# Patient Record
Sex: FEMALE | Race: WHITE | NOT HISPANIC OR LATINO | Employment: OTHER | URBAN - METROPOLITAN AREA
[De-identification: names, ages, dates, MRNs, and addresses within clinical notes are randomized per-mention and may not be internally consistent; named-entity substitution may affect disease eponyms.]

---

## 2017-01-04 DIAGNOSIS — M79.7 FIBROMYALGIA: ICD-10-CM

## 2017-01-04 DIAGNOSIS — F33.0 MAJOR DEPRESSIVE DISORDER, RECURRENT EPISODE, MILD (H): ICD-10-CM

## 2017-01-04 DIAGNOSIS — G25.81 RESTLESS LEG SYNDROME: Primary | ICD-10-CM

## 2017-01-04 DIAGNOSIS — F41.9 ANXIETY: ICD-10-CM

## 2017-01-04 RX ORDER — DULOXETIN HYDROCHLORIDE 60 MG/1
60 CAPSULE, DELAYED RELEASE ORAL 2 TIMES DAILY
Qty: 180 CAPSULE | Refills: 3 | Status: SHIPPED | OUTPATIENT
Start: 2017-01-04 | End: 2018-01-04

## 2017-01-04 NOTE — TELEPHONE ENCOUNTER
Pt states she is taking 3 mg of Requip.   Provider approval needed, this amount is not on her directions.     Also resent her Cymbalta to [facility]. She states they won't release the 12/8/16 prescription.

## 2017-01-05 RX ORDER — ROPINIROLE 1 MG/1
3 TABLET, FILM COATED ORAL AT BEDTIME
Qty: 90 TABLET | Refills: 5 | Status: SHIPPED | OUTPATIENT
Start: 2017-01-05 | End: 2017-05-25

## 2017-01-05 NOTE — TELEPHONE ENCOUNTER
It was ordered as 2 mg at night; advise her she should never increase a medication above what I have recommended on her own. Requip can take some time to work so that is why I had her stop at 2 mg to start with. I will do the 3 now but in the future she needs to call before changing doses.

## 2017-01-08 PROBLEM — F33.0 MILD EPISODE OF RECURRENT MAJOR DEPRESSIVE DISORDER (H): Status: ACTIVE | Noted: 2017-01-08

## 2017-01-18 ENCOUNTER — TELEPHONE (OUTPATIENT)
Dept: INTERNAL MEDICINE | Facility: CLINIC | Age: 72
End: 2017-01-18

## 2017-01-18 DIAGNOSIS — M51.369 DDD (DEGENERATIVE DISC DISEASE), LUMBAR: ICD-10-CM

## 2017-01-18 DIAGNOSIS — M79.7 FIBROMYALGIA: Primary | ICD-10-CM

## 2017-01-18 NOTE — TELEPHONE ENCOUNTER
Norco      Last Written Prescription Date:  12-23-16  Last Fill Quantity: 180,   # refills: 0  Last Office Visit with [facility] prescribing provider: 12-8-16  Future Office visit:       Routing refill request to provider for review/approval because:  Drug not on the [facility] or [facility] refill protocol or controlled substance    Signed CSA form in chart.    [facility]    Please advise, thanks.

## 2017-01-18 NOTE — TELEPHONE ENCOUNTER
Reason for Call:  Medication or medication refill:    Do you use a [facility]?  Name of the pharmacy and phone number for the current request:  [facility] [address] [place] - [phone]    Name of the medication requested: HAI,    Other request: NONE      Can we leave a detailed message on this number? YES    Phone number patient can be reached at: Cell number on file:    Telephone Information:   Mobile [phone]       Best Time: ASAP    Call taken on 1/18/2017 at 11:07 AM by Jazz Franks

## 2017-01-19 RX ORDER — HYDROCODONE BITARTRATE AND ACETAMINOPHEN 5; 325 MG/1; MG/1
1-2 TABLET ORAL EVERY 6 HOURS PRN
Qty: 180 TABLET | Refills: 0 | Status: SHIPPED | OUTPATIENT
Start: 2017-01-19 | End: 2017-02-17

## 2017-02-02 ENCOUNTER — TELEPHONE (OUTPATIENT)
Dept: INTERNAL MEDICINE | Facility: CLINIC | Age: 72
End: 2017-02-02

## 2017-02-02 NOTE — TELEPHONE ENCOUNTER
Patient calling in requesting a z-deana.  Reports having a cold and coughing up phlegm, greenish in color, thick once expectorated. Hard to sleep due to coughing. Cough x 3-4 days. Denies fever. Denies SOB or wheezing.    Provider please review and advise.

## 2017-02-07 ENCOUNTER — TELEPHONE (OUTPATIENT)
Dept: INTERNAL MEDICINE | Facility: CLINIC | Age: 72
End: 2017-02-07

## 2017-02-07 DIAGNOSIS — R05.9 COUGH: Primary | ICD-10-CM

## 2017-02-07 NOTE — TELEPHONE ENCOUNTER
Is she still having a cough? Or is this a new illness? If it is chronic she may need to see the lung specialist.

## 2017-02-07 NOTE — TELEPHONE ENCOUNTER
Cheratussin AC Oral Syrup      Last Written Prescription Date:  01/08/16  Last Fill Quantity: 8 oz,   # refills: 0  Last Office Visit with [facility] or  [facility] prescribing provider: 12/08/16  Future Office visit:       Routing refill request to provider for review/approval because:  Drug not on the [facility] or  [facility] refill protocol or controlled substance  Drug not active on patient's medication list

## 2017-02-08 NOTE — TELEPHONE ENCOUNTER
Spoke with pt and she went to [facility] last [facility], they gave her Zpak. She would like the cough syrup still, routed to PCP

## 2017-02-09 RX ORDER — CODEINE PHOSPHATE AND GUAIFENESIN 10; 100 MG/5ML; MG/5ML
2 SOLUTION ORAL AT BEDTIME
Qty: 8 OZ | Refills: 0 | Status: SHIPPED | OUTPATIENT
Start: 2017-02-09 | End: 2018-01-23

## 2017-02-17 DIAGNOSIS — M79.7 FIBROMYALGIA: ICD-10-CM

## 2017-02-17 DIAGNOSIS — M51.369 DDD (DEGENERATIVE DISC DISEASE), LUMBAR: ICD-10-CM

## 2017-02-17 NOTE — TELEPHONE ENCOUNTER
Pt requesting refill of Vicodin  Last written 1/19/17 #180 with 0 refills  Last OV 12/8/16    Please have someone take it to [facility].

## 2017-02-17 NOTE — TELEPHONE ENCOUNTER
Reason for Call:  Medication or medication refill:    [facility]?  Name of the pharmacy and phone number for the current request:  [facility] [address] ([place]) - [phone]    Name of the medication requested: vicodin    Other request: Pt has 5 left    Can we leave a detailed message on this number? YES    Phone number patient can be reached at: Home number on file [phone] (home)    Best Time: any    Call taken on 2/17/2017 at 1:10 PM by Giovanna Yang

## 2017-02-19 RX ORDER — HYDROCODONE BITARTRATE AND ACETAMINOPHEN 5; 325 MG/1; MG/1
1-2 TABLET ORAL EVERY 6 HOURS PRN
Qty: 180 TABLET | Refills: 0 | Status: SHIPPED | OUTPATIENT
Start: 2017-02-19 | End: 2017-03-16

## 2017-02-20 DIAGNOSIS — R05.9 COUGH: ICD-10-CM

## 2017-02-20 RX ORDER — CODEINE PHOSPHATE AND GUAIFENESIN 10; 100 MG/5ML; MG/5ML
2 SOLUTION ORAL AT BEDTIME
Qty: 8 OZ | Refills: 0 | Status: CANCELLED | OUTPATIENT
Start: 2017-02-20

## 2017-02-20 NOTE — TELEPHONE ENCOUNTER
Patient called- not feeling any better sx persist.     Deep coughing, phlegm is white and clear. Mucinex not effective. Finished Zpak few weeks ago.    [facility] [place].

## 2017-02-21 NOTE — TELEPHONE ENCOUNTER
Robitussin AC      Last Written Prescription Date:  02/09/17  Last Fill Quantity: 8 ounces,   # refills: 0  Last Office Visit with [facility] prescribing provider: 12/08/16  Future Office visit:       Routing refill request to provider for review/approval because:  Drug not on the [facility], [facility] or [facility] refill protocol or controlled substance    CSA in epic.    Please advise, thanks.

## 2017-03-16 DIAGNOSIS — M51.369 DDD (DEGENERATIVE DISC DISEASE), LUMBAR: ICD-10-CM

## 2017-03-16 DIAGNOSIS — M79.7 FIBROMYALGIA: ICD-10-CM

## 2017-03-16 NOTE — TELEPHONE ENCOUNTER
Reason for Call:  Medication or medication refill:    Do you use a [facility]?  Name of the pharmacy and phone number for the current request:  [facility] [address] ([place]) - [phone]    Name of the medication requested: vicodin    Other request:     Can we leave a detailed message on this number? YES-pt requests to leave a full message     Phone number patient can be reached at: Home number on file [phone] (home)    Best Time:     Call taken on 3/16/2017 at 12:53 PM by Chika Matute

## 2017-03-16 NOTE — TELEPHONE ENCOUNTER
Norco       Last Written Prescription Date:  2/19/17  Last Fill Quantity: 180,   # refills: 0    Last Office Visit with [facility] prescribing provider: 12/8/16  Future Office visit:       Routing refill request to provider for review/approval because:  Drug not on the [facility] or [facility] refill protocol or controlled substance

## 2017-03-21 RX ORDER — HYDROCODONE BITARTRATE AND ACETAMINOPHEN 5; 325 MG/1; MG/1
1-2 TABLET ORAL EVERY 6 HOURS PRN
Qty: 180 TABLET | Refills: 0 | Status: SHIPPED | OUTPATIENT
Start: 2017-03-21 | End: 2017-04-17

## 2017-03-22 ENCOUNTER — TRANSFERRED RECORDS (OUTPATIENT)
Dept: HEALTH INFORMATION MANAGEMENT | Facility: CLINIC | Age: 72
End: 2017-03-22

## 2017-04-17 DIAGNOSIS — I10 BENIGN ESSENTIAL HYPERTENSION: ICD-10-CM

## 2017-04-17 DIAGNOSIS — M51.369 DDD (DEGENERATIVE DISC DISEASE), LUMBAR: ICD-10-CM

## 2017-04-17 DIAGNOSIS — M79.7 FIBROMYALGIA: ICD-10-CM

## 2017-04-17 RX ORDER — LOSARTAN POTASSIUM 50 MG/1
TABLET ORAL
Qty: 90 TABLET | Refills: 0 | Status: SHIPPED | OUTPATIENT
Start: 2017-04-17 | End: 2017-05-25

## 2017-04-17 RX ORDER — AMLODIPINE BESYLATE 5 MG/1
TABLET ORAL
Qty: 90 TABLET | Refills: 0 | Status: SHIPPED | OUTPATIENT
Start: 2017-04-17 | End: 2017-05-25

## 2017-04-17 NOTE — TELEPHONE ENCOUNTER
Reason for Call:  Medication or medication refill:    Do you use a [facility]?  Name of the pharmacy and phone number for the current request: [facility]    Name of the medication requested: vicodin    Other request: none   Can we leave a detailed message on this number? YES    Phone number patient can be reached at: Home number on file [phone] (home)    Best Time: asap     Call taken on 4/17/2017 at 10:50 AM by Denia Orozco

## 2017-04-17 NOTE — TELEPHONE ENCOUNTER
Norco      Last Written Prescription Date:  3-21-17  Last Fill Quantity: 180,   # refills: 0  Last Office Visit with [facility] prescribing provider: 12-8-16  Future Office visit:    Next 5 appointments (look out 90 days)     Apr 28, 2017 10:00 AM CDT   [facility]   [facility]    [address]  [facility] [id number] [phone]                   Routing refill request to provider for review/approval because:  Drug not on the [facility], [facility] or [facility] refill protocol or controlled substance    Signed CSA form in chart.    [facility]    Please advise, thanks.

## 2017-04-17 NOTE — TELEPHONE ENCOUNTER
Amlodipine      Cozaar      Last Written Prescription Date: 7/20/16  Last Fill Quantity: 90, # refills: 3  Last Office Visit with [facility] or [facility] prescribing provider: 12/8/16  Next 5 appointments (look out 90 days)     Apr 28, 2017 10:00 AM CDT   Office Visit with Dolores Blanton MD   [facility]    [address]  [facility] [id number] [phone]                   Potassium   Date Value Ref Range Status   05/05/2016 4.2 3.4 - 5.3 mmol/L Final     Creatinine   Date Value Ref Range Status   05/05/2016 0.87 0.52 - 1.04 mg/dL Final     BP Readings from Last 3 Encounters:   12/08/16 138/70   05/05/16 138/89   08/06/15 143/89     Medication is being filled for 1 time refill only due to:  Patient needs labs non-fasting labs due May 2017. Appointment scheduled 4/28/17.

## 2017-04-18 ENCOUNTER — TELEPHONE (OUTPATIENT)
Dept: INTERNAL MEDICINE | Facility: CLINIC | Age: 72
End: 2017-04-18

## 2017-04-18 DIAGNOSIS — R53.83 FATIGUE, UNSPECIFIED TYPE: ICD-10-CM

## 2017-04-18 DIAGNOSIS — I10 ESSENTIAL HYPERTENSION: Primary | ICD-10-CM

## 2017-04-18 RX ORDER — HYDROCODONE BITARTRATE AND ACETAMINOPHEN 5; 325 MG/1; MG/1
1-2 TABLET ORAL EVERY 6 HOURS PRN
Qty: 180 TABLET | Refills: 0 | Status: SHIPPED | OUTPATIENT
Start: 2017-04-18 | End: 2017-05-18

## 2017-04-18 NOTE — TELEPHONE ENCOUNTER
Reason for Call: Request for an order or referral:    Order or referral being requested: labs for Vitamin D     Date needed: before or at my next appointment 4/28    Has the patient been seen by the PCP for this problem? NO    Additional comments: pt's chiropractor would like labs ordered for vitamin D levels in regards to patients fatigue    Phone number Patient can be reached at:  Cell number on file:    Telephone Information:   Mobile [phone]       Best Time:      Can we leave a detailed message on this number?  YES    Call taken on 4/18/2017 at 11:53 AM by Chika Matute

## 2017-04-18 NOTE — TELEPHONE ENCOUNTER
Advise patient medicare will not likely pay for this and she will have to sign ABN form. Document how much Vitamin D she is taking and advise she also needs her yearly urine protein and blood kidney tests.

## 2017-04-19 NOTE — TELEPHONE ENCOUNTER
Spoke to patient who states she will look into cost if not covered and decide if they want to proceed and let us know.

## 2017-05-02 ENCOUNTER — HOSPITAL ENCOUNTER (OUTPATIENT)
Dept: MAMMOGRAPHY | Facility: CLINIC | Age: 72
Discharge: HOME | End: 2017-05-02
Attending: INTERNAL MEDICINE | Admitting: INTERNAL MEDICINE
Payer: MEDICARE

## 2017-05-02 DIAGNOSIS — Z12.31 VISIT FOR SCREENING MAMMOGRAM: ICD-10-CM

## 2017-05-02 PROCEDURE — 77063 BREAST TOMOSYNTHESIS BI: CPT

## 2017-05-05 ENCOUNTER — TRANSFERRED RECORDS (OUTPATIENT)
Dept: HEALTH INFORMATION MANAGEMENT | Facility: CLINIC | Age: 72
End: 2017-05-05

## 2017-05-18 DIAGNOSIS — M79.7 FIBROMYALGIA: ICD-10-CM

## 2017-05-18 DIAGNOSIS — M51.369 DDD (DEGENERATIVE DISC DISEASE), LUMBAR: ICD-10-CM

## 2017-05-18 RX ORDER — HYDROCODONE BITARTRATE AND ACETAMINOPHEN 5; 325 MG/1; MG/1
1-2 TABLET ORAL EVERY 6 HOURS PRN
Qty: 180 TABLET | Refills: 0 | Status: SHIPPED | OUTPATIENT
Start: 2017-05-18 | End: 2017-06-23

## 2017-05-18 NOTE — TELEPHONE ENCOUNTER
Norco      Last Written Prescription Date:  4/18/17  Last Fill Quantity: 180,   # refills: 0  Last Office Visit with [facility] prescribing provider: 12/8/16  Future Office visit:    Next 5 appointments (look out 90 days)     May 25, 2017  8:00 AM CDT   [facility] with [facility]   [facility]    [address]  [facility] [id number] [phone]                   Routing refill request to provider for review/approval because:  Drug not on the [facility], [facility] or [facility] refill protocol or controlled substance    Controlled substance agreement on file

## 2017-05-18 NOTE — TELEPHONE ENCOUNTER
Reason for Call:  Medication or medication refill:    Do you use a [facility]?  Name of the pharmacy and phone number for the current request:  [facility] [address] [place] - [phone]    Name of the medication requested: vicodin    Other request: pt states she would like to pick this RX up on 5/18/17,told pt no kristee    Can we leave a detailed message on this number? YES    Phone number patient can be reached at: Home number on file [phone] (home)    Best Time: anytime    Call taken on 5/18/2017 at 12:45 PM by Basilia Martell

## 2017-05-25 ENCOUNTER — OFFICE VISIT (OUTPATIENT)
Dept: INTERNAL MEDICINE | Facility: CLINIC | Age: 72
End: 2017-05-25
Payer: MEDICARE

## 2017-05-25 VITALS
TEMPERATURE: 97.9 F | WEIGHT: 286 LBS | HEIGHT: 64 IN | SYSTOLIC BLOOD PRESSURE: 138 MMHG | HEART RATE: 104 BPM | OXYGEN SATURATION: 96 % | BODY MASS INDEX: 48.83 KG/M2 | DIASTOLIC BLOOD PRESSURE: 84 MMHG

## 2017-05-25 DIAGNOSIS — Z00.00 ENCOUNTER FOR ROUTINE ADULT HEALTH EXAMINATION WITHOUT ABNORMAL FINDINGS: Primary | ICD-10-CM

## 2017-05-25 DIAGNOSIS — R73.09 ABNORMAL BLOOD SUGAR: ICD-10-CM

## 2017-05-25 DIAGNOSIS — G89.4 CHRONIC PAIN SYNDROME: ICD-10-CM

## 2017-05-25 DIAGNOSIS — G25.81 RESTLESS LEG SYNDROME: ICD-10-CM

## 2017-05-25 DIAGNOSIS — J45.20 MILD INTERMITTENT ASTHMA WITHOUT COMPLICATION: ICD-10-CM

## 2017-05-25 DIAGNOSIS — I10 BENIGN ESSENTIAL HYPERTENSION: ICD-10-CM

## 2017-05-25 DIAGNOSIS — R53.83 FATIGUE, UNSPECIFIED TYPE: ICD-10-CM

## 2017-05-25 DIAGNOSIS — F33.0 MILD EPISODE OF RECURRENT MAJOR DEPRESSIVE DISORDER (H): ICD-10-CM

## 2017-05-25 DIAGNOSIS — I10 ESSENTIAL HYPERTENSION: ICD-10-CM

## 2017-05-25 DIAGNOSIS — E66.01 MORBID OBESITY, UNSPECIFIED OBESITY TYPE (H): ICD-10-CM

## 2017-05-25 LAB
ANION GAP SERPL CALCULATED.3IONS-SCNC: 9 MMOL/L (ref 3–14)
BUN SERPL-MCNC: 18 MG/DL (ref 7–30)
CALCIUM SERPL-MCNC: 9.4 MG/DL (ref 8.5–10.1)
CHLORIDE SERPL-SCNC: 108 MMOL/L (ref 94–109)
CO2 SERPL-SCNC: 24 MMOL/L (ref 20–32)
CREAT SERPL-MCNC: 0.82 MG/DL (ref 0.52–1.04)
CREAT UR-MCNC: 199 MG/DL
GFR SERPL CREATININE-BSD FRML MDRD: 69 ML/MIN/1.7M2
GLUCOSE SERPL-MCNC: 116 MG/DL (ref 70–99)
MICROALBUMIN UR-MCNC: 12 MG/L
MICROALBUMIN/CREAT UR: 5.83 MG/G CR (ref 0–25)
POTASSIUM SERPL-SCNC: 4.2 MMOL/L (ref 3.4–5.3)
SODIUM SERPL-SCNC: 141 MMOL/L (ref 133–144)

## 2017-05-25 PROCEDURE — G0472 HEP C SCREEN HIGH RISK/OTHER: HCPCS | Performed by: INTERNAL MEDICINE

## 2017-05-25 PROCEDURE — 86803 HEPATITIS C AB TEST: CPT | Performed by: INTERNAL MEDICINE

## 2017-05-25 PROCEDURE — 82306 VITAMIN D 25 HYDROXY: CPT | Performed by: INTERNAL MEDICINE

## 2017-05-25 PROCEDURE — 82043 UR ALBUMIN QUANTITATIVE: CPT | Performed by: INTERNAL MEDICINE

## 2017-05-25 PROCEDURE — 80307 DRUG TEST PRSMV CHEM ANLYZR: CPT | Mod: 90 | Performed by: INTERNAL MEDICINE

## 2017-05-25 PROCEDURE — 80048 BASIC METABOLIC PNL TOTAL CA: CPT | Performed by: INTERNAL MEDICINE

## 2017-05-25 PROCEDURE — 99000 SPECIMEN HANDLING OFFICE-LAB: CPT | Performed by: INTERNAL MEDICINE

## 2017-05-25 PROCEDURE — G0439 PPPS, SUBSEQ VISIT: HCPCS | Performed by: INTERNAL MEDICINE

## 2017-05-25 PROCEDURE — 36415 COLL VENOUS BLD VENIPUNCTURE: CPT | Performed by: INTERNAL MEDICINE

## 2017-05-25 RX ORDER — ROPINIROLE 1 MG/1
3 TABLET, FILM COATED ORAL AT BEDTIME
Qty: 90 TABLET | Refills: 11 | Status: SHIPPED | OUTPATIENT
Start: 2017-05-25 | End: 2018-01-23

## 2017-05-25 RX ORDER — LOSARTAN POTASSIUM 50 MG/1
TABLET ORAL
Qty: 90 TABLET | Refills: 3 | Status: SHIPPED | OUTPATIENT
Start: 2017-05-25 | End: 2017-11-07

## 2017-05-25 RX ORDER — AMLODIPINE BESYLATE 5 MG/1
TABLET ORAL
Qty: 90 TABLET | Refills: 3 | Status: SHIPPED | OUTPATIENT
Start: 2017-05-25 | End: 2017-11-07

## 2017-05-25 RX ORDER — ALBUTEROL SULFATE 90 UG/1
2 AEROSOL, METERED RESPIRATORY (INHALATION) EVERY 6 HOURS PRN
Qty: 1 INHALER | Refills: 11 | Status: SHIPPED | OUTPATIENT
Start: 2017-05-25 | End: 2023-11-21

## 2017-05-25 NOTE — MR AVS SNAPSHOT
After Visit Summary   5/25/2017    Svetlana Serrano    MRN: [id number]           Patient Information     Date Of Birth          1945        Visit Information        Provider Department      5/25/2017 8:00 AM Dolores Blanton MD [facility]        Today's Diagnoses     Encounter for routine adult health examination without abnormal findings    -  1    Fatigue, unspecified type        Essential hypertension          Care Instructions    Look for information on Low Glycemic Load Diet.       PREVENTIVE HEALTH RECOMMENDATIONS:     Vaccines: Get a flu shot each year. Get a tetanus shot every 10 years.     Exercise for at least 150 minutes a week (an average of 30 minutes a day, 5 days of the week). This will help you control your weight and prevent disease.    Limit alcohol to one drink per day.    No smoking.     Wear sunscreen to prevent skin cancer.     See your dentist twice a year for an exam and cleaning.    Try to get Calcium 1200 mg total per day. It is best to not take it all at once.     BMI or Body Mass Index is a way of indicating weight and health risk for cardiovascular diseases, high blood pressure, diabetes.   Definitions:    Underweight is less than 18.5 and will be associated with health risk.   Normal BMI is 18.5 to 25   Overweight is 25-29   Obesity is 30 or greater   Morbid Obesity is 40 or greater or 35 or greater with diabetes or high blood pressure  Obesity and Morbid Obesity are associated with higher health risks. Lowering calories, exercising more may lower your BMI and even small decreases can have positive impact on lowering health risks.   Your Body mass index is 49.87 kg/(m^2)..               Follow-ups after your visit        Who to contact     If you have questions or need follow up information about today's clinic visit or your schedule please contact [facility] directly at [phone].  Normal or non-critical lab and imaging results will  "be communicated to you by MyChart, letter or phone within 4 business days after the clinic has received the results. If you do not hear from us within 7 days, please contact the clinic through [employer] or phone. If you have a critical or abnormal lab result, we will notify you by phone as soon as possible.  Submit refill requests through [employer] or call your pharmacy and they will forward the refill request to us. Please allow 3 business days for your refill to be completed.          Additional Information About Your Visit        [employer] Information     [employer] lets you send messages to your doctor, view your test results, renew your prescriptions, schedule appointments and more. To sign up, go to [email or web address] . Click on \"Log in\" on the left side of the screen, which will take you to the Welcome page. Then click on \"Sign up Now\" on the right side of the page.     You will be asked to enter the access code listed below, as well as some personal information. Please follow the directions to create your username and password.     Your access code is: [email or web address]  Expires: 2017  8:40 AM     Your access code will  in 90 days. If you need help or a new code, please call your [place] clinic or [phone].        Care EveryWhere ID     This is your Care EveryWhere ID. This could be used by other organizations to access your [place] medical records  [id number]        Your Vitals Were     Pulse Temperature Height Pulse Oximetry BMI (Body Mass Index)       104 97.9  F (36.6  C) (Oral) 5' 3.5\" (1.613 m) 96% 49.87 kg/m2        Blood Pressure from Last 3 Encounters:   17 144/84   16 138/70   16 138/89    Weight from Last 3 Encounters:   17 286 lb (129.7 kg)   16 287 lb 12.8 oz (130.5 kg)   16 282 lb (127.9 kg)              We Performed the Following     Albumin Random Urine Quantitative     Basic metabolic panel     Hepatitis C Screen Reflex to HCV RNA Quant and " Genotype     Vitamin D Deficiency        Primary Care Provider Office Phone # Fax #    Dolores Blanton [phone]       [facility] [address] [facility] [address]  [facility] [address]        Thank you!     Thank you for choosing [facility]  for your care. Our goal is always to provide you with excellent care. Hearing back from our patients is one way we can continue to improve our services. Please take a few minutes to complete the written survey that you may receive in the mail after your visit with us. Thank you!             Your Updated Medication List - Protect others around you: Learn how to safely use, store and throw away your medicines at www.disposemymeds.org.          This list is accurate as of: 5/25/17  8:40 AM.  Always use your most recent med list.                   Brand Name Dispense Instructions for use    albuterol 108 (90 BASE) MCG/ACT Inhaler    PROAIR HFA/PROVENTIL HFA/VENTOLIN HFA    1 Inhaler    Inhale 2 puffs into the lungs every 6 hours as needed for shortness of breath / dyspnea       aluminum chloride 20 % external solution    DRYSOL    60 mL    Apply to scalp qhs x3, cover area of application with plastic and wash area in morning. As sweating improves, decrease use to 1-2 times weekly.       amLODIPine 5 MG tablet    NORVASC    90 tablet    Take 1 tablet by mouth  daily       diclofenac 1 % Gel topical gel    VOLTAREN    300 g    Apply to affected area tid as directed.       DULoxetine 60 MG EC capsule    CYMBALTA    180 capsule    Take 1 capsule (60 mg) by mouth 2 times daily       guaiFENesin-codeine 100-10 MG/5ML Soln solution    ROBITUSSIN AC    8 oz    Take 10 mLs by mouth At Bedtime       HYDROcodone-acetaminophen 5-325 MG per tablet    NORCO    180 tablet    Take 1-2 tablets by mouth every 6 hours as needed       LORazepam 0.5 MG tablet    ATIVAN    9 tablet    1-2 po prior to flying. Do not drive within 6 hours of taking.       losartan 50 MG  tablet    COZAAR    90 tablet    Take 1 tablet by mouth  daily       * rOPINIRole 0.5 MG tablet    REQUIP    60 tablet    Take 1.5 tablets nightly for one week. Then increase to 2 tablets nightly.       * rOPINIRole 1 MG tablet    REQUIP    90 tablet    Take 3 tablets (3 mg) by mouth At Bedtime       * Notice:  This list has 2 medication(s) that are the same as other medications prescribed for you. Read the directions carefully, and ask your doctor or other care provider to review them with you.

## 2017-05-25 NOTE — PATIENT INSTRUCTIONS
Look for information on Low Glycemic Load Diet.       PREVENTIVE HEALTH RECOMMENDATIONS:     Vaccines: Get a flu shot each year. Get a tetanus shot every 10 years.     Exercise for at least 150 minutes a week (an average of 30 minutes a day, 5 days of the week). This will help you control your weight and prevent disease.    Limit alcohol to one drink per day.    No smoking.     Wear sunscreen to prevent skin cancer.     See your dentist twice a year for an exam and cleaning.    Try to get Calcium 1200 mg total per day. It is best to not take it all at once.     BMI or Body Mass Index is a way of indicating weight and health risk for cardiovascular diseases, high blood pressure, diabetes.   Definitions:    Underweight is less than 18.5 and will be associated with health risk.   Normal BMI is 18.5 to 25   Overweight is 25-29   Obesity is 30 or greater   Morbid Obesity is 40 or greater or 35 or greater with diabetes or high blood pressure  Obesity and Morbid Obesity are associated with higher health risks. Lowering calories, exercising more may lower your BMI and even small decreases can have positive impact on lowering health risks.   Your Body mass index is 49.87 kg/(m^2)..

## 2017-05-25 NOTE — PROGRESS NOTES
SUBJECTIVE:                                                            Svetlana Serrano is a 71 year old female who presents for Preventive Visit.      Are you in the first 12 months of your Medicare Part B coverage?  No    Healthy Habits:    Do you get at least three servings of calcium containing foods daily (dairy, green leafy vegetables, etc.)? yes    Amount of exercise or daily activities, outside of work: no     Problems taking medications regularly No    Medication side effects: No    Have you had an eye exam in the past two years? yes    Do you see a dentist twice per year? no    Do you have sleep apnea, excessive snoring or daytime drowsiness?yes    COGNITIVE SCREEN  1) Repeat 3 items (Banana, Sunrise, Chair)    2) Clock draw: NORMAL  3) 3 item recall: Recalls 3 objects  Results: 3 items recalled: COGNITIVE IMPAIRMENT LESS LIKELY    Mini-CogTM Copyright S Kailee. Licensed by the author for use in [facility]; reprinted with permission (lonny@[facility]). All rights reserved.        Current concerns:   Weight questions, not sure what diet, questions about meds.   BP at home always better than here.       Reviewed and updated as needed this visit by clinical staff  Tobacco  Allergies  Med Hx  Surg Hx  Fam Hx  Soc Hx        Reviewed and updated as needed this visit by Provider        Social History   Substance Use Topics     Smoking status: Former Smoker     Quit date: 1/1/1969     Smokeless tobacco: Never Used     Alcohol use 0.0 oz/week     0 Standard drinks or equivalent per week      Comment: casual       The patient does not drink >3 drinks per day nor >7 drinks per week.    Today's PHQ-2 Score:   PHQ-2 ( 1999 Pfizer) 5/5/2016 7/10/2015   Q1: Little interest or pleasure in doing things 0 0   Q2: Feeling down, depressed or hopeless 0 0   PHQ-2 Score 0 0       Do you feel safe in your environment - Yes    Do you have a Health Care Directive?: No: Advance care planning was reviewed with  patient; patient declined at this time.    Current providers sharing in care for this patient include:   Patient Care Team:  Dolores Blanton MD as PCP - General (Internal Medicine)      Hearing impairment: No    Ability to successfully perform activities of daily living: Yes, no assistance needed     Fall risk:  Fallen 2 or more times in the past year?: No  Any fall with injury in the past year?: No    Home safety:  lack of grab bars in the bathroom- working on     The following health maintenance items are reviewed in Epic and correct as of today:  Health Maintenance   Topic Date Due     HEPATITIS C SCREENING  06/07/1963     FALL RISK ASSESSMENT  05/05/2017     ASTHMA ACTION PLAN Q1 YR  05/07/2017     ASTHMA CONTROL TEST Q6 MOS  06/08/2017     INFLUENZA VACCINE (SYSTEM ASSIGNED)  09/01/2017     GHADA QUESTIONNAIRE 1 YEAR  12/08/2017     PHQ-9 Q1YR  12/08/2017     MAMMO Q1 YR  05/02/2018     TETANUS IMMUNIZATION (SYSTEM ASSIGNED)  06/19/2018     ADVANCE DIRECTIVE PLANNING Q5 YRS  07/02/2018     LIPID SCREEN Q5 YR FEMALE (SYSTEM ASSIGNED)  05/21/2020     COLONOSCOPY Q5 YR  08/06/2020     DEXA SCAN SCREENING (SYSTEM ASSIGNED)  Completed     PNEUMOCOCCAL  Completed       Patient Active Problem List   Diagnosis     Mild intermittent asthma     HTN (hypertension)     Restless leg syndrome     CARDIOVASCULAR SCREENING; LDL GOAL LESS THAN 160     Fibromyalgia     Advanced directives, counseling/discussion     DDD (degenerative disc disease), lumbar     Spinal stenosis     Controlled substance agreement signed     Morbid obesity (H)     Anxiety     Mild episode of recurrent major depressive disorder (H)     Current Outpatient Prescriptions   Medication Sig Dispense Refill     HYDROcodone-acetaminophen (NORCO) 5-325 MG per tablet Take 1-2 tablets by mouth every 6 hours as needed 180 tablet 0     losartan (COZAAR) 50 MG tablet Take 1 tablet by mouth  daily 90 tablet 0     amLODIPine (NORVASC) 5 MG tablet Take 1 tablet by mouth   "daily 90 tablet 0     rOPINIRole (REQUIP) 1 MG tablet Take 3 tablets (3 mg) by mouth At Bedtime 90 tablet 5     DULoxetine (CYMBALTA) 60 MG EC capsule Take 1 capsule (60 mg) by mouth 2 times daily 180 capsule 3     LORazepam (ATIVAN) 0.5 MG tablet 1-2 po prior to flying. Do not drive within 6 hours of taking. 9 tablet 1     diclofenac (VOLTAREN) 1 % GEL Apply to affected area tid as directed. 300 g 3     albuterol (PROAIR HFA, PROVENTIL HFA, VENTOLIN HFA) 108 (90 BASE) MCG/ACT inhaler Inhale 2 puffs into the lungs every 6 hours as needed for shortness of breath / dyspnea 1 Inhaler 2     guaiFENesin-codeine (ROBITUSSIN AC) 100-10 MG/5ML SOLN solution Take 10 mLs by mouth At Bedtime 8 oz 0     rOPINIRole (REQUIP) 0.5 MG tablet Take 1.5 tablets nightly for one week. Then increase to 2 tablets nightly. 60 tablet 11     aluminum chloride (DRYSOL) 20 % external solution Apply to scalp qhs x3, cover area of application with plastic and wash area in morning. As sweating improves, decrease use to 1-2 times weekly. 60 mL 11      Review Of Systems  Skin: negative  Eyes: has appt coming up  Ears/Nose/Throat: negative  Respiratory: No dyspnea on exertion and No cough  Cardiovascular: negative  Gastrointestinal: negative  Genitourinary: negative  Musculoskeletal: stable  Neurologic: negative  Psychiatric: negative  Hematologic/Lymphatic/Immunologic: negative  Endocrine: negative   Gynecologic ros reveals:no breast pain or new or enlarging lumps on self exam    Objective:  Patient alert, in no acute distress  /84  Pulse 104  Temp 97.9  F (36.6  C) (Oral)  Ht 5' 3.5\" (1.613 m)  Wt 286 lb (129.7 kg)  SpO2 96%  BMI 49.87 kg/m2    HEENT: extraocular movements are intact, pupils equal and reactive to light and accommodation, TMs clear, oropharynx clear  NECK: Neck supple. No adenopathy. Thyroid symmetric, normal size,, Carotids without bruits.  PULMONARY: clear to auscultation  CARDIAC: regular rate and rhythm and no " "murmurs, clicks, or gallops  PULSES: 2/2 throughout  BACK: no spinal or CVAT  ABDOMINAL: Soft, nontender.  Normal bowel sounds.  No hepatosplenomegaly or abnormal masses  BREAST: No breast masses or tenderness, No axillary masses or tenderness and No galactorrhea  PELVIC: not needed  REFLEXES: 2+ throughout except knees 0/2  SKIN: seb ks on back       ASSESSMENT / PLAN:                                                              (Z00.00) Encounter for routine adult health examination without abnormal findings  (primary encounter diagnosis)  Comment:   Plan: Hepatitis C Screen Reflex to HCV RNA Quant and         Genotype            (E66.01) Morbid obesity, unspecified obesity type (H)  Comment: discussed diet  Plan: recheck 6-12 months    (F33.0) Mild episode of recurrent major depressive disorder (H)  Comment: stable  Plan: continue med    (I10) Essential hypertension  Comment: controlled  Plan: continue med    (G89.4) Chronic pain syndrome  Comment: stable, has CSA  Plan: Drug  Screen Comprehensive , Urine with         Reported Meds (MedTox) (Pain Care Package)              End of Life Planning:  Patient currently has an advanced directive: No.  I have verified the patient's ablity to prepare an advanced directive/make health care decisions.  Literature was provided to assist patient in preparing an advanced directive.    COUNSELING:  Reviewed preventive health counseling, as reflected in patient instructions       Hepatitis C screening        Estimated body mass index is 48.64 kg/(m^2) as calculated from the following:    Height as of 12/8/16: 5' 4.5\" (1.638 m).    Weight as of 12/8/16: 287 lb 12.8 oz (130.5 kg).  Weight management plan: Discussed healthy diet and exercise guidelines and patient will follow up in 12 months in clinic to re-evaluate.   reports that she quit smoking about 48 years ago. She has never used smokeless tobacco.      Appropriate preventive services were discussed with this patient, " including applicable screening as appropriate for cardiovascular disease, diabetes, osteopenia/osteoporosis, and glaucoma.  As appropriate for age/gender, discussed screening for colorectal cancer, prostate cancer, breast cancer, and cervical cancer. Checklist reviewing preventive services available has been given to the patient.    Reviewed patients plan of care and provided an AVS. The Basic Care Plan (routine screening as documented in Health Maintenance) for Svetlana meets the Care Plan requirement. This Care Plan has been established and reviewed with the Patient.    Counseling Resources:  ATP IV Guidelines  Pooled Cohorts Equation Calculator  Breast Cancer Risk Calculator  FRAX Risk Assessment  ICSI Preventive Guidelines  Dietary Guidelines for Americans, 2010  USDA's MyPlate  ASA Prophylaxis  Lung CA Screening    Dolores Blanton MD  [facility]

## 2017-05-25 NOTE — NURSING NOTE
"Chief Complaint   Patient presents with     Wellness Visit       Initial /84  Pulse 104  Temp 97.9  F (36.6  C) (Oral)  Ht 5' 3.5\" (1.613 m)  Wt 286 lb (129.7 kg)  SpO2 96%  BMI 49.87 kg/m2 Estimated body mass index is 49.87 kg/(m^2) as calculated from the following:    Height as of this encounter: 5' 3.5\" (1.613 m).    Weight as of this encounter: 286 lb (129.7 kg).  Medication Reconciliation: complete    "

## 2017-05-26 LAB
DEPRECATED CALCIDIOL+CALCIFEROL SERPL-MC: 37 UG/L (ref 20–75)
HCV AB SERPL QL IA: NORMAL

## 2017-05-26 ASSESSMENT — PATIENT HEALTH QUESTIONNAIRE - PHQ9: SUM OF ALL RESPONSES TO PHQ QUESTIONS 1-9: 1

## 2017-05-26 ASSESSMENT — ASTHMA QUESTIONNAIRES: ACT_TOTALSCORE: 23

## 2017-06-01 LAB — PAIN DRUG SCR UR W RPTD MEDS: NORMAL

## 2017-06-12 ENCOUNTER — TRANSFERRED RECORDS (OUTPATIENT)
Dept: HEALTH INFORMATION MANAGEMENT | Facility: CLINIC | Age: 72
End: 2017-06-12

## 2017-06-23 ENCOUNTER — TELEPHONE (OUTPATIENT)
Dept: INTERNAL MEDICINE | Facility: CLINIC | Age: 72
End: 2017-06-23

## 2017-06-23 DIAGNOSIS — M51.369 DDD (DEGENERATIVE DISC DISEASE), LUMBAR: ICD-10-CM

## 2017-06-23 DIAGNOSIS — M79.7 FIBROMYALGIA: ICD-10-CM

## 2017-06-23 NOTE — TELEPHONE ENCOUNTER
Norco       Last Written Prescription Date:  5/18/17  Last Fill Quantity: 180,   # refills: 0    Last Office Visit with [facility] or [facility] prescribing provider: 5/25/17    Future Office visit:       Routing refill request to provider for review/approval because:  Drug not on the [facility], [facility] or [facility] refill protocol or controlled substance

## 2017-06-23 NOTE — TELEPHONE ENCOUNTER
Reason for Call:  Medication or medication refill:    Do you use a [facility]?  Name of the pharmacy and phone number for the current request:  [facility] [address] ([place]) - [phone]    Name of the medication requested: vicodin    Other request: none    Can we leave a detailed message on this number? YES-pls leave a complete msg    Phone number patient can be reached at: Home number on file [phone] (home)    Best Time: any    Call taken on 6/23/2017 at 2:17 PM by Giovanna Yang

## 2017-06-26 RX ORDER — HYDROCODONE BITARTRATE AND ACETAMINOPHEN 5; 325 MG/1; MG/1
1-2 TABLET ORAL EVERY 6 HOURS PRN
Qty: 180 TABLET | Refills: 0 | Status: SHIPPED | OUTPATIENT
Start: 2017-06-26 | End: 2017-07-17

## 2017-06-26 NOTE — TELEPHONE ENCOUNTER
Called and spoke with Harley (Consent to Communicate in EPIC). Rx hand carried to [facility].

## 2017-07-11 ENCOUNTER — TRANSFERRED RECORDS (OUTPATIENT)
Dept: HEALTH INFORMATION MANAGEMENT | Facility: CLINIC | Age: 72
End: 2017-07-11

## 2017-07-17 DIAGNOSIS — M51.369 DDD (DEGENERATIVE DISC DISEASE), LUMBAR: ICD-10-CM

## 2017-07-17 DIAGNOSIS — M79.7 FIBROMYALGIA: ICD-10-CM

## 2017-07-17 NOTE — TELEPHONE ENCOUNTER
Norco. Pt states they are going to [place] on 7/20 and planning to stay there for 3-4 weeks. They are asking to have this filled early, otherwise will need to take paper script with them and have filled in [place].       Last Written Prescription Date:  6/26/17  Last Fill Quantity: 180,   # refills: 0    Last Office Visit with [facility] or [facility] prescribing provider: 5/25/17    Future Office visit:       Routing refill request to provider for review/approval because:  Drug not on the [facility] or [facility] refill protocol or controlled substance

## 2017-07-19 NOTE — TELEPHONE ENCOUNTER
Pt calls back. The insurance states OK as long as its a new Rx and signed by Dr Blanton.     OK to take to pharmacy in AM>

## 2017-07-20 RX ORDER — HYDROCODONE BITARTRATE AND ACETAMINOPHEN 5; 325 MG/1; MG/1
1-2 TABLET ORAL EVERY 6 HOURS PRN
Qty: 180 TABLET | Refills: 0 | Status: SHIPPED | OUTPATIENT
Start: 2017-07-20 | End: 2017-08-23

## 2017-07-20 NOTE — TELEPHONE ENCOUNTER
Rx brought down to  pharmacy- pt advised to call pharmacy to make sure it has been process and ready for  before coming in.

## 2017-08-23 ENCOUNTER — TELEPHONE (OUTPATIENT)
Dept: INTERNAL MEDICINE | Facility: CLINIC | Age: 72
End: 2017-08-23

## 2017-08-23 DIAGNOSIS — M51.369 DDD (DEGENERATIVE DISC DISEASE), LUMBAR: ICD-10-CM

## 2017-08-23 DIAGNOSIS — M79.7 FIBROMYALGIA: ICD-10-CM

## 2017-08-23 RX ORDER — HYDROCODONE BITARTRATE AND ACETAMINOPHEN 5; 325 MG/1; MG/1
1-2 TABLET ORAL EVERY 6 HOURS PRN
Qty: 180 TABLET | Refills: 0 | Status: SHIPPED | OUTPATIENT
Start: 2017-08-23 | End: 2017-09-25

## 2017-08-23 NOTE — TELEPHONE ENCOUNTER
Reason for call:  Medication   If this is a refill request, has the caller requested the refill from the pharmacy already? No  Will the patient be using a [facility]? Yes  Name of the pharmacy and phone number for the current request: [facility]    Name of the medication requested: Vicodin    Other request: none      Phone number to reach patient:  Cell number on file:    Telephone Information:   Mobile [phone]       Best Time:  anytime    Can we leave a detailed message on this number?  YES

## 2017-08-23 NOTE — TELEPHONE ENCOUNTER
Norco      Last Written Prescription Date:  7/20/17  Last Fill Quantity: 180,   # refills: 0  Last Office Visit with [facility] or [facility] prescribing provider: 5/25/17  Future Office visit:       Routing refill request to provider for review/approval because:  Drug not on the [facility], [facility] or [facility] refill protocol or controlled substance.  Signed CSA form in chart.  [facility].

## 2017-08-30 ENCOUNTER — TELEPHONE (OUTPATIENT)
Dept: INTERNAL MEDICINE | Facility: CLINIC | Age: 72
End: 2017-08-30

## 2017-08-30 DIAGNOSIS — R61 GENERALIZED HYPERHIDROSIS: Primary | ICD-10-CM

## 2017-08-30 NOTE — TELEPHONE ENCOUNTER
(Reason for Call:  Medication or medication refill:    Do you use a [facility]?  Name of the pharmacy and phone number for the current request:  [facility]    Name of the medication requested: NEEDS REPLACEMENT FOR PILL THAT STOPS SWEATING.  INS. NO LONGER PAYS FOR THIS    Other request: NO      Can we leave a detailed message on this number? YES    Phone number patient can be reached at: Cell number on file:    Telephone Information:   Mobile [phone]       Best Time: ANY    Call taken on 8/30/2017 at 3:13 PM by Dolores Amaro

## 2017-08-31 NOTE — TELEPHONE ENCOUNTER
"Pt calls. She doesn't know what the pills are called.   She will call back if she remembers.     She is asking if Dr Blanton has any recommendations for her sweating. It just \"pours out of her\".     I advised of hormone pills, but she states this isn't the problem. She denied that its side effect from Norco.       "

## 2017-08-31 NOTE — TELEPHONE ENCOUNTER
Oral medications may reduce sweating a little but have significant side effects such as lightheadedness, dizziness, dry mouth and some are linked to dementia. I recommend she see a dermatologist; there are some treatments with botox and laser that may help certain areas. If she does not have a dermatologist, I pended a referral with several names.

## 2017-08-31 NOTE — TELEPHONE ENCOUNTER
Drysol is not a pill. Need to call and ask what pill she is talking about, I don't know of pills that stop sweating. She may want to check if insurance allows a PA.

## 2017-09-01 NOTE — TELEPHONE ENCOUNTER
Writer called and advised per MDs note. Patient agreed to plan of care. Patient agreed to Derm Referral. Writer provided referral information.

## 2017-09-13 ENCOUNTER — TRANSFERRED RECORDS (OUTPATIENT)
Dept: HEALTH INFORMATION MANAGEMENT | Facility: CLINIC | Age: 72
End: 2017-09-13

## 2017-09-25 DIAGNOSIS — M51.369 DDD (DEGENERATIVE DISC DISEASE), LUMBAR: ICD-10-CM

## 2017-09-25 DIAGNOSIS — M79.7 FIBROMYALGIA: ICD-10-CM

## 2017-09-25 RX ORDER — HYDROCODONE BITARTRATE AND ACETAMINOPHEN 5; 325 MG/1; MG/1
1-2 TABLET ORAL EVERY 6 HOURS PRN
Qty: 180 TABLET | Refills: 0 | Status: SHIPPED | OUTPATIENT
Start: 2017-09-25 | End: 2017-10-20

## 2017-09-25 NOTE — TELEPHONE ENCOUNTER
Take Rx down to [facility].  Leaving later this week for a trip out of state.     Vicodin      Last Written Prescription Date:  8/23/17  Last Fill Quantity: 180,   # refills: 0  Last Office Visit with [facility] prescribing provider: 5/25/17  Future Office visit:       Routing refill request to provider for review/approval because:  Drug not on the [facility] or [facility] refill protocol or controlled substance

## 2017-09-28 ENCOUNTER — TRANSFERRED RECORDS (OUTPATIENT)
Dept: HEALTH INFORMATION MANAGEMENT | Facility: CLINIC | Age: 72
End: 2017-09-28

## 2017-10-20 ENCOUNTER — TELEPHONE (OUTPATIENT)
Dept: INTERNAL MEDICINE | Facility: CLINIC | Age: 72
End: 2017-10-20

## 2017-10-20 DIAGNOSIS — M51.369 DDD (DEGENERATIVE DISC DISEASE), LUMBAR: ICD-10-CM

## 2017-10-20 DIAGNOSIS — M79.7 FIBROMYALGIA: ICD-10-CM

## 2017-10-20 RX ORDER — HYDROCODONE BITARTRATE AND ACETAMINOPHEN 5; 325 MG/1; MG/1
1-2 TABLET ORAL EVERY 6 HOURS PRN
Qty: 180 TABLET | Refills: 0 | Status: SHIPPED | OUTPATIENT
Start: 2017-10-20 | End: 2017-11-28

## 2017-10-21 PROBLEM — F11.20 NARCOTIC DEPENDENCE (H): Status: ACTIVE | Noted: 2017-10-21

## 2017-11-07 DIAGNOSIS — I10 BENIGN ESSENTIAL HYPERTENSION: ICD-10-CM

## 2017-11-07 RX ORDER — AMLODIPINE BESYLATE 5 MG/1
TABLET ORAL
Qty: 90 TABLET | Refills: 1 | Status: SHIPPED | OUTPATIENT
Start: 2017-11-07 | End: 2018-06-19

## 2017-11-07 RX ORDER — LOSARTAN POTASSIUM 50 MG/1
TABLET ORAL
Qty: 90 TABLET | Refills: 1 | Status: SHIPPED | OUTPATIENT
Start: 2017-11-07 | End: 2018-05-17

## 2017-11-15 ENCOUNTER — TRANSFERRED RECORDS (OUTPATIENT)
Dept: HEALTH INFORMATION MANAGEMENT | Facility: CLINIC | Age: 72
End: 2017-11-15

## 2017-11-21 ENCOUNTER — TRANSFERRED RECORDS (OUTPATIENT)
Dept: HEALTH INFORMATION MANAGEMENT | Facility: CLINIC | Age: 72
End: 2017-11-21

## 2017-11-28 DIAGNOSIS — M79.7 FIBROMYALGIA: ICD-10-CM

## 2017-11-28 DIAGNOSIS — M51.369 DDD (DEGENERATIVE DISC DISEASE), LUMBAR: ICD-10-CM

## 2017-11-28 RX ORDER — HYDROCODONE BITARTRATE AND ACETAMINOPHEN 5; 325 MG/1; MG/1
1-2 TABLET ORAL EVERY 6 HOURS PRN
Qty: 180 TABLET | Refills: 0 | Status: SHIPPED | OUTPATIENT
Start: 2017-11-28 | End: 2018-01-02

## 2017-11-28 NOTE — TELEPHONE ENCOUNTER
Norco    TAKE to  pharmacy     Last Written Prescription Date:  Fill on 10/25/17  Last Fill Quantity: 180,   # refills: 0    Last Office Visit: 5/25/17    Future Office visit:       Routing refill request to provider for review/approval because:  Drug not on the [facility], [facility] or [facility] refill protocol or controlled substance

## 2018-01-02 DIAGNOSIS — M51.369 DDD (DEGENERATIVE DISC DISEASE), LUMBAR: ICD-10-CM

## 2018-01-02 DIAGNOSIS — M79.7 FIBROMYALGIA: ICD-10-CM

## 2018-01-02 RX ORDER — HYDROCODONE BITARTRATE AND ACETAMINOPHEN 5; 325 MG/1; MG/1
1-2 TABLET ORAL EVERY 6 HOURS PRN
Qty: 180 TABLET | Refills: 0 | Status: SHIPPED | OUTPATIENT
Start: 2018-01-02 | End: 2018-02-02

## 2018-01-02 NOTE — TELEPHONE ENCOUNTER
Advise she should see me soon; with narcotics we are now requiring visit every 6 months per protocol.

## 2018-01-02 NOTE — TELEPHONE ENCOUNTER
[place]ven     TAKE to [facility] pharmacy/     Last Written Prescription Date:  11/28/17  Last Fill Quantity: 180,   # refills: 0    Last Office Visit: 5/25/17    Future Office visit:    Next 5 appointments (look out 90 days)     Jan 16, 2018  1:20 PM CST   [facility]    [address] [facility] [place] [id number] [phone]                   Routing refill request to provider for review/approval because:  Drug not on the [facility], [facility] or  Health refill protocol or controlled substance

## 2018-01-04 DIAGNOSIS — M79.7 FIBROMYALGIA: ICD-10-CM

## 2018-01-04 DIAGNOSIS — F33.0 MAJOR DEPRESSIVE DISORDER, RECURRENT EPISODE, MILD (H): ICD-10-CM

## 2018-01-04 DIAGNOSIS — F41.9 ANXIETY: ICD-10-CM

## 2018-01-04 NOTE — TELEPHONE ENCOUNTER
Requested Prescriptions   Pending Prescriptions Disp Refills     DULoxetine (CYMBALTA) 60 MG EC capsule [Pharmacy Med Name: DULOXETINE  60MG  CAP] 180 capsule      Sig: TAKE 1 CAPSULE BY MOUTH TWO TIMES DAILY    Serotonin-Norepinephrine Reuptake Inhibitors  Failed    1/4/2018  3:08 AM       Failed - PHQ-9 score of less than 5 in past 6 months    The PHQ-9 criteria is meant to fail. It requires a PHQ-9 score review         Passed - Blood pressure under 140/90    BP Readings from Last 3 Encounters:   05/25/17 138/84   12/08/16 138/70   05/05/16 138/89                Passed - Patient is age 18 or older       Passed - No active pregnancy on record       Passed - No positive pregnancy test in past 12 months       Passed - Recent (6 mo) or future visit with authorizing provider's specialty    Patient had office visit in the last 6 months or has a visit in the next 30 days with authorizing provider.  See chart review.             Provider's authorization needed.

## 2018-01-05 RX ORDER — DULOXETIN HYDROCHLORIDE 60 MG/1
CAPSULE, DELAYED RELEASE ORAL
Qty: 180 CAPSULE | Refills: 1 | Status: SHIPPED | OUTPATIENT
Start: 2018-01-05 | End: 2018-07-20

## 2018-01-23 ENCOUNTER — OFFICE VISIT (OUTPATIENT)
Dept: INTERNAL MEDICINE | Facility: CLINIC | Age: 73
End: 2018-01-23
Payer: MEDICARE

## 2018-01-23 VITALS
TEMPERATURE: 98.7 F | BODY MASS INDEX: 51.23 KG/M2 | DIASTOLIC BLOOD PRESSURE: 82 MMHG | RESPIRATION RATE: 24 BRPM | SYSTOLIC BLOOD PRESSURE: 118 MMHG | WEIGHT: 293 LBS | OXYGEN SATURATION: 95 % | HEART RATE: 80 BPM

## 2018-01-23 DIAGNOSIS — I10 ESSENTIAL HYPERTENSION: ICD-10-CM

## 2018-01-23 DIAGNOSIS — J45.20 MILD INTERMITTENT ASTHMA WITHOUT COMPLICATION: ICD-10-CM

## 2018-01-23 DIAGNOSIS — E66.01 MORBID OBESITY (H): ICD-10-CM

## 2018-01-23 DIAGNOSIS — F43.0 ACUTE REACTION TO STRESS: ICD-10-CM

## 2018-01-23 DIAGNOSIS — F11.20 NARCOTIC DEPENDENCE (H): ICD-10-CM

## 2018-01-23 DIAGNOSIS — G89.4 CHRONIC PAIN SYNDROME: Primary | ICD-10-CM

## 2018-01-23 PROCEDURE — 99214 OFFICE O/P EST MOD 30 MIN: CPT | Performed by: INTERNAL MEDICINE

## 2018-01-23 RX ORDER — LORAZEPAM 0.5 MG/1
TABLET ORAL
Qty: 10 TABLET | Refills: 1 | Status: SHIPPED | OUTPATIENT
Start: 2018-01-23 | End: 2018-07-23

## 2018-01-23 ASSESSMENT — PATIENT HEALTH QUESTIONNAIRE - PHQ9
5. POOR APPETITE OR OVEREATING: SEVERAL DAYS
SUM OF ALL RESPONSES TO PHQ QUESTIONS 1-9: 2

## 2018-01-23 ASSESSMENT — ANXIETY QUESTIONNAIRES
GAD7 TOTAL SCORE: 2
1. FEELING NERVOUS, ANXIOUS, OR ON EDGE: NOT AT ALL
7. FEELING AFRAID AS IF SOMETHING AWFUL MIGHT HAPPEN: NOT AT ALL
5. BEING SO RESTLESS THAT IT IS HARD TO SIT STILL: SEVERAL DAYS
2. NOT BEING ABLE TO STOP OR CONTROL WORRYING: NOT AT ALL
IF YOU CHECKED OFF ANY PROBLEMS ON THIS QUESTIONNAIRE, HOW DIFFICULT HAVE THESE PROBLEMS MADE IT FOR YOU TO DO YOUR WORK, TAKE CARE OF THINGS AT HOME, OR GET ALONG WITH OTHER PEOPLE: NOT DIFFICULT AT ALL
6. BECOMING EASILY ANNOYED OR IRRITABLE: NOT AT ALL
3. WORRYING TOO MUCH ABOUT DIFFERENT THINGS: NOT AT ALL

## 2018-01-23 NOTE — NURSING NOTE
"Chief Complaint   Patient presents with     Recheck Medication     LOV 05/25/2017: Asthma, HTN, Anxiety, PHQ       Initial /82  Pulse 80  Temp 98.7  F (37.1  C) (Oral)  Resp 24  Wt 293 lb 12.8 oz (133.3 kg)  SpO2 95%  BMI 51.23 kg/m2 Estimated body mass index is 51.23 kg/(m^2) as calculated from the following:    Height as of 5/25/17: 5' 3.5\" (1.613 m).    Weight as of this encounter: 293 lb 12.8 oz (133.3 kg).  Medication Reconciliation: complete      Kumar Celaya CMA      "

## 2018-01-23 NOTE — MR AVS SNAPSHOT
"              After Visit Summary   1/23/2018    Svetlana Serrano    MRN: [id number]           Patient Information     Date Of Birth          1945        Visit Information        Provider Department      1/23/2018 2:40 PM Dolores Blanton MD [facility]        Today's Diagnoses     Acute reaction to stress          Care Instructions    Diabetes website: [email or web address]          Follow-ups after your visit        Your next 10 appointments already scheduled     Jul 16, 2018   Procedure with Olman Rocha MD   [facility] (--)    [address] [facility] [id number] [phone]              Who to contact     If you have questions or need follow up information about today's clinic visit or your schedule please contact [facility] directly at [phone].  Normal or non-critical lab and imaging results will be communicated to you by MyChart, letter or phone within 4 business days after the clinic has received the results. If you do not hear from us within 7 days, please contact the clinic through MyChart or phone. If you have a critical or abnormal lab result, we will notify you by phone as soon as possible.  Submit refill requests through [employer] or call your pharmacy and they will forward the refill request to us. Please allow 3 business days for your refill to be completed.          Additional Information About Your Visit        MyChart Information     [employer] lets you send messages to your doctor, view your test results, renew your prescriptions, schedule appointments and more. To sign up, go to [email or web address] . Click on \"Log in\" on the left side of the screen, which will take you to the Welcome page. Then click on \"Sign up Now\" on the right side of the page.     You will be asked to enter the access code listed below, as well as some personal information. Please follow the directions to create your username and password.     Your access " code is: [email or web address]  Expires: 2018  3:22 PM     Your access code will  in 90 days. If you need help or a new code, please call your [facility] clinic or [phone].        Care EveryWhere ID     This is your Care EveryWhere ID. This could be used by other organizations to access your [facility] medical records  [id number]        Your Vitals Were     Pulse Temperature Respirations Pulse Oximetry BMI (Body Mass Index)       80 98.7  F (37.1  C) (Oral) 24 95% 51.23 kg/m2        Blood Pressure from Last 3 Encounters:   18 118/82   17 138/84   16 138/70    Weight from Last 3 Encounters:   18 293 lb 12.8 oz (133.3 kg)   17 286 lb (129.7 kg)   16 287 lb 12.8 oz (130.5 kg)              Today, you had the following     No orders found for display         Today's Medication Changes          These changes are accurate as of: 18  3:22 PM.  If you have any questions, ask your nurse or doctor.               Stop taking these medicines if you haven't already. Please contact your care team if you have questions.     guaiFENesin-codeine 100-10 MG/5ML Soln solution   Commonly known as:  ROBITUSSIN AC   Stopped by:  Dolores Blanton MD           rOPINIRole 0.5 MG tablet   Commonly known as:  REQUIP   Stopped by:  Dolores Blanton MD           rOPINIRole 1 MG tablet   Commonly known as:  REQUIP   Stopped by:  Dolores Blanton MD                Where to get your medicines      Some of these will need a paper prescription and others can be bought over the counter.  Ask your nurse if you have questions.     Bring a paper prescription for each of these medications     LORazepam 0.5 MG tablet                Primary Care Provider Office Phone # Fax #    Dolores Blanton [phone]       [address] [place] [id number]        Equal Access to Services     ANNIKA BERG AH: Adonay Siddiqui, geoffrey topete, mariah kangoc mayes, bj lewis  hillapollosukhjinder franklin ah. So [facility] [phone].    ATENCIÓN: Si sterling charles, tiene a will disposición servicios gratuitos de asistencia lingüística. Nicky viera [phone].    We comply with applicable federal civil rights laws and [place] laws. We do not discriminate on the basis of race, color, national origin, age, disability, sex, sexual orientation, or gender identity.            Thank you!     Thank you for choosing [facility]  for your care. Our goal is always to provide you with excellent care. Hearing back from our patients is one way we can continue to improve our services. Please take a few minutes to complete the written survey that you may receive in the mail after your visit with us. Thank you!             Your Updated Medication List - Protect others around you: Learn how to safely use, store and throw away your medicines at www.disposemymeds.org.          This list is accurate as of: 1/23/18  3:22 PM.  Always use your most recent med list.                   Brand Name Dispense Instructions for use Diagnosis    albuterol 108 (90 BASE) MCG/ACT Inhaler    PROAIR HFA/PROVENTIL HFA/VENTOLIN HFA    1 Inhaler    Inhale 2 puffs into the lungs every 6 hours as needed for shortness of breath / dyspnea    Mild intermittent asthma without complication       aluminum chloride 20 % external solution    DRYSOL    60 mL    Apply to scalp qhs x3, cover area of application with plastic and wash area in morning. As sweating improves, decrease use to 1-2 times weekly.    Generalized hyperhidrosis       amLODIPine 5 MG tablet    NORVASC    90 tablet    TAKE 1 TABLET BY MOUTH  DAILY    Benign essential hypertension       diclofenac 1 % Gel topical gel    VOLTAREN    300 g    Apply to affected area tid as directed.    Fibromyalgia       DULoxetine 60 MG EC capsule    CYMBALTA    180 capsule    TAKE 1 CAPSULE BY MOUTH TWO TIMES DAILY    Fibromyalgia, Major depressive disorder, recurrent episode, mild (H), Anxiety        HYDROcodone-acetaminophen 5-325 MG per tablet    NORCO    180 tablet    Take 1-2 tablets by mouth every 6 hours as needed    Fibromyalgia, DDD (degenerative disc disease), lumbar       LORazepam 0.5 MG tablet    ATIVAN    10 tablet    1-2 po prior to flying. Do not drive within 6 hours of taking.    Acute reaction to stress       losartan 50 MG tablet    COZAAR    90 tablet    TAKE 1 TABLET BY MOUTH  DAILY    Benign essential hypertension

## 2018-01-23 NOTE — PROGRESS NOTES
SUBJECTIVE:   Svetlana Serrano is a 72 year old female who presents to clinic today for the following health issues:    Follow up chronic pain, narcotic dependence: She remains stable on her current narcotics.  She feels the Cymbalta is helping with pain also.      Hypertension Follow-up      Outpatient blood pressures are not being checked.    Low Salt Diet: not monitoring salt    Depression and Anxiety Follow-Up    Status since last visit: No change    Other associated symptoms:None    Complicating factors:     Significant life event: No     Current substance abuse: None    PHQ-9 Score and MyChart F/U Questions 5/7/2016 12/12/2016 5/25/2017   Total Score 6 10 1   Q9: Suicide Ideation Not at all Not at all Not at all     GHADA-7 SCORE 12/12/2016   Total Score 3       PHQ-9  English  PHQ-9   Any Language  GAD7  Suicide Assessment Five-step Evaluation and Treatment (SAFE-T)  Asthma Follow-Up    Was ACT completed today?    Yes    ACT Total Scores 1/28/2018   ACT TOTAL SCORE -   ASTHMA ER VISITS -   ASTHMA HOSPITALIZATIONS -   ACT TOTAL SCORE (Goal Greater than or Equal to 20) 22   In the past 12 months, how many times did you visit the emergency room for your asthma without being admitted to the hospital? 0   In the past 12 months, how many times were you hospitalized overnight because of your asthma? 0       Recent asthma triggers that patient is dealing with: None        Amount of exercise or physical activity: None    Problems taking medications regularly: No    Medication side effects: none  Diet: regular (no restrictions)      Other problems:   1. Morbid obesity: Her weight has gone up.  She has a lot of questions about following diabetic type diet and wonders if that would be helpful for losing weight.      Current Concerns:   She will be traveling again soon and would like a refill of Lorazepam that she uses for flying.    Patient Active Problem List   Diagnosis     Mild intermittent asthma     HTN  (hypertension)     Restless leg syndrome     CARDIOVASCULAR SCREENING; LDL GOAL LESS THAN 160     Fibromyalgia     Advanced directives, counseling/discussion     DDD (degenerative disc disease), lumbar     Spinal stenosis     Controlled substance agreement signed     Morbid obesity (H)     Anxiety     Mild episode of recurrent major depressive disorder (H)     Narcotic dependence (H)       Current Outpatient Prescriptions   Medication Sig Dispense Refill     DULoxetine (CYMBALTA) 60 MG EC capsule TAKE 1 CAPSULE BY MOUTH TWO TIMES DAILY 180 capsule 1     HYDROcodone-acetaminophen (NORCO) 5-325 MG per tablet Take 1-2 tablets by mouth every 6 hours as needed 180 tablet 0     amLODIPine (NORVASC) 5 MG tablet TAKE 1 TABLET BY MOUTH  DAILY 90 tablet 1     losartan (COZAAR) 50 MG tablet TAKE 1 TABLET BY MOUTH  DAILY 90 tablet 1     albuterol (PROAIR HFA/PROVENTIL HFA/VENTOLIN HFA) 108 (90 BASE) MCG/ACT Inhaler Inhale 2 puffs into the lungs every 6 hours as needed for shortness of breath / dyspnea 1 Inhaler 11     LORazepam (ATIVAN) 0.5 MG tablet 1-2 po prior to flying. Do not drive within 6 hours of taking. 9 tablet 1     diclofenac (VOLTAREN) 1 % GEL Apply to affected area tid as directed. 300 g 3     aluminum chloride (DRYSOL) 20 % external solution Apply to scalp qhs x3, cover area of application with plastic and wash area in morning. As sweating improves, decrease use to 1-2 times weekly. 60 mL 11         Social History   Substance Use Topics     Smoking status: Former Smoker     Quit date: 1/1/1969     Smokeless tobacco: Never Used     Alcohol use 0.0 oz/week     0 Standard drinks or equivalent per week      Comment: casual        ROS:  No fevers, chills, change in breathing, does have dyspnea on exertion which is probably conditioning, no chest pain, palpitations, abdominal concerns    OBJECTIVE:     /82  Pulse 80  Temp 98.7  F (37.1  C) (Oral)  Resp 24  Wt 293 lb 12.8 oz (133.3 kg)  SpO2 95%  BMI 51.23  "kg/m2  Body mass index is 51.23 kg/(m^2).    Lungs: clear  CV: normal S1, S2 without murmur, S3 or S4.     PHQ-9 SCORE 12/12/2016 5/25/2017 1/23/2018   Total Score - - -   Total Score 10 1 2     GHADA-7 SCORE 12/12/2016 1/23/2018   Total Score 3 2             ASSESSMENT/PLAN:         BMI:   Estimated body mass index is 51.23 kg/(m^2) as calculated from the following:    Height as of 5/25/17: 5' 3.5\" (1.613 m).    Weight as of this encounter: 293 lb 12.8 oz (133.3 kg).   Weight management plan: Discussed healthy diet and exercise guidelines and patient will follow up in 12 months in clinic to re-evaluate.      1. Chronic pain syndrome  Stable pain, continue meds    2. Narcotic dependence (H)  She is stable on her narcotic, controlled substance agreement in place    3. Morbid obesity (H)  Discussed with her principles of diabetic diet, resources for following a diabetic diet or low glycemic index diet, principles of carbohydrates, proteins, encourage regular activity. We will plan on rechecking her blood sugar and other labs in several months    4. Acute reaction to stress  Refill med  - LORazepam (ATIVAN) 0.5 MG tablet; 1-2 po prior to flying. Do not drive within 6 hours of taking.  Dispense: 10 tablet; Refill: 1    5. Mild intermittent asthma without complication  Stable, continue med    6. Essential hypertension  Controlled, continue med          Dolores Blanton MD  [facility]    30 minutes spent with the patient, >50% of time spent counseling mostly about diet, weight loss, diabetic diet, exercise and the importance.    "

## 2018-01-24 ASSESSMENT — ANXIETY QUESTIONNAIRES: GAD7 TOTAL SCORE: 2

## 2018-01-26 ENCOUNTER — TELEPHONE (OUTPATIENT)
Dept: INTERNAL MEDICINE | Facility: CLINIC | Age: 73
End: 2018-01-26

## 2018-01-26 NOTE — TELEPHONE ENCOUNTER
Pt states her upper front gums are very sore and tender. She wears dentures and cannot bite down. For a few days. If takes the dentures out and pushes on the gums, it is very painful.     No red and no swollen.     Tried biotene swish.     Please advise. She has not called a dentist.

## 2018-01-26 NOTE — TELEPHONE ENCOUNTER
She should keep dentures out and use a rinse like listerine. If still problems would need to see dentist who did dentures.

## 2018-01-29 ASSESSMENT — ASTHMA QUESTIONNAIRES: ACT_TOTALSCORE: 22

## 2018-02-01 ENCOUNTER — TELEPHONE (OUTPATIENT)
Dept: INTERNAL MEDICINE | Facility: CLINIC | Age: 73
End: 2018-02-01

## 2018-02-01 DIAGNOSIS — M79.7 FIBROMYALGIA: ICD-10-CM

## 2018-02-01 DIAGNOSIS — M51.369 DDD (DEGENERATIVE DISC DISEASE), LUMBAR: ICD-10-CM

## 2018-02-01 NOTE — TELEPHONE ENCOUNTER
Reason for Call:  Medication or medication refill:    Do you use a [facility]?  Name of the pharmacy and phone number for the current request:  [facility] ([place]) - [phone]    Name of the medication requested: Vicodin    Other request: none    Can we leave a detailed message on this number? YES    Phone number patient can be reached at: Home number on file [phone] (home)    Best Time: any    Call taken on 2/1/2018 at 3:43 PM by Giovanna Yang

## 2018-02-02 NOTE — TELEPHONE ENCOUNTER
[facility]         Last Written Prescription Date:  1/2/18    Last Fill Quantity: 180,   # refills: 0    Last Office Visit: 1/23/18    Future Office visit:       Routing refill request to provider for review/approval because:  Drug not on the [facility], P or [facility] refill protocol or controlled substance

## 2018-02-05 RX ORDER — HYDROCODONE BITARTRATE AND ACETAMINOPHEN 5; 325 MG/1; MG/1
1-2 TABLET ORAL EVERY 6 HOURS PRN
Qty: 180 TABLET | Refills: 0 | Status: SHIPPED | OUTPATIENT
Start: 2018-02-05 | End: 2018-03-26

## 2018-02-07 ENCOUNTER — TELEPHONE (OUTPATIENT)
Dept: INTERNAL MEDICINE | Facility: CLINIC | Age: 73
End: 2018-02-07

## 2018-02-07 ENCOUNTER — OFFICE VISIT (OUTPATIENT)
Dept: INTERNAL MEDICINE | Facility: CLINIC | Age: 73
End: 2018-02-07
Payer: MEDICARE

## 2018-02-07 VITALS
DIASTOLIC BLOOD PRESSURE: 86 MMHG | RESPIRATION RATE: 16 BRPM | HEART RATE: 111 BPM | HEIGHT: 64 IN | TEMPERATURE: 98.6 F | WEIGHT: 291.9 LBS | OXYGEN SATURATION: 91 % | BODY MASS INDEX: 49.83 KG/M2 | SYSTOLIC BLOOD PRESSURE: 128 MMHG

## 2018-02-07 DIAGNOSIS — J20.9 ACUTE BRONCHITIS, UNSPECIFIED ORGANISM: Primary | ICD-10-CM

## 2018-02-07 PROCEDURE — 99213 OFFICE O/P EST LOW 20 MIN: CPT | Performed by: NURSE PRACTITIONER

## 2018-02-07 RX ORDER — CODEINE PHOSPHATE AND GUAIFENESIN 10; 100 MG/5ML; MG/5ML
2 SOLUTION ORAL EVERY 4 HOURS PRN
Qty: 240 ML | Refills: 0 | Status: SHIPPED | OUTPATIENT
Start: 2018-02-07 | End: 2018-03-15

## 2018-02-07 NOTE — TELEPHONE ENCOUNTER
Pt calls stating began coughing Sunday, had coughing Monday, no coughing Tuesday. Pt states began coughing around 4 AM today, is productive, clear. No fever. Pt states has tried Robitussin, does not help. Pt states when she has had this before, was given a Z-pack, has asthma. Pt requesting appt, this was scheduled . Basilia Antoine RN

## 2018-02-07 NOTE — MR AVS SNAPSHOT
"              After Visit Summary   2/7/2018    Svetlana Serrano    MRN: [id number]           Patient Information     Date Of Birth          1945        Visit Information        Provider Department      2/7/2018 11:00 AM Maddi Knapp NP [facility]        Today's Diagnoses     Acute bronchitis, unspecified organism    -  1       Follow-ups after your visit        Your next 10 appointments already scheduled     Jul 16, 2018   Procedure with Olman Rocha MD   [facility] (--)    [address] [facility] [id number] [phone]              Who to contact     If you have questions or need follow up information about today's clinic visit or your schedule please contact [facility] directly at [phone].  Normal or non-critical lab and imaging results will be communicated to you by MyChart, letter or phone within 4 business days after the clinic has received the results. If you do not hear from us within 7 days, please contact the clinic through MyChart or phone. If you have a critical or abnormal lab result, we will notify you by phone as soon as possible.  Submit refill requests through [employer] or call your pharmacy and they will forward the refill request to us. Please allow 3 business days for your refill to be completed.          Additional Information About Your Visit        MyChart Information     [employer] lets you send messages to your doctor, view your test results, renew your prescriptions, schedule appointments and more. To sign up, go to [email or web address] . Click on \"Log in\" on the left side of the screen, which will take you to the Welcome page. Then click on \"Sign up Now\" on the right side of the page.     You will be asked to enter the access code listed below, as well as some personal information. Please follow the directions to create your username and password.     Your access code is: [email or web address]  Expires: " "2018  3:22 PM     Your access code will  in 90 days. If you need help or a new code, please call your [place] clinic or [phone].        Care EveryWhere ID     This is your Care EveryWhere ID. This could be used by other organizations to access your [place] medical records  [id number]        Your Vitals Were     Pulse Temperature Respirations Height Pulse Oximetry BMI (Body Mass Index)    111 98.6  F (37  C) (Oral) 16 5' 3.5\" (1.613 m) 91% 50.9 kg/m2       Blood Pressure from Last 3 Encounters:   18 128/86   18 118/82   17 138/84    Weight from Last 3 Encounters:   18 291 lb 14.4 oz (132.4 kg)   18 293 lb 12.8 oz (133.3 kg)   17 286 lb (129.7 kg)              Today, you had the following     No orders found for display         Today's Medication Changes          These changes are accurate as of 18 11:28 AM.  If you have any questions, ask your nurse or doctor.               Start taking these medicines.        Dose/Directions    guaiFENesin-codeine 100-10 MG/5ML Soln solution   Commonly known as:  ROBITUSSIN AC   Used for:  Acute bronchitis, unspecified organism   Started by:  Maddi Knapp NP        Dose:  2 tsp.   Take 10 mLs by mouth every 4 hours as needed   Quantity:  240 mL   Refills:  0            Where to get your medicines      Some of these will need a paper prescription and others can be bought over the counter.  Ask your nurse if you have questions.     Bring a paper prescription for each of these medications     guaiFENesin-codeine 100-10 MG/5ML Soln solution                Primary Care Provider Office Phone # Fax #    Dolores Blanton [phone]       [address] [place] [id number]        Equal Access to Services     ANNIKA BERG : Adonay roque Sotrey, waaxda luqadaha, qaybta kaalmabj lopes. [facility] [phone].    ATENCIÓN: Si josh fairbanks " will disposición servicios gratuitos de asistencia lingüística. Nicky viera [phone].    We comply with applicable federal civil rights laws and [place] laws. We do not discriminate on the basis of race, color, national origin, age, disability, sex, sexual orientation, or gender identity.            Thank you!     Thank you for choosing [facility]  for your care. Our goal is always to provide you with excellent care. Hearing back from our patients is one way we can continue to improve our services. Please take a few minutes to complete the written survey that you may receive in the mail after your visit with us. Thank you!             Your Updated Medication List - Protect others around you: Learn how to safely use, store and throw away your medicines at www.disposemymeds.org.          This list is accurate as of 2/7/18 11:28 AM.  Always use your most recent med list.                   Brand Name Dispense Instructions for use Diagnosis    albuterol 108 (90 BASE) MCG/ACT Inhaler    PROAIR HFA/PROVENTIL HFA/VENTOLIN HFA    1 Inhaler    Inhale 2 puffs into the lungs every 6 hours as needed for shortness of breath / dyspnea    Mild intermittent asthma without complication       aluminum chloride 20 % external solution    DRYSOL    60 mL    Apply to scalp qhs x3, cover area of application with plastic and wash area in morning. As sweating improves, decrease use to 1-2 times weekly.    Generalized hyperhidrosis       amLODIPine 5 MG tablet    NORVASC    90 tablet    TAKE 1 TABLET BY MOUTH  DAILY    Benign essential hypertension       diclofenac 1 % Gel topical gel    VOLTAREN    300 g    Apply to affected area tid as directed.    Fibromyalgia       DULoxetine 60 MG EC capsule    CYMBALTA    180 capsule    TAKE 1 CAPSULE BY MOUTH TWO TIMES DAILY    Fibromyalgia, Major depressive disorder, recurrent episode, mild (H), Anxiety       guaiFENesin-codeine 100-10 MG/5ML Soln solution    ROBITUSSIN AC    240 mL     Take 10 mLs by mouth every 4 hours as needed    Acute bronchitis, unspecified organism       HYDROcodone-acetaminophen 5-325 MG per tablet    NORCO    180 tablet    Take 1-2 tablets by mouth every 6 hours as needed    Fibromyalgia, DDD (degenerative disc disease), lumbar       LORazepam 0.5 MG tablet    ATIVAN    10 tablet    1-2 po prior to flying. Do not drive within 6 hours of taking.    Acute reaction to stress       losartan 50 MG tablet    COZAAR    90 tablet    TAKE 1 TABLET BY MOUTH  DAILY    Benign essential hypertension

## 2018-02-07 NOTE — PROGRESS NOTES
SUBJECTIVE:   Svetlana Serrano is a 72 year old female who presents to clinic today for the following health issues:      RESPIRATORY SYMPTOMS      Duration: 3 days    Description  Cough, occ prod white sputum    Severity: moderate    Accompanying signs and symptoms: afebrile, tightness in chest with breathing/cough    History (predisposing factors):  asthma    Precipitating or alleviating factors: None    Therapies tried and outcome:  rest and fluids guaifenesin, albuterol        Patient Active Problem List   Diagnosis     Mild intermittent asthma     HTN (hypertension)     Restless leg syndrome     CARDIOVASCULAR SCREENING; LDL GOAL LESS THAN 160     Fibromyalgia     Advanced directives, counseling/discussion     DDD (degenerative disc disease), lumbar     Spinal stenosis     Controlled substance agreement signed     Morbid obesity (H)     Anxiety     Mild episode of recurrent major depressive disorder (H)     Narcotic dependence (H)     Past Surgical History:   Procedure Laterality Date     ARTHROPLASTY SHOULDER       C/SECTION, CLASSICAL      , Classical     CHOLECYSTECTOMY, LAPOROSCOPIC      Cholecystectomy, Laparoscopic     HYSTERECTOMY, JOSHUA       JOINT REPLACEMTN, KNEE RT/LT      bilateral       Social History   Substance Use Topics     Smoking status: Former Smoker     Quit date: 1969     Smokeless tobacco: Never Used     Alcohol use 0.0 oz/week     0 Standard drinks or equivalent per week      Comment: casual     Family History   Problem Relation Age of Onset     Family History Negative Mother      Family History Negative Father          Current Outpatient Prescriptions   Medication Sig Dispense Refill     guaiFENesin-codeine (ROBITUSSIN AC) 100-10 MG/5ML SOLN solution Take 10 mLs by mouth every 4 hours as needed 240 mL 0     HYDROcodone-acetaminophen (NORCO) 5-325 MG per tablet Take 1-2 tablets by mouth every 6 hours as needed 180 tablet 0     LORazepam (ATIVAN) 0.5 MG tablet 1-2 po prior to  "flying. Do not drive within 6 hours of taking. 10 tablet 1     DULoxetine (CYMBALTA) 60 MG EC capsule TAKE 1 CAPSULE BY MOUTH TWO TIMES DAILY 180 capsule 1     amLODIPine (NORVASC) 5 MG tablet TAKE 1 TABLET BY MOUTH  DAILY 90 tablet 1     losartan (COZAAR) 50 MG tablet TAKE 1 TABLET BY MOUTH  DAILY 90 tablet 1     albuterol (PROAIR HFA/PROVENTIL HFA/VENTOLIN HFA) 108 (90 BASE) MCG/ACT Inhaler Inhale 2 puffs into the lungs every 6 hours as needed for shortness of breath / dyspnea 1 Inhaler 11     diclofenac (VOLTAREN) 1 % GEL Apply to affected area tid as directed. 300 g 3     aluminum chloride (DRYSOL) 20 % external solution Apply to scalp qhs x3, cover area of application with plastic and wash area in morning. As sweating improves, decrease use to 1-2 times weekly. 60 mL 11     BP Readings from Last 3 Encounters:   02/07/18 128/86   01/23/18 118/82   05/25/17 138/84    Wt Readings from Last 3 Encounters:   02/07/18 291 lb 14.4 oz (132.4 kg)   01/23/18 293 lb 12.8 oz (133.3 kg)   05/25/17 286 lb (129.7 kg)                    Reviewed and updated as needed this visit by clinical staff  Tobacco  Meds  Med Hx  Surg Hx  Fam Hx  Soc Hx      Reviewed and updated as needed this visit by Provider         ROS:  C: NEGATIVE for fever, chills, change in weight  E/M: NEGATIVE for ear, mouth and throat problems  RESP:NEGATIVE for SOB/dyspnea and wheezing  CV: NEGATIVE for chest pain, palpitations or peripheral edema    OBJECTIVE:     /86 (BP Location: Right arm, Patient Position: Sitting, Cuff Size: Adult Large)  Pulse 111  Temp 98.6  F (37  C) (Oral)  Resp 16  Ht 5' 3.5\" (1.613 m)  Wt 291 lb 14.4 oz (132.4 kg)  SpO2 91%  BMI 50.9 kg/m2  Body mass index is 50.9 kg/(m^2).  GENERAL: morbidly obese  HENT: ear canals and TM's normal, nose and mouth without ulcers or lesions  NECK: no adenopathy, no asymmetry, masses, or scars and thyroid normal to palpation  RESP: lungs clear to auscultation - no rales, rhonchi or " wheezes  CV: regular rate and rhythm, normal S1 S2, no S3 or S4, no murmur, click or rub, no peripheral edema and peripheral pulses strong        ASSESSMENT/PLAN:               ICD-10-CM    1. Acute bronchitis, unspecified organism J20.9 guaiFENesin-codeine (ROBITUSSIN AC) 100-10 MG/5ML SOLN solution       Fluids, expectorants, albuterol    Maddi Knapp NP  [facility]

## 2018-02-07 NOTE — NURSING NOTE
"Chief Complaint   Patient presents with     Cough     productive cough for 3 days, patient does have asthma.       Initial /86 (BP Location: Right arm, Patient Position: Sitting, Cuff Size: Adult Large)  Pulse 111  Temp 98.6  F (37  C) (Oral)  Resp 16  Ht 5' 3.5\" (1.613 m)  Wt 291 lb 14.4 oz (132.4 kg)  SpO2 91%  BMI 50.9 kg/m2 Estimated body mass index is 50.9 kg/(m^2) as calculated from the following:    Height as of this encounter: 5' 3.5\" (1.613 m).    Weight as of this encounter: 291 lb 14.4 oz (132.4 kg).  Medication Reconciliation: complete    "

## 2018-03-15 DIAGNOSIS — J20.9 ACUTE BRONCHITIS, UNSPECIFIED ORGANISM: ICD-10-CM

## 2018-03-15 RX ORDER — CODEINE PHOSPHATE AND GUAIFENESIN 10; 100 MG/5ML; MG/5ML
2 SOLUTION ORAL EVERY 4 HOURS PRN
Qty: 240 ML | Refills: 0 | Status: SHIPPED | OUTPATIENT
Start: 2018-03-15 | End: 2018-07-10

## 2018-03-15 NOTE — TELEPHONE ENCOUNTER
Patient calling with cough x2 weeks.  It is frequent and productive of clear phlegm.  Denies fever, wheezing, chest pain or SOB.  Has used Mucinex, Robitussin AC, plain Robitussin without relief.  Feeling like she needs to use her Proair inhaler more often than prescribed as it seems to help only briefly after using it.  Advised appointment but none available today with any of our providers.  States she is scheduled to leave for [employer] tomorrow, only available today.  Would PCP consider adding patient on today?  YESICA Goldberg R.N.

## 2018-03-15 NOTE — TELEPHONE ENCOUNTER
She saw Maddi 4.5 weeks ago and she stated initially that she was not helped by the cough syrup and requested appointment. If she called right away in the morning, I would have had same day still open but we are busy, have a few providers gone so can not always fit someone in same day. I did the cough syrup but still suggest she consider UC before traveling as it can be much more difficult to be evaluated out of town.

## 2018-03-15 NOTE — TELEPHONE ENCOUNTER
Call to pt and advised. She states she does not think this is fair because has called before and cannot get an appointment.     She states Maddi Knapp NP gave her the Robitussin AC last month and this helped.     She is asking for refill of this, since cannot get an appointment today.

## 2018-03-26 DIAGNOSIS — M51.369 DDD (DEGENERATIVE DISC DISEASE), LUMBAR: ICD-10-CM

## 2018-03-26 DIAGNOSIS — M79.7 FIBROMYALGIA: ICD-10-CM

## 2018-03-26 RX ORDER — HYDROCODONE BITARTRATE AND ACETAMINOPHEN 5; 325 MG/1; MG/1
1-2 TABLET ORAL EVERY 6 HOURS PRN
Qty: 180 TABLET | Refills: 0 | Status: SHIPPED | OUTPATIENT
Start: 2018-03-26 | End: 2018-05-04

## 2018-03-26 NOTE — TELEPHONE ENCOUNTER
Requested Prescriptions   Pending Prescriptions Disp Refills     HYDROcodone-acetaminophen (NORCO) 5-325 MG per tablet 180 tablet 0     Sig: Take 1-2 tablets by mouth every 6 hours as needed          Last Written Prescription Date:  02/05/18  Last Fill Quantity: 180,   # refills: 0  Last Office Visit: 02/07/18  Future Office visit:       Routing refill request to provider for review/approval because:  Drug not on the [facility], UMP or  Health refill protocol or controlled substance  CSA in [facility].  [place] reviewed:  reviewed- no concerns    [place]:  [email or web address]  Please advise, thanks.    Rx to [facility] when available.

## 2018-03-26 NOTE — TELEPHONE ENCOUNTER
Reason for Call:  Medication or medication refill:    Do you use a [facility]?  Name of the [facility] and phone [facility] for the current request[facility] ([place] - [phone]    Name of the medication requested: vicodin    Other request: none    Can we leave a detailed message on this number? YES    Phone number patient can be reached at: Home number on file [phone] (home)    Best Time: any    Call taken on 3/26/2018 at 11:51 AM by Giovanna Yang

## 2018-05-04 DIAGNOSIS — M51.369 DDD (DEGENERATIVE DISC DISEASE), LUMBAR: ICD-10-CM

## 2018-05-04 DIAGNOSIS — M79.7 FIBROMYALGIA: ICD-10-CM

## 2018-05-04 NOTE — TELEPHONE ENCOUNTER
Reason for Call:  Medication or medication refill:    Do you use a [facility]? Yes      Name of the pharmacy and phone number for the current request:  [facility]     Name of the medication requested: vicodin    Other request: none    Can we leave a detailed message on this number? YES    Phone number patient can be reached at: Home number on file [phone] (home)    Best Time: asap    Call taken on 5/4/2018 at 12:02 PM by Denia Orozco

## 2018-05-04 NOTE — TELEPHONE ENCOUNTER
Norco    TAKE to pharmacy.    Last Written Prescription Date:  3/26/18  Last Fill Quantity: 180,   # refills: 0    Last Office Visit: 2/7/18  Future Office visit:       Routing refill request to provider for review/approval because:  Drug not on the [facility], P or [facility] refill protocol or controlled substance

## 2018-05-07 RX ORDER — HYDROCODONE BITARTRATE AND ACETAMINOPHEN 5; 325 MG/1; MG/1
1-2 TABLET ORAL EVERY 6 HOURS PRN
Qty: 180 TABLET | Refills: 0 | Status: SHIPPED | OUTPATIENT
Start: 2018-05-07 | End: 2018-07-03

## 2018-05-08 ENCOUNTER — HOSPITAL ENCOUNTER (OUTPATIENT)
Dept: MAMMOGRAPHY | Facility: CLINIC | Age: 73
Discharge: HOME | End: 2018-05-08
Attending: INTERNAL MEDICINE | Admitting: INTERNAL MEDICINE
Payer: MEDICARE

## 2018-05-08 DIAGNOSIS — Z12.31 VISIT FOR SCREENING MAMMOGRAM: ICD-10-CM

## 2018-05-08 PROCEDURE — 77067 SCR MAMMO BI INCL CAD: CPT

## 2018-05-17 DIAGNOSIS — I10 BENIGN ESSENTIAL HYPERTENSION: ICD-10-CM

## 2018-05-17 NOTE — LETTER
[facility]  [address], [address]  [place], [place]  [address]                                            TEL:[phone]  FAX:[phone]      Svetlana Serrano  [address]  [facility] [address]      May 18, 2018    Dear Svetlana       We have received a refill request from your pharmacy, however, we were only able to provide a one time fill because you are due for labs. Please call [phone] to schedule an appointment before you are due for your next refill.        Sincerely,  Della, RN - Triage Nurse

## 2018-05-18 RX ORDER — LOSARTAN POTASSIUM 50 MG/1
TABLET ORAL
Qty: 90 TABLET | Refills: 0 | Status: SHIPPED | OUTPATIENT
Start: 2018-05-18 | End: 2018-07-23

## 2018-05-18 NOTE — TELEPHONE ENCOUNTER
"Requested Prescriptions   Pending Prescriptions Disp Refills     losartan (COZAAR) 50 MG tablet [Pharmacy Med Name: LOSARTAN  50MG  TAB] 90 tablet      Sig: TAKE 1 TABLET BY MOUTH  DAILY    Angiotensin-II Receptors Passed    5/17/2018  3:58 AM       Passed - Blood pressure under 140/90 in past 12 months    BP Readings from Last 3 Encounters:   02/07/18 128/86   01/23/18 118/82   05/25/17 138/84          Passed - Recent (12 mo) or future (30 days) visit within the authorizing provider's specialty    Patient had office visit in the last 12 months or has a visit in the next 30 days with authorizing provider or within the authorizing provider's specialty.  See \"Patient Info\" tab in inbasket, or \"Choose Columns\" in Meds & Orders section of the refill encounter.    Last OV: 02/07/18, last OV discussing HTN: 01/23/18       Passed - Patient is age 18 or older       Passed - No active pregnancy on record       Passed - Normal serum creatinine on file in past 12 months    Recent Labs   Lab Test  05/25/17   0855   CR  0.82          Passed - Normal serum potassium on file in past 12 months    Recent Labs   Lab Test  05/25/17   0855   POTASSIUM  4.2          Passed - No positive pregnancy test in past 12 months        Medication is being filled for 1 time refill only due to:  Due for lab  Mailed letter.    Pended labs. Please advise, thanks.  "

## 2018-05-20 NOTE — TELEPHONE ENCOUNTER
She is due to see me in early July for follow up. She can do her lab at that appointment. Cancelled orders, call patient oand tell her to disregard letter sent.

## 2018-06-13 ENCOUNTER — TRANSFERRED RECORDS (OUTPATIENT)
Dept: HEALTH INFORMATION MANAGEMENT | Facility: CLINIC | Age: 73
End: 2018-06-13

## 2018-06-18 ENCOUNTER — TRANSFERRED RECORDS (OUTPATIENT)
Dept: HEALTH INFORMATION MANAGEMENT | Facility: CLINIC | Age: 73
End: 2018-06-18

## 2018-06-19 DIAGNOSIS — I10 BENIGN ESSENTIAL HYPERTENSION: ICD-10-CM

## 2018-06-22 NOTE — TELEPHONE ENCOUNTER
"Requested Prescriptions   Pending Prescriptions Disp Refills     amLODIPine (NORVASC) 5 MG tablet [Pharmacy Med Name: AMLODIPINE  5MG  TAB] 90 tablet     Last Written Prescription Date:  11/17/2017  Last Fill Quantity: 90,  # refills: 1   Last office visit: 2/7/2018 with prescribing provider:     Future Office Visit:   Next 5 appointments (look out 90 days)     Jul 03, 2018 11:00 AM CDT   Pre-Op physical with Dolores Blanton MD   [facility]    [address]  [facility] [id number] [phone]                Sig: TAKE 1 TABLET BY MOUTH  DAILY    Calcium Channel Blockers Protocol  Failed    6/19/2018  3:54 AM       Failed - Normal serum creatinine on file in past 12 months    Recent Labs   Lab Test  05/25/17   0855   CR  0.82            Passed - Blood pressure under 140/90 in past 12 months    BP Readings from Last 3 Encounters:   02/07/18 128/86   01/23/18 118/82   05/25/17 138/84                Passed - Recent (12 mo) or future (30 days) visit within the authorizing provider's specialty    Patient had office visit in the last 12 months or has a visit in the next 30 days with authorizing provider or within the authorizing provider's specialty.  See \"Patient Info\" tab in inbasket, or \"Choose Columns\" in Meds & Orders section of the refill encounter.           Passed - Patient is age 18 or older       Passed - No active pregnancy on record       Passed - No positive pregnancy test in past 12 months        "

## 2018-06-25 ENCOUNTER — TRANSFERRED RECORDS (OUTPATIENT)
Dept: HEALTH INFORMATION MANAGEMENT | Facility: CLINIC | Age: 73
End: 2018-06-25

## 2018-06-26 RX ORDER — AMLODIPINE BESYLATE 5 MG/1
TABLET ORAL
Qty: 90 TABLET | Refills: 0 | Status: SHIPPED | OUTPATIENT
Start: 2018-06-26 | End: 2018-07-23

## 2018-06-26 NOTE — TELEPHONE ENCOUNTER
Medication is being filled for 1 time refill only due to:  future appointment scheduled.    Elif Kumar RN

## 2018-07-03 DIAGNOSIS — M51.369 DDD (DEGENERATIVE DISC DISEASE), LUMBAR: ICD-10-CM

## 2018-07-03 DIAGNOSIS — M79.7 FIBROMYALGIA: ICD-10-CM

## 2018-07-03 RX ORDER — HYDROCODONE BITARTRATE AND ACETAMINOPHEN 5; 325 MG/1; MG/1
1-2 TABLET ORAL EVERY 6 HOURS PRN
Qty: 180 TABLET | Refills: 0 | Status: SHIPPED | OUTPATIENT
Start: 2018-07-03 | End: 2018-08-07

## 2018-07-03 NOTE — TELEPHONE ENCOUNTER
Pt calling for refill on [place]. Req to [facility].  States will be out of med on 7-6-18.    Norco      Last Written Prescription Date:  5-7-18  Last Fill Quantity: 180,   # refills: 0  Last Office Visit: 2-7-18  Future Office visit:       Routing refill request to provider for review/approval because:  Drug not on the [facility], UMP or  Health refill protocol or controlled substance    Signed CSA form in chart.    [place] reviewed:  reviewed- no concerns    [place]  [email or web address]    Please advise, thanks.

## 2018-07-10 ENCOUNTER — OFFICE VISIT (OUTPATIENT)
Dept: INTERNAL MEDICINE | Facility: CLINIC | Age: 73
End: 2018-07-10
Payer: MEDICARE

## 2018-07-10 ENCOUNTER — TELEPHONE (OUTPATIENT)
Dept: INTERNAL MEDICINE | Facility: CLINIC | Age: 73
End: 2018-07-10

## 2018-07-10 VITALS
HEART RATE: 80 BPM | DIASTOLIC BLOOD PRESSURE: 102 MMHG | BODY MASS INDEX: 47.22 KG/M2 | WEIGHT: 270.8 LBS | TEMPERATURE: 98.6 F | SYSTOLIC BLOOD PRESSURE: 162 MMHG | RESPIRATION RATE: 16 BRPM | OXYGEN SATURATION: 90 %

## 2018-07-10 DIAGNOSIS — J45.20 MILD INTERMITTENT ASTHMA WITHOUT COMPLICATION: ICD-10-CM

## 2018-07-10 DIAGNOSIS — Z13.6 CARDIOVASCULAR SCREENING; LDL GOAL LESS THAN 130: ICD-10-CM

## 2018-07-10 DIAGNOSIS — F41.9 ANXIETY: ICD-10-CM

## 2018-07-10 DIAGNOSIS — F11.20 NARCOTIC DEPENDENCE (H): ICD-10-CM

## 2018-07-10 DIAGNOSIS — E66.01 MORBID OBESITY (H): ICD-10-CM

## 2018-07-10 DIAGNOSIS — R20.0 NUMBNESS OF FINGER: ICD-10-CM

## 2018-07-10 DIAGNOSIS — M79.7 FIBROMYALGIA: ICD-10-CM

## 2018-07-10 DIAGNOSIS — Z23 NEED FOR TD VACCINE: ICD-10-CM

## 2018-07-10 DIAGNOSIS — I10 BENIGN ESSENTIAL HYPERTENSION: Primary | ICD-10-CM

## 2018-07-10 PROBLEM — F33.0 MILD EPISODE OF RECURRENT MAJOR DEPRESSIVE DISORDER (H): Status: RESOLVED | Noted: 2017-01-08 | Resolved: 2018-07-10

## 2018-07-10 LAB

## 2018-07-10 PROCEDURE — 82043 UR ALBUMIN QUANTITATIVE: CPT | Performed by: INTERNAL MEDICINE

## 2018-07-10 PROCEDURE — 80306 DRUG TEST PRSMV INSTRMNT: CPT | Performed by: INTERNAL MEDICINE

## 2018-07-10 PROCEDURE — 80061 LIPID PANEL: CPT | Performed by: INTERNAL MEDICINE

## 2018-07-10 PROCEDURE — 90714 TD VACC NO PRESV 7 YRS+ IM: CPT | Performed by: INTERNAL MEDICINE

## 2018-07-10 PROCEDURE — 36415 COLL VENOUS BLD VENIPUNCTURE: CPT | Performed by: INTERNAL MEDICINE

## 2018-07-10 PROCEDURE — 90471 IMMUNIZATION ADMIN: CPT | Performed by: INTERNAL MEDICINE

## 2018-07-10 PROCEDURE — 80048 BASIC METABOLIC PNL TOTAL CA: CPT | Performed by: INTERNAL MEDICINE

## 2018-07-10 PROCEDURE — 99214 OFFICE O/P EST MOD 30 MIN: CPT | Mod: 25 | Performed by: INTERNAL MEDICINE

## 2018-07-10 ASSESSMENT — ANXIETY QUESTIONNAIRES
6. BECOMING EASILY ANNOYED OR IRRITABLE: NOT AT ALL
2. NOT BEING ABLE TO STOP OR CONTROL WORRYING: NOT AT ALL
5. BEING SO RESTLESS THAT IT IS HARD TO SIT STILL: NOT AT ALL
1. FEELING NERVOUS, ANXIOUS, OR ON EDGE: NOT AT ALL
GAD7 TOTAL SCORE: 0
7. FEELING AFRAID AS IF SOMETHING AWFUL MIGHT HAPPEN: NOT AT ALL
3. WORRYING TOO MUCH ABOUT DIFFERENT THINGS: NOT AT ALL

## 2018-07-10 ASSESSMENT — ACTIVITIES OF DAILY LIVING (ADL)
CURRENT_FUNCTION: NO ASSISTANCE NEEDED
I_NEED_ASSISTANCE_FOR_THE_FOLLOWING_DAILY_ACTIVITIES:: NO ASSISTANCE IS NEEDED

## 2018-07-10 ASSESSMENT — PATIENT HEALTH QUESTIONNAIRE - PHQ9: 5. POOR APPETITE OR OVEREATING: NOT AT ALL

## 2018-07-10 NOTE — TELEPHONE ENCOUNTER
Please call the patient and find out what her medication for excess sweating is, and the dose.  Also get the ingredients in her supplements (tumeric and please apologize that I Vitamin).  Got so busy and forgot to ask Charlene to call on Tuesday afternoon.

## 2018-07-10 NOTE — PROGRESS NOTES
SUBJECTIVE:   Svetlana Serrano is a 73 year old female who presents to clinic today for the following health issues:      Hypertension Follow-up      Outpatient blood pressures are not being checked.    Low Salt Diet: low salt    Depression and Anxiety Follow-Up-    Status since last visit:  her depression resolved, anxiety is well controlled.     Other associated symptoms:None    Complicating factors:     Significant life event: No     Current substance abuse: None    PHQ-9 12/12/2016 5/25/2017 1/23/2018   Total Score 10 1 2   Q9: Suicide Ideation Not at all Not at all Not at all     GHADA-7 SCORE 12/12/2016 1/23/2018   Total Score 3 2       PHQ-9  English  PHQ-9   Any Language  GHADA-7  Suicide Assessment Five-step Evaluation and Treatment (SAFE-T)    Asthma Follow-Up    Was ACT completed today?  Yes      Amount of exercise or physical activity: walking and staying active    Problems taking medications regularly: No    Medication side effects: none    Diet: low salt    Other problems:   1. Chronic pain: continued stable pain, would like to look into medical marijuana. She is taking tumeric and hemp oil from nutrition store and think that helps, also some other vitamin . She is supposed to have shoulder surgery but there was a delay.   2.  Morbid obesity: She has lost over 20 pounds since February.  She has been eating a lot less.  She is going to start exercise program.    Current Concerns:   Left 3rd and 4th fingers hurting for the past few months, numb. They are sore with exposure to hot liquids, can be sore on and off.  The numbness is primarily decreased sensation but can be a little tingling, it comes and goes.      She reports she is on a new medication for excessive sweating but does not know what it is.    Patient Active Problem List   Diagnosis     Mild intermittent asthma     HTN (hypertension)     Restless leg syndrome     CARDIOVASCULAR SCREENING; LDL GOAL LESS THAN 160     Fibromyalgia     Advanced  directives, counseling/discussion     DDD (degenerative disc disease), lumbar     Spinal stenosis     Controlled substance agreement signed     Morbid obesity (H)     Anxiety     Mild episode of recurrent major depressive disorder (H)     Narcotic dependence (H)       Current Outpatient Prescriptions   Medication Sig Dispense Refill     albuterol (PROAIR HFA/PROVENTIL HFA/VENTOLIN HFA) 108 (90 BASE) MCG/ACT Inhaler Inhale 2 puffs into the lungs every 6 hours as needed for shortness of breath / dyspnea 1 Inhaler 11     amLODIPine (NORVASC) 5 MG tablet TAKE 1 TABLET BY MOUTH  DAILY 90 tablet 0     diclofenac (VOLTAREN) 1 % GEL Apply to affected area tid as directed. 300 g 3     DULoxetine (CYMBALTA) 60 MG EC capsule TAKE 1 CAPSULE BY MOUTH TWO TIMES DAILY 180 capsule 1     HYDROcodone-acetaminophen (NORCO) 5-325 MG per tablet Take 1-2 tablets by mouth every 6 hours as needed 180 tablet 0     losartan (COZAAR) 50 MG tablet TAKE 1 TABLET BY MOUTH  DAILY 90 tablet 0     LORazepam (ATIVAN) 0.5 MG tablet 1-2 po prior to flying. Do not drive within 6 hours of taking. 10 tablet 1       Social History   Substance Use Topics     Smoking status: Former Smoker     Quit date: 1/1/1969     Smokeless tobacco: Never Used     Alcohol use 0.0 oz/week     0 Standard drinks or equivalent per week      Comment: casual          ROS:  No fever, chills, no dyspnea on exertion, no chest pain, palpitations, PND, orthopnea, edema, syncope, headache, abdominal pain     OBJECTIVE:     BP (!) 162/102  Pulse 80  Temp 98.6  F (37  C) (Oral)  Resp 16  Wt 270 lb 12.8 oz (122.8 kg)  SpO2 90%  BMI 47.22 kg/m2  Body mass index is 47.22 kg/(m^2).    .Lungs: Clear  CV: normal S1, S2 without murmur, S3 or S4.   Pulses full  Negative Tinel's at the left wrist  She does report some dysesthesias at the tips of the left third and fourth fingers.    PHQ-9 SCORE 5/25/2017 1/23/2018 7/10/2018   Total Score - - -   Total Score 1 2 2     GHADA-7 SCORE  12/12/2016 1/23/2018 7/10/2018   Total Score 3 2 0       1    ASSESSMENT/PLAN:             1. Benign essential hypertension  Blood pressure is very high today, it is not clear why.  She does believe she took her medications today.  Possible it could be due to a supplement to the medication from the dermatologist, will call her at home to get specific details about these and then follow through.  May need to do a nurse recheck, will check her renal function today.  - Basic metabolic panel  - Albumin Random Urine Quantitative with Creat Ratio    2. Morbid obesity (H)  She has lost weight, encouraged to continue working on this.  She is going to be starting exercise    3. Narcotic dependence (H)  Overall stable with medication use  - Drug Abuse Screen Panel 13, Urine (Pain Care Package)    4. Fibromyalgia  Stable chronic pain, continue medications, advised how to look into the medical marijuana program    5. Mild intermittent asthma without complication  Stable, continue inhaler    6. Anxiety  Symptoms are well controlled, continue Cymbalta.    7. Numbness of finger  It is possible this could be a little radicular pain but no other significant neurologic symptoms, could be carpal tunnel.  If progressive, may need EMG testing    8. CARDIOVASCULAR SCREENING; LDL GOAL LESS THAN 130    - Lipid panel reflex to direct LDL Fasting    9. Need for TD vaccine    - TD (ADULT, 7+) PRESERVE FREE        Dolores Blanton MD  [facility]

## 2018-07-10 NOTE — MR AVS SNAPSHOT
After Visit Summary   7/10/2018    Svetlana Serrano    MRN: [id number]           Patient Information     Date Of Birth          1945        Visit Information        Provider Department      7/10/2018 9:20 AM Dolores Blanton MD [facility]        Today's Diagnoses     Benign essential hypertension    -  1    Narcotic dependence (H)        CARDIOVASCULAR SCREENING; LDL GOAL LESS THAN 160           Follow-ups after your visit        Who to contact     If you have questions or need follow up information about today's clinic visit or your schedule please contact [facility] directly at [phone].  Normal or non-critical lab and imaging results will be communicated to you by MyChart, letter or phone within 4 business days after the clinic has received the results. If you do not hear from us within 7 days, please contact the clinic through MyChart or phone. If you have a critical or abnormal lab result, we will notify you by phone as soon as possible.  Submit refill requests through [employer] or call your pharmacy and they will forward the refill request to us. Please allow 3 business days for your refill to be completed.          Additional Information About Your Visit        Care EveryWhere ID     This is your Care EveryWhere ID. This could be used by other organizations to access your [facility] medical records  [id number]        Your Vitals Were     Pulse Temperature Respirations Pulse Oximetry BMI (Body Mass Index)       80 98.6  F (37  C) (Oral) 16 90% 47.22 kg/m2        Blood Pressure from Last 3 Encounters:   07/10/18 (!) 162/102   02/07/18 128/86   01/23/18 118/82    Weight from Last 3 Encounters:   07/10/18 270 lb 12.8 oz (122.8 kg)   02/07/18 291 lb 14.4 oz (132.4 kg)   01/23/18 293 lb 12.8 oz (133.3 kg)              We Performed the Following     Albumin Random Urine Quantitative with Creat Ratio     Basic metabolic panel     Drug Abuse Screen Panel 13, Urine  (Pain Care Package)     Lipid panel reflex to direct LDL Fasting          Today's Medication Changes          These changes are accurate as of 7/10/18 10:16 AM.  If you have any questions, ask your nurse or doctor.               Stop taking these medicines if you haven't already. Please contact your care team if you have questions.     aluminum chloride 20 % external solution   Commonly known as:  DRYSOL   Stopped by:  Dolores Blanton MD           guaiFENesin-codeine 100-10 MG/5ML Soln solution   Commonly known as:  ROBITUSSIN AC   Stopped by:  Dolores Blanton MD                    Primary Care Provider Office Phone # Fax #    Dolores Blanton [phone]       [address] [facility] [address]  [facility] [address]        Equal Access to Services     [facility]: Hadii nasrin de la torre hadsusano Sotrey, waaxda luqadaha, qaybta kaalmada adeegyada, bj franklin . So [facility] [phone].    ATENCIÓN: Si habla español, tiene a will disposición servicios gratuitos de asistencia lingüística. [facility] [phone].    We comply with applicable federal civil rights laws and [place] laws. We do not discriminate on the basis of race, color, national origin, age, disability, sex, sexual orientation, or gender identity.            Thank you!     Thank you for choosing [facility]  for your care. Our goal is always to provide you with excellent care. Hearing back from our patients is one way we can continue to improve our services. Please take a few minutes to complete the written survey that you may receive in the mail after your visit with us. Thank you!             Your Updated Medication List - Protect others around you: Learn how to safely use, store and throw away your medicines at www.disposemymeds.org.          This list is accurate as of 7/10/18 10:16 AM.  Always use your most recent med list.                   Brand Name Dispense Instructions for use Diagnosis    albuterol 108 (90 Base)  MCG/ACT Inhaler    PROAIR HFA/PROVENTIL HFA/VENTOLIN HFA    1 Inhaler    Inhale 2 puffs into the lungs every 6 hours as needed for shortness of breath / dyspnea    Mild intermittent asthma without complication       amLODIPine 5 MG tablet    NORVASC    90 tablet    TAKE 1 TABLET BY MOUTH  DAILY    Benign essential hypertension       diclofenac 1 % Gel topical gel    VOLTAREN    300 g    Apply to affected area tid as directed.    Fibromyalgia       DULoxetine 60 MG EC capsule    CYMBALTA    180 capsule    TAKE 1 CAPSULE BY MOUTH TWO TIMES DAILY    Fibromyalgia, Major depressive disorder, recurrent episode, mild (H), Anxiety       HYDROcodone-acetaminophen 5-325 MG per tablet    NORCO    180 tablet    Take 1-2 tablets by mouth every 6 hours as needed    Fibromyalgia, DDD (degenerative disc disease), lumbar       LORazepam 0.5 MG tablet    ATIVAN    10 tablet    1-2 po prior to flying. Do not drive within 6 hours of taking.    Acute reaction to stress       losartan 50 MG tablet    COZAAR    90 tablet    TAKE 1 TABLET BY MOUTH  DAILY    Benign essential hypertension

## 2018-07-11 LAB
ANION GAP SERPL CALCULATED.3IONS-SCNC: 11 MMOL/L (ref 3–14)
BUN SERPL-MCNC: 14 MG/DL (ref 7–30)
CALCIUM SERPL-MCNC: 9.1 MG/DL (ref 8.5–10.1)
CHLORIDE SERPL-SCNC: 106 MMOL/L (ref 94–109)
CHOLEST SERPL-MCNC: 173 MG/DL
CO2 SERPL-SCNC: 24 MMOL/L (ref 20–32)
CREAT SERPL-MCNC: 0.8 MG/DL (ref 0.52–1.04)
CREAT UR-MCNC: 114 MG/DL
GFR SERPL CREATININE-BSD FRML MDRD: 70 ML/MIN/1.7M2
GLUCOSE SERPL-MCNC: 107 MG/DL (ref 70–99)
HDLC SERPL-MCNC: 58 MG/DL
LDLC SERPL CALC-MCNC: 92 MG/DL
MICROALBUMIN UR-MCNC: 11 MG/L
MICROALBUMIN/CREAT UR: 9.91 MG/G CR (ref 0–25)
NONHDLC SERPL-MCNC: 115 MG/DL
POTASSIUM SERPL-SCNC: 4.3 MMOL/L (ref 3.4–5.3)
SODIUM SERPL-SCNC: 141 MMOL/L (ref 133–144)
TRIGL SERPL-MCNC: 113 MG/DL

## 2018-07-11 ASSESSMENT — ASTHMA QUESTIONNAIRES: ACT_TOTALSCORE: 24

## 2018-07-11 ASSESSMENT — ANXIETY QUESTIONNAIRES: GAD7 TOTAL SCORE: 0

## 2018-07-11 ASSESSMENT — PATIENT HEALTH QUESTIONNAIRE - PHQ9: SUM OF ALL RESPONSES TO PHQ QUESTIONS 1-9: 2

## 2018-07-11 NOTE — TELEPHONE ENCOUNTER
Pt returns call, provides list below. Pt has appt with PCP 7/27/18, pt will bring the below OTC bottles to that appt. Pt asks if there is any of the below she can stop, please advise.     Oxybutynin 5 mg - pt taking 2 tabs, Rx'd by Dr Ke Layne     finasteride 5 mg - take 1/2 tab Rx'd by Dr Ke Layne    glycopyrrolate  1 mg - take 1 tablet 2-3 times a day, Rx'd Dr Ke Layne - pt states only taking 1 tab daily.     Hemp oil 1000 mg taking 1 capsul contains Omega 3 185 mg, Omega 6 5356 mg, Omega 9 105 mg, (noticed when providing information, should be taking 3 capsules per day. Pt states will increase to take 3 capsules per day.)    Tumeric Curcumin 1800 mg, black pepper 15 mg. pt states has been taking 1 capsule per day, noticed when providing information, should be taking 3 capsules per day. Pt states will increase to take 3 capsules per day.)    1000 mg Fish Oil  1 capsule per day Total Omega 1065 mg, Omega  mg, Omega  mg, Other Omega 65 mg    Womens Ultra Jones 50+ multi-Vitamin - taking 1 tablet a day     Collagen Revitalizing formula - takes 1 teaspoon daily, mixes with hot water - 65392 mg hydrolyzed collagen, protein 9 g, Vit c 100 mg, calcium 3 mg, magnesium 14 mg, sodium 25 mg, glucosamine sulfate 1200 mg   Pt states has other ingredients but print is too small    Basilia Antoine RN

## 2018-07-12 NOTE — TELEPHONE ENCOUNTER
Oxybutinin can raise BP. I recommend she stop it for now.   She should not need to take fish oil separately if taking Hemp oil which is mostly omegas.   She should be careful with tumeric; it can have a lot of side effects and not much evidence of benefit but elevated BP is not one of the issues.   The glycopyrrolate can also raise the BP. She should stop this also. I don't see any appointment for her on 7/27.  She should do a nurse BP check in a week to see if improved off the meds.

## 2018-07-12 NOTE — TELEPHONE ENCOUNTER
Patient returning call.  Advised of primary care provider's message below.    1.  She states she cannot go off Oxybutynin d/t extreme sweating.  States she has tried other meds for this and they did not work.  Oxybutynin is the only medication that helps.    2.  Asking for lab results from 7-10-18.    (Patient states her  has an appointment 7-27-18--not Patient.)    Please advise, thanks.

## 2018-07-12 NOTE — TELEPHONE ENCOUNTER
I think she needs to figure out if the glycopyrrolate and or the oxybutynin are causing the elevated blood pressure.  She can try stopping one at a time and checking her blood pressure after stopping for for 5 days but if they are causing the high blood pressure, they she may need to speak with the specialist about that problem, otherwise she may end up on 2 or 3 more medications to control her blood pressure.

## 2018-07-20 DIAGNOSIS — M79.7 FIBROMYALGIA: ICD-10-CM

## 2018-07-20 DIAGNOSIS — F33.0 MAJOR DEPRESSIVE DISORDER, RECURRENT EPISODE, MILD (H): ICD-10-CM

## 2018-07-20 DIAGNOSIS — F41.9 ANXIETY: ICD-10-CM

## 2018-07-20 NOTE — TELEPHONE ENCOUNTER
"Requested Prescriptions   Pending Prescriptions Disp Refills     DULoxetine (CYMBALTA) 60 MG EC capsule [Pharmacy Med Name: DULOXETINE  60MG  CAP] 180 capsule     Last Written Prescription Date:  01/05/2018  Last Fill Quantity: 180,  # refills: 1   Last office visit: 7/10/2018 with prescribing provider:     Future Office Visit:   Sig: TAKE 1 CAPSULE BY MOUTH TWO TIMES DAILY    Serotonin-Norepinephrine Reuptake Inhibitors  Failed    7/20/2018  4:13 AM       Failed - Blood pressure under 140/90 in past 12 months    BP Readings from Last 3 Encounters:   07/10/18 (!) 162/102   02/07/18 128/86   01/23/18 118/82                Passed - PHQ-9 score of less than 5 in past 6 months    Please review last PHQ-9 score.          Passed - Patient is age 18 or older       Passed - No active pregnancy on record       Passed - No positive pregnancy test in past 12 months       Passed - Recent (6 mo) or future (30 days) visit within the authorizing provider's specialty    Patient had office visit in the last 6 months or has a visit in the next 30 days with authorizing provider or within the authorizing provider's specialty.  See \"Patient Info\" tab in inbasket, or \"Choose Columns\" in Meds & Orders section of the refill encounter.            "

## 2018-07-23 DIAGNOSIS — I10 BENIGN ESSENTIAL HYPERTENSION: ICD-10-CM

## 2018-07-23 DIAGNOSIS — F43.0 ACUTE REACTION TO STRESS: ICD-10-CM

## 2018-07-23 RX ORDER — LORAZEPAM 0.5 MG/1
TABLET ORAL
Qty: 10 TABLET | Refills: 0 | Status: SHIPPED | OUTPATIENT
Start: 2018-07-23 | End: 2018-11-02

## 2018-07-23 NOTE — TELEPHONE ENCOUNTER
"Requested Prescriptions   Pending Prescriptions Disp Refills     losartan (COZAAR) 50 MG tablet  Last Written Prescription Date:  05/18/18  Last Fill Quantity: 90,  # refills: 0   Last office visit: 7/10/2018 with prescribing provider:  07/10/18   Future Office Visit:     90 tablet 0    Angiotensin-II Receptors Failed    7/23/2018  1:03 PM       Failed - Blood pressure under 140/90 in past 12 months    BP Readings from Last 3 Encounters:   07/10/18 (!) 162/102   02/07/18 128/86   01/23/18 118/82                Passed - Recent (12 mo) or future (30 days) visit within the authorizing provider's specialty    Patient had office visit in the last 12 months or has a visit in the next 30 days with authorizing provider or within the authorizing provider's specialty.  See \"Patient Info\" tab in inbasket, or \"Choose Columns\" in Meds & Orders section of the refill encounter.           Passed - Patient is age 18 or older       Passed - No active pregnancy on record       Passed - Normal serum creatinine on file in past 12 months    Recent Labs   Lab Test  07/10/18   1033   CR  0.80            Passed - Normal serum potassium on file in past 12 months    Recent Labs   Lab Test  07/10/18   1033   POTASSIUM  4.3                   Passed - No positive pregnancy test in past 12 months        amLODIPine (NORVASC) 5 MG tablet  Last Written Prescription Date:  06/26/18  Last Fill Quantity: 90,  # refills: 0   Last office visit: 7/10/2018 with prescribing provider:  07/10/18   Future Office Visit:     90 tablet 0    Calcium Channel Blockers Protocol  Failed    7/23/2018  1:03 PM       Failed - Blood pressure under 140/90 in past 12 months    BP Readings from Last 3 Encounters:   07/10/18 (!) 162/102   02/07/18 128/86   01/23/18 118/82                Passed - Recent (12 mo) or future (30 days) visit within the authorizing provider's specialty    Patient had office visit in the last 12 months or has a visit in the next 30 days with " "authorizing provider or within the authorizing provider's specialty.  See \"Patient Info\" tab in inbasket, or \"Choose Columns\" in Meds & Orders section of the refill encounter.           Passed - Patient is age 18 or older       Passed - No active pregnancy on record       Passed - Normal serum creatinine on file in past 12 months    Recent Labs   Lab Test  07/10/18   1033   CR  0.80            Passed - No positive pregnancy test in past 12 months          "

## 2018-07-23 NOTE — TELEPHONE ENCOUNTER
Last Written Prescription Date:  1/23/18  Last Fill Quantity: 10,  # refills: 1   Last office visit: 7/10/2018 with prescribing provider:     NICHOLAS on file

## 2018-07-24 RX ORDER — DULOXETIN HYDROCHLORIDE 60 MG/1
CAPSULE, DELAYED RELEASE ORAL
Qty: 180 CAPSULE | Refills: 1 | Status: SHIPPED | OUTPATIENT
Start: 2018-07-24 | End: 2019-08-09

## 2018-07-24 NOTE — TELEPHONE ENCOUNTER
Call to pt and advised. She is worried about her sweating if she goes off of the Oxybutynin.     She already stopped the Glycopyrrolate.     She will get her BP checked next week when Harley is at his appt.

## 2018-07-25 NOTE — TELEPHONE ENCOUNTER
"Requested Prescriptions   Pending Prescriptions Disp Refills     losartan (COZAAR) 50 MG tablet 90 tablet 0    Angiotensin-II Receptors Failed    7/23/2018  1:04 PM   Last Written Prescription Date:  05/18/2018  Last Fill Quantity: 90,  # refills: 0   Last office visit: 7/10/2018 with prescribing provider:     Future Office Visit:      Failed - Blood pressure under 140/90 in past 12 months    BP Readings from Last 3 Encounters:   07/10/18 (!) 162/102   02/07/18 128/86   01/23/18 118/82                Passed - Recent (12 mo) or future (30 days) visit within the authorizing provider's specialty    Patient had office visit in the last 12 months or has a visit in the next 30 days with authorizing provider or within the authorizing provider's specialty.  See \"Patient Info\" tab in inbasket, or \"Choose Columns\" in Meds & Orders section of the refill encounter.           Passed - Patient is age 18 or older       Passed - No active pregnancy on record       Passed - Normal serum creatinine on file in past 12 months    Recent Labs   Lab Test  07/10/18   1033   CR  0.80            Passed - Normal serum potassium on file in past 12 months    Recent Labs   Lab Test  07/10/18   1033   POTASSIUM  4.3                   Passed - No positive pregnancy test in past 12 months        amLODIPine (NORVASC) 5 MG tablet 90 tablet 0    Calcium Channel Blockers Protocol  Failed    7/23/2018  1:04 PM   Last Written Prescription Date:  06/26/2018  Last Fill Quantity: 90,  # refills: 0   Last office visit: 7/10/2018 with prescribing provider:     Future Office Visit:      Failed - Blood pressure under 140/90 in past 12 months    BP Readings from Last 3 Encounters:   07/10/18 (!) 162/102   02/07/18 128/86   01/23/18 118/82                Passed - Recent (12 mo) or future (30 days) visit within the authorizing provider's specialty    Patient had office visit in the last 12 months or has a visit in the next 30 days with authorizing provider or " "within the authorizing provider's specialty.  See \"Patient Info\" tab in inbasket, or \"Choose Columns\" in Meds & Orders section of the refill encounter.           Passed - Patient is age 18 or older       Passed - No active pregnancy on record       Passed - Normal serum creatinine on file in past 12 months    Recent Labs   Lab Test  07/10/18   1033   CR  0.80            Passed - No positive pregnancy test in past 12 months        "

## 2018-07-26 RX ORDER — AMLODIPINE BESYLATE 5 MG/1
TABLET ORAL
Qty: 90 TABLET | Refills: 0 | Status: SHIPPED | OUTPATIENT
Start: 2018-07-26 | End: 2018-12-20

## 2018-07-26 RX ORDER — LOSARTAN POTASSIUM 50 MG/1
TABLET ORAL
Qty: 90 TABLET | Refills: 0 | Status: SHIPPED | OUTPATIENT
Start: 2018-07-26 | End: 2018-12-20

## 2018-07-26 NOTE — TELEPHONE ENCOUNTER
She would need to schedule her own nurse BP check if she is going to have it done here.  Refills done.

## 2018-07-26 NOTE — TELEPHONE ENCOUNTER
Refills to soon-but see 7/10 phone note. Will see if patient has rechecked BP yet       Called patient to see if she has had her BP rechecked yet, patient stated no, but she was planning on having her BP checked when she comes in with her  tomorrow. When patient 's  told her what time his appointment's were patient stated she does not know if she can come that early. If patient does not come tomorrow, she stated she will have BP checked over the weekend and call on Monday with BP    Patient's  is scheduled for lab work at 815am, and BP check at 830am.

## 2018-07-27 ENCOUNTER — ALLIED HEALTH/NURSE VISIT (OUTPATIENT)
Dept: NURSING | Facility: CLINIC | Age: 73
End: 2018-07-27
Payer: MEDICARE

## 2018-07-27 VITALS — DIASTOLIC BLOOD PRESSURE: 80 MMHG | SYSTOLIC BLOOD PRESSURE: 142 MMHG

## 2018-07-27 DIAGNOSIS — I10 BENIGN ESSENTIAL HYPERTENSION: Primary | ICD-10-CM

## 2018-07-27 PROCEDURE — 99207 ZZC NO CHARGE NURSE ONLY: CPT

## 2018-07-27 NOTE — MR AVS SNAPSHOT
After Visit Summary   7/27/2018    Svetlana Serrano    MRN: [id number]           Patient Information     Date Of Birth          1945        Visit Information        Provider Department      7/27/2018 9:30 AM RI IM NURSE [facility]        Today's Diagnoses     Benign essential hypertension    -  1       Follow-ups after your visit        Your next 10 appointments already scheduled     Jul 27, 2018  9:30 AM CDT   Nurse [facility] with [facility]    [address] [place]  [facility] [id number] [phone]            Oct 03, 2018   Procedure with Teo Gustafson MD   [facility] (--)    [address]  [facility] [id number] [phone]              Who to contact     If you have questions or need follow up information about today's clinic visit or your schedule please contact [facility] directly at [phone].  Normal or non-critical lab and imaging results will be communicated to you by MyChart, letter or phone within 4 business days after the clinic has received the results. If you do not hear from us within 7 days, please contact the clinic through MyChart or phone. If you have a critical or abnormal lab result, we will notify you by phone as soon as possible.  Submit refill requests through Voicebase or call your pharmacy and they will forward the refill request to us. Please allow 3 business days for your refill to be completed.          Additional Information About Your Visit        Care EveryWhere ID     This is your Care EveryWhere ID. This could be used by other organizations to access your [facility] medical records  [id number]         Blood Pressure from Last 3 Encounters:   07/27/18 142/80   07/10/18 (!) 162/102   02/07/18 128/86    Weight from Last 3 Encounters:   07/10/18 270 lb 12.8 oz (122.8 kg)   02/07/18 291 lb 14.4 oz (132.4 kg)   01/23/18 293 lb 12.8 oz  (133.3 kg)              Today, you had the following     No orders found for display       Primary Care Provider Office Phone # Fax #    Dolores Blanton [phone]       [address]  [facility] [address]        Equal Access to Services     SKYARCENIO OTIS : Hadii aad ku hadasho Soomaali, waaxda luqadaha, qaybta kaalmada adeegyada, waxay idiin hayaan adeeg khthompson lashirley . So [facility] [phone].    ATENCIÓN: Si habla español, tiene a will disposición servicios gratuitos de asistencia lingüística. Llame al [phone].    We comply with applicable federal civil rights laws and [place] laws. We do not discriminate on the basis of race, color, national origin, age, disability, sex, sexual orientation, or gender identity.            Thank you!     Thank you for choosing [facility]  for your care. Our goal is always to provide you with excellent care. Hearing back from our patients is one way we can continue to improve our services. Please take a few minutes to complete the written survey that you may receive in the mail after your visit with us. Thank you!             Your Updated Medication List - Protect others around you: Learn how to safely use, store and throw away your medicines at www.disposemymeds.org.          This list is accurate as of 7/27/18  9:07 AM.  Always use your most recent med list.                   Brand Name Dispense Instructions for use Diagnosis    albuterol 108 (90 Base) MCG/ACT Inhaler    PROAIR HFA/PROVENTIL HFA/VENTOLIN HFA    1 Inhaler    Inhale 2 puffs into the lungs every 6 hours as needed for shortness of breath / dyspnea    Mild intermittent asthma without complication       amLODIPine 5 MG tablet    NORVASC    90 tablet    TAKE 1 TABLET BY MOUTH  DAILY    Benign essential hypertension       diclofenac 1 % Gel topical gel    VOLTAREN    300 g    Apply to affected area tid as directed.    Fibromyalgia       DULoxetine 60 MG EC capsule    CYMBALTA     180 capsule    TAKE 1 CAPSULE BY MOUTH TWO TIMES DAILY    Fibromyalgia, Major depressive disorder, recurrent episode, mild (H), Anxiety       HYDROcodone-acetaminophen 5-325 MG per tablet    NORCO    180 tablet    Take 1-2 tablets by mouth every 6 hours as needed    Fibromyalgia, DDD (degenerative disc disease), lumbar       LORazepam 0.5 MG tablet    ATIVAN    10 tablet    TAKE 1-2 TABLETS BY MOUTH PRIOR TO FLYING. DO NOT DRIVE WITHIN 6 HOURS OF TAKING    Acute reaction to stress       losartan 50 MG tablet    COZAAR    90 tablet    TAKE 1 TABLET BY MOUTH  DAILY    Benign essential hypertension

## 2018-07-27 NOTE — PROGRESS NOTES
Please advise her that the blood pressure is much better but not quite at goal.  Recommend she recheck again in another 2 weeks.

## 2018-07-27 NOTE — NURSING NOTE
"Chief Complaint   Patient presents with     Allied Health Visit     initial /80 Estimated body mass index is 47.22 kg/(m^2) as calculated from the following:    Height as of 2/7/18: 5' 3.5\" (1.613 m).    Weight as of 7/10/18: 270 lb 12.8 oz (122.8 kg)..  bp completed using cuff size large  MACI HAMILTON LPN  "

## 2018-08-06 ENCOUNTER — TELEPHONE (OUTPATIENT)
Dept: INTERNAL MEDICINE | Facility: CLINIC | Age: 73
End: 2018-08-06

## 2018-08-06 DIAGNOSIS — M51.369 DDD (DEGENERATIVE DISC DISEASE), LUMBAR: ICD-10-CM

## 2018-08-06 DIAGNOSIS — M79.7 FIBROMYALGIA: ICD-10-CM

## 2018-08-06 NOTE — TELEPHONE ENCOUNTER
Reason for Call:  Medication or medication refill:    Do you use a [facility]?  Name of the pharmacy and phone number for the current request:  [email or web address] [address] ([place]) - [phone]    Name of the medication requested: vicodin    Other request: none    Can we leave a detailed message on this number? YES    Phone number patient can be reached at: Home number on file [phone] (home)    Best Time: any    Call taken on 8/6/2018 at 10:02 AM by Giovanna Yang

## 2018-08-07 RX ORDER — HYDROCODONE BITARTRATE AND ACETAMINOPHEN 5; 325 MG/1; MG/1
1-2 TABLET ORAL EVERY 6 HOURS PRN
Qty: 180 TABLET | Refills: 0 | Status: SHIPPED | OUTPATIENT
Start: 2018-08-07 | End: 2018-09-11

## 2018-08-07 NOTE — TELEPHONE ENCOUNTER
Norco     TAKE to  pharmacy    Last Written Prescription Date:  7/3/18  Last Fill Quantity: 180,   # refills: 0    Last Office Visit: 7/10/18    Future Office visit:       Routing refill request to provider for review/approval because:  Drug not on the [facility], [facility] or [facility] refill protocol or controlled substance

## 2018-09-11 DIAGNOSIS — M51.369 DDD (DEGENERATIVE DISC DISEASE), LUMBAR: ICD-10-CM

## 2018-09-11 DIAGNOSIS — M79.7 FIBROMYALGIA: ICD-10-CM

## 2018-09-11 RX ORDER — HYDROCODONE BITARTRATE AND ACETAMINOPHEN 5; 325 MG/1; MG/1
1-2 TABLET ORAL EVERY 6 HOURS PRN
Qty: 180 TABLET | Refills: 0 | Status: SHIPPED | OUTPATIENT
Start: 2018-09-11 | End: 2018-10-08

## 2018-09-11 NOTE — TELEPHONE ENCOUNTER
[facility]      Last Written Prescription Date:  08/07/18  Last Fill Quantity: 180,   # refills: 07/10/18  Last Office Visit: 07/10/18  Future Office visit:       Routing refill request to provider for review/approval because:  Drug not on the [facility], [facility] or [facility] refill protocol or controlled substance

## 2018-09-20 ENCOUNTER — TRANSFERRED RECORDS (OUTPATIENT)
Dept: HEALTH INFORMATION MANAGEMENT | Facility: CLINIC | Age: 73
End: 2018-09-20

## 2018-09-26 ENCOUNTER — OFFICE VISIT (OUTPATIENT)
Dept: INTERNAL MEDICINE | Facility: CLINIC | Age: 73
End: 2018-09-26
Payer: MEDICARE

## 2018-09-26 VITALS
DIASTOLIC BLOOD PRESSURE: 84 MMHG | SYSTOLIC BLOOD PRESSURE: 134 MMHG | TEMPERATURE: 98.4 F | HEART RATE: 100 BPM | BODY MASS INDEX: 45.38 KG/M2 | HEIGHT: 64 IN | WEIGHT: 265.8 LBS | RESPIRATION RATE: 16 BRPM | OXYGEN SATURATION: 94 %

## 2018-09-26 DIAGNOSIS — Z01.818 PRE-OP EXAM: Primary | ICD-10-CM

## 2018-09-26 DIAGNOSIS — Z01.818 PREOP GENERAL PHYSICAL EXAM: ICD-10-CM

## 2018-09-26 PROCEDURE — 99213 OFFICE O/P EST LOW 20 MIN: CPT | Performed by: NURSE PRACTITIONER

## 2018-09-26 PROCEDURE — 93000 ELECTROCARDIOGRAM COMPLETE: CPT | Performed by: NURSE PRACTITIONER

## 2018-09-26 NOTE — MR AVS SNAPSHOT
After Visit Summary   9/26/2018    Svetlana Serrano    MRN: [id number]           Patient Information     Date Of Birth          1945        Visit Information        Provider Department      9/26/2018 11:00 AM Maddi Knapp NP [facility]        Today's Diagnoses     Pre-op exam    -  1    Preop general physical exam          Care Instructions      Before Your Surgery      Call your surgeon if there is any change in your health. This includes signs of a cold or flu (such as a sore throat, runny nose, cough, rash or fever).    Do not smoke, drink alcohol or take over the counter medicine (unless your surgeon or primary care doctor tells you to) for the 24 hours before and after surgery.    If you take prescribed drugs: Follow your doctor s orders about which medicines to take and which to stop until after surgery.    Eating and drinking prior to surgery: follow the instructions from your surgeon    Take a shower or bath the night before surgery. Use the soap your surgeon gave you to gently clean your skin. If you do not have soap from your surgeon, use your regular soap. Do not shave or scrub the surgery site.  Wear clean pajamas and have clean sheets on your bed.           Follow-ups after your visit        Follow-up notes from your care team     Return if symptoms worsen or fail to improve.      Your next 10 appointments already scheduled     Oct 08, 2018   Procedure with Anton Stroud MD   [facility] (--)    [address] [facility] [id number] [phone]              Who to contact     If you have questions or need follow up information about today's clinic visit or your schedule please contact [facility] directly at [phone].  Normal or non-critical lab and imaging results will be communicated to you by MyChart, letter or phone within 4 business days after the clinic has received the results. If you do not hear  "from us within 7 days, please contact the clinic through NextPaget or phone. If you have a critical or abnormal lab result, we will notify you by phone as soon as possible.  Submit refill requests through [employer] or call your pharmacy and they will forward the refill request to us. Please allow 3 business days for your refill to be completed.          Additional Information About Your Visit        Care EveryWhere ID     This is your Care EveryWhere ID. This could be used by other organizations to access your [facility] medical records  [id number]        Your Vitals Were     Pulse Temperature Respirations Height Pulse Oximetry BMI (Body Mass Index)    100 98.4  F (36.9  C) (Oral) 16 5' 3.5\" (1.613 m) 94% 46.35 kg/m2       Blood Pressure from Last 3 Encounters:   09/26/18 134/84   07/27/18 142/80   07/10/18 (!) 162/102    Weight from Last 3 Encounters:   09/26/18 265 lb 12.8 oz (120.6 kg)   07/10/18 270 lb 12.8 oz (122.8 kg)   02/07/18 291 lb 14.4 oz (132.4 kg)              We Performed the Following     EKG 12-lead complete w/read - Clinics        Primary Care Provider Office Phone # Fax #    Dolores Blanton [phone]       [address] [place] [id number]        Equal Access to Services     [facility] : Hadii aad ku hadasho Soomaali, waaxda luqadaha, qaybta kaalmada adesherie, bj quiroga. So [facility] [phone].    ATENCIÓN: Si habla español, tiene a will disposición servicios gratuitos de asistencia lingüística. Nicky al [phone].    We comply with applicable federal civil rights laws and [place] laws. We do not discriminate on the basis of race, color, national origin, age, disability, sex, sexual orientation, or gender identity.            Thank you!     Thank you for choosing [facility]  for your care. Our goal is always to provide you with excellent care. Hearing back from our patients is one way we can continue to improve our " services. Please take a few minutes to complete the written survey that you may receive in the mail after your visit with us. Thank you!             Your Updated Medication List - Protect others around you: Learn how to safely use, store and throw away your medicines at www.disposemymeds.org.          This list is accurate as of 9/26/18 11:55 AM.  Always use your most recent med list.                   Brand Name Dispense Instructions for use Diagnosis    albuterol 108 (90 Base) MCG/ACT inhaler    PROAIR HFA/PROVENTIL HFA/VENTOLIN HFA    1 Inhaler    Inhale 2 puffs into the lungs every 6 hours as needed for shortness of breath / dyspnea    Mild intermittent asthma without complication       amLODIPine 5 MG tablet    NORVASC    90 tablet    TAKE 1 TABLET BY MOUTH  DAILY    Benign essential hypertension       diclofenac 1 % Gel topical gel    VOLTAREN    300 g    Apply to affected area tid as directed.    Fibromyalgia       DULoxetine 60 MG EC capsule    CYMBALTA    180 capsule    TAKE 1 CAPSULE BY MOUTH TWO TIMES DAILY    Fibromyalgia, Major depressive disorder, recurrent episode, mild (H), Anxiety       HYDROcodone-acetaminophen 5-325 MG per tablet    NORCO    180 tablet    Take 1-2 tablets by mouth every 6 hours as needed    Fibromyalgia, DDD (degenerative disc disease), lumbar       LORazepam 0.5 MG tablet    ATIVAN    10 tablet    TAKE 1-2 TABLETS BY MOUTH PRIOR TO FLYING. DO NOT DRIVE WITHIN 6 HOURS OF TAKING    Acute reaction to stress       losartan 50 MG tablet    COZAAR    90 tablet    TAKE 1 TABLET BY MOUTH  DAILY    Benign essential hypertension       OVER-THE-COUNTER      Ideal collegen        OVER-THE-COUNTER      womens ultra zulema        OVER-THE-COUNTER      Hemp oil        Vitamin D-3 5000 units Tabs

## 2018-09-26 NOTE — PROGRESS NOTES
[place] [address]  [facility] [address]  [phone]  Dept: [phone]    PRE-OP EVALUATION:  Today's date: 2018    Svetlana Serrano (: 1945) presents for pre-operative evaluation assessment as requested by Dr. HUNTER.  She requires evaluation and anesthesia risk assessment prior to undergoing surgery/procedure for treatment of screening colonoscopy .    Proposed Surgery/ Procedure: colonoscopy  Date of Surgery/ Procedure: 10/8/18  Time of Surgery/ Procedure: [facility]/Surgical Facility: [facility]    Primary Physician: Dolores Blanton  Type of Anesthesia Anticipated: Local with MAC    Patient has a Health Care Directive or Living Will:  NO    1. NO - Do you have a history of heart attack, stroke, stent, bypass or surgery on an artery in the head, neck, heart or legs?  2. NO - Do you ever have any pain or discomfort in your chest?  3. NO - Do you have a history of  Heart Failure?  4. NO - Are you troubled by shortness of breath when: walking on the level, up a slight hill or at night?  5. NO - Do you currently have a cold, bronchitis or other respiratory infection?  6. NO - Do you have a cough, shortness of breath or wheezing?  7. NO - Do you sometimes get pains in the calves of your legs when you walk?  8. NO - Do you or anyone in your family have previous history of blood clots?  9. NO - Do you or does anyone in your family have a serious bleeding problem such as prolonged bleeding following surgeries or cuts?  10. NO - Have you ever had problems with anemia or been told to take iron pills?  11. NO - Have you had any abnormal blood loss such as black, tarry or bloody stools, or abnormal vaginal bleeding?  12. NO - Have you ever had a blood transfusion?  13. NO - Have you or any of your relatives ever had problems with anesthesia?  14. NO - Do you have sleep apnea, excessive snoring or daytime drowsiness?  15. NO - Do you have any prosthetic heart valves?  16. YES -  Do you have prosthetic joints? bilat knees,L shoulder  17. NO - Is there any chance that you may be pregnant?      HPI:     HPI related to upcoming procedure: screening colonoscopy      See problem list for active medical problems.  Problems all longstanding and stable, except as noted/documented.  See ROS for pertinent symptoms related to these conditions.                                                                                                                                                          .    MEDICAL HISTORY:     Patient Active Problem List    Diagnosis Date Noted     Benign essential hypertension 07/10/2018     Priority: Medium     CARDIOVASCULAR SCREENING; LDL GOAL LESS THAN 130 07/10/2018     Priority: Medium     Narcotic dependence (H) 10/21/2017     Priority: Medium     Anxiety 12/08/2016     Priority: Medium     Morbid obesity (H) 05/07/2016     Priority: Medium     Controlled substance agreement signed 04/25/2016     Priority: Medium     Patient is followed by Data Unavailable for ongoing prescription of pain medication.  All refills should be approved by this provider, or covering partner.    Medication(s):   norco.   Maximum quantity per month: 180  Clinic visit frequency required: Q 6  months     Controlled substance agreement on file: Yes       Date(s): 4/21/16    Pain Clinic evaluation in the past: Yes       Date/Location:      DIRE Total Score(s):  No flowsheet data found.    Last [facility] website verification:  none   [email or web address]         DDD (degenerative disc disease), lumbar 04/01/2014     Priority: Medium     Spinal stenosis 04/01/2014     Priority: Medium     Advanced directives, counseling/discussion 07/02/2013     Priority: Medium     Discussed Advance Directive planning with patient; however, patient declined at this time.          Fibromyalgia 01/24/2013     Priority: Medium     Mild intermittent asthma 12/10/2012     Priority: Medium     Asthma taken care by  Dr. Kristin Puckett       Restless leg syndrome 12/10/2012     Priority: Medium      Past Medical History:   Diagnosis Date     Back pain      HTN (hypertension)      Mild intermittent asthma      Tubular adenoma of colon 8/15     Past Surgical History:   Procedure Laterality Date     ARTHROPLASTY SHOULDER       C/SECTION, CLASSICAL      , Classical     CHOLECYSTECTOMY, LAPOROSCOPIC      Cholecystectomy, Laparoscopic     HYSTERECTOMY, JOSHUA       JOINT REPLACEMTN, KNEE RT/LT      bilateral     Current Outpatient Prescriptions   Medication Sig Dispense Refill     albuterol (PROAIR HFA/PROVENTIL HFA/VENTOLIN HFA) 108 (90 BASE) MCG/ACT Inhaler Inhale 2 puffs into the lungs every 6 hours as needed for shortness of breath / dyspnea 1 Inhaler 11     amLODIPine (NORVASC) 5 MG tablet TAKE 1 TABLET BY MOUTH  DAILY 90 tablet 0     Cholecalciferol (VITAMIN D-3) 5000 units TABS        diclofenac (VOLTAREN) 1 % GEL Apply to affected area tid as directed. 300 g 3     DULoxetine (CYMBALTA) 60 MG EC capsule TAKE 1 CAPSULE BY MOUTH TWO TIMES DAILY 180 capsule 1     HYDROcodone-acetaminophen (NORCO) 5-325 MG per tablet Take 1-2 tablets by mouth every 6 hours as needed 180 tablet 0     LORazepam (ATIVAN) 0.5 MG tablet TAKE 1-2 TABLETS BY MOUTH PRIOR TO FLYING. DO NOT DRIVE WITHIN 6 HOURS OF TAKING 10 tablet 0     losartan (COZAAR) 50 MG tablet TAKE 1 TABLET BY MOUTH  DAILY 90 tablet 0     OVER-THE-COUNTER Ideal collegen       OVER-THE-COUNTER womens ultra zulema       OVER-THE-COUNTER Hemp oil       OTC products: None, except as noted above    Allergies   Allergen Reactions     Clindamycin Shortness Of Breath     Amoxicillin      Rash.     Sulfa Drugs      Hives        Latex Allergy: NO    Social History   Substance Use Topics     Smoking status: Former Smoker     Quit date: 1969     Smokeless tobacco: Never Used     Alcohol use 0.0 oz/week     0 Standard drinks or equivalent per week      Comment: casual     History   Drug Use  "No       REVIEW OF SYSTEMS:   CONSTITUTIONAL: NEGATIVE for fever, chills, change in weight  ENT/MOUTH: NEGATIVE for ear, mouth and throat problems  RESP: NEGATIVE for significant cough or SOB  CV: NEGATIVE for chest pain, palpitations or peripheral edema  GI: NEGATIVE for nausea, abdominal pain, heartburn, or change in bowel habits  : NEGATIVE for frequency, dysuria, or hematuria  MUSCULOSKELETAL: NEGATIVE for significant arthralgias or myalgia  NEURO: NEGATIVE for weakness, dizziness or paresthesias  ENDOCRINE: NEGATIVE for temperature intolerance, skin/hair changes  HEME: NEGATIVE for bleeding problems  PSYCHIATRIC: NEGATIVE for changes in mood or affect    EXAM:   /84 (BP Location: Right arm, Patient Position: Sitting, Cuff Size: Adult Large)  Pulse 100  Temp 98.4  F (36.9  C) (Oral)  Resp 16  Ht 5' 3.5\" (1.613 m)  Wt 265 lb 12.8 oz (120.6 kg)  SpO2 94%  BMI 46.35 kg/m2    GENERAL APPEARANCE: alert, no distress and morbidly obese     NECK: no adenopathy, no asymmetry, masses, or scars and thyroid normal to palpation     RESP: lungs clear to auscultation - no rales, rhonchi or wheezes     CV: regular rates and rhythm, normal S1 S2, no S3 or S4 and no murmur, click or rub     ABDOMEN:  soft, nontender, no HSM or masses and bowel sounds normal     MS: extremities normal- no gross deformities noted, no evidence of inflammation in joints, FROM in all extremities.     NEURO: Normal strength and tone, sensory exam grossly normal, mentation intact and speech normal     PSYCH: mentation appears normal. and affect normal/bright     LYMPHATICS: No cervical adenopathy    DIAGNOSTICS:   EKG: appears normal, NSR    Recent Labs   Lab Test  07/10/18   1033  05/25/17   0855  05/05/16   1051   07/08/11   1020   HGB   --    --   15.0   --   14.2   INR   --    --    --    --   0.90   NA  141  141  142   < >  143   POTASSIUM  4.3  4.2  4.2   < >  4.5   CR  0.80  0.82  0.87   < >  0.85    < > = values in this interval " not displayed.        IMPRESSION:   Reason for surgery/procedure: screening colonscopy    The proposed surgical procedure is considered LOW risk.    REVISED CARDIAC RISK INDEX  The patient has the following serious cardiovascular risks for perioperative complications such as (MI, PE, VFib and 3  AV Block):  No serious cardiac risks  INTERPRETATION: 0 risks: Class I (very low risk - 0.4% complication rate)    The patient has the following additional risks for perioperative complications:  No identified additional risks      ICD-10-CM    1. Pre-op exam Z01.818 EKG 12-lead complete w/read - Clinics   2. Preop general physical exam Z01.818        RECOMMENDATIONS:     --Consult hospital rounder / IM to assist post-op medical management    --Patient is to take all scheduled medications on the day of surgery EXCEPT for modifications listed below.    APPROVAL GIVEN to proceed with proposed procedure, without further diagnostic evaluation       Signed Electronically by: Maddi Knapp NP    Copy of this evaluation report is provided to requesting physician.    Sal Preop Guidelines    Revised Cardiac Risk Index

## 2018-10-05 NOTE — H&P (VIEW-ONLY)
[place] [address]  [facility] [address]  [phone]  Dept: [phone]    PRE-OP EVALUATION:  Today's date: 2018    Svetlana Serrano (: 1945) presents for pre-operative evaluation assessment as requested by Dr. HUNTER.  She requires evaluation and anesthesia risk assessment prior to undergoing surgery/procedure for treatment of screening colonoscopy .    Proposed Surgery/ Procedure: colonoscopy  Date of Surgery/ Procedure: 10/8/18  Time of Surgery/ Procedure: [facility]/Surgical Facility: [facility]    Primary Physician: Dolores Blanton  Type of Anesthesia Anticipated: Local with MAC    Patient has a Health Care Directive or Living Will:  NO    1. NO - Do you have a history of heart attack, stroke, stent, bypass or surgery on an artery in the head, neck, heart or legs?  2. NO - Do you ever have any pain or discomfort in your chest?  3. NO - Do you have a history of  Heart Failure?  4. NO - Are you troubled by shortness of breath when: walking on the level, up a slight hill or at night?  5. NO - Do you currently have a cold, bronchitis or other respiratory infection?  6. NO - Do you have a cough, shortness of breath or wheezing?  7. NO - Do you sometimes get pains in the calves of your legs when you walk?  8. NO - Do you or anyone in your family have previous history of blood clots?  9. NO - Do you or does anyone in your family have a serious bleeding problem such as prolonged bleeding following surgeries or cuts?  10. NO - Have you ever had problems with anemia or been told to take iron pills?  11. NO - Have you had any abnormal blood loss such as black, tarry or bloody stools, or abnormal vaginal bleeding?  12. NO - Have you ever had a blood transfusion?  13. NO - Have you or any of your relatives ever had problems with anesthesia?  14. NO - Do you have sleep apnea, excessive snoring or daytime drowsiness?  15. NO - Do you have any prosthetic heart valves?  16. YES -  Do you have prosthetic joints? bilat knees,L shoulder  17. NO - Is there any chance that you may be pregnant?      HPI:     HPI related to upcoming procedure: screening colonoscopy      See problem list for active medical problems.  Problems all longstanding and stable, except as noted/documented.  See ROS for pertinent symptoms related to these conditions.                                                                                                                                                          .    MEDICAL HISTORY:     Patient Active Problem List    Diagnosis Date Noted     Benign essential hypertension 07/10/2018     Priority: Medium     CARDIOVASCULAR SCREENING; LDL GOAL LESS THAN 130 07/10/2018     Priority: Medium     Narcotic dependence (H) 10/21/2017     Priority: Medium     Anxiety 12/08/2016     Priority: Medium     Morbid obesity (H) 05/07/2016     Priority: Medium     Controlled substance agreement signed 04/25/2016     Priority: Medium     Patient is followed by Data Unavailable for ongoing prescription of pain medication.  All refills should be approved by this provider, or covering partner.    Medication(s):   norco.   Maximum quantity per month: 180  Clinic visit frequency required: Q 6  months     Controlled substance agreement on file: Yes       Date(s): 4/21/16    Pain Clinic evaluation in the past: Yes       Date/Location:      DIRE Total Score(s):  No flowsheet data found.    Last [facility] website verification:  none   [email or web address]         DDD (degenerative disc disease), lumbar 04/01/2014     Priority: Medium     Spinal stenosis 04/01/2014     Priority: Medium     Advanced directives, counseling/discussion 07/02/2013     Priority: Medium     Discussed Advance Directive planning with patient; however, patient declined at this time.          Fibromyalgia 01/24/2013     Priority: Medium     Mild intermittent asthma 12/10/2012     Priority: Medium     Asthma taken care by  Dr. Kristin Puckett       Restless leg syndrome 12/10/2012     Priority: Medium      Past Medical History:   Diagnosis Date     Back pain      HTN (hypertension)      Mild intermittent asthma      Tubular adenoma of colon 8/15     Past Surgical History:   Procedure Laterality Date     ARTHROPLASTY SHOULDER       C/SECTION, CLASSICAL      , Classical     CHOLECYSTECTOMY, LAPOROSCOPIC      Cholecystectomy, Laparoscopic     HYSTERECTOMY, JOSHUA       JOINT REPLACEMTN, KNEE RT/LT      bilateral     Current Outpatient Prescriptions   Medication Sig Dispense Refill     albuterol (PROAIR HFA/PROVENTIL HFA/VENTOLIN HFA) 108 (90 BASE) MCG/ACT Inhaler Inhale 2 puffs into the lungs every 6 hours as needed for shortness of breath / dyspnea 1 Inhaler 11     amLODIPine (NORVASC) 5 MG tablet TAKE 1 TABLET BY MOUTH  DAILY 90 tablet 0     Cholecalciferol (VITAMIN D-3) 5000 units TABS        diclofenac (VOLTAREN) 1 % GEL Apply to affected area tid as directed. 300 g 3     DULoxetine (CYMBALTA) 60 MG EC capsule TAKE 1 CAPSULE BY MOUTH TWO TIMES DAILY 180 capsule 1     HYDROcodone-acetaminophen (NORCO) 5-325 MG per tablet Take 1-2 tablets by mouth every 6 hours as needed 180 tablet 0     LORazepam (ATIVAN) 0.5 MG tablet TAKE 1-2 TABLETS BY MOUTH PRIOR TO FLYING. DO NOT DRIVE WITHIN 6 HOURS OF TAKING 10 tablet 0     losartan (COZAAR) 50 MG tablet TAKE 1 TABLET BY MOUTH  DAILY 90 tablet 0     OVER-THE-COUNTER Ideal collegen       OVER-THE-COUNTER womens ultra zulema       OVER-THE-COUNTER Hemp oil       OTC products: None, except as noted above    Allergies   Allergen Reactions     Clindamycin Shortness Of Breath     Amoxicillin      Rash.     Sulfa Drugs      Hives        Latex Allergy: NO    Social History   Substance Use Topics     Smoking status: Former Smoker     Quit date: 1969     Smokeless tobacco: Never Used     Alcohol use 0.0 oz/week     0 Standard drinks or equivalent per week      Comment: casual     History   Drug Use  "No       REVIEW OF SYSTEMS:   CONSTITUTIONAL: NEGATIVE for fever, chills, change in weight  ENT/MOUTH: NEGATIVE for ear, mouth and throat problems  RESP: NEGATIVE for significant cough or SOB  CV: NEGATIVE for chest pain, palpitations or peripheral edema  GI: NEGATIVE for nausea, abdominal pain, heartburn, or change in bowel habits  : NEGATIVE for frequency, dysuria, or hematuria  MUSCULOSKELETAL: NEGATIVE for significant arthralgias or myalgia  NEURO: NEGATIVE for weakness, dizziness or paresthesias  ENDOCRINE: NEGATIVE for temperature intolerance, skin/hair changes  HEME: NEGATIVE for bleeding problems  PSYCHIATRIC: NEGATIVE for changes in mood or affect    EXAM:   /84 (BP Location: Right arm, Patient Position: Sitting, Cuff Size: Adult Large)  Pulse 100  Temp 98.4  F (36.9  C) (Oral)  Resp 16  Ht 5' 3.5\" (1.613 m)  Wt 265 lb 12.8 oz (120.6 kg)  SpO2 94%  BMI 46.35 kg/m2    GENERAL APPEARANCE: alert, no distress and morbidly obese     NECK: no adenopathy, no asymmetry, masses, or scars and thyroid normal to palpation     RESP: lungs clear to auscultation - no rales, rhonchi or wheezes     CV: regular rates and rhythm, normal S1 S2, no S3 or S4 and no murmur, click or rub     ABDOMEN:  soft, nontender, no HSM or masses and bowel sounds normal     MS: extremities normal- no gross deformities noted, no evidence of inflammation in joints, FROM in all extremities.     NEURO: Normal strength and tone, sensory exam grossly normal, mentation intact and speech normal     PSYCH: mentation appears normal. and affect normal/bright     LYMPHATICS: No cervical adenopathy    DIAGNOSTICS:   EKG: appears normal, NSR    Recent Labs   Lab Test  07/10/18   1033  05/25/17   0855  05/05/16   1051   07/08/11   1020   HGB   --    --   15.0   --   14.2   INR   --    --    --    --   0.90   NA  141  141  142   < >  143   POTASSIUM  4.3  4.2  4.2   < >  4.5   CR  0.80  0.82  0.87   < >  0.85    < > = values in this interval " not displayed.        IMPRESSION:   Reason for surgery/procedure: screening colonscopy    The proposed surgical procedure is considered LOW risk.    REVISED CARDIAC RISK INDEX  The patient has the following serious cardiovascular risks for perioperative complications such as (MI, PE, VFib and 3  AV Block):  No serious cardiac risks  INTERPRETATION: 0 risks: Class I (very low risk - 0.4% complication rate)    The patient has the following additional risks for perioperative complications:  No identified additional risks      ICD-10-CM    1. Pre-op exam Z01.818 EKG 12-lead complete w/read - Clinics   2. Preop general physical exam Z01.818        RECOMMENDATIONS:     --Consult hospital rounder / IM to assist post-op medical management    --Patient is to take all scheduled medications on the day of surgery EXCEPT for modifications listed below.    APPROVAL GIVEN to proceed with proposed procedure, without further diagnostic evaluation       Signed Electronically by: Maddi Knapp NP    Copy of this evaluation report is provided to requesting physician.    Sal Preop Guidelines    Revised Cardiac Risk Index

## 2018-10-08 ENCOUNTER — ANESTHESIA EVENT (OUTPATIENT)
Dept: SURGERY | Facility: CLINIC | Age: 73
End: 2018-10-08
Payer: MEDICARE

## 2018-10-08 ENCOUNTER — SURGERY (OUTPATIENT)
Age: 73
End: 2018-10-08

## 2018-10-08 ENCOUNTER — HOSPITAL ENCOUNTER (OUTPATIENT)
Facility: CLINIC | Age: 73
Discharge: HOME | End: 2018-10-08
Attending: INTERNAL MEDICINE | Admitting: INTERNAL MEDICINE
Payer: MEDICARE

## 2018-10-08 ENCOUNTER — ANESTHESIA (OUTPATIENT)
Dept: SURGERY | Facility: CLINIC | Age: 73
End: 2018-10-08
Payer: MEDICARE

## 2018-10-08 ENCOUNTER — TELEPHONE (OUTPATIENT)
Dept: INTERNAL MEDICINE | Facility: CLINIC | Age: 73
End: 2018-10-08

## 2018-10-08 VITALS
TEMPERATURE: 97.5 F | SYSTOLIC BLOOD PRESSURE: 130 MMHG | OXYGEN SATURATION: 95 % | WEIGHT: 261 LBS | BODY MASS INDEX: 45.51 KG/M2 | RESPIRATION RATE: 16 BRPM | DIASTOLIC BLOOD PRESSURE: 80 MMHG

## 2018-10-08 DIAGNOSIS — M51.369 DDD (DEGENERATIVE DISC DISEASE), LUMBAR: ICD-10-CM

## 2018-10-08 DIAGNOSIS — M79.7 FIBROMYALGIA: ICD-10-CM

## 2018-10-08 LAB — COLONOSCOPY: NORMAL

## 2018-10-08 PROCEDURE — 25000128 H RX IP 250 OP 636: Performed by: NURSE ANESTHETIST, CERTIFIED REGISTERED

## 2018-10-08 PROCEDURE — 27210794 ZZH OR GENERAL SUPPLY STERILE: Performed by: INTERNAL MEDICINE

## 2018-10-08 PROCEDURE — 88305 TISSUE EXAM BY PATHOLOGIST: CPT | Mod: 26 | Performed by: INTERNAL MEDICINE

## 2018-10-08 PROCEDURE — 40000306 ZZH STATISTIC PRE PROC ASSESS II: Performed by: INTERNAL MEDICINE

## 2018-10-08 PROCEDURE — 71000027 ZZH RECOVERY PHASE 2 EACH 15 MINS: Performed by: INTERNAL MEDICINE

## 2018-10-08 PROCEDURE — 88305 TISSUE EXAM BY PATHOLOGIST: CPT | Performed by: INTERNAL MEDICINE

## 2018-10-08 PROCEDURE — 40000037 ZZH STATISTIC COLONOSCOPY (OR PROCEDURE): Performed by: INTERNAL MEDICINE

## 2018-10-08 PROCEDURE — 36000050 ZZH SURGERY LEVEL 2 1ST 30 MIN: Performed by: INTERNAL MEDICINE

## 2018-10-08 PROCEDURE — 25000125 ZZHC RX 250: Performed by: NURSE ANESTHETIST, CERTIFIED REGISTERED

## 2018-10-08 PROCEDURE — 25000128 H RX IP 250 OP 636: Performed by: ANESTHESIOLOGY

## 2018-10-08 PROCEDURE — 37000008 ZZH ANESTHESIA TECHNICAL FEE, 1ST 30 MIN: Performed by: INTERNAL MEDICINE

## 2018-10-08 RX ORDER — ONDANSETRON 2 MG/ML
4 INJECTION INTRAMUSCULAR; INTRAVENOUS EVERY 30 MIN PRN
Status: DISCONTINUED | OUTPATIENT
Start: 2018-10-08 | End: 2018-10-08 | Stop reason: HOSPADM

## 2018-10-08 RX ORDER — GLYCOPYRROLATE 0.2 MG/ML
INJECTION, SOLUTION INTRAMUSCULAR; INTRAVENOUS PRN
Status: DISCONTINUED | OUTPATIENT
Start: 2018-10-08 | End: 2018-10-08

## 2018-10-08 RX ORDER — FENTANYL CITRATE 50 UG/ML
INJECTION, SOLUTION INTRAMUSCULAR; INTRAVENOUS PRN
Status: DISCONTINUED | OUTPATIENT
Start: 2018-10-08 | End: 2018-10-08

## 2018-10-08 RX ORDER — LABETALOL HYDROCHLORIDE 5 MG/ML
10 INJECTION, SOLUTION INTRAVENOUS
Status: DISCONTINUED | OUTPATIENT
Start: 2018-10-08 | End: 2018-10-08 | Stop reason: HOSPADM

## 2018-10-08 RX ORDER — ONDANSETRON 4 MG/1
4 TABLET, ORALLY DISINTEGRATING ORAL EVERY 6 HOURS PRN
Status: DISCONTINUED | OUTPATIENT
Start: 2018-10-08 | End: 2018-10-08 | Stop reason: HOSPADM

## 2018-10-08 RX ORDER — DIMENHYDRINATE 50 MG/ML
25 INJECTION, SOLUTION INTRAMUSCULAR; INTRAVENOUS
Status: DISCONTINUED | OUTPATIENT
Start: 2018-10-08 | End: 2018-10-08 | Stop reason: HOSPADM

## 2018-10-08 RX ORDER — SODIUM CHLORIDE, SODIUM LACTATE, POTASSIUM CHLORIDE, CALCIUM CHLORIDE 600; 310; 30; 20 MG/100ML; MG/100ML; MG/100ML; MG/100ML
INJECTION, SOLUTION INTRAVENOUS CONTINUOUS
Status: DISCONTINUED | OUTPATIENT
Start: 2018-10-08 | End: 2018-10-08 | Stop reason: HOSPADM

## 2018-10-08 RX ORDER — PROPOFOL 10 MG/ML
INJECTION, EMULSION INTRAVENOUS CONTINUOUS PRN
Status: DISCONTINUED | OUTPATIENT
Start: 2018-10-08 | End: 2018-10-08

## 2018-10-08 RX ORDER — FLUMAZENIL 0.1 MG/ML
0.2 INJECTION, SOLUTION INTRAVENOUS
Status: DISCONTINUED | OUTPATIENT
Start: 2018-10-08 | End: 2018-10-08 | Stop reason: HOSPADM

## 2018-10-08 RX ORDER — FENTANYL CITRATE 50 UG/ML
25-50 INJECTION, SOLUTION INTRAMUSCULAR; INTRAVENOUS
Status: DISCONTINUED | OUTPATIENT
Start: 2018-10-08 | End: 2018-10-08 | Stop reason: HOSPADM

## 2018-10-08 RX ORDER — ONDANSETRON 4 MG/1
4 TABLET, ORALLY DISINTEGRATING ORAL EVERY 30 MIN PRN
Status: DISCONTINUED | OUTPATIENT
Start: 2018-10-08 | End: 2018-10-08 | Stop reason: HOSPADM

## 2018-10-08 RX ORDER — MEPERIDINE HYDROCHLORIDE 25 MG/ML
12.5 INJECTION INTRAMUSCULAR; INTRAVENOUS; SUBCUTANEOUS
Status: DISCONTINUED | OUTPATIENT
Start: 2018-10-08 | End: 2018-10-08 | Stop reason: HOSPADM

## 2018-10-08 RX ORDER — ONDANSETRON 2 MG/ML
INJECTION INTRAMUSCULAR; INTRAVENOUS PRN
Status: DISCONTINUED | OUTPATIENT
Start: 2018-10-08 | End: 2018-10-08

## 2018-10-08 RX ORDER — NALOXONE HYDROCHLORIDE 0.4 MG/ML
.1-.4 INJECTION, SOLUTION INTRAMUSCULAR; INTRAVENOUS; SUBCUTANEOUS
Status: DISCONTINUED | OUTPATIENT
Start: 2018-10-08 | End: 2018-10-08 | Stop reason: HOSPADM

## 2018-10-08 RX ORDER — ONDANSETRON 2 MG/ML
4 INJECTION INTRAMUSCULAR; INTRAVENOUS EVERY 6 HOURS PRN
Status: DISCONTINUED | OUTPATIENT
Start: 2018-10-08 | End: 2018-10-08 | Stop reason: HOSPADM

## 2018-10-08 RX ORDER — KETAMINE HYDROCHLORIDE 10 MG/ML
INJECTION INTRAMUSCULAR; INTRAVENOUS PRN
Status: DISCONTINUED | OUTPATIENT
Start: 2018-10-08 | End: 2018-10-08

## 2018-10-08 RX ORDER — HYDRALAZINE HYDROCHLORIDE 20 MG/ML
2.5-5 INJECTION INTRAMUSCULAR; INTRAVENOUS EVERY 10 MIN PRN
Status: DISCONTINUED | OUTPATIENT
Start: 2018-10-08 | End: 2018-10-08 | Stop reason: HOSPADM

## 2018-10-08 RX ORDER — HYDROMORPHONE HYDROCHLORIDE 1 MG/ML
.3-.5 INJECTION, SOLUTION INTRAMUSCULAR; INTRAVENOUS; SUBCUTANEOUS EVERY 10 MIN PRN
Status: DISCONTINUED | OUTPATIENT
Start: 2018-10-08 | End: 2018-10-08 | Stop reason: HOSPADM

## 2018-10-08 RX ADMIN — FENTANYL CITRATE 50 MCG: 50 INJECTION, SOLUTION INTRAMUSCULAR; INTRAVENOUS at 11:19

## 2018-10-08 RX ADMIN — PROPOFOL 100 MCG/KG/MIN: 10 INJECTION, EMULSION INTRAVENOUS at 11:17

## 2018-10-08 RX ADMIN — MIDAZOLAM 2 MG: 1 INJECTION INTRAMUSCULAR; INTRAVENOUS at 11:15

## 2018-10-08 RX ADMIN — ONDANSETRON 4 MG: 2 INJECTION INTRAMUSCULAR; INTRAVENOUS at 11:21

## 2018-10-08 RX ADMIN — FENTANYL CITRATE 50 MCG: 50 INJECTION, SOLUTION INTRAMUSCULAR; INTRAVENOUS at 11:26

## 2018-10-08 RX ADMIN — KETAMINE HCL-NACL SOLN PREF SY 50 MG/5ML-0.9% (10MG/ML) 25 MG: 10 SOLUTION PREFILLED SYRINGE at 11:20

## 2018-10-08 RX ADMIN — GLYCOPYRROLATE 0.2 MG: 0.2 INJECTION, SOLUTION INTRAMUSCULAR; INTRAVENOUS at 11:15

## 2018-10-08 RX ADMIN — SODIUM CHLORIDE, POTASSIUM CHLORIDE, SODIUM LACTATE AND CALCIUM CHLORIDE: 600; 310; 30; 20 INJECTION, SOLUTION INTRAVENOUS at 11:15

## 2018-10-08 ASSESSMENT — ENCOUNTER SYMPTOMS
STRIDOR: 0
SEIZURES: 0
DYSRHYTHMIAS: 0

## 2018-10-08 ASSESSMENT — LIFESTYLE VARIABLES: TOBACCO_USE: 1

## 2018-10-08 ASSESSMENT — COPD QUESTIONNAIRES: COPD: 0

## 2018-10-08 NOTE — ANESTHESIA PREPROCEDURE EVALUATION
PAC NOTE:       ANESTHESIA PRE EVALUATION:  Anesthesia Evaluation     . Pt has had prior anesthetic. Type: General    No history of anesthetic complications          ROS/MED HX    ENT/Pulmonary:     (+)BRISEYDA risk factors hypertension, obese, tobacco use, Past use Intermittent asthma , . .   (-) COPD and recent URI   Neurologic:  - neg neurologic ROS    (-) seizures and CVA   Cardiovascular:     (+) hypertension----. : . . . :. . Previous cardiac testing date:results:date: results:ECG reviewed date:9/18 results:NSR.  RAD.  Anterior Qs. date: results:         (-) CAD, arrhythmias and valvular problems/murmurs   METS/Exercise Tolerance:     Hematologic: Comments: Lab Test        05/05/16 07/08/11                       1051          1020          HGB          15.0         14.2          INR           --          0.90           Lab Test        07/10/18     05/25/17     05/05/16                       1033          0855          1051          NA           141          141          142           POTASSIUM    4.3          4.2          4.2           CHLORIDE     106          108          107           CO2          24           24           27            BUN          14           18           13            CR           0.80         0.82         0.87          ANIONGAP     11           9            8             ELIZABETH          9.1          9.4          9.1           GLC          107*         116*         101*         - neg hematologic  ROS       Musculoskeletal:   (+) arthritis, , , other musculoskeletal- DDD      GI/Hepatic:  - neg GI/hepatic ROS      (-) GERD, hepatitis and liver disease   Renal/Genitourinary:  - ROS Renal section negative       Endo:     (+) Obesity, .   (-) Type I DM, Type II DM, thyroid disease and chronic steroid usage   Psychiatric:     (+) psychiatric history anxiety      Infectious Disease:  - neg infectious disease ROS       Malignancy:      - no malignancy   Other: Comment: fibromyalgia   (+) H/O  Chronic Pain,H/O chronic opiod use ,                    Physical Exam  Normal systems: cardiovascular, pulmonary and dental    Airway   Mallampati: III  TM distance: >3 FB  Neck ROM: full    Dental   (+) upper dentures and lower dentures    Cardiovascular   Rhythm and rate: regular and normal  (-) no friction rub, no systolic click and no murmur    Pulmonary    breath sounds clear to auscultation(-) no rhonchi, no decreased breath sounds, no wheezes, no rales and no stridor             Anesthesia Plan      History & Physical Review  History and physical reviewed and following examination; no interval change.    ASA Status:  3 .    NPO Status:  > 8 hours    Plan for MAC Reason for MAC:  Difficulty with conscious sedation (QS)         Postoperative Care  Postoperative pain management:  IV analgesics.      Consents  Anesthetic plan, risks, benefits and alternatives discussed with:  Patient..                            .

## 2018-10-08 NOTE — TELEPHONE ENCOUNTER
Norco refill request. TAKE to  pharmacy.     Last OV 9/26/18    Last refill on 9/11/18 for #180    Routing refill request to provider for review/approval because:  Drug not on the [facility] refill protocol

## 2018-10-08 NOTE — ANESTHESIA POSTPROCEDURE EVALUATION
Patient: Svetlana Serrano    Procedure(s):  Colonoscopy with polypectomies - Wound Class: II-Clean Contaminated    Diagnosis:Hx of colon polyps  Diagnosis Additional Information: Hx of colon polyps    Anesthesia Type:  MAC    Note:  Anesthesia Post Evaluation    Patient location during evaluation: PACU  Patient participation: Able to fully participate in evaluation  Level of consciousness: awake  Pain management: adequate  Airway patency: patent  Cardiovascular status: acceptable  Respiratory status: acceptable  Hydration status: acceptable  PONV: controlled     Anesthetic complications: None          Last vitals:  Vitals:    10/08/18 1150 10/08/18 1225 10/08/18 1236   BP: 122/80  130/80   Resp: 18  16   Temp:   97.5  F (36.4  C)   SpO2: 94% 94% 95%         Electronically Signed By: Dayton Sanchez MD  October 8, 2018  1:29 PM

## 2018-10-08 NOTE — DISCHARGE INSTRUCTIONS
GENERAL ANESTHESIA OR SEDATION ADULT DISCHARGE INSTRUCTIONS   SPECIAL PRECAUTIONS FOR 24 HOURS AFTER SURGERY    IT IS NOT UNUSUAL TO FEEL LIGHT-HEADED OR FAINT, UP TO 24 HOURS AFTER SURGERY OR WHILE TAKING PAIN MEDICATION.  IF YOU HAVE THESE SYMPTOMS; SIT FOR A FEW MINUTES BEFORE STANDING AND HAVE SOMEONE ASSIST YOU WHEN YOU GET UP TO WALK OR USE THE BATHROOM.    YOU SHOULD REST AND RELAX FOR THE NEXT 24 HOURS AND YOU MUST MAKE ARRANGEMENTS TO HAVE SOMEONE STAY WITH YOU FOR AT LEAST 24 HOURS AFTER YOUR DISCHARGE.  AVOID HAZARDOUS AND STRENUOUS ACTIVITIES.  DO NOT MAKE IMPORTANT DECISIONS FOR 24 HOURS.    DO NOT DRIVE ANY VEHICLE OR OPERATE MECHANICAL EQUIPMENT FOR 24 HOURS FOLLOWING THE END OF YOUR SURGERY.  EVEN THOUGH YOU MAY FEEL NORMAL, YOUR REACTIONS MAY BE AFFECTED BY THE MEDICATION YOU HAVE RECEIVED.    DO NOT DRINK ALCOHOLIC BEVERAGES FOR 24 HOURS FOLLOWING YOUR SURGERY.    DRINK CLEAR LIQUIDS (APPLE JUICE, GINGER ALE, 7-UP, BROTH, ETC.).  PROGRESS TO YOUR REGULAR DIET AS YOU FEEL ABLE.    YOU MAY HAVE A DRY MOUTH, A SORE THROAT, MUSCLES ACHES OR TROUBLE SLEEPING.  THESE SHOULD GO AWAY AFTER 24 HOURS.    CALL YOUR DOCTOR FOR ANY OF THE FOLLOWING:  SIGNS OF INFECTION (FEVER, GROWING TENDERNESS AT THE SURGERY SITE, A LARGE AMOUNT OF DRAINAGE OR BLEEDING, SEVERE PAIN, FOUL-SMELLING DRAINAGE, REDNESS OR SWELLING.    IT HAS BEEN OVER 8 TO 10 HOURS SINCE SURGERY AND YOU ARE STILL NOT ABLE TO URINATE (PASS WATER).     COLONOSCOPY DISCHARGE INSTRUCTIONS    You may not drive, use heavy equipment or consume alcohol for 24 hours because the drugs you were given may cause dizziness, drowsiness, forgetfulness and slower reaction time.    You may resume your regular diet and medications.    If you had a biopsy or polypectomy done, do not take aspirin, aleve (naproxen) or ibuprofen products for the next 10 days.  Tylenol (acetaminophen) is safe to take.    What to watch for:    Problems rarely occur after the exam.  It is  important for you to be aware of the early signs of a possible complication.  Call your doctor immediately if you notice any of the followin.  Unusual or persistent abdominal pain (pain that does not move around like a gas pain).    2.  Passing bright red blood from your rectum.    3.  Black or bloody stools.    4.  Temperature above 100.6 degrees F (37.5 degrees C)    If you fell this has become a medical emergency please call 911.    DR. ILIANA BOWEN M.D.   [facility] PHONE NUMBER:  [phone] extension [phone]

## 2018-10-08 NOTE — TELEPHONE ENCOUNTER
Pt's daughter called in to get a refill started. Please call pt if any questions or concerns.     MEDICATION - Vicodins    PHARMACY - [facility] ([address])     PHONE - [phone]    DETAIL MSG - YES

## 2018-10-08 NOTE — ANESTHESIA CARE TRANSFER NOTE
Patient: Svetlana Serrano    Procedure(s):  Colonoscopy with polypectomies - Wound Class: II-Clean Contaminated    Diagnosis: Hx of colon polyps  Diagnosis Additional Information: No value filed.    Anesthesia Type:   MAC     Note:  Airway :Room Air  Patient transferred to:Phase II  Handoff Report: Identifed the Patient, Identified the Reponsible Provider, Reviewed the pertinent medical history, Discussed the surgical course, Reviewed Intra-OP anesthesia mangement and issues during anesthesia, Set expectations for post-procedure period and Allowed opportunity for questions and acknowledgement of understanding      Vitals: (Last set prior to Anesthesia Care Transfer)    CRNA VITALS  10/8/2018 1115 - 10/8/2018 1145      10/8/2018             NIBP: 125/82    Pulse: 99    NIBP Mean: 95    SpO2: 98 %                Electronically Signed By: CAMELIA Meyers CRNA  October 8, 2018  11:45 AM

## 2018-10-08 NOTE — IP AVS SNAPSHOT
[facility] PreOP/PostOP    [address]    [facility] [address]    Phone:  [phone]    Fax:  [phone]                                       After Visit Summary   10/8/2018    Svetlana Serrano    MRN: [id number]           After Visit Summary Signature Page     I have received my discharge instructions, and my questions have been answered. I have discussed any challenges I see with this plan with the nurse or doctor.    ..........................................................................................................................................  Patient/Patient Representative Signature      ..........................................................................................................................................  Patient Representative Print Name and Relationship to Patient    ..................................................               ................................................  Date                                   Time    ..........................................................................................................................................  Reviewed by Signature/Title    ...................................................              ..............................................  Date                                               Time          22EPIC Rev 08/18

## 2018-10-09 LAB — COPATH REPORT: NORMAL

## 2018-10-10 NOTE — TELEPHONE ENCOUNTER
Pt calls again checking on her Rx.     Please address in AM.     Per  last dispensed on 9/12/18.

## 2018-10-11 RX ORDER — HYDROCODONE BITARTRATE AND ACETAMINOPHEN 5; 325 MG/1; MG/1
1-2 TABLET ORAL EVERY 6 HOURS PRN
Qty: 180 TABLET | Refills: 0 | Status: SHIPPED | OUTPATIENT
Start: 2018-10-11 | End: 2018-11-07

## 2018-10-11 NOTE — TELEPHONE ENCOUNTER
Advise patient the rx will be taken to the pharmacy on the day it is due, she should not call back about it. We are no longer calling when the rx is done. I printed it, can take downstairs.

## 2018-11-01 ENCOUNTER — TELEPHONE (OUTPATIENT)
Dept: INTERNAL MEDICINE | Facility: CLINIC | Age: 73
End: 2018-11-01

## 2018-11-01 DIAGNOSIS — F43.0 ACUTE REACTION TO STRESS: ICD-10-CM

## 2018-11-01 NOTE — TELEPHONE ENCOUNTER
Patient calling to request refill on Lorazepam. She reports that she has to appear in court on 11/5 and also will be traveling to [place] in about a month and will need to take Lorazepam on both occasions.    Svetlana can be reached at [phone], OK to leave a detailed message    Preferred Pharmacy is [facility] [address] in [place]

## 2018-11-02 RX ORDER — LORAZEPAM 0.5 MG/1
TABLET ORAL
Qty: 10 TABLET | Refills: 0 | Status: SHIPPED | OUTPATIENT
Start: 2018-11-02 | End: 2019-05-07

## 2018-11-02 NOTE — TELEPHONE ENCOUNTER
Approved and faxed to [facility] in [place].      Kumar Celaya MA

## 2018-11-02 NOTE — TELEPHONE ENCOUNTER
Lorazepam refill request. See message below.     Last refill on 7/23/18 for #10 tablets.     Last OV 9/26/18

## 2018-11-07 DIAGNOSIS — M79.7 FIBROMYALGIA: ICD-10-CM

## 2018-11-07 DIAGNOSIS — M51.369 DDD (DEGENERATIVE DISC DISEASE), LUMBAR: ICD-10-CM

## 2018-11-07 NOTE — TELEPHONE ENCOUNTER
guerdaco      Last Written Prescription Date:  10/11/18  Last Fill Quantity: 180,   # refills: 0  Last Office Visit: 9/26/18 [place]  Future Office visit:       Routing refill request to provider for review/approval because:  Drug not on the [facility], [facility] or [facility] refill protocol or controlled substance

## 2018-11-09 RX ORDER — HYDROCODONE BITARTRATE AND ACETAMINOPHEN 5; 325 MG/1; MG/1
1-2 TABLET ORAL EVERY 6 HOURS PRN
Qty: 180 TABLET | Refills: 0 | Status: SHIPPED | OUTPATIENT
Start: 2018-11-09 | End: 2018-12-11
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
## 2018-11-29 ENCOUNTER — TELEPHONE (OUTPATIENT)
Dept: INTERNAL MEDICINE | Facility: CLINIC | Age: 73
End: 2018-11-29

## 2018-11-30 NOTE — TELEPHONE ENCOUNTER
Attempted to contact pt. Left message to call clinic.   Left message to go to Walk in clinic over the weekend.

## 2018-12-07 ENCOUNTER — OFFICE VISIT (OUTPATIENT)
Dept: INTERNAL MEDICINE | Facility: CLINIC | Age: 73
End: 2018-12-07
Payer: MEDICARE

## 2018-12-07 VITALS
DIASTOLIC BLOOD PRESSURE: 80 MMHG | HEART RATE: 100 BPM | WEIGHT: 268 LBS | HEIGHT: 64 IN | TEMPERATURE: 98.3 F | BODY MASS INDEX: 45.75 KG/M2 | OXYGEN SATURATION: 97 % | SYSTOLIC BLOOD PRESSURE: 132 MMHG

## 2018-12-07 DIAGNOSIS — R35.0 URINARY FREQUENCY: Primary | ICD-10-CM

## 2018-12-07 DIAGNOSIS — I10 BENIGN ESSENTIAL HYPERTENSION: ICD-10-CM

## 2018-12-07 LAB
ALBUMIN UR-MCNC: NEGATIVE MG/DL
APPEARANCE UR: ABNORMAL
BILIRUB UR QL STRIP: NEGATIVE
COLOR UR AUTO: YELLOW
GLUCOSE UR STRIP-MCNC: NEGATIVE MG/DL
HGB UR QL STRIP: NEGATIVE
KETONES UR STRIP-MCNC: ABNORMAL MG/DL
LEUKOCYTE ESTERASE UR QL STRIP: ABNORMAL
NITRATE UR QL: NEGATIVE
NON-SQ EPI CELLS #/AREA URNS LPF: ABNORMAL /LPF
PH UR STRIP: 5.5 PH (ref 5–7)
RBC #/AREA URNS AUTO: ABNORMAL /HPF
SOURCE: ABNORMAL
SP GR UR STRIP: 1.01 (ref 1–1.03)
UROBILINOGEN UR STRIP-ACNC: 0.2 EU/DL (ref 0.2–1)
WBC #/AREA URNS AUTO: ABNORMAL /HPF

## 2018-12-07 PROCEDURE — 99213 OFFICE O/P EST LOW 20 MIN: CPT | Performed by: INTERNAL MEDICINE

## 2018-12-07 PROCEDURE — 81001 URINALYSIS AUTO W/SCOPE: CPT | Performed by: INTERNAL MEDICINE

## 2018-12-07 RX ORDER — CIPROFLOXACIN 250 MG/1
250 TABLET, FILM COATED ORAL 2 TIMES DAILY
Qty: 6 TABLET | Refills: 0 | Status: SHIPPED | OUTPATIENT
Start: 2018-12-07 | End: 2019-05-15

## 2018-12-07 NOTE — NURSING NOTE
"Vital signs:  Temp: 98.3  F (36.8  C) Temp src: Oral BP: 148/78 Pulse: 100     SpO2: 97 %     Height: 5' 3.5\" (161.3 cm) Weight: 268 lb (121.6 kg)  Estimated body mass index is 46.73 kg/(m^2) as calculated from the following:    Height as of this encounter: 5' 3.5\" (1.613 m).    Weight as of this encounter: 268 lb (121.6 kg).          "

## 2018-12-07 NOTE — MR AVS SNAPSHOT
"              After Visit Summary   12/7/2018    Svetlana Serrano    MRN: [id number]           Patient Information     Date Of Birth          1945        Visit Information        Provider Department      12/7/2018 10:00 AM Oneil Arredondo MD [facility]        Today's Diagnoses     Urinary frequency    -  1       Follow-ups after your visit        Who to contact     If you have questions or need follow up information about today's clinic visit or your schedule please contact [facility] directly at [phone].  Normal or non-critical lab and imaging results will be communicated to you by MyChart, letter or phone within 4 business days after the clinic has received the results. If you do not hear from us within 7 days, please contact the clinic through MyChart or phone. If you have a critical or abnormal lab result, we will notify you by phone as soon as possible.  Submit refill requests through Network or call your pharmacy and they will forward the refill request to us. Please allow 3 business days for your refill to be completed.          Additional Information About Your Visit        Care EveryWhere ID     This is your Care EveryWhere ID. This could be used by other organizations to access your [facility] medical records  [id number]        Your Vitals Were     Pulse Temperature Height Pulse Oximetry BMI (Body Mass Index)       100 98.3  F (36.8  C) (Oral) 5' 3.5\" (1.613 m) 97% 46.73 kg/m2        Blood Pressure from Last 3 Encounters:   12/07/18 132/80   10/08/18 130/80   09/26/18 134/84    Weight from Last 3 Encounters:   12/07/18 268 lb (121.6 kg)   10/08/18 261 lb (118.4 kg)   09/26/18 265 lb 12.8 oz (120.6 kg)              We Performed the Following     UA reflex to Microscopic and Culture     Urine Microscopic        Primary Care Provider Office Phone # Fax #    Dolores Blanton [phone]       [address] [place] [id number]        Equal " Access to Services     [facility]: Hadii aad ku hadsusannick Jodieali, wabutchda luqadaha, qaybta kaalmabj lopes. So [facility] [phone].    ATENCIÓN: Si habla español, tiene a will disposición servicios gratuitos de asistencia lingüística. Llame al [phone].    We comply with applicable federal civil rights laws and [place] laws. We do not discriminate on the basis of race, color, national origin, age, disability, sex, sexual orientation, or gender identity.            Thank you!     Thank you for choosing [facility]  for your care. Our goal is always to provide you with excellent care. Hearing back from our patients is one way we can continue to improve our services. Please take a few minutes to complete the written survey that you may receive in the mail after your visit with us. Thank you!             Your Updated Medication List - Protect others around you: Learn how to safely use, store and throw away your medicines at www.disposemymeds.org.          This list is accurate as of 12/7/18 10:37 AM.  Always use your most recent med list.                   Brand Name Dispense Instructions for use Diagnosis    albuterol 108 (90 Base) MCG/ACT inhaler    PROAIR HFA/PROVENTIL HFA/VENTOLIN HFA    1 Inhaler    Inhale 2 puffs into the lungs every 6 hours as needed for shortness of breath / dyspnea    Mild intermittent asthma without complication       amLODIPine 5 MG tablet    NORVASC    90 tablet    TAKE 1 TABLET BY MOUTH  DAILY    Benign essential hypertension       diclofenac 1 % topical gel    VOLTAREN    300 g    Apply to affected area tid as directed.    Fibromyalgia       DULoxetine 60 MG capsule    CYMBALTA    180 capsule    TAKE 1 CAPSULE BY MOUTH TWO TIMES DAILY    Fibromyalgia, Major depressive disorder, recurrent episode, mild (H), Anxiety       HYDROcodone-acetaminophen 5-325 MG tablet    NORCO    180 tablet    Take 1-2 tablets by mouth every 6  hours as needed for pain    Fibromyalgia, DDD (degenerative disc disease), lumbar       LORazepam 0.5 MG tablet    ATIVAN    10 tablet    TAKE 1-2 TABLETS BY MOUTH PRIOR TO FLYING. DO NOT DRIVE WITHIN 6 HOURS OF TAKING    Acute reaction to stress       losartan 50 MG tablet    COZAAR    90 tablet    TAKE 1 TABLET BY MOUTH  DAILY    Benign essential hypertension       OVER-THE-COUNTER      Ideal collegen        OVER-THE-COUNTER      womens ultra zulema        OVER-THE-COUNTER      Hemp oil        Vitamin D-3 5000 units Tabs

## 2018-12-07 NOTE — PROGRESS NOTES
SUBJECTIVE:   Svetlana Serrano is a 73 year old female who presents to clinic today for the following health issues:      Urinary frequency:    Presents with sympoms of urinary frequency. Present for 3 days. No dysuria,  suprapubic or flank tenderness. No fevers, chills. No nausea, vomiting. Has  prior history of UTIs. No h/o DM.     PROBLEMS TO ADD ON...  Has h/o HTN. on medical treatment. BP has been controlled. No side effects from medications. No CP, HA, dizziness. good compliance with medications and low salt diet.      Problem list and histories reviewed & adjusted, as indicated.  Additional history: as documented    Patient Active Problem List   Diagnosis     Mild intermittent asthma     Restless leg syndrome     Fibromyalgia     Advanced directives, counseling/discussion     DDD (degenerative disc disease), lumbar     Spinal stenosis     Controlled substance agreement signed     Morbid obesity (H)     Anxiety     Narcotic dependence (H)     Benign essential hypertension     CARDIOVASCULAR SCREENING; LDL GOAL LESS THAN 130     Past Surgical History:   Procedure Laterality Date     ARTHROPLASTY SHOULDER Left      C/SECTION, CLASSICAL      , Classical     CHOLECYSTECTOMY, LAPOROSCOPIC      Cholecystectomy, Laparoscopic     COLONOSCOPY N/A 10/8/2018    Procedure: COLONOSCOPY;  Colonoscopy with polypectomies;  Surgeon: Anton Stroud MD;  Location: [facility] OR     HYSTERECTOMY, JOSHUA       JOINT REPLACEMTN, KNEE RT/LT      bilateral       Social History   Substance Use Topics     Smoking status: Former Smoker     Quit date: 1969     Smokeless tobacco: Never Used     Alcohol use 0.0 oz/week     0 Standard drinks or equivalent per week      Comment: casual     Family History   Problem Relation Age of Onset     Family History Negative Mother      Family History Negative Father          Current Outpatient Prescriptions   Medication Sig Dispense Refill     albuterol (PROAIR HFA/PROVENTIL HFA/VENTOLIN  "HFA) 108 (90 BASE) MCG/ACT Inhaler Inhale 2 puffs into the lungs every 6 hours as needed for shortness of breath / dyspnea 1 Inhaler 11     amLODIPine (NORVASC) 5 MG tablet TAKE 1 TABLET BY MOUTH  DAILY 90 tablet 0     Cholecalciferol (VITAMIN D-3) 5000 units TABS        diclofenac (VOLTAREN) 1 % GEL Apply to affected area tid as directed. 300 g 3     DULoxetine (CYMBALTA) 60 MG EC capsule TAKE 1 CAPSULE BY MOUTH TWO TIMES DAILY 180 capsule 1     HYDROcodone-acetaminophen (NORCO) 5-325 MG per tablet Take 1-2 tablets by mouth every 6 hours as needed for pain 180 tablet 0     LORazepam (ATIVAN) 0.5 MG tablet TAKE 1-2 TABLETS BY MOUTH PRIOR TO FLYING. DO NOT DRIVE WITHIN 6 HOURS OF TAKING 10 tablet 0     losartan (COZAAR) 50 MG tablet TAKE 1 TABLET BY MOUTH  DAILY 90 tablet 0     OVER-THE-COUNTER Ideal collegen       OVER-THE-COUNTER womens ultra zulema       OVER-THE-COUNTER Hemp oil         Reviewed and updated as needed this visit by clinical staff       Reviewed and updated as needed this visit by Provider         ROS:  Constitutional, HEENT, cardiovascular, pulmonary, gi and gu systems are negative, except as otherwise noted.    OBJECTIVE:     /80  Pulse 100  Temp 98.3  F (36.8  C) (Oral)  Ht 5' 3.5\" (1.613 m)  Wt 268 lb (121.6 kg)  SpO2 97%  BMI 46.73 kg/m2  Body mass index is 46.73 kg/(m^2).   GENERAL:obese, alert and no distress  NECK: no adenopathy, no asymmetry, masses, or scars and thyroid normal to palpation  RESP: lungs clear to auscultation - no rales, rhonchi or wheezes  CV: regular rate and rhythm, normal S1 S2, no S3 or S4, no murmur, click or rub, no peripheral edema and peripheral pulses strong  ABDOMEN: soft, nontender, no hepatosplenomegaly, no masses and bowel sounds normal  MS: no gross musculoskeletal defects noted, no edema    Diagnostic Test Results:  Results for orders placed or performed in visit on 12/07/18 (from the past 24 hour(s))   UA reflex to Microscopic and Culture   Result " Value Ref Range    Color Urine Yellow     Appearance Urine Slightly Cloudy     Glucose Urine Negative NEG^Negative mg/dL    Bilirubin Urine Negative NEG^Negative    Ketones Urine Trace (A) NEG^Negative mg/dL    Specific Gravity Urine 1.015 1.003 - 1.035    Blood Urine Negative NEG^Negative    pH Urine 5.5 5.0 - 7.0 pH    Protein Albumin Urine Negative NEG^Negative mg/dL    Urobilinogen Urine 0.2 0.2 - 1.0 EU/dL    Nitrite Urine Negative NEG^Negative    Leukocyte Esterase Urine Small (A) NEG^Negative    Source Midstream Urine    Urine Microscopic   Result Value Ref Range    WBC Urine 5-10 (A) OTO5^0 - 5 /HPF    RBC Urine O - 2 OTO2^O - 2 /HPF    Squamous Epithelial /LPF Urine Many (A) FEW^Few /LPF       ASSESSMENT/PLAN:     Problem List Items Addressed This Visit     Benign essential hypertension      Other Visit Diagnoses     Urinary frequency    -  Primary    Relevant Orders    UA reflex to Microscopic and Culture (Completed)    Urine Microscopic (Completed)           Assess for UTI   Cont treatment for HTN  Treatment for UTI according to results     Follow-Up:with results     Oneil Arredondo MD  [facility]

## 2018-12-10 ENCOUNTER — TELEPHONE (OUTPATIENT)
Dept: INTERNAL MEDICINE | Facility: CLINIC | Age: 73
End: 2018-12-10

## 2018-12-10 DIAGNOSIS — M51.369 DDD (DEGENERATIVE DISC DISEASE), LUMBAR: ICD-10-CM

## 2018-12-10 DIAGNOSIS — M79.7 FIBROMYALGIA: ICD-10-CM

## 2018-12-11 RX ORDER — HYDROCODONE BITARTRATE AND ACETAMINOPHEN 5; 325 MG/1; MG/1
1-2 TABLET ORAL EVERY 6 HOURS PRN
Qty: 180 TABLET | Refills: 0 | Status: SHIPPED | OUTPATIENT
Start: 2018-12-11 | End: 2019-01-10

## 2018-12-11 NOTE — TELEPHONE ENCOUNTER
Norco. TAKE to [facility]      Last Written Prescription Date:  11/9/18  Last Fill Quantity: 180,   # refills: 0    Last Office Visit: 12/7/18    Future Office visit:       Routing refill request to provider for review/approval because:  Drug not on the [facility], [facility] or [facility] refill protocol or controlled substance

## 2018-12-20 DIAGNOSIS — I10 BENIGN ESSENTIAL HYPERTENSION: ICD-10-CM

## 2018-12-20 NOTE — TELEPHONE ENCOUNTER
"Requested Prescriptions   Pending Prescriptions Disp Refills     losartan (COZAAR) 50 MG tablet [Pharmacy Med Name: LOSARTAN  50MG  TAB]  Last Written Prescription Date: 07/26/2018  Last Fill Quantity: 90 tablet, # Refills: 0  Last Office Visit: 12/07/2018 Maria Elena  Future Office visit:      90 tablet 0     Sig: TAKE 1 TABLET BY MOUTH  DAILY    Angiotensin-II Receptors Passed - 12/20/2018  4:31 AM       Passed - Blood pressure under 140/90 in past 12 months    BP Readings from Last 3 Encounters:   12/07/18 132/80   10/08/18 130/80   09/26/18 134/84                Passed - Recent (12 mo) or future (30 days) visit within the authorizing provider's specialty    Patient had office visit in the last 12 months or has a visit in the next 30 days with authorizing provider or within the authorizing provider's specialty.  See \"Patient Info\" tab in inbasket, or \"Choose Columns\" in Meds & Orders section of the refill encounter.             Passed - Patient is age 18 or older       Passed - No active pregnancy on record       Passed - Normal serum creatinine on file in past 12 months    Recent Labs   Lab Test 07/10/18  1033   CR 0.80            Passed - Normal serum potassium on file in past 12 months    Recent Labs   Lab Test 07/10/18  1033   POTASSIUM 4.3                   Passed - No positive pregnancy test in past 12 months                  amLODIPine (NORVASC) 5 MG tablet [Pharmacy Med Name: AMLODIPINE  5MG  TAB]  Last Written Prescription Date: 07/26/2018  Last Fill Quantity: 90 tablet, # Refills: 0  Last Office Visit: 12/07/2018 Maria Elena  Future Office visit:      90 tablet 0     Sig: TAKE 1 TABLET BY MOUTH  DAILY    Calcium Channel Blockers Protocol  Passed - 12/20/2018  4:31 AM       Passed - Blood pressure under 140/90 in past 12 months    BP Readings from Last 3 Encounters:   12/07/18 132/80   10/08/18 130/80   09/26/18 134/84                Passed - Recent (12 mo) or future (30 days) visit within the authorizing " "provider's specialty    Patient had office visit in the last 12 months or has a visit in the next 30 days with authorizing provider or within the authorizing provider's specialty.  See \"Patient Info\" tab in inbasket, or \"Choose Columns\" in Meds & Orders section of the refill encounter.             Passed - Patient is age 18 or older       Passed - No active pregnancy on record       Passed - Normal serum creatinine on file in past 12 months    Recent Labs   Lab Test 07/10/18  1033   CR 0.80            Passed - No positive pregnancy test in past 12 months          "

## 2018-12-21 RX ORDER — LOSARTAN POTASSIUM 50 MG/1
TABLET ORAL
Qty: 90 TABLET | Refills: 0 | Status: SHIPPED | OUTPATIENT
Start: 2018-12-21 | End: 2019-03-28

## 2018-12-21 RX ORDER — AMLODIPINE BESYLATE 5 MG/1
TABLET ORAL
Qty: 90 TABLET | Refills: 0 | Status: SHIPPED | OUTPATIENT
Start: 2018-12-21 | End: 2019-05-08

## 2018-12-21 NOTE — TELEPHONE ENCOUNTER
Medication Refill Request    Medication(s) requested:  losartan (COZAAR) 50 MG tablet   amLODIPine (NORVASC) 5 MG tablet     Last Office Visit: 12/7/18  Next Office Visit: none scheduled at this time   Labs: up to date     Rx approved and refilled per [facility] refill protocol.    Estella Mortensen RN  12/21/18  8:55 AM

## 2019-01-08 ENCOUNTER — TELEPHONE (OUTPATIENT)
Dept: INTERNAL MEDICINE | Facility: CLINIC | Age: 74
End: 2019-01-08

## 2019-01-08 DIAGNOSIS — M79.7 FIBROMYALGIA: ICD-10-CM

## 2019-01-08 DIAGNOSIS — M51.369 DDD (DEGENERATIVE DISC DISEASE), LUMBAR: ICD-10-CM

## 2019-01-08 NOTE — TELEPHONE ENCOUNTER
Reason for Call:  Medication or medication refill:    Do you use a [facility]?  Name of the pharmacy and phone number for the current request:  [facility]    Name of the medication requested: NORCO Hydrocodone 5-325MG tablet    Other request: none      Can we leave a detailed message on this number? YES    Phone number patient can be reached at: Home number on file [phone] (home)    Best Time: any    Call taken on 1/8/2019 at 11:51 AM by Fidelina Wick

## 2019-01-10 RX ORDER — HYDROCODONE BITARTRATE AND ACETAMINOPHEN 5; 325 MG/1; MG/1
1-2 TABLET ORAL EVERY 6 HOURS PRN
Qty: 180 TABLET | Refills: 0 | Status: SHIPPED | OUTPATIENT
Start: 2019-01-10 | End: 2019-02-11

## 2019-01-10 NOTE — TELEPHONE ENCOUNTER
Norco   TAKE to [facility]     Last Written Prescription Date:  12/11/18  Last Fill Quantity: 180,   # refills: 0    Last Office Visit: 12/7/18    Future Office visit:       Routing refill request to provider for review/approval because:  Drug not on the [facility], [facility] or [facility] refill protocol or controlled substance

## 2019-01-21 ENCOUNTER — TELEPHONE (OUTPATIENT)
Dept: INTERNAL MEDICINE | Facility: CLINIC | Age: 74
End: 2019-01-21

## 2019-01-21 NOTE — TELEPHONE ENCOUNTER
Spoke with patient.  States she hit the top L side of head and R forehead on the corner of her bed on 1-15-19.  Denies LOC.  Her head is no longer hurting.  Denies any dizziness.    Will monitor for now.    Advised if c/o any dizziness, HA that will not go away, etc to go to ER for eval.

## 2019-01-21 NOTE — TELEPHONE ENCOUNTER
Reason for Call:  Other call back    Detailed comments: Patient fell and hit head last Tuesday, nothing is sore any longer but she wonders if she should come in?       Phone Number Patient can be reached at: Home number on file [phone] (home)    Best Time: asap    Can we leave a detailed message on this number? YES    Call taken on 1/21/2019 at 1:44 PM by Denia Orozco

## 2019-01-31 ENCOUNTER — NURSE TRIAGE (OUTPATIENT)
Dept: NURSING | Facility: CLINIC | Age: 74
End: 2019-01-31

## 2019-01-31 NOTE — TELEPHONE ENCOUNTER
"Svetlana fell and hit head on ground about two weeks ago.  Denies vomiting, headache, unsteady walking.      Additional Information    Negative: [1] ACUTE NEURO SYMPTOM AND [2] present now  (DEFINITION: difficult to awaken OR confused thinking and talking OR slurred speech OR weakness of arms OR unsteady walking)    Negative: Knocked out (unconscious) > 1 minute    Negative: Seizure (convulsion) occurred  (Exception: prior history of seizures and now alert and without Acute Neuro Symptoms)    Negative: Penetrating head injury (e.g., knife, gun shot wound, metal object)    Negative: [1] Major bleeding (e.g., actively dripping or spurting) AND [2] can't be stopped    Negative: [1] Dangerous mechanism of injury (e.g., MVA, diving, trampoline, contact sports, fall > 10 feet or 3 meters) AND [2] NECK pain AND [3] began < 1 hour after injury    Negative: Sounds like a life-threatening emergency to the triager    Negative: Can't remember what happened (amnesia)    Negative: Vomiting once or more    Negative: [1] Loss of vision or double vision AND [2] present now    Negative: Watery or blood-tinged fluid dripping from the NOSE or EARS now  (Exception: tears from crying or nosebleed from nasal trauma)    Negative: One or two \"black eyes\" (bruising, purple color of eyelids)    Negative: [1] Large swelling AND [2] size > palm of person's hand    Negative: Skin is split open or gaping  (or length > 1/2 inch or 12 mm)    Negative: [1] Bleeding AND [2] won't stop after 10 minutes of direct pressure (using correct technique)    Negative: Sounds like a serious injury to the triager    Negative: [1] ACUTE NEURO SYMPTOM AND [2] now fine  (DEFINITION: difficult to awaken OR confused thinking and talking OR slurred speech OR weakness of arms OR unsteady walking)    Negative: [1] Knocked out (unconscious) < 1 minute AND [2] now fine    Negative: [1] SEVERE headache AND [2]  not improved 2 hours after pain medicine/ice packs    Negative: " Dangerous injury (e.g., MVA, diving, trampoline, contact sports, fall > 10 feet or 3 meters) or severe blow from hard object (e.g., golf club or baseball bat)    Negative: Taking Coumadin (warfarin) or other strong blood thinner, or known bleeding disorder (e.g., thrombocytopenia)    Negative: Suspicious history for the injury    Negative: Patient is confused or is an unreliable provider of information (e.g., dementia, profound mental retardation, alcohol intoxication)    Negative: [1] Last tetanus shot > 5 years ago AND [2] DIRTY cut or scrape    Negative: [1] After 72 hours AND [2] headache persists    Scalp swelling, bruise or pain (all triage questions negative)    Protocols used: HEAD INJURY-ADULT-

## 2019-02-11 DIAGNOSIS — M79.7 FIBROMYALGIA: ICD-10-CM

## 2019-02-11 DIAGNOSIS — M51.369 DDD (DEGENERATIVE DISC DISEASE), LUMBAR: ICD-10-CM

## 2019-02-11 RX ORDER — HYDROCODONE BITARTRATE AND ACETAMINOPHEN 5; 325 MG/1; MG/1
1-2 TABLET ORAL EVERY 6 HOURS PRN
Qty: 180 TABLET | Refills: 0 | Status: SHIPPED | OUTPATIENT
Start: 2019-02-11 | End: 2019-03-13

## 2019-02-11 NOTE — TELEPHONE ENCOUNTER
Reason for Call:  Medication or medication refill:    Do you use a [facility]?  Name of the pharmacy and phone number for the current request:  [facility]    Name of the medication requested: Hydrocodone    Other request: anytime  Can we leave a detailed message on this number? YES    Phone number patient can be reached at: Home number on file [phone] (home)    Best Time: anytime    Call taken on 2/11/2019 at 10:02 AM by Izabel Kaplan

## 2019-02-11 NOTE — TELEPHONE ENCOUNTER
Norco TAKE to [facility]         Last Written Prescription Date:  1/10/19  Last Fill Quantity: 180,   # refills: 0    Last Office Visit: 12/7/18    Future Office visit:       Routing refill request to provider for review/approval because:  Drug not on the [facility], [facility] or [facility] refill protocol or controlled substance

## 2019-03-13 DIAGNOSIS — M51.369 DDD (DEGENERATIVE DISC DISEASE), LUMBAR: ICD-10-CM

## 2019-03-13 DIAGNOSIS — M79.7 FIBROMYALGIA: ICD-10-CM

## 2019-03-13 RX ORDER — HYDROCODONE BITARTRATE AND ACETAMINOPHEN 5; 325 MG/1; MG/1
1-2 TABLET ORAL EVERY 6 HOURS PRN
Qty: 180 TABLET | Refills: 0 | Status: SHIPPED | OUTPATIENT
Start: 2019-03-13 | End: 2019-04-15

## 2019-03-13 NOTE — TELEPHONE ENCOUNTER
Norco TAKE to [facility] pharmacy        Last Written Prescription Date:  2/11/19  Last Fill Quantity: 180,   # refills: 0    Last Office Visit: 12/7/18    Future Office visit:       Routing refill request to provider for review/approval because:  Drug not on the [facility], [facility] or  Health refill protocol or controlled substance    [place]  checked, [place] concerns.

## 2019-03-13 NOTE — TELEPHONE ENCOUNTER
Reason for Call:  Medication or medication refill:    Do you use a [facility]?  Name of the pharmacy and phone number for the current request:  [facility] [address] [facility] (SPECIALTY) - [phone]    Name of the medication requested: vicodin     Other request: Would like to get refilled on Friday.    Can we leave a detailed message on this number? YES    Phone number patient can be reached at: Home number on file [phone] (home)    Best Time: any    Call taken on 3/13/2019 at 10:45 AM by Giovanna Yang

## 2019-03-28 DIAGNOSIS — I10 BENIGN ESSENTIAL HYPERTENSION: ICD-10-CM

## 2019-03-28 NOTE — TELEPHONE ENCOUNTER
"Requested Prescriptions   Pending Prescriptions Disp Refills     losartan (COZAAR) 50 MG tablet [Pharmacy Med Name: LOSARTAN  50MG  TAB] 90 tablet 0    Last Written Prescription Date:  12/21/2018  Last Fill Quantity: 90,  # refills: 0   Last office visit: 12/7/2018 with prescribing provider:     Future Office Visit:   Sig: TAKE 1 TABLET BY MOUTH  DAILY    Angiotensin-II Receptors Passed - 3/28/2019  4:05 AM       Passed - Blood pressure under 140/90 in past 12 months    BP Readings from Last 3 Encounters:   12/07/18 132/80   10/08/18 130/80   09/26/18 134/84                Passed - Recent (12 mo) or future (30 days) visit within the authorizing provider's specialty    Patient had office visit in the last 12 months or has a visit in the next 30 days with authorizing provider or within the authorizing provider's specialty.  See \"Patient Info\" tab in inbasket, or \"Choose Columns\" in Meds & Orders section of the refill encounter.             Passed - Medication is active on med list       Passed - Patient is age 18 or older       Passed - No active pregnancy on record       Passed - Normal serum creatinine on file in past 12 months    Recent Labs   Lab Test 07/10/18  1033   CR 0.80            Passed - Normal serum potassium on file in past 12 months    Recent Labs   Lab Test 07/10/18  1033   POTASSIUM 4.3                   Passed - No positive pregnancy test in past 12 months        "

## 2019-03-30 RX ORDER — LOSARTAN POTASSIUM 50 MG/1
TABLET ORAL
Qty: 90 TABLET | Refills: 1 | Status: SHIPPED | OUTPATIENT
Start: 2019-03-30 | End: 2019-09-10

## 2019-04-11 ENCOUNTER — TELEPHONE (OUTPATIENT)
Dept: INTERNAL MEDICINE | Facility: CLINIC | Age: 74
End: 2019-04-11

## 2019-04-11 DIAGNOSIS — M51.369 DDD (DEGENERATIVE DISC DISEASE), LUMBAR: ICD-10-CM

## 2019-04-11 DIAGNOSIS — M79.7 FIBROMYALGIA: ICD-10-CM

## 2019-04-11 NOTE — TELEPHONE ENCOUNTER
Reason for Call:  Medication or medication refill:    Do you use a [facility]? Yes      Name of the pharmacy and phone number for the current request:[facility] [place]     Name of the medication requested: vicodin    Other request: call pt when ready     Can we leave a detailed message on this number? YES    Phone number patient can be reached at: Home number on file [phone] (home)    Best Time: asap    Call taken on 4/11/2019 at 4:12 PM by Denia Orozco

## 2019-04-12 NOTE — TELEPHONE ENCOUNTER
Norco refill request. TAKE To  PHARMACY      Last Written Prescription Date:  3/13/19  Last Fill Quantity: 180,   # refills: 0    Last Office Visit: 12/7/18    Future Office visit:       Routing refill request to provider for review/approval because:  Drug not on the [facility], [facility] or [facility] refill protocol or controlled substance

## 2019-04-15 RX ORDER — HYDROCODONE BITARTRATE AND ACETAMINOPHEN 5; 325 MG/1; MG/1
1-2 TABLET ORAL EVERY 6 HOURS PRN
Qty: 180 TABLET | Refills: 0 | Status: SHIPPED | OUTPATIENT
Start: 2019-04-15 | End: 2019-05-23

## 2019-04-19 ENCOUNTER — TRANSFERRED RECORDS (OUTPATIENT)
Dept: HEALTH INFORMATION MANAGEMENT | Facility: CLINIC | Age: 74
End: 2019-04-19

## 2019-05-07 DIAGNOSIS — F43.0 ACUTE REACTION TO STRESS: ICD-10-CM

## 2019-05-07 RX ORDER — LORAZEPAM 0.5 MG/1
TABLET ORAL
Qty: 10 TABLET | Refills: 0 | Status: SHIPPED | OUTPATIENT
Start: 2019-05-07 | End: 2019-07-16

## 2019-05-07 NOTE — TELEPHONE ENCOUNTER
Lorazepam         Last Written Prescription Date:  11/2/18  Last Fill Quantity: 10,   # refills: 0    Last Office Visit: 12/7/18    Future Office visit:    Next 5 appointments (look out 90 days)    May 28, 2019 12:40 PM CDT  SHORT with Dolores Blanton MD  [facility] ([facility]) [address]  [facility] [address]  [phone]         Routing refill request to provider for review/approval because:  Drug not on the [facility], [facility] or [facility] refill protocol or controlled substance

## 2019-05-07 NOTE — TELEPHONE ENCOUNTER
Reason for Call:  Medication or medication refill:    Do you use a [facility]?  Name of the pharmacy and phone number for the current request:   [facility] pharmacy on [address]    Name of the medication requested: lorazepam    Other request:     Can we leave a detailed message on this number? YES    Phone number patient can be reached at: Home number on file [phone] (home)    Best Time: anytime    Call taken on 5/7/2019 at 10:40 AM by Izabel Kaplan

## 2019-05-08 DIAGNOSIS — I10 BENIGN ESSENTIAL HYPERTENSION: ICD-10-CM

## 2019-05-08 RX ORDER — AMLODIPINE BESYLATE 5 MG/1
TABLET ORAL
Qty: 90 TABLET | Refills: 0 | Status: SHIPPED | OUTPATIENT
Start: 2019-05-08 | End: 2019-08-01

## 2019-05-08 NOTE — TELEPHONE ENCOUNTER
"Requested Prescriptions   Pending Prescriptions Disp Refills     amLODIPine (NORVASC) 5 MG tablet [Pharmacy Med Name: AMLODIPINE  5MG  TAB] 90 tablet 0     Sig: TAKE 1 TABLET BY MOUTH  DAILY   Last Written Prescription Date:  12/21/2018  Last Fill Quantity: 90,  # refills: 0   Last office visit: 12/7/2018 with prescribing provider:     Future Office Visit:   Next 5 appointments (look out 90 days)    May 28, 2019 12:40 PM CDT  SHORT with Dolores Blanton MD  [facility] ([facility]) [address]  [facility] [address]  [phone]           Calcium Channel Blockers Protocol  Passed - 5/8/2019  3:33 AM        Passed - Blood pressure under 140/90 in past 12 months     BP Readings from Last 3 Encounters:   12/07/18 132/80   10/08/18 130/80   09/26/18 134/84                 Passed - Recent (12 mo) or future (30 days) visit within the authorizing provider's specialty     Patient had office visit in the last 12 months or has a visit in the next 30 days with authorizing provider or within the authorizing provider's specialty.  See \"Patient Info\" tab in inbasket, or \"Choose Columns\" in Meds & Orders section of the refill encounter.              Passed - Medication is active on med list        Passed - Patient is age 18 or older        Passed - No active pregnancy on record        Passed - Normal serum creatinine on file in past 12 months     Recent Labs   Lab Test 07/10/18  1033   CR 0.80             Passed - No positive pregnancy test in past 12 months        "

## 2019-05-10 ENCOUNTER — HOSPITAL ENCOUNTER (OUTPATIENT)
Dept: MAMMOGRAPHY | Facility: CLINIC | Age: 74
Discharge: HOME | End: 2019-05-10
Attending: INTERNAL MEDICINE | Admitting: INTERNAL MEDICINE
Payer: MEDICARE

## 2019-05-10 DIAGNOSIS — Z12.31 VISIT FOR SCREENING MAMMOGRAM: ICD-10-CM

## 2019-05-10 PROCEDURE — 77063 BREAST TOMOSYNTHESIS BI: CPT

## 2019-05-15 ENCOUNTER — OFFICE VISIT (OUTPATIENT)
Dept: FAMILY MEDICINE | Facility: CLINIC | Age: 74
End: 2019-05-15
Payer: MEDICARE

## 2019-05-15 VITALS
BODY MASS INDEX: 47.25 KG/M2 | OXYGEN SATURATION: 96 % | DIASTOLIC BLOOD PRESSURE: 84 MMHG | TEMPERATURE: 98.2 F | HEART RATE: 97 BPM | WEIGHT: 271 LBS | SYSTOLIC BLOOD PRESSURE: 138 MMHG

## 2019-05-15 DIAGNOSIS — J20.9 ACUTE BRONCHITIS, UNSPECIFIED ORGANISM: Primary | ICD-10-CM

## 2019-05-15 PROCEDURE — 99213 OFFICE O/P EST LOW 20 MIN: CPT | Performed by: PHYSICIAN ASSISTANT

## 2019-05-15 RX ORDER — PREDNISONE 20 MG/1
20 TABLET ORAL DAILY
Qty: 5 TABLET | Refills: 0 | Status: SHIPPED | OUTPATIENT
Start: 2019-05-15 | End: 2019-05-15

## 2019-05-15 RX ORDER — BENZONATATE 200 MG/1
200 CAPSULE ORAL 3 TIMES DAILY PRN
Qty: 21 CAPSULE | Refills: 0 | Status: SHIPPED | OUTPATIENT
Start: 2019-05-15 | End: 2019-06-08

## 2019-05-15 RX ORDER — AZITHROMYCIN 250 MG/1
TABLET, FILM COATED ORAL
Qty: 6 TABLET | Refills: 1 | Status: SHIPPED | OUTPATIENT
Start: 2019-05-15 | End: 2019-06-08

## 2019-05-15 RX ORDER — PREDNISONE 20 MG/1
20 TABLET ORAL DAILY
Qty: 5 TABLET | Refills: 0 | Status: SHIPPED | OUTPATIENT
Start: 2019-05-15 | End: 2019-06-08

## 2019-05-15 NOTE — PROGRESS NOTES
SUBJECTIVE:   Svetlana Serrano is a 73 year old female who presents to clinic today for the following health issues:      HPI  Acute Illness   Acute illness concerns: cough  Onset: 2 months    Fever: no     Chills/Sweats: YES - sweats at night (not related)    Headache (location?): no     Sinus Pressure:yes - a little    Conjunctivitis:  no    Ear Pain: no    Rhinorrhea: no     Congestion: YES    Sore Throat: no      Cough: YES-productive of clear sputum, productive of yellow sputum, productive of green sputum, with shortness of breath    Wheeze: no     Decreased Appetite: no     Nausea: no     Vomiting: no     Diarrhea:  no     Dysuria/Freq.: no     Fatigue/Achiness: YES    Sick  Exposure: YES -  and son sick      Therapies Tried and outcome: netti pot - no help    Additional history:     Reviewed and updated as needed this visit by clinical staff  Tobacco  Allergies  Meds  Med Hx  Surg Hx  Fam Hx  Soc Hx        Reviewed and updated as needed this visit by Provider             Patient Active Problem List   Diagnosis     Mild intermittent asthma     Restless leg syndrome     Fibromyalgia     Advanced directives, counseling/discussion     DDD (degenerative disc disease), lumbar     Spinal stenosis     Controlled substance agreement signed     Morbid obesity (H)     Anxiety     Narcotic dependence (H)     Benign essential hypertension     CARDIOVASCULAR SCREENING; LDL GOAL LESS THAN 130     Past Surgical History:   Procedure Laterality Date     ARTHROPLASTY SHOULDER Left      C/SECTION, CLASSICAL      , Classical     CHOLECYSTECTOMY, LAPOROSCOPIC      Cholecystectomy, Laparoscopic     COLONOSCOPY N/A 10/8/2018    Procedure: COLONOSCOPY;  Colonoscopy with polypectomies;  Surgeon: Anton Stroud MD;  Location: [facility] OR     HYSTERECTOMY, JOSHUA       JOINT REPLACEMTN, KNEE RT/LT      bilateral       Social History     Tobacco Use     Smoking status: Former Smoker     Last attempt to quit:  1969     Years since quittin.4     Smokeless tobacco: Never Used   Substance Use Topics     Alcohol use: Yes     Alcohol/week: 0.0 oz     Comment: casual     Family History   Problem Relation Age of Onset     Family History Negative Mother      Family History Negative Father            ROS:  Constitutional, HEENT, cardiovascular, pulmonary, gi and gu systems are negative, except as otherwise noted.    OBJECTIVE:     /84 (BP Location: Right arm, Patient Position: Sitting, Cuff Size: Adult Large)   Pulse 97   Temp 98.2  F (36.8  C) (Oral)   Wt 122.9 kg (271 lb)   SpO2 96%   BMI 47.25 kg/m    Body mass index is 47.25 kg/m .  GENERAL APPEARANCE: healthy, alert and no distress  HENT: ear canals and TM's normal and nose and mouth without ulcers or lesions  RESP: rhonchi throughout  CV: regular rates and rhythm, normal S1 S2, no S3 or S4 and no murmur, click or rub        ASSESSMENT/PLAN:             1. Acute bronchitis, unspecified organism  Continue Ventolin  - azithromycin (ZITHROMAX) 250 MG tablet; Two tablets first day, then one tablet daily for four days.  Dispense: 6 tablet; Refill: 1  - benzonatate (TESSALON) 200 MG capsule; Take 1 capsule (200 mg) by mouth 3 times daily as needed for cough  Dispense: 21 capsule; Refill: 0  - predniSONE (DELTASONE) 20 MG tablet; Take 20 mg by mouth daily for 5 days.  Dispense: 5 tablet; Refill: 0    See Patient Instructions    Sheeba Bruce PA-C  [facility]

## 2019-05-17 ENCOUNTER — TELEPHONE (OUTPATIENT)
Dept: INTERNAL MEDICINE | Facility: CLINIC | Age: 74
End: 2019-05-17

## 2019-05-17 DIAGNOSIS — J20.9 ACUTE BRONCHITIS, UNSPECIFIED ORGANISM: Primary | ICD-10-CM

## 2019-05-17 RX ORDER — CODEINE PHOSPHATE AND GUAIFENESIN 10; 100 MG/5ML; MG/5ML
1 SOLUTION ORAL EVERY 6 HOURS PRN
Qty: 118 ML | Refills: 0 | Status: SHIPPED | OUTPATIENT
Start: 2019-05-17 | End: 2019-05-23

## 2019-05-17 NOTE — TELEPHONE ENCOUNTER
Sheeba,     The Pt called in to report the Tessalon is not helping her cough, is requesting to try Robitussin AC. Arabella partially pended it for you to review and complete with a dispense amount.     Cherie Laguerre RN -- [facility]

## 2019-05-23 ENCOUNTER — TELEPHONE (OUTPATIENT)
Dept: INTERNAL MEDICINE | Facility: CLINIC | Age: 74
End: 2019-05-23

## 2019-05-23 DIAGNOSIS — M51.369 DDD (DEGENERATIVE DISC DISEASE), LUMBAR: ICD-10-CM

## 2019-05-23 DIAGNOSIS — M79.7 FIBROMYALGIA: ICD-10-CM

## 2019-05-23 DIAGNOSIS — J20.9 ACUTE BRONCHITIS, UNSPECIFIED ORGANISM: ICD-10-CM

## 2019-05-23 RX ORDER — CODEINE PHOSPHATE AND GUAIFENESIN 10; 100 MG/5ML; MG/5ML
1-2 SOLUTION ORAL EVERY 4 HOURS PRN
Status: CANCELLED | OUTPATIENT
Start: 2019-05-23

## 2019-05-23 RX ORDER — HYDROCODONE BITARTRATE AND ACETAMINOPHEN 5; 325 MG/1; MG/1
1-2 TABLET ORAL EVERY 6 HOURS PRN
Qty: 180 TABLET | Refills: 0 | Status: SHIPPED | OUTPATIENT
Start: 2019-05-23 | End: 2019-06-21

## 2019-05-23 RX ORDER — CODEINE PHOSPHATE AND GUAIFENESIN 10; 100 MG/5ML; MG/5ML
1 SOLUTION ORAL EVERY 6 HOURS PRN
Qty: 118 ML | Refills: 0 | Status: SHIPPED | OUTPATIENT
Start: 2019-05-23 | End: 2019-11-12

## 2019-05-23 NOTE — TELEPHONE ENCOUNTER
"I do not recommend \"large bottle\" of cough syrup or long term use, as pt current takes Norco (hydrocodone-acetaminophen). The cough syrup has similar medication of codeine. Too much of these opioids can make you stop breathing.     I will refill another small bottle. Rx sent.    However, if your symptoms are not improving, I recommend a visit prior to leaving town.    Sheeba Bruce PA-C    "

## 2019-05-23 NOTE — TELEPHONE ENCOUNTER
Patient is leaving out of state on 5/29/19 and would like to  before then.    Controlled Substance Refill Request for norco  Problem List Complete:    No     PROVIDER TO CONSIDER COMPLETION OF PROBLEM LIST AND OVERVIEW/CONTROLLED SUBSTANCE AGREEMENT    Last Written Prescription Date:  4/15/19  Last Fill Quantity: 180,   # refills: 0        Last Office Visit with [facility] primary care provider: 12/7/18    Future Office visit:   Next 5 appointments (look out 90 days)    May 28, 2019 12:40 PM CDT  SHORT with Dolores Blanton MD  [facility] ([facility]) [address]  [facility] [address]  [phone]          Controlled substance agreement:   Encounter-Level CSA - 04/21/2016:    Controlled Substance Agreement - Scan on 4/28/2016  9:48 AM: [place] Controlled Substance Agreement (below)       Patient-Level CSA:    There are no patient-level csa.         Last Urine Drug Screen:   Pain Drug SCR UR W RPTD Meds   Date Value Ref Range Status   05/25/2017   Final    FINAL  (Note)  ====================================================================  [facility] COMP DRUG ANALYSIS,UR  ====================================================================  Test                             Result       Flag       Units    Drug Present   Hydrocodone                    2505                    ng/mg creat   Hydromorphone                  162                     ng/mg creat   Dihydrocodeine                 513                     ng/mg creat   Norhydrocodone                 1605                    ng/mg creat    Sources of hydrocodone include scheduled prescription    medications. Hydromorphone, dihydrocodeine and norhydrocodone are    expected metabolites of hydrocodone. Hydromorphone and    dihydrocodeine are also available as scheduled prescription    medications.     Duloxetine                     PRESENT   Acetaminophen                  PRESENT   Naproxen                        PRESENT  ====================================================================  Test                      Result    Flag   Units      Ref Range   Creatinine              191              mg/dL      >=20  ====================================================================  Declared Medications:  Medication list was not provided.  ====================================================================  For clinical consultation, please call [phone].  ====================================================================  Analysis performed by [employer] [place] [address]     , No results found for: COMDAT,   Cannabinoids (86-nds-0-carboxy-9-THC)   Date Value Ref Range Status   07/10/2018 Not Detected NDET^Not Detected ng/mL Final     Comment:     Cutoff for a negative cannabinoid is 50 ng/mL or less.     Phencyclidine (Phencyclidine)   Date Value Ref Range Status   07/10/2018 Not Detected NDET^Not Detected ng/mL Final     Comment:     Cutoff for a negative PCP is 25 ng/mL or less.     Cocaine (Benzoylecgonine)   Date Value Ref Range Status   07/10/2018 Not Detected NDET^Not Detected ng/mL Final     Comment:     Cutoff for a negative cocaine is 150 ng/ml or less.     Methamphetamine (d-Methamphetamine)   Date Value Ref Range Status   07/10/2018 Not Detected NDET^Not Detected ng/mL Final     Comment:     Cutoff for a negative methamphetamine is 500 ng/ml or less.     Opiates (Morphine)   Date Value Ref Range Status   07/10/2018 Detected, Abnormal Result (A) NDET^Not Detected ng/mL Final     Comment:     Cutoff for a positive opiate is greater than 100 ng/ml.  This is an unconfirmed screening result to be used for medical purposes only.   Order [id number] for confirmation or individual confirmation tests to [facility].       Amphetamine (d-Amphetamine)   Date Value Ref Range Status   07/10/2018 Not Detected NDET^Not Detected ng/mL Final     Comment:     Cutoff for a negative amphetamine is 500 ng/mL or less.      Benzodiazepines (Nordiazepam)   Date Value Ref Range Status   07/10/2018 Not Detected NDET^Not Detected ng/mL Final     Comment:     Cutoff for a negative benzodiazepine is 150 ng/ml or less.     Tricyclic Antidepressants (Desipramine)   Date Value Ref Range Status   07/10/2018 Not Detected NDET^Not Detected ng/mL Final     Comment:     Cutoff for a negative tricyclic antidepressant is 300 ng/ml or less.     Methadone (Methadone)   Date Value Ref Range Status   07/10/2018 Not Detected NDET^Not Detected ng/mL Final     Comment:     Cutoff for a negative methadone is 200 ng/ml or less.     Barbiturates (Butalbital)   Date Value Ref Range Status   07/10/2018 Not Detected NDET^Not Detected ng/mL Final     Comment:     Cutoff for a negative barbituate is 200 ng/ml or less.     Oxycodone (Oxycodone)   Date Value Ref Range Status   07/10/2018 Not Detected NDET^Not Detected ng/mL Final     Comment:     Cutoff for a negative Oxycodone is 100 ng/mL or less.     Propoxyphene (Norpropoxyphene)   Date Value Ref Range Status   07/10/2018 Not Detected NDET^Not Detected ng/mL Final     Comment:     Cutoff for a negative propoxyphene is 300 ng/ml or less     Buprenorphine (Buprenorphine)   Date Value Ref Range Status   07/10/2018 Not Detected NDET^Not Detected ng/mL Final     Comment:     Cutoff for a negative buprenorphine is 10 ng/ml or less        Processing:  Staff will hand deliver Rx to on-site pharmacy     [email or web address]       checked in past 3 months?  No, route to RN

## 2019-05-23 NOTE — TELEPHONE ENCOUNTER
"Pt calls back, see note below, PCP at [facility], saw CJ 5/15/19 since could not get in with PCP, does not want to call PCP office, discussed, pt informs:    ~still has Cough, lots of lung congestion, does not seem to be loosening up, not able to cough up  ~using Ventolin daily  ~upon review cough syrup prescribed by  5/17/19 is the same as what the  had (nadege ac vs guanfensin ac), pt not out of bottle yet, wants REFILL WHEN DUE, \"LARGE BOTTLE\" to take with before flight to [place] next Wednesday at 6 am   ~discussed f/u appointment if not improving  ~pt denies fever  ~wants  to advise plan    Routed, inform pt of plan on cell, may LMOVM    Dolores Cunningham, RN, BSN  Message handled by Nurse Triage.        "

## 2019-05-23 NOTE — TELEPHONE ENCOUNTER
Reason for call:  Other   Patient called regarding (reason for call): information nurse advise, possible medication  Additional comments: Patients PCP is listed is Dolores Blanton, but patient wants to speak with Sheeba Bruce and/or nurse regarding f/u of her 05/15/19 visit. She is not better. Patient states she is at end of second dose of Z-deana. She is also requesting a large bottle of cough medicine, the one given does not work. She is suppose to be flying out of state tomorrow and would like a call back as soon as possible.        Phone number to reach patient:  Cell number on file:    Telephone Information:   Mobile [phone]   Mobile [phone]       Best Time:  any    Can we leave a detailed message on this number?  YES

## 2019-05-23 NOTE — TELEPHONE ENCOUNTER
"Pt reports ongoing symptoms, very slightly approved.      Phlem in lungs still.  Coughing at night with nothing coming up.    Asking for \"the cough syrup my  has which is codeine\"  Advised to pt that she has codeine, but pt states she wants what her  has and it's something different/thinner.  Pt will call back with verification of what her  is getting.      Pt also wondering if she should have more prednisone, another 20mg x 5 days.  Will await pt's call back to send both questions to Sheeba.  Izabel Woodard RN        "

## 2019-05-23 NOTE — TELEPHONE ENCOUNTER
[place] 5-23-19: patient received Robafen AC oral solution #118 ml on 5-17-19.    [place]  [email or web address]    Please advise, thanks.

## 2019-05-28 ENCOUNTER — OFFICE VISIT (OUTPATIENT)
Dept: INTERNAL MEDICINE | Facility: CLINIC | Age: 74
End: 2019-05-28
Payer: MEDICARE

## 2019-05-28 VITALS
SYSTOLIC BLOOD PRESSURE: 139 MMHG | HEART RATE: 79 BPM | HEIGHT: 64 IN | WEIGHT: 270.9 LBS | DIASTOLIC BLOOD PRESSURE: 98 MMHG | OXYGEN SATURATION: 95 % | BODY MASS INDEX: 46.25 KG/M2 | TEMPERATURE: 99.1 F | RESPIRATION RATE: 24 BRPM

## 2019-05-28 DIAGNOSIS — F11.20 NARCOTIC DEPENDENCE (H): ICD-10-CM

## 2019-05-28 DIAGNOSIS — F33.0 MAJOR DEPRESSIVE DISORDER, RECURRENT EPISODE, MILD (H): ICD-10-CM

## 2019-05-28 DIAGNOSIS — J45.21 MILD INTERMITTENT ASTHMA WITH ACUTE EXACERBATION: Primary | ICD-10-CM

## 2019-05-28 DIAGNOSIS — Z13.6 CARDIOVASCULAR SCREENING; LDL GOAL LESS THAN 130: ICD-10-CM

## 2019-05-28 DIAGNOSIS — I10 BENIGN ESSENTIAL HYPERTENSION: ICD-10-CM

## 2019-05-28 DIAGNOSIS — F41.9 ANXIETY: ICD-10-CM

## 2019-05-28 DIAGNOSIS — E66.01 MORBID OBESITY (H): ICD-10-CM

## 2019-05-28 PROCEDURE — 99214 OFFICE O/P EST MOD 30 MIN: CPT | Performed by: INTERNAL MEDICINE

## 2019-05-28 ASSESSMENT — ANXIETY QUESTIONNAIRES
5. BEING SO RESTLESS THAT IT IS HARD TO SIT STILL: NOT AT ALL
3. WORRYING TOO MUCH ABOUT DIFFERENT THINGS: NOT AT ALL
1. FEELING NERVOUS, ANXIOUS, OR ON EDGE: NOT AT ALL
7. FEELING AFRAID AS IF SOMETHING AWFUL MIGHT HAPPEN: NOT AT ALL
2. NOT BEING ABLE TO STOP OR CONTROL WORRYING: NOT AT ALL
GAD7 TOTAL SCORE: 0
IF YOU CHECKED OFF ANY PROBLEMS ON THIS QUESTIONNAIRE, HOW DIFFICULT HAVE THESE PROBLEMS MADE IT FOR YOU TO DO YOUR WORK, TAKE CARE OF THINGS AT HOME, OR GET ALONG WITH OTHER PEOPLE: NOT DIFFICULT AT ALL
6. BECOMING EASILY ANNOYED OR IRRITABLE: NOT AT ALL

## 2019-05-28 ASSESSMENT — MIFFLIN-ST. JEOR: SCORE: 1715.85

## 2019-05-28 ASSESSMENT — PATIENT HEALTH QUESTIONNAIRE - PHQ9: 5. POOR APPETITE OR OVEREATING: NOT AT ALL

## 2019-05-28 NOTE — LETTER
[place] [address]  [facility] [address]  [phone]        October 1, 2019    Svetlana Serrano  [address]  [facility] [address]              Dear Svetlana Serrano    This is to remind you that your FASTING LAB is due.    You may call our office at [phone] to schedule an appointment.    Please disregard this notice if you have already had your labs drawn or made an appointment.        Sincerely,        Dolores Blanton MD

## 2019-05-28 NOTE — NURSING NOTE
"Vital signs:  Temp: 99.1  F (37.3  C) Temp src: Oral BP: (!) 139/98 Pulse: 79   Resp: 24 SpO2: 95 %     Height: 161.3 cm (5' 3.5\") Weight: 122.9 kg (270 lb 14.4 oz)  Estimated body mass index is 47.24 kg/m  as calculated from the following:    Height as of this encounter: 1.613 m (5' 3.5\").    Weight as of this encounter: 122.9 kg (270 lb 14.4 oz).        "
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
## 2019-05-28 NOTE — LETTER
My Asthma Action Plan  Name: Svetlana Serrano   YOB: 1945  Date: 6/8/2019   My doctor: Dolores Blanton MD   My clinic: [facility]        My Control Medicine:   My Rescue Medicine:    My Asthma Severity:   Avoid your asthma triggers:   None          GREEN ZONE   Good Control    I feel good    No cough or wheeze    Can work, sleep and play without asthma symptoms       Take your asthma control medicine every day.     1. If exercise triggers your asthma, take your rescue medication    15 minutes before exercise or sports, and    During exercise if you have asthma symptoms  2. Spacer to use with inhaler: If you have a spacer, make sure to use it with your inhaler             YELLOW ZONE Getting Worse  I have ANY of these:    I do not feel good    Cough or wheeze    Chest feels tight    Wake up at night   1. Keep taking your Green Zone medications  2. Start taking your rescue medicine:    every 20 minutes for up to 1 hour. Then every 4 hours for 24-48 hours.  3. If you stay in the Yellow Zone for more than 12-24 hours, contact your doctor.  4. If you do not return to the Green Zone in 12-24 hours or you get worse, start taking your oral steroid medicine if prescribed by your provider.           RED ZONE Medical Alert - Get Help  I have ANY of these:    I feel awful    Medicine is not helping    Breathing getting harder    Trouble walking or talking    Nose opens wide to breathe       1. Take your rescue medicine NOW  2. If your provider has prescribed an oral steroid medicine, start taking it NOW  3. Call your doctor NOW  4. If you are still in the Red Zone after 20 minutes and you have not reached your doctor:    Take your rescue medicine again and    Call 911 or go to the emergency room right away    See your regular doctor within 2 weeks of an Emergency Room or Urgent Care visit for follow-up treatment.          Annual Reminders:  Meet with Asthma Educator,  Flu Shot in the Fall, consider  Pneumonia Vaccination for patients with asthma (aged 19 and older).    Pharmacy:    [id number] - [place], [place] - [address]  [facility] - [place], [place] - [address] - [place] - [address]                      Asthma Triggers  How To Control Things That Make Your Asthma Worse    Triggers are things that make your asthma worse.  Look at the list below to help you find your triggers and what you can do about them.  You can help prevent asthma flare-ups by staying away from your triggers.      Trigger                                                          What you can do   Cigarette Smoke  Tobacco smoke can make asthma worse. Do not allow smoking in your home, car or around you.  Be sure no one smokes at a child s day care or school.  If you smoke, ask your health care provider for ways to help you quit.  Ask family members to quit too.  Ask your health care provider for a referral to Quit Plan to help you quit smoking, or call 7-916-877-PLAN.     Colds, Flu, Bronchitis  These are common triggers of asthma. Wash your hands often.  Don t touch your eyes, nose or mouth.  Get a flu shot every year.     Dust Mites  These are tiny bugs that live in cloth or carpet. They are too small to see. Wash sheets and blankets in hot water every week.   Encase pillows and mattress in dust mite proof covers.  Avoid having carpet if you can. If you have carpet, vacuum weekly.   Use a dust mask and HEPA vacuum.   Pollen and Outdoor Mold  Some people are allergic to trees, grass, or weed pollen, or molds. Try to keep your windows closed.  Limit time out doors when pollen count is high.   Ask you health care provider about taking medicine during allergy season.     Animal Dander  Some people are allergic to skin flakes, urine or saliva from pets with fur or feathers. Keep pets with fur or feathers out of your home.    If you can t keep the pet  outdoors, then keep the pet out of your bedroom.  Keep the bedroom door closed.  Keep pets off cloth furniture and away from stuffed toys.     Mice, Rats, and Cockroaches  Some people are allergic to the waste from these pests.   Cover food and garbage.  Clean up spills and food crumbs.  Store grease in the refrigerator.   Keep food out of the bedroom.   Indoor Mold  This can be a trigger if your home has high moisture. Fix leaking faucets, pipes, or other sources of water.   Clean moldy surfaces.  Dehumidify basement if it is damp and smelly.   Smoke, Strong Odors, and Sprays  These can reduce air quality. Stay away from strong odors and sprays, such as perfume, powder, hair spray, paints, smoke incense, paint, cleaning products, candles and new carpet.   Exercise or Sports  Some people with asthma have this trigger. Be active!  Ask your doctor about taking medicine before sports or exercise to prevent symptoms.    Warm up for 5-10 minutes before and after sports or exercise.     Other Triggers of Asthma  Cold air:  Cover your nose and mouth with a scarf.  Sometimes laughing or crying can be a trigger.  Some medicines and food can trigger asthma.

## 2019-05-29 ASSESSMENT — ANXIETY QUESTIONNAIRES: GAD7 TOTAL SCORE: 0

## 2019-05-29 ASSESSMENT — ASTHMA QUESTIONNAIRES: ACT_TOTALSCORE: 15

## 2019-06-14 ENCOUNTER — TELEPHONE (OUTPATIENT)
Dept: INTERNAL MEDICINE | Facility: CLINIC | Age: 74
End: 2019-06-14

## 2019-06-14 NOTE — TELEPHONE ENCOUNTER
"Call to pt and advised. She states there is no way she can leave the house unless she is transported by ambulance. She states \"Dr Blanton must think I am lying\". Advised to pt if the pain is that bad, then she needs to go to the ED.   She states she will just lay in bed at home and take the pain medication she has. She then asks if she can take 3 Norco at a time. Advised her that her prescription read 2 tablets every 6 hours, and that 3 tablets would be taking too much tylenol, (besides not following the prescription directions).   Offered her an appointment for next week, she states \"You must be kidding me\". Informed her that this injury was 10 days ago, and we are not open on the weekends. Advised her to go to  over the weekend if needs something for breakthrough pain. She states \"I think I am going to change clinics, go to [facility].\" Offered her to call clinic back with any further concerns.   FYI for Dr BLANTON   "

## 2019-06-14 NOTE — TELEPHONE ENCOUNTER
I cannot change her pain medication without an evaluation.  She needs to be seen.   Bilobed Transposition Flap Text: The defect edges were debeveled with a #15 scalpel blade.  Given the location of the defect and the proximity to free margins a bilobed transposition flap was deemed most appropriate.  Using a sterile surgical marker, an appropriate bilobe flap drawn around the defect.    The area thus outlined was incised deep to adipose tissue with a #15 scalpel blade.  The skin margins were undermined to an appropriate distance in all directions utilizing iris scissors.

## 2019-06-14 NOTE — TELEPHONE ENCOUNTER
Patient's  is calling ( C2C present)to see if Katerin can get a stronger pain medication than norco. She is having very severe leg pain on the right side.
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
## 2019-06-20 ENCOUNTER — TRANSFERRED RECORDS (OUTPATIENT)
Dept: HEALTH INFORMATION MANAGEMENT | Facility: CLINIC | Age: 74
End: 2019-06-20

## 2019-06-21 DIAGNOSIS — M51.369 DDD (DEGENERATIVE DISC DISEASE), LUMBAR: ICD-10-CM

## 2019-06-21 DIAGNOSIS — M79.7 FIBROMYALGIA: ICD-10-CM

## 2019-06-21 RX ORDER — HYDROCODONE BITARTRATE AND ACETAMINOPHEN 5; 325 MG/1; MG/1
1-2 TABLET ORAL EVERY 6 HOURS PRN
Qty: 180 TABLET | Refills: 0 | Status: SHIPPED | OUTPATIENT
Start: 2019-06-21 | End: 2019-07-22

## 2019-06-21 NOTE — TELEPHONE ENCOUNTER
Prescription placed in folder. Pharmacy will contact pt when ready for .   Message relayed to patient.

## 2019-06-21 NOTE — TELEPHONE ENCOUNTER
[facility]      Last Written Prescription Date:  05/23/19  Last Fill Quantity: 180,   # refills: 0  Last Office Visit: 05/28/19  Future Office visit:       Routing refill request to provider for review/approval because:  Drug not on the [facility], [facility] or [facility] refill protocol or controlled substance

## 2019-06-21 NOTE — TELEPHONE ENCOUNTER
I am not sure that this would get to the pharmacy on the last run.  Please remind patient that they need to give 3 business days notice for controlled substance refills.  They may not be able to get it picked up to Monday if it does not get over there in time.

## 2019-07-16 DIAGNOSIS — F43.0 ACUTE REACTION TO STRESS: ICD-10-CM

## 2019-07-16 NOTE — TELEPHONE ENCOUNTER
Requested Prescriptions   Pending Prescriptions Disp Refills     LORazepam (ATIVAN) 0.5 MG tablet [Pharmacy Med Name: LORazepam Oral   Tablet 0.5 MG]    Last Written Prescription Date:  5/7/19  Last Fill Quantity: 10,  # refills: 0   Last office visit: 5/28/2019 with prescribing provider:  Franny   Future Office Visit:     10 tablet 0     Sig: TAKE 1-2 TABLETS BY MOUTH PRIOR TO FLYING. DO NOT DRIVE WITHIN 6 HOURS OF TAKING       There is no refill protocol information for this order

## 2019-07-17 DIAGNOSIS — F43.0 ACUTE REACTION TO STRESS: ICD-10-CM

## 2019-07-17 RX ORDER — LORAZEPAM 0.5 MG/1
TABLET ORAL
Qty: 10 TABLET | Refills: 0 | Status: SHIPPED | OUTPATIENT
Start: 2019-07-17 | End: 2019-10-15

## 2019-07-18 RX ORDER — LORAZEPAM 0.5 MG/1
TABLET ORAL
Qty: 10 TABLET | Refills: 0
Start: 2019-07-18

## 2019-07-19 DIAGNOSIS — M51.369 DDD (DEGENERATIVE DISC DISEASE), LUMBAR: ICD-10-CM

## 2019-07-19 DIAGNOSIS — M79.7 FIBROMYALGIA: ICD-10-CM

## 2019-07-19 NOTE — TELEPHONE ENCOUNTER
Controlled Substance Refill Request for Norco  Problem List Complete:    Yes    Last Written Prescription Date:  6/21/19  Last Fill Quantity: 180,   # refills: 0    THE MOST RECENT OFFICE VISIT MUST BE WITHIN THE PAST 3 MONTHS. AT LEAST ONE FACE TO FACE VISIT MUST OCCUR EVERY 6 MONTHS. ADDITIONAL VISITS CAN BE VIRTUAL.  (THIS STATEMENT SHOULD BE DELETED.)    Last Office Visit with [facility] primary care provider: 5/28/19    Future Office visit:     Controlled substance agreement:   Encounter-Level CSA - 04/21/2016:    Controlled Substance Agreement - Scan on 4/28/2016  9:48 AM: [place] Controlled Substance Agreement (below)       Patient-Level CSA:    There are no patient-level csa.         Last Urine Drug Screen:   Pain Drug SCR UR W RPTD Meds   Date Value Ref Range Status   05/25/2017   Final    FINAL  (Note)  ====================================================================  [facility]RE COMP DRUG ANALYSIS,UR  ====================================================================  Test                             Result       Flag       Units    Drug Present   Hydrocodone                    2505                    ng/mg creat   Hydromorphone                  162                     ng/mg creat   Dihydrocodeine                 513                     ng/mg creat   Norhydrocodone                 1605                    ng/mg creat    Sources of hydrocodone include scheduled prescription    medications. Hydromorphone, dihydrocodeine and norhydrocodone are    expected metabolites of hydrocodone. Hydromorphone and    dihydrocodeine are also available as scheduled prescription    medications.     Duloxetine                     PRESENT   Acetaminophen                  PRESENT   Naproxen                       PRESENT  ====================================================================  Test                      Result    Flag   Units      Ref Range   Creatinine              191              mg/dL       >=20  ====================================================================  Declared Medications:  Medication list was not provided.  ====================================================================  For clinical consultation, please call [phone].  ====================================================================  Analysis performed by [facility] [place] [address]     , No results found for: COMDAT,   Cannabinoids (76-ntc-2-carboxy-9-THC)   Date Value Ref Range Status   07/10/2018 Not Detected NDET^Not Detected ng/mL Final     Comment:     Cutoff for a negative cannabinoid is 50 ng/mL or less.     Phencyclidine (Phencyclidine)   Date Value Ref Range Status   07/10/2018 Not Detected NDET^Not Detected ng/mL Final     Comment:     Cutoff for a negative PCP is 25 ng/mL or less.     Cocaine (Benzoylecgonine)   Date Value Ref Range Status   07/10/2018 Not Detected NDET^Not Detected ng/mL Final     Comment:     Cutoff for a negative cocaine is 150 ng/ml or less.     Methamphetamine (d-Methamphetamine)   Date Value Ref Range Status   07/10/2018 Not Detected NDET^Not Detected ng/mL Final     Comment:     Cutoff for a negative methamphetamine is 500 ng/ml or less.     Opiates (Morphine)   Date Value Ref Range Status   07/10/2018 Detected, Abnormal Result (A) NDET^Not Detected ng/mL Final     Comment:     Cutoff for a positive opiate is greater than 100 ng/ml.  This is an unconfirmed screening result to be used for medical purposes only.   Order [id number] for confirmation or individual confirmation tests to [employer] [facility].       Amphetamine (d-Amphetamine)   Date Value Ref Range Status   07/10/2018 Not Detected NDET^Not Detected ng/mL Final     Comment:     Cutoff for a negative amphetamine is 500 ng/mL or less.     Benzodiazepines (Nordiazepam)   Date Value Ref Range Status   07/10/2018 Not Detected NDET^Not Detected ng/mL Final     Comment:     Cutoff for a negative benzodiazepine is 150 ng/ml or less.      Tricyclic Antidepressants (Desipramine)   Date Value Ref Range Status   07/10/2018 Not Detected NDET^Not Detected ng/mL Final     Comment:     Cutoff for a negative tricyclic antidepressant is 300 ng/ml or less.     Methadone (Methadone)   Date Value Ref Range Status   07/10/2018 Not Detected NDET^Not Detected ng/mL Final     Comment:     Cutoff for a negative methadone is 200 ng/ml or less.     Barbiturates (Butalbital)   Date Value Ref Range Status   07/10/2018 Not Detected NDET^Not Detected ng/mL Final     Comment:     Cutoff for a negative barbituate is 200 ng/ml or less.     Oxycodone (Oxycodone)   Date Value Ref Range Status   07/10/2018 Not Detected NDET^Not Detected ng/mL Final     Comment:     Cutoff for a negative Oxycodone is 100 ng/mL or less.     Propoxyphene (Norpropoxyphene)   Date Value Ref Range Status   07/10/2018 Not Detected NDET^Not Detected ng/mL Final     Comment:     Cutoff for a negative propoxyphene is 300 ng/ml or less     Buprenorphine (Buprenorphine)   Date Value Ref Range Status   07/10/2018 Not Detected NDET^Not Detected ng/mL Final     Comment:     Cutoff for a negative buprenorphine is 10 ng/ml or less        Processing:  Staff will hand deliver Rx to on-site pharmacy    [email or web address]    [place]) reviewed:  reviewed- recommend provider review  See prescriptions from 5/17/19 and 5/23/19 from [facility]. Per chart, this was reported to primary care provider    [place] profile:  [email or web address]

## 2019-07-19 NOTE — TELEPHONE ENCOUNTER
[facility]      Last Written Prescription Date:  06/21/19  Last Fill Quantity: 180,   # refills: 0  Last Office Visit: 05/28/19  Future Office visit:       Routing refill request to provider for review/approval because:  Drug not on the [facility], [facility] or [facility] refill protocol or controlled substance

## 2019-07-22 RX ORDER — HYDROCODONE BITARTRATE AND ACETAMINOPHEN 5; 325 MG/1; MG/1
1-2 TABLET ORAL EVERY 6 HOURS PRN
Qty: 180 TABLET | Refills: 0 | Status: SHIPPED | OUTPATIENT
Start: 2019-07-22 | End: 2019-08-20

## 2019-07-30 ENCOUNTER — TRANSFERRED RECORDS (OUTPATIENT)
Dept: HEALTH INFORMATION MANAGEMENT | Facility: CLINIC | Age: 74
End: 2019-07-30

## 2019-08-01 DIAGNOSIS — I10 BENIGN ESSENTIAL HYPERTENSION: ICD-10-CM

## 2019-08-01 RX ORDER — AMLODIPINE BESYLATE 5 MG/1
TABLET ORAL
Qty: 90 TABLET | Refills: 0 | Status: SHIPPED | OUTPATIENT
Start: 2019-08-01 | End: 2019-10-11

## 2019-08-01 NOTE — TELEPHONE ENCOUNTER
"Requested Prescriptions   Pending Prescriptions Disp Refills     amLODIPine (NORVASC) 5 MG tablet [Pharmacy Med Name: AMLODIPINE  5MG  TAB] 90 tablet 0     Sig: TAKE 1 TABLET BY MOUTH  DAILY   Last Written Prescription Date:  05/08/2019  Last Fill Quantity: 90,  # refills: 0   Last office visit: 5/28/2019 with prescribing provider:     Future Office Visit:      Calcium Channel Blockers Protocol  Failed - 8/1/2019  2:50 PM        Failed - Blood pressure under 140/90 in past 12 months     BP Readings from Last 3 Encounters:   05/28/19 (!) 139/98   05/15/19 138/84   12/07/18 132/80                 Failed - Normal serum creatinine on file in past 12 months     Recent Labs   Lab Test 07/10/18  1033   CR 0.80             Passed - Recent (12 mo) or future (30 days) visit within the authorizing provider's specialty     Patient had office visit in the last 12 months or has a visit in the next 30 days with authorizing provider or within the authorizing provider's specialty.  See \"Patient Info\" tab in inbasket, or \"Choose Columns\" in Meds & Orders section of the refill encounter.              Passed - Medication is active on med list        Passed - Patient is age 18 or older        Passed - No active pregnancy on record        Passed - No positive pregnancy test in past 12 months        "

## 2019-08-02 NOTE — TELEPHONE ENCOUNTER
Routing refill request to provider for review/approval because:  Labs not current:  CR  Blood pressure

## 2019-08-09 DIAGNOSIS — F33.0 MAJOR DEPRESSIVE DISORDER, RECURRENT EPISODE, MILD (H): ICD-10-CM

## 2019-08-09 DIAGNOSIS — F41.9 ANXIETY: ICD-10-CM

## 2019-08-09 DIAGNOSIS — M79.7 FIBROMYALGIA: ICD-10-CM

## 2019-08-09 NOTE — TELEPHONE ENCOUNTER
"Requested Prescriptions   Pending Prescriptions Disp Refills     DULoxetine (CYMBALTA) 60 MG capsule [Pharmacy Med Name: DULOXETINE   Last Written Prescription Date:  7/24/2018  Last Fill Quantity: 180,  # refills: 1   Last office visit: 5/28/2019 with prescribing provider:     Future Office Visit:   60MG  CAP] 180 capsule 1     Sig: TAKE 1 CAPSULE BY MOUTH TWO TIMES DAILY       Serotonin-Norepinephrine Reuptake Inhibitors  Failed - 8/9/2019  3:28 AM        Failed - Blood pressure under 140/90 in past 12 months     BP Readings from Last 3 Encounters:   05/28/19 (!) 139/98   05/15/19 138/84   12/07/18 132/80                 Failed - PHQ-9 score of less than 5 in past 6 months     Please review last PHQ-9 score.           Passed - Medication is active on med list        Passed - Patient is age 18 or older        Passed - No active pregnancy on record        Passed - No positive pregnancy test in past 12 months        Passed - Recent (6 mo) or future (30 days) visit within the authorizing provider's specialty     Patient had office visit in the last 6 months or has a visit in the next 30 days with authorizing provider or within the authorizing provider's specialty.  See \"Patient Info\" tab in inbasket, or \"Choose Columns\" in Meds & Orders section of the refill encounter.            "

## 2019-08-12 ENCOUNTER — TRANSFERRED RECORDS (OUTPATIENT)
Dept: HEALTH INFORMATION MANAGEMENT | Facility: CLINIC | Age: 74
End: 2019-08-12

## 2019-08-12 RX ORDER — DULOXETIN HYDROCHLORIDE 60 MG/1
CAPSULE, DELAYED RELEASE ORAL
Qty: 180 CAPSULE | Refills: 1 | Status: SHIPPED | OUTPATIENT
Start: 2019-08-12 | End: 2020-12-26

## 2019-08-12 NOTE — TELEPHONE ENCOUNTER
"Routing refill request to provider for review/approval because:  Labs not current:  Per last office visit note, patient was to return around 7/1/19 for labs-no appointment scheduled to date.  phq 9 scores out of date  Blood pressure out of range     \"Return in about 1 month (around 7/1/2019) for Lab with blood and urine. \"          PHQ-9 SCORE 5/25/2017 1/23/2018 7/10/2018   PHQ-9 Total Score - - -   PHQ-9 Total Score 1 2 2        "

## 2019-08-19 DIAGNOSIS — M51.369 DDD (DEGENERATIVE DISC DISEASE), LUMBAR: ICD-10-CM

## 2019-08-19 DIAGNOSIS — M79.7 FIBROMYALGIA: ICD-10-CM

## 2019-08-19 NOTE — TELEPHONE ENCOUNTER
Requested Prescriptions   Pending Prescriptions Disp Refills     HYDROcodone-acetaminophen (NORCO) 5-325 MG tablet Last Written Prescription Date:  7/22/2019  Last Fill Quantity: 180 tablet,  # refills: 0   Last office visit: 5/28/2019 with prescribing provider:  5/28/2019  Future Office Visit:   180 tablet 0     Sig: Take 1-2 tablets by mouth every 6 hours as needed for pain       There is no refill protocol information for this order

## 2019-08-20 RX ORDER — HYDROCODONE BITARTRATE AND ACETAMINOPHEN 5; 325 MG/1; MG/1
1-2 TABLET ORAL EVERY 6 HOURS PRN
Qty: 180 TABLET | Refills: 0 | Status: SHIPPED | OUTPATIENT
Start: 2019-08-20 | End: 2019-09-19

## 2019-08-20 NOTE — TELEPHONE ENCOUNTER
Encounter-Level CSA - 04/21/2016:    Controlled Substance Agreement - Scan on 4/28/2016  9:48 AM: [place] Controlled Substance Agreement (below)       Patient-Level CSA:    There are no patient-level csa.       [place] reviewed:  reviewed- no concerns    Norco last filled 7/24/19     [place]:  [email or web address]    Elif Kumar RN

## 2019-09-09 DIAGNOSIS — I10 BENIGN ESSENTIAL HYPERTENSION: ICD-10-CM

## 2019-09-09 NOTE — TELEPHONE ENCOUNTER
"Requested Prescriptions   Pending Prescriptions Disp Refills     losartan (COZAAR) 50 MG tablet  Last Written Prescription Date:  3/30/19  Last Fill Quantity: 90,  # refills: 1   Last office visit: 5/28/2019 with prescribing provider:  Franny   Future Office Visit:     90 tablet 1     Sig: Take 1 tablet (50 mg) by mouth daily       Angiotensin-II Receptors Failed - 9/9/2019 12:56 PM        Failed - Last blood pressure under 140/90 in past 12 months     BP Readings from Last 3 Encounters:   05/28/19 (!) 139/98   05/15/19 138/84   12/07/18 132/80                 Failed - Normal serum creatinine on file in past 12 months     Recent Labs   Lab Test 07/10/18  1033   CR 0.80             Failed - Normal serum potassium on file in past 12 months     Recent Labs   Lab Test 07/10/18  1033   POTASSIUM 4.3                    Passed - Recent (12 mo) or future (30 days) visit within the authorizing provider's specialty     Patient had office visit in the last 12 months or has a visit in the next 30 days with authorizing provider or within the authorizing provider's specialty.  See \"Patient Info\" tab in inbasket, or \"Choose Columns\" in Meds & Orders section of the refill encounter.              Passed - Medication is active on med list        Passed - Patient is age 18 or older        Passed - No active pregnancy on record        Passed - No positive pregnancy test in past 12 months          "

## 2019-09-10 RX ORDER — LOSARTAN POTASSIUM 50 MG/1
50 TABLET ORAL DAILY
Qty: 30 TABLET | Refills: 0 | Status: SHIPPED | OUTPATIENT
Start: 2019-09-10 | End: 2019-09-12

## 2019-09-10 NOTE — TELEPHONE ENCOUNTER
Please call and remind patient she was supposed to have her labs done within the last 2 months so she needs to schedule that as soon as possible.  One month fill done will need to have labs done before I can refill it again.,

## 2019-09-10 NOTE — TELEPHONE ENCOUNTER
Routing refill request to provider for review/approval because:  Labs not current:  creat  And most recent blood pressure ouside standing order parameters.    Please advise, thanks.

## 2019-09-12 ENCOUNTER — TELEPHONE (OUTPATIENT)
Dept: INTERNAL MEDICINE | Facility: CLINIC | Age: 74
End: 2019-09-12

## 2019-09-12 DIAGNOSIS — I10 BENIGN ESSENTIAL HYPERTENSION: ICD-10-CM

## 2019-09-12 RX ORDER — LOSARTAN POTASSIUM 50 MG/1
50 TABLET ORAL DAILY
Qty: 30 TABLET | Refills: 0 | Status: SHIPPED | OUTPATIENT
Start: 2019-09-12 | End: 2019-11-12

## 2019-09-12 NOTE — TELEPHONE ENCOUNTER
"Call to patient. States she has 2 herniated discs and has not been able to walk. States her  has been in the hospital and is now in a transitional care unit. States her brother is also dying. Patient states she has had a terrible couple of months. Advised MD sent 30 days. Patient agreed she will try to make an appointment with in the next month. Patient then stated \"alright\" and hung up.   "

## 2019-09-12 NOTE — TELEPHONE ENCOUNTER
Reason for Call:  Medication or medication refill:    Do you use a [facility]?  Name of the pharmacy and phone number for the current request:  [facility] [address]    Name of the medication requested: losartan    Other request: Patient is completely out, and can't wait for [facility] to send her rx. States has been out of this medication for a month.     Can we leave a detailed message on this number? YES    Phone number patient can be reached at: Cell number on file:    Telephone Information:   . [phone]            Best Time: ASAP    Call taken on 9/12/2019 at 11:51 AM by Mallory Meza

## 2019-09-16 DIAGNOSIS — M79.7 FIBROMYALGIA: ICD-10-CM

## 2019-09-16 DIAGNOSIS — M51.369 DDD (DEGENERATIVE DISC DISEASE), LUMBAR: ICD-10-CM

## 2019-09-16 NOTE — TELEPHONE ENCOUNTER
Controlled Substance Refill Request for norco  Problem List Complete:    No     PROVIDER TO CONSIDER COMPLETION OF PROBLEM LIST AND OVERVIEW/CONTROLLED SUBSTANCE AGREEMENT    Last Written Prescription Date:  8/20/19  Last Fill Quantity: 180,   # refills: 0    Last Office Visit with [facility] primary care provider: 5/28/19 Franny    Future Office visit:     Controlled substance agreement:   Encounter-Level CSA - 04/21/2016:    Controlled Substance Agreement - Scan on 4/28/2016  9:48 AM: [place] Controlled Substance Agreement (below)       Patient-Level CSA:    There are no patient-level csa.         Last Urine Drug Screen:   Pain Drug SCR UR W RPTD Meds   Date Value Ref Range Status   05/25/2017   Final    FINAL  (Note)  ====================================================================  TOXASSURE COMP DRUG ANALYSIS,UR  ====================================================================  Test                             Result       Flag       Units    Drug Present   Hydrocodone                    2505                    ng/mg creat   Hydromorphone                  162                     ng/mg creat   Dihydrocodeine                 513                     ng/mg creat   Norhydrocodone                 1605                    ng/mg creat    Sources of hydrocodone include scheduled prescription    medications. Hydromorphone, dihydrocodeine and norhydrocodone are    expected metabolites of hydrocodone. Hydromorphone and    dihydrocodeine are also available as scheduled prescription    medications.     Duloxetine                     PRESENT   Acetaminophen                  PRESENT   Naproxen                       PRESENT  ====================================================================  Test                      Result    Flag   Units      Ref Range   Creatinine              191              mg/dL      >=20  ====================================================================  Declared Medications:  Medication list was  not provided.  ====================================================================  For clinical consultation, please call [phone].  ====================================================================  Analysis performed by [facility] [place] [address]     , No results found for: COMDAT,   Cannabinoids (11-zcx-3-carboxy-9-THC)   Date Value Ref Range Status   07/10/2018 Not Detected NDET^Not Detected ng/mL Final     Comment:     Cutoff for a negative cannabinoid is 50 ng/mL or less.     Phencyclidine (Phencyclidine)   Date Value Ref Range Status   07/10/2018 Not Detected NDET^Not Detected ng/mL Final     Comment:     Cutoff for a negative PCP is 25 ng/mL or less.     Cocaine (Benzoylecgonine)   Date Value Ref Range Status   07/10/2018 Not Detected NDET^Not Detected ng/mL Final     Comment:     Cutoff for a negative cocaine is 150 ng/ml or less.     Methamphetamine (d-Methamphetamine)   Date Value Ref Range Status   07/10/2018 Not Detected NDET^Not Detected ng/mL Final     Comment:     Cutoff for a negative methamphetamine is 500 ng/ml or less.     Opiates (Morphine)   Date Value Ref Range Status   07/10/2018 Detected, Abnormal Result (A) NDET^Not Detected ng/mL Final     Comment:     Cutoff for a positive opiate is greater than 100 ng/ml.  This is an unconfirmed screening result to be used for medical purposes only.   Order [id number] for confirmation or individual confirmation tests to [facility].       Amphetamine (d-Amphetamine)   Date Value Ref Range Status   07/10/2018 Not Detected NDET^Not Detected ng/mL Final     Comment:     Cutoff for a negative amphetamine is 500 ng/mL or less.     Benzodiazepines (Nordiazepam)   Date Value Ref Range Status   07/10/2018 Not Detected NDET^Not Detected ng/mL Final     Comment:     Cutoff for a negative benzodiazepine is 150 ng/ml or less.     Tricyclic Antidepressants (Desipramine)   Date Value Ref Range Status   07/10/2018 Not Detected NDET^Not Detected  ng/mL Final     Comment:     Cutoff for a negative tricyclic antidepressant is 300 ng/ml or less.     Methadone (Methadone)   Date Value Ref Range Status   07/10/2018 Not Detected NDET^Not Detected ng/mL Final     Comment:     Cutoff for a negative methadone is 200 ng/ml or less.     Barbiturates (Butalbital)   Date Value Ref Range Status   07/10/2018 Not Detected NDET^Not Detected ng/mL Final     Comment:     Cutoff for a negative barbituate is 200 ng/ml or less.     Oxycodone (Oxycodone)   Date Value Ref Range Status   07/10/2018 Not Detected NDET^Not Detected ng/mL Final     Comment:     Cutoff for a negative Oxycodone is 100 ng/mL or less.     Propoxyphene (Norpropoxyphene)   Date Value Ref Range Status   07/10/2018 Not Detected NDET^Not Detected ng/mL Final     Comment:     Cutoff for a negative propoxyphene is 300 ng/ml or less     Buprenorphine (Buprenorphine)   Date Value Ref Range Status   07/10/2018 Not Detected NDET^Not Detected ng/mL Final     Comment:     Cutoff for a negative buprenorphine is 10 ng/ml or less        Processing:  e scribe     [email or web address]       checked in past 3 months?  No, route to RN

## 2019-09-17 NOTE — TELEPHONE ENCOUNTER
[place] reviewed:  not reviewed/not due - last done on 8/20/19  [place] profile:  [email or web address]

## 2019-09-17 NOTE — TELEPHONE ENCOUNTER
I am not sure why this was sent as urgent, the prescription will not be due until the 19th.  I will send it on that day.

## 2019-09-19 RX ORDER — HYDROCODONE BITARTRATE AND ACETAMINOPHEN 5; 325 MG/1; MG/1
1-2 TABLET ORAL EVERY 6 HOURS PRN
Qty: 180 TABLET | Refills: 0 | Status: SHIPPED | OUTPATIENT
Start: 2019-09-19 | End: 2019-10-15

## 2019-09-24 ENCOUNTER — TRANSFERRED RECORDS (OUTPATIENT)
Dept: HEALTH INFORMATION MANAGEMENT | Facility: CLINIC | Age: 74
End: 2019-09-24

## 2019-10-11 DIAGNOSIS — I10 BENIGN ESSENTIAL HYPERTENSION: ICD-10-CM

## 2019-10-11 RX ORDER — AMLODIPINE BESYLATE 5 MG/1
TABLET ORAL
Qty: 90 TABLET | Refills: 0 | Status: SHIPPED | OUTPATIENT
Start: 2019-10-11 | End: 2020-01-14

## 2019-10-11 NOTE — TELEPHONE ENCOUNTER
"Requested Prescriptions   Pending Prescriptions Disp Refills     amLODIPine (NORVASC) 5 MG tablet [Pharmacy Med Name: AMLODIPINE  5MG  TAB] 90 tablet 0     Sig: TAKE 1 TABLET BY MOUTH  DAILY   Last Written Prescription Date:  08/01/2019  Last Fill Quantity: 90,  # refills: 0   Last office visit: 5/28/2019 with prescribing provider:     Future Office Visit:      Calcium Channel Blockers Protocol  Failed - 10/11/2019  3:24 AM        Failed - Blood pressure under 140/90 in past 12 months     BP Readings from Last 3 Encounters:   05/28/19 (!) 139/98   05/15/19 138/84   12/07/18 132/80                 Failed - Normal serum creatinine on file in past 12 months     Recent Labs   Lab Test 07/10/18  1033   CR 0.80             Passed - Recent (12 mo) or future (30 days) visit within the authorizing provider's specialty     Patient has had an office visit with the authorizing provider or a provider within the authorizing providers department within the previous 12 mos or has a future within next 30 days. See \"Patient Info\" tab in inbasket, or \"Choose Columns\" in Meds & Orders section of the refill encounter.              Passed - Medication is active on med list        Passed - Patient is age 18 or older        Passed - No active pregnancy on record        Passed - No positive pregnancy test in past 12 months        "

## 2019-10-11 NOTE — TELEPHONE ENCOUNTER
Routing refill request to provider for review/approval because:  Labs out of range:  CR, blood pressure out of range

## 2019-10-15 DIAGNOSIS — M51.369 DDD (DEGENERATIVE DISC DISEASE), LUMBAR: ICD-10-CM

## 2019-10-15 DIAGNOSIS — Z13.6 CARDIOVASCULAR SCREENING; LDL GOAL LESS THAN 130: ICD-10-CM

## 2019-10-15 DIAGNOSIS — F43.0 ACUTE REACTION TO STRESS: ICD-10-CM

## 2019-10-15 DIAGNOSIS — I10 BENIGN ESSENTIAL HYPERTENSION: ICD-10-CM

## 2019-10-15 DIAGNOSIS — F11.20 NARCOTIC DEPENDENCE (H): ICD-10-CM

## 2019-10-15 DIAGNOSIS — M79.7 FIBROMYALGIA: ICD-10-CM

## 2019-10-15 LAB
AMPHETAMINES UR QL: NOT DETECTED NG/ML
BARBITURATES UR QL SCN: NOT DETECTED NG/ML
BENZODIAZ UR QL SCN: ABNORMAL NG/ML
BUPRENORPHINE UR QL: NOT DETECTED NG/ML
CANNABINOIDS UR QL: NOT DETECTED NG/ML
COCAINE UR QL SCN: NOT DETECTED NG/ML
D-METHAMPHET UR QL: NOT DETECTED NG/ML
METHADONE UR QL SCN: NOT DETECTED NG/ML
OPIATES UR QL SCN: ABNORMAL NG/ML
OXYCODONE UR QL SCN: NOT DETECTED NG/ML
PCP UR QL SCN: NOT DETECTED NG/ML
PROPOXYPH UR QL: NOT DETECTED NG/ML
TRICYCLICS UR QL SCN: NOT DETECTED NG/ML

## 2019-10-15 PROCEDURE — 80061 LIPID PANEL: CPT | Performed by: INTERNAL MEDICINE

## 2019-10-15 PROCEDURE — 82043 UR ALBUMIN QUANTITATIVE: CPT | Performed by: INTERNAL MEDICINE

## 2019-10-15 PROCEDURE — 80306 DRUG TEST PRSMV INSTRMNT: CPT | Performed by: INTERNAL MEDICINE

## 2019-10-15 PROCEDURE — 80048 BASIC METABOLIC PNL TOTAL CA: CPT | Performed by: INTERNAL MEDICINE

## 2019-10-15 PROCEDURE — 36415 COLL VENOUS BLD VENIPUNCTURE: CPT | Performed by: INTERNAL MEDICINE

## 2019-10-15 RX ORDER — LORAZEPAM 0.5 MG/1
TABLET ORAL
Qty: 10 TABLET | Refills: 0 | Status: SHIPPED | OUTPATIENT
Start: 2019-10-15 | End: 2020-11-30

## 2019-10-15 RX ORDER — HYDROCODONE BITARTRATE AND ACETAMINOPHEN 5; 325 MG/1; MG/1
1-2 TABLET ORAL EVERY 6 HOURS PRN
Qty: 180 TABLET | Refills: 0 | Status: SHIPPED | OUTPATIENT
Start: 2019-10-15 | End: 2019-11-12

## 2019-10-15 NOTE — TELEPHONE ENCOUNTER
Patient does not have any of that medication left.    Next 5 appointments (look out 90 days)    Nov 12, 2019 12:40 PM CST  [facility] with [facility]  [facility] [address]  [facility] [id number] [phone]

## 2019-10-15 NOTE — TELEPHONE ENCOUNTER
"Patient states she has had a lot of stress recently and has had decreased appetite and a lot of anxiety. State she has episodes of \"shaking\" due to nerves. Asking if she could get something to help her calm down. States she also does not sleep well at night due to this. States she needs to go to court for an issue with her son in law on Tuesday next week and has a lot of anxiety regarding this also. Requesting something prior to Tuesday. Please advise if patient can get something to help with anxiety or if appointment is needed.    Patient in clinic with spouse. Requesting Norco refill.    Controlled Substance Refill Request for Norco  Problem List Complete:    No     PROVIDER TO CONSIDER COMPLETION OF PROBLEM LIST AND OVERVIEW/CONTROLLED SUBSTANCE AGREEMENT    Last Written Prescription Date: 9/19/19   Last Fill Quantity: 180,   # refills: 0    THE MOST RECENT OFFICE VISIT MUST BE WITHIN THE PAST 3 MONTHS. AT LEAST ONE FACE TO FACE VISIT MUST OCCUR EVERY 6 MONTHS. ADDITIONAL VISITS CAN BE VIRTUAL.     [place] [facility] [place] [facility] [place]: 5/28/19    Future Office visit:     Controlled substance agreement:   Encounter-Level CSA - 04/21/2016:    Controlled Substance Agreement - Scan on 4/28/2016  9:48 AM: [place] Controlled Substance Agreement     Patient-Level CSA:    There are no patient-level csa.         Last Urine Drug Screen:   Pain Drug SCR UR W RPTD Meds   Date Value Ref Range Status   05/25/2017   Final    FINAL  (Note)  ====================================================================  TOXASSURE COMP DRUG ANALYSIS,UR  ====================================================================  Test                             Result       Flag       Units    Drug Present   Hydrocodone                    2505                    ng/mg creat   Hydromorphone                  162                     ng/mg creat   Dihydrocodeine                 513                     ng/mg creat   Norhydrocodone          "        1605                    ng/mg creat    Sources of hydrocodone include scheduled prescription    medications. Hydromorphone, dihydrocodeine and norhydrocodone are    expected metabolites of hydrocodone. Hydromorphone and    dihydrocodeine are also available as scheduled prescription    medications.     Duloxetine                     PRESENT   Acetaminophen                  PRESENT   Naproxen                       PRESENT  ====================================================================  Test                      Result    Flag   Units      Ref Range   Creatinine              191              mg/dL      >=20  ====================================================================  Declared Medications:  Medication list was not provided.  ====================================================================  For clinical consultation, please call [phone].  ====================================================================  Analysis performed by [facility] [place] [address]     , No results found for: COMDAT,   Cannabinoids (97-oii-9-carboxy-9-THC)   Date Value Ref Range Status   07/10/2018 Not Detected NDET^Not Detected ng/mL Final     Comment:     Cutoff for a negative cannabinoid is 50 ng/mL or less.     Phencyclidine (Phencyclidine)   Date Value Ref Range Status   07/10/2018 Not Detected NDET^Not Detected ng/mL Final     Comment:     Cutoff for a negative PCP is 25 ng/mL or less.     Cocaine (Benzoylecgonine)   Date Value Ref Range Status   07/10/2018 Not Detected NDET^Not Detected ng/mL Final     Comment:     Cutoff for a negative cocaine is 150 ng/ml or less.     Methamphetamine (d-Methamphetamine)   Date Value Ref Range Status   07/10/2018 Not Detected NDET^Not Detected ng/mL Final     Comment:     Cutoff for a negative methamphetamine is 500 ng/ml or less.     Opiates (Morphine)   Date Value Ref Range Status   07/10/2018 Detected, Abnormal Result (A) NDET^Not Detected ng/mL Final      Comment:     Cutoff for a positive opiate is greater than 100 ng/ml.  This is an unconfirmed screening result to be used for medical purposes only.   Order [id number] for confirmation or individual confirmation tests to [facility].       Amphetamine (d-Amphetamine)   Date Value Ref Range Status   07/10/2018 Not Detected NDET^Not Detected ng/mL Final     Comment:     Cutoff for a negative amphetamine is 500 ng/mL or less.     Benzodiazepines (Nordiazepam)   Date Value Ref Range Status   07/10/2018 Not Detected NDET^Not Detected ng/mL Final     Comment:     Cutoff for a negative benzodiazepine is 150 ng/ml or less.     Tricyclic Antidepressants (Desipramine)   Date Value Ref Range Status   07/10/2018 Not Detected NDET^Not Detected ng/mL Final     Comment:     Cutoff for a negative tricyclic antidepressant is 300 ng/ml or less.     Methadone (Methadone)   Date Value Ref Range Status   07/10/2018 Not Detected NDET^Not Detected ng/mL Final     Comment:     Cutoff for a negative methadone is 200 ng/ml or less.     Barbiturates (Butalbital)   Date Value Ref Range Status   07/10/2018 Not Detected NDET^Not Detected ng/mL Final     Comment:     Cutoff for a negative barbituate is 200 ng/ml or less.     Oxycodone (Oxycodone)   Date Value Ref Range Status   07/10/2018 Not Detected NDET^Not Detected ng/mL Final     Comment:     Cutoff for a negative Oxycodone is 100 ng/mL or less.     Propoxyphene (Norpropoxyphene)   Date Value Ref Range Status   07/10/2018 Not Detected NDET^Not Detected ng/mL Final     Comment:     Cutoff for a negative propoxyphene is 300 ng/ml or less     Buprenorphine (Buprenorphine)   Date Value Ref Range Status   07/10/2018 Not Detected NDET^Not Detected ng/mL Final     Comment:     Cutoff for a negative buprenorphine is 10 ng/ml or less        Processing:  escribe     [email or web address]       checked in past 3 months?  Yes Pt received Timazepam from [id number] on  9/25      Advised due for appointment. Scheduled.

## 2019-10-16 LAB
ANION GAP SERPL CALCULATED.3IONS-SCNC: 6 MMOL/L (ref 3–14)
BUN SERPL-MCNC: 15 MG/DL (ref 7–30)
CALCIUM SERPL-MCNC: 9.4 MG/DL (ref 8.5–10.1)
CHLORIDE SERPL-SCNC: 105 MMOL/L (ref 94–109)
CHOLEST SERPL-MCNC: 173 MG/DL
CO2 SERPL-SCNC: 26 MMOL/L (ref 20–32)
CREAT SERPL-MCNC: 0.76 MG/DL (ref 0.52–1.04)
CREAT UR-MCNC: 291 MG/DL
GFR SERPL CREATININE-BSD FRML MDRD: 78 ML/MIN/{1.73_M2}
GLUCOSE SERPL-MCNC: 96 MG/DL (ref 70–99)
HDLC SERPL-MCNC: 59 MG/DL
LDLC SERPL CALC-MCNC: 97 MG/DL
MICROALBUMIN UR-MCNC: 37 MG/L
MICROALBUMIN/CREAT UR: 12.68 MG/G CR (ref 0–25)
NONHDLC SERPL-MCNC: 114 MG/DL
POTASSIUM SERPL-SCNC: 4.1 MMOL/L (ref 3.4–5.3)
SODIUM SERPL-SCNC: 137 MMOL/L (ref 133–144)
TRIGL SERPL-MCNC: 87 MG/DL

## 2019-10-21 ENCOUNTER — TELEPHONE (OUTPATIENT)
Dept: BEHAVIORAL HEALTH | Facility: CLINIC | Age: 74
End: 2019-10-21

## 2019-10-21 DIAGNOSIS — F41.9 ANXIETY: Primary | ICD-10-CM

## 2019-10-21 NOTE — TELEPHONE ENCOUNTER
Tried calling home number and it just kept ringing. Then tried [facility] [employer]. . Agreeable to have [facility] call patient and get therapy started. Order placed. Pt hung up before I could ask her about safety concerns. Did try calling again. Lm. No safety concerns noted in pt's chart.

## 2019-11-11 DIAGNOSIS — I10 BENIGN ESSENTIAL HYPERTENSION: ICD-10-CM

## 2019-11-11 NOTE — TELEPHONE ENCOUNTER
"Requested Prescriptions   Pending Prescriptions Disp Refills     losartan (COZAAR) 50 MG tablet [Pharmacy Med Name: LOSARTAN  50MG  TAB] 30 tablet 0     Sig: TAKE 1 TABLET BY MOUTH  DAILY   Last Written Prescription Date:  09/12/2019  Last Fill Quantity: 30,  # refills: 0   Last office visit: 5/28/2019 with prescribing provider:     Future Office Visit:   Next 5 appointments (look out 90 days)    Nov 12, 2019 12:40 PM CST  SHORT with Dolores Blanton MD  [facility] ([facility]) [address]  [facility] [id number]  [phone]           Angiotensin-II Receptors Failed - 11/11/2019  1:36 PM        Failed - Last blood pressure under 140/90 in past 12 months     BP Readings from Last 3 Encounters:   05/28/19 (!) 139/98   05/15/19 138/84   12/07/18 132/80                 Passed - Recent (12 mo) or future (30 days) visit within the authorizing provider's specialty     Patient has had an office visit with the authorizing provider or a provider within the authorizing providers department within the previous 12 mos or has a future within next 30 days. See \"Patient Info\" tab in inbasket, or \"Choose Columns\" in Meds & Orders section of the refill encounter.              Passed - Medication is active on med list        Passed - Patient is age 18 or older        Passed - No active pregnancy on record        Passed - Normal serum creatinine on file in past 12 months     Recent Labs   Lab Test 10/15/19  1205   CR 0.76             Passed - Normal serum potassium on file in past 12 months     Recent Labs   Lab Test 10/15/19  1205   POTASSIUM 4.1                    Passed - No positive pregnancy test in past 12 months        "

## 2019-11-12 ENCOUNTER — OFFICE VISIT (OUTPATIENT)
Dept: INTERNAL MEDICINE | Facility: CLINIC | Age: 74
End: 2019-11-12
Payer: MEDICARE

## 2019-11-12 DIAGNOSIS — F41.9 ANXIETY: ICD-10-CM

## 2019-11-12 DIAGNOSIS — M79.7 FIBROMYALGIA: Primary | ICD-10-CM

## 2019-11-12 DIAGNOSIS — F11.20 NARCOTIC DEPENDENCE (H): ICD-10-CM

## 2019-11-12 DIAGNOSIS — M51.369 DDD (DEGENERATIVE DISC DISEASE), LUMBAR: ICD-10-CM

## 2019-11-12 DIAGNOSIS — G89.4 CHRONIC PAIN SYNDROME: ICD-10-CM

## 2019-11-12 DIAGNOSIS — I10 BENIGN ESSENTIAL HYPERTENSION: ICD-10-CM

## 2019-11-12 PROCEDURE — 96127 BRIEF EMOTIONAL/BEHAV ASSMT: CPT | Performed by: INTERNAL MEDICINE

## 2019-11-12 PROCEDURE — 99214 OFFICE O/P EST MOD 30 MIN: CPT | Performed by: INTERNAL MEDICINE

## 2019-11-12 RX ORDER — HYDROCODONE BITARTRATE AND ACETAMINOPHEN 5; 325 MG/1; MG/1
1-2 TABLET ORAL EVERY 6 HOURS PRN
Qty: 180 TABLET | Refills: 0 | Status: SHIPPED | OUTPATIENT
Start: 2019-11-12 | End: 2019-12-19

## 2019-11-12 RX ORDER — LOSARTAN POTASSIUM 50 MG/1
50 TABLET ORAL DAILY
Qty: 90 TABLET | Refills: 1 | Status: SHIPPED | OUTPATIENT
Start: 2019-11-12 | End: 2020-09-02

## 2019-11-12 ASSESSMENT — MIFFLIN-ST. JEOR: SCORE: 1592.25

## 2019-11-12 NOTE — PATIENT INSTRUCTIONS
Use either Mucinex DM or Robitussin Cough and Chest congestion DM syrup for cough and to keep mucus thin.

## 2019-11-12 NOTE — PROGRESS NOTES
Subjective     Svetlana Serrano is a 74 year old female who presents to clinic today for the following health issues:    HPI     Follow-up chronic pain syndrome/fibromyalgia/narcotic dependence:  She is having increasing pains, a lot of that is been related to hip problems.  She is seeing orthopedics and will have another follow-up tomorrow about this.  This is caused her to feel more diffuse pain.  When she was in with her  recently, she was asking about increasing her pain medication and I told her that would not be appropriate.  She reports she is taking her medication as prescribed.    Hypertension Follow-up  She checks her blood pressure regularly and it runs 120s/70s.    Do you check your blood pressure regularly outside of the clinic? No     Are you following a low salt diet? Yes    Are your blood pressures ever more than 140 on the top number (systolic) OR more   than 90 on the bottom number (diastolic), for example 140/90? No    Anxiety Follow-Up    How are you doing with your anxiety since your last visit? No change    Are you having other symptoms that might be associated with anxiety? No    Have you had a significant life event? No     Are you feeling depressed? No    Do you have any concerns with your use of alcohol or other drugs? No    Social History     Tobacco Use     Smoking status: Former Smoker     Last attempt to quit: 1969     Years since quittin.8     Smokeless tobacco: Never Used   Substance Use Topics     Alcohol use: Yes     Alcohol/week: 0.0 standard drinks     Comment: casual     Drug use: No     GHADA-7 SCORE 2018 7/10/2018 2019   Total Score 2 0 0     PHQ 2017 2018 7/10/2018   PHQ-9 Total Score 1 2 2   Q9: Thoughts of better off dead/self-harm past 2 weeks Not at all Not at all Not at all           How many servings of fruits and vegetables do you eat daily?  0-1    On average, how many sweetened beverages do you drink each day (soda, juice, sweet tea,  etc)?   0    How many days per week do you miss taking your medication? 0          Current Concerns:   She does have a new acute viral URI starting 2 days ago.  She felt very cold the first night but did not check her temperature.  She has some minor rhinorrhea, cough.  No fever, no chest pain    Patient Active Problem List   Diagnosis     Mild intermittent asthma     Restless leg syndrome     Fibromyalgia     Advanced directives, counseling/discussion     DDD (degenerative disc disease), lumbar     Spinal stenosis     Controlled substance agreement signed     Morbid obesity (H)     Anxiety     Narcotic dependence (H)     Benign essential hypertension     CARDIOVASCULAR SCREENING; LDL GOAL LESS THAN 130       Current Outpatient Medications   Medication Sig Dispense Refill     albuterol (PROAIR HFA/PROVENTIL HFA/VENTOLIN HFA) 108 (90 BASE) MCG/ACT Inhaler Inhale 2 puffs into the lungs every 6 hours as needed for shortness of breath / dyspnea 1 Inhaler 11     amLODIPine (NORVASC) 5 MG tablet TAKE 1 TABLET BY MOUTH  DAILY 90 tablet 0     Cholecalciferol (VITAMIN D-3) 5000 units TABS        diclofenac (VOLTAREN) 1 % GEL Apply to affected area tid as directed. 300 g 3     DULoxetine (CYMBALTA) 60 MG capsule TAKE 1 CAPSULE BY MOUTH TWO TIMES DAILY 180 capsule 1     guaiFENesin-codeine (ROBITUSSIN AC) 100-10 MG/5ML solution Take 5 mLs by mouth every 6 hours as needed for cough 118 mL 0     HYDROcodone-acetaminophen (NORCO) 5-325 MG tablet Take 1-2 tablets by mouth every 6 hours as needed for pain 180 tablet 0     LORazepam (ATIVAN) 0.5 MG tablet TAKE 1 po q8 hours prn anxiety 10 tablet 0     losartan (COZAAR) 50 MG tablet Take 1 tablet (50 mg) by mouth daily 30 tablet 0     OVER-THE-COUNTER Ideal collegen       OVER-THE-COUNTER womens ultra zulema       OVER-THE-COUNTER Hemp oil           Social History     Tobacco Use     Smoking status: Former Smoker     Last attempt to quit: 1969     Years since quittin.8      "Smokeless tobacco: Never Used   Substance Use Topics     Alcohol use: Yes     Alcohol/week: 0.0 standard drinks     Comment: casual          Reviewed and updated as needed this visit by Provider         Review of Systems   No fever, chills, mild nasal congestion, no significant rhinorrhea, sore throat, no chest pain, palpitations, dyspnea      Objective    /78 (BP Location: Left arm, Patient Position: Sitting, Cuff Size: Adult Regular)   Pulse 93   Temp 98.4  F (36.9  C) (Oral)   Resp 18   Ht 1.61 m (5' 3.4\")   Wt 111.2 kg (245 lb 1.6 oz)   SpO2 95%   BMI 42.87 kg/m    Body mass index is 42.87 kg/m .  Physical Exam     Lungs: Clear no wheezes  .CV: normal S1, S2 without murmur, S3 or S4.     GHADA-7 SCORE 7/10/2018 5/28/2019 11/16/2019   Total Score 0 0 0               Assessment & Plan     1. Fibromyalgia  Symptoms are overall fairly stable, continue pain medication  - HYDROcodone-acetaminophen (NORCO) 5-325 MG tablet; Take 1-2 tablets by mouth every 6 hours as needed for pain  Dispense: 180 tablet; Refill: 0    2. Chronic pain syndrome  Continue follow-up with orthopedics  - EMOTIONAL / BEHAVIORAL ASSESSMENT    3. Narcotic dependence (H)  stable    4. Benign essential hypertension  Home blood pressure readings are good, continue to monitor, may be slightly high today due to URI  - losartan (COZAAR) 50 MG tablet; Take 1 tablet (50 mg) by mouth daily  Dispense: 90 tablet; Refill: 1    5. DDD (degenerative disc disease), lumbar  stable  - HYDROcodone-acetaminophen (NORCO) 5-325 MG tablet; Take 1-2 tablets by mouth every 6 hours as needed for pain  Dispense: 180 tablet; Refill: 0    6. Anxiety  She has been more anxious because of family problems and her 's health issues, pain issues.  Her score probably does not really reflect her true anxiety issues.  She does have counseling set up.  She has used an occasional Lorazepam, if she continues to have some acute anxiety we may need to consider using " hydroxyzine long-term  - EMOTIONAL / BEHAVIORAL ASSESSMENT         No follow-ups on file.    Dolores Blanton MD  [facility]

## 2019-11-12 NOTE — NURSING NOTE
"BP (!) 141/83 (BP Location: Left arm, Patient Position: Sitting, Cuff Size: Adult Regular)   Pulse 93   Temp 98.4  F (36.9  C) (Oral)   Resp 18   Ht 1.61 m (5' 3.4\")   Wt 111.2 kg (245 lb 1.6 oz)   SpO2 95%   BMI 42.87 kg/m      "

## 2019-11-13 ENCOUNTER — TRANSFERRED RECORDS (OUTPATIENT)
Dept: HEALTH INFORMATION MANAGEMENT | Facility: CLINIC | Age: 74
End: 2019-11-13

## 2019-11-13 RX ORDER — LOSARTAN POTASSIUM 50 MG/1
TABLET ORAL
Refills: 0
Start: 2019-11-13

## 2019-11-16 VITALS
RESPIRATION RATE: 18 BRPM | OXYGEN SATURATION: 95 % | SYSTOLIC BLOOD PRESSURE: 128 MMHG | BODY MASS INDEX: 43.43 KG/M2 | HEART RATE: 93 BPM | WEIGHT: 245.1 LBS | TEMPERATURE: 98.4 F | HEIGHT: 63 IN | DIASTOLIC BLOOD PRESSURE: 78 MMHG

## 2019-11-16 ASSESSMENT — ANXIETY QUESTIONNAIRES
5. BEING SO RESTLESS THAT IT IS HARD TO SIT STILL: NOT AT ALL
GAD7 TOTAL SCORE: 0
2. NOT BEING ABLE TO STOP OR CONTROL WORRYING: NOT AT ALL
7. FEELING AFRAID AS IF SOMETHING AWFUL MIGHT HAPPEN: NOT AT ALL
6. BECOMING EASILY ANNOYED OR IRRITABLE: NOT AT ALL
1. FEELING NERVOUS, ANXIOUS, OR ON EDGE: NOT AT ALL
3. WORRYING TOO MUCH ABOUT DIFFERENT THINGS: NOT AT ALL

## 2019-11-16 ASSESSMENT — PATIENT HEALTH QUESTIONNAIRE - PHQ9
SUM OF ALL RESPONSES TO PHQ QUESTIONS 1-9: 3
5. POOR APPETITE OR OVEREATING: NOT AT ALL

## 2019-11-17 ASSESSMENT — ANXIETY QUESTIONNAIRES: GAD7 TOTAL SCORE: 0

## 2019-11-19 ENCOUNTER — TRANSFERRED RECORDS (OUTPATIENT)
Dept: HEALTH INFORMATION MANAGEMENT | Facility: CLINIC | Age: 74
End: 2019-11-19

## 2019-12-16 DIAGNOSIS — G89.4 CHRONIC PAIN SYNDROME: ICD-10-CM

## 2019-12-16 DIAGNOSIS — M79.7 FIBROMYALGIA: ICD-10-CM

## 2019-12-16 DIAGNOSIS — M51.369 DDD (DEGENERATIVE DISC DISEASE), LUMBAR: ICD-10-CM

## 2019-12-16 NOTE — TELEPHONE ENCOUNTER
Requested Prescriptions   Pending Prescriptions Disp Refills     HYDROcodone-acetaminophen (NORCO) 5-325 MG tablet  Last Written Prescription Date:  11/12/19  Last Fill Quantity: 180,  # refills: 0   Last office visit: 11/12/2019 with prescribing provider:  11/21/19   Future Office Visit:   Next 5 appointments (look out 90 days)    Jan 14, 2020 10:30 AM CST  Return Visit with AILIN Us  [facility] ([facility]) [address] [facility] [address] [phone]          180 tablet 0     Sig: Take 1-2 tablets by mouth every 6 hours as needed for pain       There is no refill protocol information for this order

## 2019-12-18 DIAGNOSIS — G89.4 CHRONIC PAIN SYNDROME: ICD-10-CM

## 2019-12-18 DIAGNOSIS — M79.7 FIBROMYALGIA: ICD-10-CM

## 2019-12-18 DIAGNOSIS — M51.369 DDD (DEGENERATIVE DISC DISEASE), LUMBAR: ICD-10-CM

## 2019-12-18 NOTE — TELEPHONE ENCOUNTER
Requested Prescriptions   Pending Prescriptions Disp Refills     HYDROcodone-acetaminophen (NORCO) 5-325 MG tablet [Pharmacy Med Name: HYDROcodone-Acetaminophen Oral Tablet 5-325 MG] 180 tablet 0     Sig: Take 1-2 tablets by mouth every 6 hours as needed for pain       There is no refill protocol information for this order      Last Written Prescription Date:  11/12/2019  Last Fill Quantity: 180,  # refills: 0   Last office visit: 11/12/2019 with prescribing provider:     Future Office Visit:   Next 5 appointments (look out 90 days)    Jan 14, 2020 10:30 AM CST  Return Visit with AILIN Us  [facility] ([facility]) [address] [facility] [address] [phone]

## 2019-12-18 NOTE — TELEPHONE ENCOUNTER
[place] reviewed:  reviewed- recommend provider review  [place]:  [email or web address]

## 2019-12-19 RX ORDER — HYDROCODONE BITARTRATE AND ACETAMINOPHEN 5; 325 MG/1; MG/1
1-2 TABLET ORAL EVERY 6 HOURS PRN
Qty: 180 TABLET | Refills: 0 | Status: SHIPPED | OUTPATIENT
Start: 2019-12-19 | End: 2020-01-17

## 2019-12-20 RX ORDER — HYDROCODONE BITARTRATE AND ACETAMINOPHEN 5; 325 MG/1; MG/1
1-2 TABLET ORAL EVERY 6 HOURS PRN
Qty: 180 TABLET | Refills: 0 | OUTPATIENT
Start: 2019-12-20

## 2020-01-12 DIAGNOSIS — I10 BENIGN ESSENTIAL HYPERTENSION: ICD-10-CM

## 2020-01-13 DIAGNOSIS — M51.369 DDD (DEGENERATIVE DISC DISEASE), LUMBAR: ICD-10-CM

## 2020-01-13 DIAGNOSIS — M79.7 FIBROMYALGIA: ICD-10-CM

## 2020-01-13 DIAGNOSIS — G89.4 CHRONIC PAIN SYNDROME: ICD-10-CM

## 2020-01-13 NOTE — TELEPHONE ENCOUNTER
"Requested Prescriptions   Pending Prescriptions Disp Refills     amLODIPine (NORVASC) 5 MG tablet [Pharmacy Med Name: AMLODIPINE  5MG  TAB] 90 tablet 0     Sig: TAKE 1 TABLET BY MOUTH  DAILY   Last Written Prescription Date:  10/11/2019  Last Fill Quantity: 90,  # refills: 0   Last office visit: 11/12/2019 with prescribing provider:     Future Office Visit:   Next 5 appointments (look out 90 days)    Jan 14, 2020  2:00 PM CST  Pre-Op physical with Dolores Blanton MD  [facility] [address]  [facility] [id number] [phone]   Mar 23, 2020  2:00 PM CDT  Return Visit with Eden Tian [facility] ([facility]) [address] [facility] [address] [phone]           Calcium Channel Blockers Protocol  Passed - 1/12/2020  3:57 AM        Passed - Blood pressure under 140/90 in past 12 months     BP Readings from Last 3 Encounters:   11/12/19 128/78   05/28/19 (!) 139/98   05/15/19 138/84                 Passed - Recent (12 mo) or future (30 days) visit within the authorizing provider's specialty     Patient has had an office visit with the authorizing provider or a provider within the authorizing providers department within the previous 12 mos or has a future within next 30 days. See \"Patient Info\" tab in inbasket, or \"Choose Columns\" in Meds & Orders section of the refill encounter.              Passed - Medication is active on med list        Passed - Patient is age 18 or older        Passed - No active pregnancy on record        Passed - Normal serum creatinine on file in past 12 months     Recent Labs   Lab Test 10/15/19  1205   CR 0.76             Passed - No positive pregnancy test in past 12 months        "

## 2020-01-13 NOTE — TELEPHONE ENCOUNTER
Requested Prescriptions   Pending Prescriptions Disp Refills     HYDROcodone-acetaminophen (NORCO) 5-325 MG tablet Last Written Prescription Date:  12/19/20109  Last Fill Quantity: 180 tablet,  # refills: 0   Last office visit: 11/12/2019 with prescribing provider:  11/12/2019  Future Office Visit:   Next 5 appointments (look out 90 days)    Jan 14, 2020  2:00 PM CST  Pre-Op physical with Dolores Blanton MD  [facility] ([facility]) [address] [place]  [facility] [id number] [phone]   Mar 23, 2020  2:00 PM CDT  Return Visit with AILIN Us  [facility] ([facility]) [id number] [place] [address] [phone]        180 tablet 0     Sig: Take 1-2 tablets by mouth every 6 hours as needed for pain       There is no refill protocol information for this order

## 2020-01-14 ENCOUNTER — OFFICE VISIT (OUTPATIENT)
Dept: INTERNAL MEDICINE | Facility: CLINIC | Age: 75
End: 2020-01-14
Payer: MEDICARE

## 2020-01-14 VITALS
HEART RATE: 117 BPM | HEIGHT: 63 IN | RESPIRATION RATE: 16 BRPM | WEIGHT: 248.5 LBS | DIASTOLIC BLOOD PRESSURE: 87 MMHG | SYSTOLIC BLOOD PRESSURE: 136 MMHG | OXYGEN SATURATION: 97 % | TEMPERATURE: 98.9 F | BODY MASS INDEX: 44.03 KG/M2

## 2020-01-14 DIAGNOSIS — Z01.818 PRE-OP EXAM: ICD-10-CM

## 2020-01-14 DIAGNOSIS — M16.11 PRIMARY OSTEOARTHRITIS OF RIGHT HIP: Primary | ICD-10-CM

## 2020-01-14 LAB
ERYTHROCYTE [DISTWIDTH] IN BLOOD BY AUTOMATED COUNT: 13.4 % (ref 10–15)
HCT VFR BLD AUTO: 47.2 % (ref 35–47)
HGB BLD-MCNC: 15.3 G/DL (ref 11.7–15.7)
MCH RBC QN AUTO: 32.8 PG (ref 26.5–33)
MCHC RBC AUTO-ENTMCNC: 32.4 G/DL (ref 31.5–36.5)
MCV RBC AUTO: 101 FL (ref 78–100)
MRSA DNA SPEC QL NAA+PROBE: NEGATIVE
PLATELET # BLD AUTO: 296 10E9/L (ref 150–450)
RBC # BLD AUTO: 4.67 10E12/L (ref 3.8–5.2)
SPECIMEN SOURCE: NORMAL
WBC # BLD AUTO: 6.9 10E9/L (ref 4–11)

## 2020-01-14 PROCEDURE — 36415 COLL VENOUS BLD VENIPUNCTURE: CPT | Performed by: INTERNAL MEDICINE

## 2020-01-14 PROCEDURE — 80048 BASIC METABOLIC PNL TOTAL CA: CPT | Performed by: INTERNAL MEDICINE

## 2020-01-14 PROCEDURE — 99215 OFFICE O/P EST HI 40 MIN: CPT | Performed by: INTERNAL MEDICINE

## 2020-01-14 PROCEDURE — 85027 COMPLETE CBC AUTOMATED: CPT | Performed by: INTERNAL MEDICINE

## 2020-01-14 PROCEDURE — 93000 ELECTROCARDIOGRAM COMPLETE: CPT | Performed by: INTERNAL MEDICINE

## 2020-01-14 PROCEDURE — 87640 STAPH A DNA AMP PROBE: CPT | Performed by: INTERNAL MEDICINE

## 2020-01-14 PROCEDURE — 87641 MR-STAPH DNA AMP PROBE: CPT | Performed by: INTERNAL MEDICINE

## 2020-01-14 RX ORDER — AMLODIPINE BESYLATE 5 MG/1
TABLET ORAL
Qty: 90 TABLET | Refills: 1 | Status: SHIPPED | OUTPATIENT
Start: 2020-01-14 | End: 2020-09-02

## 2020-01-14 ASSESSMENT — MIFFLIN-ST. JEOR: SCORE: 1607.67

## 2020-01-14 NOTE — PATIENT INSTRUCTIONS
Do not take the losartan the day before or the day of surgery.   You can take amlodipine and duloxetine the morning of surgery with sips of water.   Use the inhaler that morning.     Stop supplements a week before surgery.

## 2020-01-14 NOTE — TELEPHONE ENCOUNTER
Prescription approved per [facility] Refill Protocol.  Suzanan Mohamud RN

## 2020-01-14 NOTE — PROGRESS NOTES
[place] [address]  [facility] [address]  [phone]  Dept: [phone]    PRE-OP EVALUATION:  Today's date: 2020    Svetlana Serrano (: 1945) presents for pre-operative evaluation assessment as requested by Cirilo Jones.  She requires evaluation and anesthesia risk assessment prior to undergoing surgery/procedure for treatment of Right hip osteoarthritis    Fax number for surgical facility: [facility]  Primary Physician: Dolores Blanton  Type of Anesthesia Anticipated: General    Patient has a Health Care Directive or Living Will:  NO    Preop Questions 2020   Who is doing your surgery? cirilo peñaloza   What are you having done? right hip replacement   Date of Surgery/Procedure: 2020 @ 10:20 am   Facility or Hospital where procedure/surgery will be performed: [facility]   1.  Do you have a history of Heart attack, stroke, stent, coronary bypass surgery, or other heart surgery? No   2.  Do you ever have any pain or discomfort in your chest? No   3.  Do you have a history of  Heart Failure? No   4.   Are you troubled by shortness of breath when:  walking on a level surface, or up a slight hill, or at night? YES - up a hill   5.  Do you currently have a cold, bronchitis or other respiratory infection? No   6.  Do you have a cough, shortness of breath, or wheezing? YES - clear cough, some rhinorrhea   Stable a while   7.  Do you sometimes get pains in the calves of your legs when you walk? No   8. Do you or anyone in your family have previous history of blood clots? YES - son   9.  Do you or does anyone in your family have a serious bleeding problem such as prolonged bleeding following surgeries or cuts? No   10. Have you ever had problems with anemia or been told to take iron pills? no   11. Have you had any abnormal blood loss such as black, tarry or bloody stools, or abnormal vaginal bleeding? No   12. Have you ever had  a blood transfusion? No   13. Have you or any of your relatives ever had problems with anesthesia? No   14. Do you have sleep apnea, excessive snoring or daytime drowsiness? No   15. Do you have any prosthetic heart valves? No   16. Do you have prosthetic joints? YES - bilateral knees   17. Is there any chance that you may be pregnant? No         HPI:     HPI related to upcoming procedure: she has OA hip not managed by conservative treatment      HYPERTENSION - Patient has longstanding history of HTN , currently denies any symptoms referable to elevated blood pressure. Specifically denies chest pain, palpitations, dyspnea, orthopnea, PND or peripheral edema. Blood pressure readings have been in normal range. Current medication regimen is as listed below. Patient denies any side effects of medication.       MEDICAL HISTORY:     Patient Active Problem List    Diagnosis Date Noted     Benign essential hypertension 07/10/2018     Priority: Medium     CARDIOVASCULAR SCREENING; LDL GOAL LESS THAN 130 07/10/2018     Priority: Medium     Narcotic dependence (H) 10/21/2017     Priority: Medium     Anxiety 12/08/2016     Priority: Medium     Morbid obesity (H) 05/07/2016     Priority: Medium     Controlled substance agreement signed 04/25/2016     Priority: Medium     DDD (degenerative disc disease), lumbar 04/01/2014     Priority: Medium     Spinal stenosis 04/01/2014     Priority: Medium     Advanced directives, counseling/discussion 07/02/2013     Priority: Medium     Fibromyalgia 01/24/2013     Priority: Medium     Mild intermittent asthma 12/10/2012     Priority: Medium     Asthma taken care by Dr. Kristin Puckett       Restless leg syndrome 12/10/2012     Priority: Medium      Past Medical History:   Diagnosis Date     Arthritis     right shoulder     Back pain      Fibromyalgia      HTN (hypertension)      Mild intermittent asthma      Other chronic pain     from fibromyalgia     Restless leg      Tubular adenoma of colon  8/15     Past Surgical History:   Procedure Laterality Date     ARTHROPLASTY SHOULDER Left      C/SECTION, CLASSICAL      , Classical     CHOLECYSTECTOMY, LAPOROSCOPIC      Cholecystectomy, Laparoscopic     COLONOSCOPY N/A 10/8/2018    Procedure: COLONOSCOPY;  Colonoscopy with polypectomies;  Surgeon: Anton Stroud MD;  Location: [facility] OR     HYSTERECTOMY, JOSHUA       JOINT REPLACEMTN, KNEE RT/LT      bilateral     Current Outpatient Medications   Medication Sig Dispense Refill     albuterol (PROAIR HFA/PROVENTIL HFA/VENTOLIN HFA) 108 (90 BASE) MCG/ACT Inhaler Inhale 2 puffs into the lungs every 6 hours as needed for shortness of breath / dyspnea 1 Inhaler 11     amLODIPine (NORVASC) 5 MG tablet TAKE 1 TABLET BY MOUTH  DAILY 90 tablet 1     BIOTIN PO Take by mouth daily       DULoxetine (CYMBALTA) 60 MG capsule TAKE 1 CAPSULE BY MOUTH TWO TIMES DAILY 180 capsule 1     HYDROcodone-acetaminophen (NORCO) 5-325 MG tablet Take 1-2 tablets by mouth every 6 hours as needed for pain 180 tablet 0     LORazepam (ATIVAN) 0.5 MG tablet TAKE 1 po q8 hours prn anxiety 10 tablet 0     losartan (COZAAR) 50 MG tablet Take 1 tablet (50 mg) by mouth daily 90 tablet 1     OVER-THE-COUNTER Ideal collegen       OVER-THE-COUNTER womens ultra zulema       OTC products: None, except as noted above    Allergies   Allergen Reactions     Clindamycin Shortness Of Breath     Amoxicillin      Rash.     Sulfa Drugs      Hives        Latex Allergy: NO    Social History     Tobacco Use     Smoking status: Former Smoker     Last attempt to quit: 1969     Years since quittin.0     Smokeless tobacco: Never Used   Substance Use Topics     Alcohol use: Yes     Alcohol/week: 0.0 standard drinks     Comment: casual     History   Drug Use No       REVIEW OF SYSTEMS:   GENERAL: negative for, fever, chills, weight gain, positive weight loss from diet changes  EYES: negative  ENT: negative  RESPIRATORY: stable dyspnea on exertion and cough  "from asthma  CARDIOVASCULAR: negative for, palpitations, tachycardia, irregular heart beat and chest pain  GI: negative for, nausea, vomiting, abdominal pain, melena and hematochezia  : frequency  MUSCULOSKELETAL: back pain and hip pain, fibromyalgia  NEUROLOGIC: negative for, seizures, local weakness, had a headache for 30 minutes last week, some tingling fingers and toes chronically  SKIN: negative  ENDOCRINE: negative         EXAM:   There were no vitals taken for this visit.    Patient is alert, oriented, cooperative in no acute distress.  /87 (BP Location: Left arm, Patient Position: Sitting, Cuff Size: Adult Regular)   Pulse 117   Temp 98.9  F (37.2  C) (Oral)   Resp 16   Ht 1.61 m (5' 3.4\")   Wt 112.7 kg (248 lb 8 oz)   SpO2 97%   BMI 43.47 kg/m      HEENT: PERRL, EOMI, TM's are normal. Oropharynx is clear.  NECK: No lymphadenopathy or thyromegaly. Carotid pulses full without bruits.  LUNGS: clear  CV: normal S1, S2 without murmur, S3 or S4 present. Pulses are 2/2 throughout. No JVD.  ABDOMEN: Bowel sounds present, nontender without hepatosplenomegaly. Liver is normal size to percussion.  EXTREMITIES: no edema present, unremarkable joints  NEUROLOGIC: Cranial nerves II-XII intact, reflexes 2/4 throughout, strength 5/5, sensation grossly intact,  SKIN: without rashes or significant lesions     DIAGNOSTICS:   EKG: Normal Sinus Rhythm, rightward axis, no LVH by voltage criteria, unchanged from previous tracings  Lab: see [facility]    Recent Labs   Lab Test 10/15/19  1205 07/10/18  1033  05/05/16  1051   HGB  --   --   --  15.0    141   < > 142   POTASSIUM 4.1 4.3   < > 4.2   CR 0.76 0.80   < > 0.87    < > = values in this interval not displayed.        IMPRESSION:   Reason for surgery/procedure: OA hip  Diagnosis/reason for consult: preop    The proposed surgical procedure is considered INTERMEDIATE risk.    REVISED CARDIAC RISK INDEX  The patient has the following serious cardiovascular risks " for perioperative complications such as (MI, PE, VFib and 3  AV Block):  No serious cardiac risks  INTERPRETATION: 0 risks: Class I (very low risk - 0.4% complication rate)    The patient has the following additional risks for perioperative complications:  High tolerance to opioid analgesics due to chronic narcotic use  Morbid obesity      ICD-10-CM    1. Primary osteoarthritis of right hip M16.11    2. Pre-op exam Z01.818 Methicillin Resist/Sens S. aureus PCR     CBC with platelets     Basic metabolic panel  (Ca, Cl, CO2, Creat, Gluc, K, Na, BUN)     EKG 12-lead complete w/read - Clinics       RECOMMENDATIONS:     --Consult hospital rounder / IM to assist post-op medical management    --Patient is to take all scheduled medications on the day of surgery EXCEPT for modifications listed below.    ACE Inhibitor or Angiotensin Receptor Blocker (ARB) Use  Ace inhibitor or Angiotensin Receptor Blocker (ARB) and should HOLD this medication for the 24 hours prior to surgery.      APPROVAL GIVEN to proceed with proposed procedure, without further diagnostic evaluation       Signed Electronically by: Dolores Blanton MD    Copy of this evaluation report is provided to requesting physician.    [facility] Preop Guidelines    Revised Cardiac Risk Index

## 2020-01-14 NOTE — NURSING NOTE
"/87 (BP Location: Left arm, Patient Position: Sitting, Cuff Size: Adult Regular)   Pulse 117   Temp 98.9  F (37.2  C) (Oral)   Resp 16   Ht 1.61 m (5' 3.4\")   Wt 112.7 kg (248 lb 8 oz)   SpO2 97%   BMI 43.47 kg/m      "

## 2020-01-15 LAB
ANION GAP SERPL CALCULATED.3IONS-SCNC: 4 MMOL/L (ref 3–14)
BUN SERPL-MCNC: 14 MG/DL (ref 7–30)
CALCIUM SERPL-MCNC: 9 MG/DL (ref 8.5–10.1)
CHLORIDE SERPL-SCNC: 106 MMOL/L (ref 94–109)
CO2 SERPL-SCNC: 30 MMOL/L (ref 20–32)
CREAT SERPL-MCNC: 0.81 MG/DL (ref 0.52–1.04)
GFR SERPL CREATININE-BSD FRML MDRD: 71 ML/MIN/{1.73_M2}
GLUCOSE SERPL-MCNC: 102 MG/DL (ref 70–99)
POTASSIUM SERPL-SCNC: 3.6 MMOL/L (ref 3.4–5.3)
SODIUM SERPL-SCNC: 140 MMOL/L (ref 133–144)

## 2020-01-17 RX ORDER — HYDROCODONE BITARTRATE AND ACETAMINOPHEN 5; 325 MG/1; MG/1
1-2 TABLET ORAL EVERY 6 HOURS PRN
Qty: 180 TABLET | Refills: 0 | Status: SHIPPED | OUTPATIENT
Start: 2020-01-17 | End: 2020-02-14

## 2020-01-17 NOTE — TELEPHONE ENCOUNTER
Controlled Substance Refill Request for HYDROcodone-acetaminophen (NORCO)  Problem List Complete:    Yes    Last Written Prescription Date:  12/19/19  Last Fill Quantity: 180,   # refills: 0    THE MOST RECENT OFFICE VISIT MUST BE WITHIN THE PAST 3 MONTHS. AT LEAST ONE FACE TO FACE VISIT MUST OCCUR EVERY 6 MONTHS. ADDITIONAL VISITS CAN BE VIRTUAL.  (THIS STATEMENT SHOULD BE DELETED.)    [place] [facility] [place]: 1/14/19    Future Office visit:   Next 5 appointments (look out 90 days)    Mar 23, 2020  2:00 PM CDT  Return Visit with AILIN Us  [facility] [address] ([facility]) [address] [facility] [address] [phone]          Controlled substance agreement:   Encounter-Level CSA - 04/21/2016:    Controlled Substance Agreement - Scan on 4/28/2016  9:48 AM: [place] Controlled Substance Agreement     Patient-Level CSA:    There are no patient-level csa.         Last Urine Drug Screen:   Pain Drug SCR UR W RPTD Meds   Date Value Ref Range Status   05/25/2017   Final    FINAL  (Note)  ====================================================================  TOXASSURE COMP DRUG ANALYSIS,UR  ====================================================================  Test                             Result       Flag       Units    Drug Present   Hydrocodone                    2505                    ng/mg creat   Hydromorphone                  162                     ng/mg creat   Dihydrocodeine                 513                     ng/mg creat   Norhydrocodone                 1605                    ng/mg creat    Sources of hydrocodone include scheduled prescription    medications. Hydromorphone, dihydrocodeine and norhydrocodone are    expected metabolites of hydrocodone. Hydromorphone and    dihydrocodeine are also available as scheduled prescription    medications.     Duloxetine                     PRESENT   Acetaminophen                  PRESENT    Naproxen                       PRESENT  ====================================================================  Test                      Result    Flag   Units      Ref Range   Creatinine              191              mg/dL      >=20  ====================================================================  Declared Medications:  Medication list was not provided.  ====================================================================  For clinical consultation, please call [phone].  ====================================================================  Analysis performed by [employer] [place] [address]     , No results found for: COMDAT,   Cannabinoids (05-izj-4-carboxy-9-THC)   Date Value Ref Range Status   10/15/2019 Not Detected NDET^Not Detected ng/mL Final     Comment:     Cutoff for a negative cannabinoid is 50 ng/mL or less.     Phencyclidine (Phencyclidine)   Date Value Ref Range Status   10/15/2019 Not Detected NDET^Not Detected ng/mL Final     Comment:     Cutoff for a negative PCP is 25 ng/mL or less.     Cocaine (Benzoylecgonine)   Date Value Ref Range Status   10/15/2019 Not Detected NDET^Not Detected ng/mL Final     Comment:     Cutoff for a negative cocaine is 150 ng/ml or less.     Methamphetamine (d-Methamphetamine)   Date Value Ref Range Status   10/15/2019 Not Detected NDET^Not Detected ng/mL Final     Comment:     Cutoff for a negative methamphetamine is 500 ng/ml or less.     Opiates (Morphine)   Date Value Ref Range Status   10/15/2019 Detected, Abnormal Result (A) NDET^Not Detected ng/mL Final     Comment:     Cutoff for a positive opiate is greater than 100 ng/ml.  This is an unconfirmed screening result to be used for medical purposes only.   Order [id number] for confirmation or individual confirmation tests to [facility].       Amphetamine (d-Amphetamine)   Date Value Ref Range Status   10/15/2019 Not Detected NDET^Not Detected ng/mL Final     Comment:     Cutoff for a negative  amphetamine is 500 ng/mL or less.     Benzodiazepines (Nordiazepam)   Date Value Ref Range Status   10/15/2019 Detected, Abnormal Result (A) NDET^Not Detected ng/mL Final     Comment:     Cutoff for a positive benzodiazepines is greater than 150 ng/ml.  This is an unconfirmed screening result to be used for medical purposes only.   Order [id number] for confirmation or individual confirmation tests to [facility].       Tricyclic Antidepressants (Desipramine)   Date Value Ref Range Status   10/15/2019 Not Detected NDET^Not Detected ng/mL Final     Comment:     Cutoff for a negative tricyclic antidepressant is 300 ng/ml or less.     Methadone (Methadone)   Date Value Ref Range Status   10/15/2019 Not Detected NDET^Not Detected ng/mL Final     Comment:     Cutoff for a negative methadone is 200 ng/ml or less.     Barbiturates (Butalbital)   Date Value Ref Range Status   10/15/2019 Not Detected NDET^Not Detected ng/mL Final     Comment:     Cutoff for a negative barbituate is 200 ng/ml or less.     Oxycodone (Oxycodone)   Date Value Ref Range Status   10/15/2019 Not Detected NDET^Not Detected ng/mL Final     Comment:     Cutoff for a negative Oxycodone is 100 ng/mL or less.     Propoxyphene (Norpropoxyphene)   Date Value Ref Range Status   10/15/2019 Not Detected NDET^Not Detected ng/mL Final     Comment:     Cutoff for a negative propoxyphene is 300 ng/ml or less     Buprenorphine (Buprenorphine)   Date Value Ref Range Status   10/15/2019 Not Detected NDET^Not Detected ng/mL Final     Comment:     Cutoff for a negative buprenorphine is 10 ng/ml or less        Processing:  Rx to be electronically transmitted to pharmacy by provider     [place] reviewed: 12/18/19:  reviewed- recommend provider review [place] profile:  [email or web address]    Routing refill request to provider for review/approval because:  Drug not on the [facility] refill protocol

## 2020-01-22 ENCOUNTER — TELEPHONE (OUTPATIENT)
Dept: INTERNAL MEDICINE | Facility: CLINIC | Age: 75
End: 2020-01-22

## 2020-01-22 DIAGNOSIS — J20.9 ACUTE BRONCHITIS, UNSPECIFIED ORGANISM: Primary | ICD-10-CM

## 2020-01-22 NOTE — TELEPHONE ENCOUNTER
Please see message below and advise.    Called for further triage. Patient states this cough has been on and off throughout the month. She states this cough was discussed at 1/14/20 office visit. Patient denies fevers throughout this time. States the cough is congested and productive, patient is coughing up yellow to clear phlegm. Nose is also plugged.     Patient not interested in care advice from writer. Patient states she does not use mychart either. Patient requesting a z-pack and guaifenesin-codeine. Patient wants to ensure her surgery does not get pushed back

## 2020-01-22 NOTE — TELEPHONE ENCOUNTER
Patient is calling because she has a cough and cold since she was in for her preop on 1/14/20. Her surgeon has rescheduled her surgery for 2/24/20 to see if she can get herself better from this. She is asking to see if she can get a z deana without coming in to be seen because it is very hard for her to get around with her bad hip.

## 2020-01-23 NOTE — TELEPHONE ENCOUNTER
Advise the patient that that antibiotics are not appropriate for colds.  She should call her surgeon and let him know that she is ill.  They are the ones who need to decide whether she can have her surgery on the expected date or not but the sooner she lets them know the better.   She should take Mucinex DM twice a day for the cough and for the congestion.  She is already on a lot of narcotic so I would not add codeine cough syrup.

## 2020-01-27 NOTE — TELEPHONE ENCOUNTER
The patient is calling again to say she is feeling even worse now and she is coughing up little sticky yellow pieces. She says she gets this every year and she says a z-deana  Usually helps her with this. She says she cannot drive right and neither can her  so it would be hard for her to come to an appointment.

## 2020-01-28 RX ORDER — AZITHROMYCIN 250 MG/1
TABLET, FILM COATED ORAL
Qty: 6 TABLET | Refills: 0 | Status: SHIPPED | OUTPATIENT
Start: 2020-01-28 | End: 2020-02-21

## 2020-01-28 NOTE — TELEPHONE ENCOUNTER
Called patient and informed her of the antibiotic being sent to the pharmacy.  Also advised that primary care provider was not going to send in the prescription for cough syrup with codeine as she is already on narcotics.  Patient verbalized understanding.

## 2020-02-13 ENCOUNTER — OFFICE VISIT (OUTPATIENT)
Dept: INTERNAL MEDICINE | Facility: CLINIC | Age: 75
End: 2020-02-13
Payer: MEDICARE

## 2020-02-13 VITALS
HEART RATE: 87 BPM | BODY MASS INDEX: 43.62 KG/M2 | RESPIRATION RATE: 20 BRPM | TEMPERATURE: 97.3 F | HEIGHT: 63 IN | WEIGHT: 246.2 LBS | DIASTOLIC BLOOD PRESSURE: 93 MMHG | OXYGEN SATURATION: 96 % | SYSTOLIC BLOOD PRESSURE: 147 MMHG

## 2020-02-13 DIAGNOSIS — M51.369 DDD (DEGENERATIVE DISC DISEASE), LUMBAR: ICD-10-CM

## 2020-02-13 DIAGNOSIS — M79.7 FIBROMYALGIA: ICD-10-CM

## 2020-02-13 DIAGNOSIS — R05.9 COUGH: ICD-10-CM

## 2020-02-13 DIAGNOSIS — Z01.818 PREOP GENERAL PHYSICAL EXAM: Primary | ICD-10-CM

## 2020-02-13 DIAGNOSIS — M16.10 PRIMARY OSTEOARTHRITIS OF HIP, UNSPECIFIED LATERALITY: ICD-10-CM

## 2020-02-13 DIAGNOSIS — G89.4 CHRONIC PAIN SYNDROME: ICD-10-CM

## 2020-02-13 PROCEDURE — 99215 OFFICE O/P EST HI 40 MIN: CPT | Performed by: INTERNAL MEDICINE

## 2020-02-13 RX ORDER — CODEINE PHOSPHATE AND GUAIFENESIN 10; 100 MG/5ML; MG/5ML
2 SOLUTION ORAL EVERY 4 HOURS PRN
Qty: 8 OZ | Refills: 0 | Status: SHIPPED | OUTPATIENT
Start: 2020-02-13 | End: 2020-06-26

## 2020-02-13 ASSESSMENT — MIFFLIN-ST. JEOR: SCORE: 1597.24

## 2020-02-13 NOTE — PROGRESS NOTES
[place] [address]  [facility] [address]  [phone]  Dept: [phone]     PRE-OP EVALUATION:  Today's date: 2020     Svetlana Serrano (: 1945) presents for pre-operative evaluation assessment as requested by Cirilo Jones.  She requires evaluation and anesthesia risk assessment prior to undergoing surgery/procedure for treatment of Right hip osteoarthritis     Fax number for surgical facility: [facility]  Primary Physician: Dolores Blanton  Type of Anesthesia Anticipated: General     Patient has a Health Care Directive or Living Will:  NO     Preop Questions 2020   Who is doing your surgery? cirilo peñalzoa   What are you having done? right hip replacement   Date of Surgery/Procedure: 2020 time TBD   Facility or Hospital where procedure/surgery will be performed: [facility]   1.  Do you have a history of Heart attack, stroke, stent, coronary bypass surgery, or other heart surgery? No   2.  Do you ever have any pain or discomfort in your chest? No   3.  Do you have a history of  Heart Failure? No   4.   Are you troubled by shortness of breath when:  walking on a level surface, or up a slight hill, or at night? YES - up a hill   5.  Do you currently have a cold, bronchitis or other respiratory infection? No   6.  Do you have a cough, shortness of breath, or wheezing? YES cough related to postnasal drainage    7.  Do you sometimes get pains in the calves of your legs when you walk? No   8. Do you or anyone in your family have previous history of blood clots? YES - son   9.  Do you or does anyone in your family have a serious bleeding problem such as prolonged bleeding following surgeries or cuts? No   10. Have you ever had problems with anemia or been told to take iron pills? no   11. Have you had any abnormal blood loss such as black, tarry or bloody stools, or abnormal vaginal bleeding? No   12. Have you ever had a blood  transfusion? No   13. Have you or any of your relatives ever had problems with anesthesia? No   14. Do you have sleep apnea, excessive snoring or daytime drowsiness? No   15. Do you have any prosthetic heart valves? No   16. Do you have prosthetic joints? YES - bilateral knees   17. Is there any chance that you may be pregnant? No            HPI:      HPI related to upcoming procedure: she has OA hip not managed by conservative treatment.  She had been scheduled previously had some increasing cough so it was delayed.  The cough seems to be back to her baseline chronic cough.        HYPERTENSION - Patient has longstanding history of HTN , currently denies any symptoms referable to elevated blood pressure. Specifically denies chest pain, palpitations, dyspnea, orthopnea, PND or peripheral edema. Blood pressure readings have been in normal range. Current medication regimen is as listed below. Patient denies any side effects of medication.      Her BP is elevated today because of taking sudafed.      MEDICAL HISTORY:            Patient Active Problem List     Diagnosis Date Noted     Benign essential hypertension 07/10/2018       Priority: Medium     CARDIOVASCULAR SCREENING; LDL GOAL LESS THAN 130 07/10/2018       Priority: Medium     Narcotic dependence (H) 10/21/2017       Priority: Medium     Anxiety 12/08/2016       Priority: Medium     Morbid obesity (H) 05/07/2016       Priority: Medium     Controlled substance agreement signed 04/25/2016       Priority: Medium     DDD (degenerative disc disease), lumbar 04/01/2014       Priority: Medium     Spinal stenosis 04/01/2014       Priority: Medium     Advanced directives, counseling/discussion 07/02/2013       Priority: Medium     Fibromyalgia 01/24/2013       Priority: Medium     Mild intermittent asthma 12/10/2012       Priority: Medium       Asthma taken care by Dr. Kristin Puckett        Restless leg syndrome 12/10/2012       Priority: Medium      Past Medical History    Past Medical History:   Diagnosis Date     Arthritis       right shoulder     Back pain       Fibromyalgia       HTN (hypertension)       Mild intermittent asthma       Other chronic pain       from fibromyalgia     Restless leg       Tubular adenoma of colon 8/15         Past Surgical History         Past Surgical History:   Procedure Laterality Date     ARTHROPLASTY SHOULDER Left      C/SECTION, CLASSICAL         , Classical     CHOLECYSTECTOMY, LAPOROSCOPIC         Cholecystectomy, Laparoscopic     COLONOSCOPY N/A 10/8/2018     Procedure: COLONOSCOPY;  Colonoscopy with polypectomies;  Surgeon: Anton Stroud MD;  Location: [facility] OR     HYSTERECTOMY, JOSHUA         JOINT REPLACEMTN, KNEE RT/LT         bilateral         Current Outpatient Prescriptions          Current Outpatient Medications   Medication Sig Dispense Refill     albuterol (PROAIR HFA/PROVENTIL HFA/VENTOLIN HFA) 108 (90 BASE) MCG/ACT Inhaler Inhale 2 puffs into the lungs every 6 hours as needed for shortness of breath / dyspnea 1 Inhaler 11     amLODIPine (NORVASC) 5 MG tablet TAKE 1 TABLET BY MOUTH  DAILY 90 tablet 1     BIOTIN PO Take by mouth daily         DULoxetine (CYMBALTA) 60 MG capsule TAKE 1 CAPSULE BY MOUTH TWO TIMES DAILY 180 capsule 1     HYDROcodone-acetaminophen (NORCO) 5-325 MG tablet Take 1-2 tablets by mouth every 6 hours as needed for pain 180 tablet 0     LORazepam (ATIVAN) 0.5 MG tablet TAKE 1 po q8 hours prn anxiety 10 tablet 0     losartan (COZAAR) 50 MG tablet Take 1 tablet (50 mg) by mouth daily 90 tablet 1     OVER-THE-COUNTER Ideal collegen         OVER-THE-COUNTER womens ultra zulema             OTC products: None, except as noted above           Allergies   Allergen Reactions     Clindamycin Shortness Of Breath     Amoxicillin         Rash.     Sulfa Drugs         Hives         Latex Allergy: NO     Social History            Tobacco Use     Smoking status: Former Smoker       Last attempt to quit: 1969      "  Years since quittin.0     Smokeless tobacco: Never Used   Substance Use Topics     Alcohol use: Yes       Alcohol/week: 0.0 standard drinks       Comment: casual          History   Drug Use No         REVIEW OF SYSTEMS:   GENERAL: negative for, fever, chills, weight gain, positive weight loss from diet changes  EYES: negative  ENT: some nasal congestion and postnasal drainage   RESPIRATORY: stable dyspnea on exertion and cough from asthma  CARDIOVASCULAR: negative for, palpitations, tachycardia, irregular heart beat and chest pain  GI: negative for, nausea, vomiting, abdominal pain, melena and hematochezia  : frequency  MUSCULOSKELETAL: back pain and hip pain, fibromyalgia  NEUROLOGIC: negative for, seizures, local weakness, had a headache for 30 minutes last week, some tingling fingers and toes chronically  SKIN: negative  ENDOCRINE: negative           EXAM:        Patient is alert, oriented, cooperative in no acute distress.  BP (!) 147/93 (BP Location: Left arm, Patient Position: Sitting, Cuff Size: Adult Large)   Pulse 87   Temp 97.3  F (36.3  C) (Oral)   Resp 20   Ht 1.61 m (5' 3.4\")   Wt 111.7 kg (246 lb 3.2 oz)   SpO2 96%   BMI 43.06 kg/m        HEENT: PERRL, EOMI, TM's are normal. Oropharynx is clear.  NECK: No lymphadenopathy or thyromegaly. Carotid pulses full without bruits.  LUNGS: clear  CV: normal S1, S2 without murmur, S3 or S4 present. Pulses are 2/2 throughout. No JVD.  ABDOMEN: Bowel sounds present, nontender without hepatosplenomegaly. Liver is normal size to percussion.  EXTREMITIES: no edema present, unremarkable joints  NEUROLOGIC: Cranial nerves II-XII intact, reflexes 2/4 throughout, strength 5/5, sensation grossly intact,  SKIN: without rashes or significant lesions      DIAGNOSTICS:   EKG: Normal Sinus Rhythm, rightward axis, no LVH by voltage criteria, unchanged from previous tracings  Lab: see [facility]            Recent Labs   Lab Test 10/15/19  1205 07/10/18  1033   " 05/05/16  1051   HGB  --   --   --  15.0    141   < > 142   POTASSIUM 4.1 4.3   < > 4.2   CR 0.76 0.80   < > 0.87    < > = values in this interval not displayed.         IMPRESSION:   Reason for surgery/procedure: OA hip  Diagnosis/reason for consult: preop   The proposed surgical procedure is considered INTERMEDIATE risk.     REVISED CARDIAC RISK INDEX  The patient has the following serious cardiovascular risks for perioperative complications such as (MI, PE, VFib and 3  AV Block):  No serious cardiac risks  INTERPRETATION: 0 risks: Class I (very low risk - 0.4% complication rate)     The patient has the following additional risks for perioperative complications:  High tolerance to opioid analgesics due to chronic narcotic use  Morbid obesity          RECOMMENDATIONS:      --Consult hospital rounder / IM to assist post-op medical management     --Patient is to take all scheduled medications on the day of surgery EXCEPT for modifications listed below.     ACE Inhibitor or Angiotensin Receptor Blocker (ARB) Use  Ace inhibitor or Angiotensin Receptor Blocker (ARB) and should HOLD this medication for the 24 hours prior to surgery.        APPROVAL GIVEN to proceed with proposed procedure, without further diagnostic evaluation        Signed Electronically by: Dolores Blanton MD     Copy of this evaluation report is provided to requesting physician.     [facility] Preop Guidelines     Revised Cardiac Risk Index

## 2020-02-13 NOTE — NURSING NOTE
"BP (!) 147/93 (BP Location: Left arm, Patient Position: Sitting, Cuff Size: Adult Large)   Pulse 87   Temp 97.3  F (36.3  C) (Oral)   Resp 20   Ht 1.61 m (5' 3.4\")   Wt 111.7 kg (246 lb 3.2 oz)   SpO2 96%   BMI 43.06 kg/m      "

## 2020-02-13 NOTE — PATIENT INSTRUCTIONS
Do not take the losartan the day before or the day of surgery.   You can take amlodipine and duloxetine the morning of surgery with sips of water.   Use the inhaler that morning.      Stop supplements a week before surgery.      Saline nasal spray like [place].

## 2020-02-14 RX ORDER — HYDROCODONE BITARTRATE AND ACETAMINOPHEN 5; 325 MG/1; MG/1
1-2 TABLET ORAL EVERY 6 HOURS PRN
Qty: 180 TABLET | Refills: 0 | Status: ON HOLD | OUTPATIENT
Start: 2020-02-14 | End: 2020-02-27

## 2020-02-22 NOTE — PHARMACY-ADMISSION MEDICATION HISTORY
Admission medication history interview status for this patient completed by pre-admitting RN: Lidia Kelley, RN . See EPIC admission navigator for allergy information, prior to admission medications and immunization status.         Prior to Admission medications    Medication Sig Last Dose Taking? Auth Provider   albuterol (PROAIR HFA/PROVENTIL HFA/VENTOLIN HFA) 108 (90 BASE) MCG/ACT Inhaler Inhale 2 puffs into the lungs every 6 hours as needed for shortness of breath / dyspnea  Yes Dolores Blanton MD   BIOTIN PO Take by mouth daily  Yes Reported, Patient   DULoxetine (CYMBALTA) 60 MG capsule TAKE 1 CAPSULE BY MOUTH TWO TIMES DAILY  Yes Dolores Blanton MD   LORazepam (ATIVAN) 0.5 MG tablet TAKE 1 po q8 hours prn anxiety  Patient taking differently: Take 0.5 mg by mouth every 8 hours as needed for anxiety TAKE 1 po q8 hours prn anxiety  Yes Dolores Blanton MD   losartan (COZAAR) 50 MG tablet Take 1 tablet (50 mg) by mouth daily  Yes Dolores Blanton MD   OVER-THE-COUNTER Ideal collegen  Yes Reported, Patient   OVER-THE-COUNTER womens ultra zulema  Yes Reported, Patient   amLODIPine (NORVASC) 5 MG tablet TAKE 1 TABLET BY MOUTH  DAILY   Dolores Blanton MD   guaiFENesin-codeine (ROBITUSSIN AC) 100-10 MG/5ML solution Take 10 mLs by mouth every 4 hours as needed for cough   Dolores Blanton MD   HYDROcodone-acetaminophen (NORCO) 5-325 MG tablet Take 1-2 tablets by mouth every 6 hours as needed for pain   Dolores Blanton MD

## 2020-02-25 ENCOUNTER — HOSPITAL ENCOUNTER (OUTPATIENT)
Dept: LAB | Facility: CLINIC | Age: 75
Discharge: HOME | DRG: 470 | End: 2020-02-25
Attending: INTERNAL MEDICINE | Admitting: ORTHOPAEDIC SURGERY
Payer: MEDICARE

## 2020-02-25 DIAGNOSIS — Z01.812 PRE-OPERATIVE LABORATORY EXAMINATION: ICD-10-CM

## 2020-02-25 LAB
ABO + RH BLD: NORMAL
ABO + RH BLD: NORMAL
BLD GP AB SCN SERPL QL: NORMAL
BLOOD BANK CMNT PATIENT-IMP: NORMAL
CREAT SERPL-MCNC: 0.86 MG/DL (ref 0.52–1.04)
GFR SERPL CREATININE-BSD FRML MDRD: 67 ML/MIN/{1.73_M2}
HGB BLD-MCNC: 16.3 G/DL (ref 11.7–15.7)
POTASSIUM SERPL-SCNC: 3.9 MMOL/L (ref 3.4–5.3)
SPECIMEN EXP DATE BLD: NORMAL

## 2020-02-25 PROCEDURE — 82565 ASSAY OF CREATININE: CPT | Performed by: ORTHOPAEDIC SURGERY

## 2020-02-25 PROCEDURE — 86901 BLOOD TYPING SEROLOGIC RH(D): CPT | Performed by: ORTHOPAEDIC SURGERY

## 2020-02-25 PROCEDURE — 86850 RBC ANTIBODY SCREEN: CPT | Performed by: ORTHOPAEDIC SURGERY

## 2020-02-25 PROCEDURE — 86900 BLOOD TYPING SEROLOGIC ABO: CPT | Performed by: ORTHOPAEDIC SURGERY

## 2020-02-25 PROCEDURE — 84132 ASSAY OF SERUM POTASSIUM: CPT | Performed by: ORTHOPAEDIC SURGERY

## 2020-02-25 PROCEDURE — 85018 HEMOGLOBIN: CPT | Performed by: ORTHOPAEDIC SURGERY

## 2020-02-25 PROCEDURE — 36415 COLL VENOUS BLD VENIPUNCTURE: CPT | Performed by: ORTHOPAEDIC SURGERY

## 2020-02-26 ENCOUNTER — ANESTHESIA EVENT (OUTPATIENT)
Dept: SURGERY | Facility: CLINIC | Age: 75
DRG: 470 | End: 2020-02-26
Payer: MEDICARE

## 2020-02-26 ENCOUNTER — ANESTHESIA (OUTPATIENT)
Dept: SURGERY | Facility: CLINIC | Age: 75
DRG: 470 | End: 2020-02-26
Payer: MEDICARE

## 2020-02-26 ENCOUNTER — APPOINTMENT (OUTPATIENT)
Dept: GENERAL RADIOLOGY | Facility: CLINIC | Age: 75
DRG: 470 | End: 2020-02-26
Attending: ORTHOPAEDIC SURGERY
Payer: MEDICARE

## 2020-02-26 ENCOUNTER — HOSPITAL ENCOUNTER (INPATIENT)
Facility: CLINIC | Age: 75
LOS: 2 days | Discharge: HOME | DRG: 470 | End: 2020-02-28
Attending: ORTHOPAEDIC SURGERY | Admitting: ORTHOPAEDIC SURGERY
Payer: MEDICARE

## 2020-02-26 ENCOUNTER — APPOINTMENT (OUTPATIENT)
Dept: GENERAL RADIOLOGY | Facility: CLINIC | Age: 75
DRG: 470 | End: 2020-02-26
Attending: PHYSICIAN ASSISTANT
Payer: MEDICARE

## 2020-02-26 DIAGNOSIS — Z96.641 STATUS POST TOTAL REPLACEMENT OF RIGHT HIP: Primary | ICD-10-CM

## 2020-02-26 PROBLEM — Z96.649 S/P TOTAL HIP ARTHROPLASTY: Status: ACTIVE | Noted: 2020-02-26

## 2020-02-26 PROCEDURE — 36000093 ZZH SURGERY LEVEL 4 1ST 30 MIN: Performed by: ORTHOPAEDIC SURGERY

## 2020-02-26 PROCEDURE — 25000125 ZZHC RX 250: Performed by: NURSE ANESTHETIST, CERTIFIED REGISTERED

## 2020-02-26 PROCEDURE — 0SR901A REPLACEMENT OF RIGHT HIP JOINT WITH METAL SYNTHETIC SUBSTITUTE, UNCEMENTED, OPEN APPROACH: ICD-10-PCS | Performed by: ORTHOPAEDIC SURGERY

## 2020-02-26 PROCEDURE — C1776 JOINT DEVICE (IMPLANTABLE): HCPCS | Performed by: ORTHOPAEDIC SURGERY

## 2020-02-26 PROCEDURE — 25000128 H RX IP 250 OP 636: Performed by: ANESTHESIOLOGY

## 2020-02-26 PROCEDURE — 71000012 ZZH RECOVERY PHASE 1 LEVEL 1 FIRST HR: Performed by: ORTHOPAEDIC SURGERY

## 2020-02-26 PROCEDURE — 36000063 ZZH SURGERY LEVEL 4 EA 15 ADDTL MIN: Performed by: ORTHOPAEDIC SURGERY

## 2020-02-26 PROCEDURE — 25000128 H RX IP 250 OP 636: Performed by: NURSE ANESTHETIST, CERTIFIED REGISTERED

## 2020-02-26 PROCEDURE — 37000009 ZZH ANESTHESIA TECHNICAL FEE, EACH ADDTL 15 MIN: Performed by: ORTHOPAEDIC SURGERY

## 2020-02-26 PROCEDURE — 25000128 H RX IP 250 OP 636: Performed by: ORTHOPAEDIC SURGERY

## 2020-02-26 PROCEDURE — 71000013 ZZH RECOVERY PHASE 1 LEVEL 1 EA ADDTL HR: Performed by: ORTHOPAEDIC SURGERY

## 2020-02-26 PROCEDURE — 37000008 ZZH ANESTHESIA TECHNICAL FEE, 1ST 30 MIN: Performed by: ORTHOPAEDIC SURGERY

## 2020-02-26 PROCEDURE — 40000986 XR PELVIS AD HIP PORTABLE RIGHT 1 VIEW

## 2020-02-26 PROCEDURE — 40000985 XR HIP PORT RT 1 VW: Mod: TC

## 2020-02-26 PROCEDURE — 12000000 ZZH R&B MED SURG/OB

## 2020-02-26 PROCEDURE — 25800030 ZZH RX IP 258 OP 636: Performed by: ORTHOPAEDIC SURGERY

## 2020-02-26 PROCEDURE — 25000125 ZZHC RX 250: Performed by: ORTHOPAEDIC SURGERY

## 2020-02-26 PROCEDURE — 25800030 ZZH RX IP 258 OP 636: Performed by: NURSE ANESTHETIST, CERTIFIED REGISTERED

## 2020-02-26 PROCEDURE — 27210794 ZZH OR GENERAL SUPPLY STERILE: Performed by: ORTHOPAEDIC SURGERY

## 2020-02-26 PROCEDURE — 25800030 ZZH RX IP 258 OP 636: Performed by: PHYSICIAN ASSISTANT

## 2020-02-26 PROCEDURE — 25000125 ZZHC RX 250: Performed by: PHYSICIAN ASSISTANT

## 2020-02-26 PROCEDURE — 25800030 ZZH RX IP 258 OP 636: Performed by: ANESTHESIOLOGY

## 2020-02-26 PROCEDURE — 25000132 ZZH RX MED GY IP 250 OP 250 PS 637: Mod: GY | Performed by: PHYSICIAN ASSISTANT

## 2020-02-26 PROCEDURE — 40000171 ZZH STATISTIC PRE-PROCEDURE ASSESSMENT III: Performed by: ORTHOPAEDIC SURGERY

## 2020-02-26 DEVICE — 132 DEGREE NECK ANGLE HIP STEM
Type: IMPLANTABLE DEVICE | Site: HIP | Status: FUNCTIONAL
Brand: ACCOLADE

## 2020-02-26 DEVICE — TRIDENT II TRITANIUM CLUSTER 52E
Type: IMPLANTABLE DEVICE | Site: HIP | Status: FUNCTIONAL
Brand: TRIDENT II

## 2020-02-26 DEVICE — CERAMIC V40 FEMORAL HEAD
Type: IMPLANTABLE DEVICE | Site: HIP | Status: FUNCTIONAL
Brand: BIOLOX

## 2020-02-26 DEVICE — 0 DEGREE POLYETHYLENE INSERT
Type: IMPLANTABLE DEVICE | Site: HIP | Status: FUNCTIONAL
Brand: TRIDENT

## 2020-02-26 RX ORDER — ACETAMINOPHEN 325 MG/1
650 TABLET ORAL EVERY 4 HOURS PRN
Status: DISCONTINUED | OUTPATIENT
Start: 2020-02-29 | End: 2020-02-28 | Stop reason: HOSPADM

## 2020-02-26 RX ORDER — PROPOFOL 10 MG/ML
INJECTION, EMULSION INTRAVENOUS PRN
Status: DISCONTINUED | OUTPATIENT
Start: 2020-02-26 | End: 2020-02-26

## 2020-02-26 RX ORDER — PROPOFOL 10 MG/ML
INJECTION, EMULSION INTRAVENOUS CONTINUOUS PRN
Status: DISCONTINUED | OUTPATIENT
Start: 2020-02-26 | End: 2020-02-26

## 2020-02-26 RX ORDER — ONDANSETRON 4 MG/1
4 TABLET, ORALLY DISINTEGRATING ORAL EVERY 30 MIN PRN
Status: DISCONTINUED | OUTPATIENT
Start: 2020-02-26 | End: 2020-02-26 | Stop reason: HOSPADM

## 2020-02-26 RX ORDER — VANCOMYCIN HYDROCHLORIDE 1 G/20ML
INJECTION, POWDER, LYOPHILIZED, FOR SOLUTION INTRAVENOUS PRN
Status: DISCONTINUED | OUTPATIENT
Start: 2020-02-26 | End: 2020-02-26 | Stop reason: HOSPADM

## 2020-02-26 RX ORDER — LIDOCAINE 40 MG/G
CREAM TOPICAL
Status: DISCONTINUED | OUTPATIENT
Start: 2020-02-26 | End: 2020-02-28 | Stop reason: HOSPADM

## 2020-02-26 RX ORDER — ALBUTEROL SULFATE 0.83 MG/ML
2.5 SOLUTION RESPIRATORY (INHALATION) EVERY 4 HOURS PRN
Status: DISCONTINUED | OUTPATIENT
Start: 2020-02-26 | End: 2020-02-26 | Stop reason: HOSPADM

## 2020-02-26 RX ORDER — FLUCONAZOLE 50 MG/1
50 TABLET ORAL DAILY
Status: COMPLETED | OUTPATIENT
Start: 2020-02-26 | End: 2020-02-28

## 2020-02-26 RX ORDER — SODIUM CHLORIDE, SODIUM LACTATE, POTASSIUM CHLORIDE, CALCIUM CHLORIDE 600; 310; 30; 20 MG/100ML; MG/100ML; MG/100ML; MG/100ML
INJECTION, SOLUTION INTRAVENOUS CONTINUOUS
Status: DISCONTINUED | OUTPATIENT
Start: 2020-02-26 | End: 2020-02-26 | Stop reason: HOSPADM

## 2020-02-26 RX ORDER — NALOXONE HYDROCHLORIDE 0.4 MG/ML
.1-.4 INJECTION, SOLUTION INTRAMUSCULAR; INTRAVENOUS; SUBCUTANEOUS
Status: ACTIVE | OUTPATIENT
Start: 2020-02-26 | End: 2020-02-27

## 2020-02-26 RX ORDER — AMOXICILLIN 250 MG
2 CAPSULE ORAL 2 TIMES DAILY
Status: DISCONTINUED | OUTPATIENT
Start: 2020-02-26 | End: 2020-02-28 | Stop reason: HOSPADM

## 2020-02-26 RX ORDER — KETAMINE HYDROCHLORIDE 10 MG/ML
INJECTION, SOLUTION INTRAMUSCULAR; INTRAVENOUS PRN
Status: DISCONTINUED | OUTPATIENT
Start: 2020-02-26 | End: 2020-02-26

## 2020-02-26 RX ORDER — FENTANYL CITRATE 50 UG/ML
25-50 INJECTION, SOLUTION INTRAMUSCULAR; INTRAVENOUS
Status: DISCONTINUED | OUTPATIENT
Start: 2020-02-26 | End: 2020-02-26 | Stop reason: HOSPADM

## 2020-02-26 RX ORDER — HYDRALAZINE HYDROCHLORIDE 20 MG/ML
INJECTION INTRAMUSCULAR; INTRAVENOUS PRN
Status: DISCONTINUED | OUTPATIENT
Start: 2020-02-26 | End: 2020-02-26

## 2020-02-26 RX ORDER — ACETAMINOPHEN 325 MG/1
975 TABLET ORAL EVERY 8 HOURS
Status: DISCONTINUED | OUTPATIENT
Start: 2020-02-26 | End: 2020-02-28 | Stop reason: HOSPADM

## 2020-02-26 RX ORDER — ONDANSETRON 2 MG/ML
4 INJECTION INTRAMUSCULAR; INTRAVENOUS EVERY 6 HOURS PRN
Status: DISCONTINUED | OUTPATIENT
Start: 2020-02-26 | End: 2020-02-28 | Stop reason: HOSPADM

## 2020-02-26 RX ORDER — HYDROMORPHONE HYDROCHLORIDE 1 MG/ML
0.2 INJECTION, SOLUTION INTRAMUSCULAR; INTRAVENOUS; SUBCUTANEOUS
Status: DISCONTINUED | OUTPATIENT
Start: 2020-02-26 | End: 2020-02-28 | Stop reason: HOSPADM

## 2020-02-26 RX ORDER — HYDRALAZINE HYDROCHLORIDE 20 MG/ML
2.5-5 INJECTION INTRAMUSCULAR; INTRAVENOUS EVERY 10 MIN PRN
Status: DISCONTINUED | OUTPATIENT
Start: 2020-02-26 | End: 2020-02-26 | Stop reason: HOSPADM

## 2020-02-26 RX ORDER — OXYCODONE HYDROCHLORIDE 5 MG/1
5-10 TABLET ORAL
Status: DISCONTINUED | OUTPATIENT
Start: 2020-02-26 | End: 2020-02-28 | Stop reason: HOSPADM

## 2020-02-26 RX ORDER — ONDANSETRON 4 MG/1
4 TABLET, ORALLY DISINTEGRATING ORAL EVERY 6 HOURS PRN
Status: DISCONTINUED | OUTPATIENT
Start: 2020-02-26 | End: 2020-02-28 | Stop reason: HOSPADM

## 2020-02-26 RX ORDER — POLYETHYLENE GLYCOL 3350 17 G/17G
17 POWDER, FOR SOLUTION ORAL DAILY
Status: DISCONTINUED | OUTPATIENT
Start: 2020-02-26 | End: 2020-02-28 | Stop reason: HOSPADM

## 2020-02-26 RX ORDER — GLYCOPYRROLATE 0.2 MG/ML
INJECTION, SOLUTION INTRAMUSCULAR; INTRAVENOUS PRN
Status: DISCONTINUED | OUTPATIENT
Start: 2020-02-26 | End: 2020-02-26

## 2020-02-26 RX ORDER — ONDANSETRON 2 MG/ML
4 INJECTION INTRAMUSCULAR; INTRAVENOUS EVERY 30 MIN PRN
Status: DISCONTINUED | OUTPATIENT
Start: 2020-02-26 | End: 2020-02-26 | Stop reason: HOSPADM

## 2020-02-26 RX ORDER — CEFAZOLIN SODIUM 1 G/3ML
1 INJECTION, POWDER, FOR SOLUTION INTRAMUSCULAR; INTRAVENOUS SEE ADMIN INSTRUCTIONS
Status: DISCONTINUED | OUTPATIENT
Start: 2020-02-26 | End: 2020-02-26

## 2020-02-26 RX ORDER — LABETALOL 20 MG/4 ML (5 MG/ML) INTRAVENOUS SYRINGE
PRN
Status: DISCONTINUED | OUTPATIENT
Start: 2020-02-26 | End: 2020-02-26

## 2020-02-26 RX ORDER — DEXAMETHASONE SODIUM PHOSPHATE 4 MG/ML
INJECTION, SOLUTION INTRA-ARTICULAR; INTRALESIONAL; INTRAMUSCULAR; INTRAVENOUS; SOFT TISSUE PRN
Status: DISCONTINUED | OUTPATIENT
Start: 2020-02-26 | End: 2020-02-26

## 2020-02-26 RX ORDER — HYDROXYZINE HYDROCHLORIDE 10 MG/1
10 TABLET, FILM COATED ORAL EVERY 6 HOURS PRN
Status: DISCONTINUED | OUTPATIENT
Start: 2020-02-26 | End: 2020-02-28 | Stop reason: HOSPADM

## 2020-02-26 RX ORDER — LIDOCAINE HYDROCHLORIDE 10 MG/ML
INJECTION, SOLUTION INFILTRATION; PERINEURAL PRN
Status: DISCONTINUED | OUTPATIENT
Start: 2020-02-26 | End: 2020-02-26

## 2020-02-26 RX ORDER — ONDANSETRON 2 MG/ML
INJECTION INTRAMUSCULAR; INTRAVENOUS PRN
Status: DISCONTINUED | OUTPATIENT
Start: 2020-02-26 | End: 2020-02-26

## 2020-02-26 RX ORDER — FENTANYL CITRATE 50 UG/ML
INJECTION, SOLUTION INTRAMUSCULAR; INTRAVENOUS PRN
Status: DISCONTINUED | OUTPATIENT
Start: 2020-02-26 | End: 2020-02-26

## 2020-02-26 RX ORDER — DULOXETIN HYDROCHLORIDE 30 MG/1
60 CAPSULE, DELAYED RELEASE ORAL 2 TIMES DAILY
Status: DISCONTINUED | OUTPATIENT
Start: 2020-02-26 | End: 2020-02-28 | Stop reason: HOSPADM

## 2020-02-26 RX ORDER — SODIUM CHLORIDE, SODIUM LACTATE, POTASSIUM CHLORIDE, CALCIUM CHLORIDE 600; 310; 30; 20 MG/100ML; MG/100ML; MG/100ML; MG/100ML
INJECTION, SOLUTION INTRAVENOUS CONTINUOUS
Status: DISCONTINUED | OUTPATIENT
Start: 2020-02-26 | End: 2020-02-28 | Stop reason: HOSPADM

## 2020-02-26 RX ORDER — NEOSTIGMINE METHYLSULFATE 1 MG/ML
VIAL (ML) INJECTION PRN
Status: DISCONTINUED | OUTPATIENT
Start: 2020-02-26 | End: 2020-02-26

## 2020-02-26 RX ORDER — CEFAZOLIN SODIUM 2 G/100ML
2 INJECTION, SOLUTION INTRAVENOUS
Status: DISCONTINUED | OUTPATIENT
Start: 2020-02-26 | End: 2020-02-26

## 2020-02-26 RX ORDER — AMLODIPINE BESYLATE 5 MG/1
5 TABLET ORAL DAILY
Status: DISCONTINUED | OUTPATIENT
Start: 2020-02-27 | End: 2020-02-27

## 2020-02-26 RX ORDER — ACETAMINOPHEN 500 MG
1000 TABLET ORAL ONCE
Status: COMPLETED | OUTPATIENT
Start: 2020-02-26 | End: 2020-02-26

## 2020-02-26 RX ORDER — BISACODYL 10 MG
10 SUPPOSITORY, RECTAL RECTAL DAILY PRN
Status: DISCONTINUED | OUTPATIENT
Start: 2020-02-26 | End: 2020-02-28 | Stop reason: HOSPADM

## 2020-02-26 RX ORDER — TRAMADOL HYDROCHLORIDE 50 MG/1
50 TABLET ORAL EVERY 6 HOURS PRN
Status: DISCONTINUED | OUTPATIENT
Start: 2020-02-26 | End: 2020-02-28 | Stop reason: HOSPADM

## 2020-02-26 RX ORDER — LIDOCAINE 40 MG/G
CREAM TOPICAL
Status: DISCONTINUED | OUTPATIENT
Start: 2020-02-26 | End: 2020-02-26 | Stop reason: HOSPADM

## 2020-02-26 RX ORDER — NALOXONE HYDROCHLORIDE 0.4 MG/ML
.1-.4 INJECTION, SOLUTION INTRAMUSCULAR; INTRAVENOUS; SUBCUTANEOUS
Status: DISCONTINUED | OUTPATIENT
Start: 2020-02-26 | End: 2020-02-28 | Stop reason: HOSPADM

## 2020-02-26 RX ORDER — LIDOCAINE HYDROCHLORIDE ANHYDROUS AND EPINEPHRINE 10; 10 MG/ML; UG/ML
30 INJECTION, SOLUTION INFILTRATION ONCE
Status: DISCONTINUED | OUTPATIENT
Start: 2020-02-26 | End: 2020-02-27

## 2020-02-26 RX ORDER — LOSARTAN POTASSIUM 50 MG/1
50 TABLET ORAL DAILY
Status: DISCONTINUED | OUTPATIENT
Start: 2020-02-26 | End: 2020-02-26

## 2020-02-26 RX ADMIN — HYDRALAZINE HYDROCHLORIDE 4 MG: 20 INJECTION INTRAMUSCULAR; INTRAVENOUS at 14:43

## 2020-02-26 RX ADMIN — ACETAMINOPHEN 1000 MG: 500 TABLET, FILM COATED ORAL at 11:04

## 2020-02-26 RX ADMIN — FENTANYL CITRATE 150 MCG: 50 INJECTION, SOLUTION INTRAMUSCULAR; INTRAVENOUS at 13:51

## 2020-02-26 RX ADMIN — PHENYLEPHRINE HYDROCHLORIDE 100 MCG: 10 INJECTION INTRAVENOUS at 15:25

## 2020-02-26 RX ADMIN — SODIUM CHLORIDE, POTASSIUM CHLORIDE, SODIUM LACTATE AND CALCIUM CHLORIDE: 600; 310; 30; 20 INJECTION, SOLUTION INTRAVENOUS at 12:55

## 2020-02-26 RX ADMIN — Medication 1 G: at 15:19

## 2020-02-26 RX ADMIN — ROCURONIUM BROMIDE 50 MG: 10 INJECTION INTRAVENOUS at 13:52

## 2020-02-26 RX ADMIN — GLYCOPYRROLATE 0.2 MG: 0.2 INJECTION, SOLUTION INTRAMUSCULAR; INTRAVENOUS at 14:58

## 2020-02-26 RX ADMIN — DEXAMETHASONE SODIUM PHOSPHATE 4 MG: 4 INJECTION, SOLUTION INTRA-ARTICULAR; INTRALESIONAL; INTRAMUSCULAR; INTRAVENOUS; SOFT TISSUE at 13:52

## 2020-02-26 RX ADMIN — ACETAMINOPHEN 975 MG: 325 TABLET, FILM COATED ORAL at 19:06

## 2020-02-26 RX ADMIN — HYDROMORPHONE HYDROCHLORIDE 0.5 MG: 1 INJECTION, SOLUTION INTRAMUSCULAR; INTRAVENOUS; SUBCUTANEOUS at 14:21

## 2020-02-26 RX ADMIN — SODIUM CHLORIDE, POTASSIUM CHLORIDE, SODIUM LACTATE AND CALCIUM CHLORIDE: 600; 310; 30; 20 INJECTION, SOLUTION INTRAVENOUS at 15:19

## 2020-02-26 RX ADMIN — MIDAZOLAM 1 MG: 1 INJECTION INTRAMUSCULAR; INTRAVENOUS at 13:44

## 2020-02-26 RX ADMIN — LIDOCAINE HYDROCHLORIDE 50 MG: 10 INJECTION, SOLUTION INFILTRATION; PERINEURAL at 13:51

## 2020-02-26 RX ADMIN — DULOXETINE HYDROCHLORIDE 60 MG: 30 CAPSULE, DELAYED RELEASE ORAL at 19:06

## 2020-02-26 RX ADMIN — SODIUM CHLORIDE, POTASSIUM CHLORIDE, SODIUM LACTATE AND CALCIUM CHLORIDE: 600; 310; 30; 20 INJECTION, SOLUTION INTRAVENOUS at 12:00

## 2020-02-26 RX ADMIN — PROPOFOL 150 MG: 10 INJECTION, EMULSION INTRAVENOUS at 13:51

## 2020-02-26 RX ADMIN — ROCURONIUM BROMIDE 20 MG: 10 INJECTION INTRAVENOUS at 14:23

## 2020-02-26 RX ADMIN — ASPIRIN 325 MG: 325 TABLET, DELAYED RELEASE ORAL at 19:06

## 2020-02-26 RX ADMIN — MIDAZOLAM 2 MG: 1 INJECTION INTRAMUSCULAR; INTRAVENOUS at 12:18

## 2020-02-26 RX ADMIN — Medication 1 G: at 14:19

## 2020-02-26 RX ADMIN — FLUCONAZOLE 50 MG: 50 TABLET ORAL at 19:06

## 2020-02-26 RX ADMIN — PHENYLEPHRINE HYDROCHLORIDE 50 MCG: 10 INJECTION INTRAVENOUS at 14:59

## 2020-02-26 RX ADMIN — HYDROMORPHONE HYDROCHLORIDE 0.5 MG: 1 INJECTION, SOLUTION INTRAMUSCULAR; INTRAVENOUS; SUBCUTANEOUS at 14:27

## 2020-02-26 RX ADMIN — PROPOFOL 50 MCG/KG/MIN: 10 INJECTION, EMULSION INTRAVENOUS at 14:24

## 2020-02-26 RX ADMIN — SENNOSIDES AND DOCUSATE SODIUM 1 TABLET: 8.6; 5 TABLET ORAL at 19:06

## 2020-02-26 RX ADMIN — FENTANYL CITRATE 50 MCG: 50 INJECTION, SOLUTION INTRAMUSCULAR; INTRAVENOUS at 16:10

## 2020-02-26 RX ADMIN — FENTANYL CITRATE 25 MCG: 50 INJECTION, SOLUTION INTRAMUSCULAR; INTRAVENOUS at 15:18

## 2020-02-26 RX ADMIN — LABETALOL 20 MG/4 ML (5 MG/ML) INTRAVENOUS SYRINGE 5 MG: at 15:41

## 2020-02-26 RX ADMIN — VANCOMYCIN HYDROCHLORIDE 1500 MG: 10 INJECTION, POWDER, LYOPHILIZED, FOR SOLUTION INTRAVENOUS at 12:39

## 2020-02-26 RX ADMIN — HYDROMORPHONE HYDROCHLORIDE 0.5 MG: 1 INJECTION, SOLUTION INTRAMUSCULAR; INTRAVENOUS; SUBCUTANEOUS at 16:34

## 2020-02-26 RX ADMIN — LABETALOL 20 MG/4 ML (5 MG/ML) INTRAVENOUS SYRINGE 5 MG: at 14:29

## 2020-02-26 RX ADMIN — GLYCOPYRROLATE 0.2 MG: 0.2 INJECTION, SOLUTION INTRAMUSCULAR; INTRAVENOUS at 15:32

## 2020-02-26 RX ADMIN — OXYCODONE HYDROCHLORIDE 5 MG: 5 TABLET ORAL at 23:13

## 2020-02-26 RX ADMIN — ONDANSETRON HYDROCHLORIDE 4 MG: 2 INJECTION, SOLUTION INTRAVENOUS at 15:18

## 2020-02-26 RX ADMIN — SODIUM CHLORIDE, POTASSIUM CHLORIDE, SODIUM LACTATE AND CALCIUM CHLORIDE: 600; 310; 30; 20 INJECTION, SOLUTION INTRAVENOUS at 23:10

## 2020-02-26 RX ADMIN — FENTANYL CITRATE 100 MCG: 50 INJECTION, SOLUTION INTRAMUSCULAR; INTRAVENOUS at 14:13

## 2020-02-26 RX ADMIN — MICONAZOLE NITRATE: 20 POWDER TOPICAL at 22:32

## 2020-02-26 RX ADMIN — KETAMINE HYDROCHLORIDE 10 MG: 10 INJECTION, SOLUTION INTRAMUSCULAR; INTRAVENOUS at 14:26

## 2020-02-26 RX ADMIN — Medication 2 MG: at 15:32

## 2020-02-26 ASSESSMENT — MIFFLIN-ST. JEOR: SCORE: 1580.91

## 2020-02-26 ASSESSMENT — ACTIVITIES OF DAILY LIVING (ADL): ADLS_ACUITY_SCORE: 18

## 2020-02-26 NOTE — OP NOTE
[facility]  Operative Note    Posterior Total Hip Arthroplasty -     Svetlana Serrano MRN# [id number]   YOB: 1945  Procedure Date:  2/26/2020  Age: 74 year old       PREOPERATIVE DIAGNOSIS:  Degenerative osteoarthritis, right hip.    POSTOPERATIVE DIAGNOSIS:  Degenerative osteoarthritis,right hip.    PROCEDURE PERFORMED:  right total hip arthroplasty.  Posterior lateral piriformis sparing approach.    SURGEON:  Cirilo Man M.D.    FIRST ASSISTANT:  Keith Aguilar PA-C, whose was critical for positioning, retraction during exposure, placement of implants, and closure.    ANESTHESIA:  General    INDICATIONS:  Svetlana Serrano  is a74 year old-year-old with severe disabling degenerative osteoarthritis of right hip.  Discussed both operative and nonoperative management.  Risks of surgery discussed included but not limited to bleeding, infection, damage to surrounding neurovascular structures, leg length inequality, dislocation, periprosthetic fracture, need for revision surgery, blood clots, pulmonary embolus, stroke, anesthetic complications and even death.  No guarantees given or implied. Patient thoughtfully acknowledges risks and wishes to proceed.  Brought to surgery for right total hip arthroplasty.    IMPLANTS:  Shay Accolade II size 5 std 132 offset neck, size 52 Trident II cup, Crossfire neutral liner, and +0 mm 36 mm Biolox head    PROCEDURE:  After obtaining informed surgical consent, and marking patient operative site the patient was brought to the operating room.  Tranexamic acid 1 g given prior to surgery and additional gram at closure.  Vancomyocin was administered intravenously within one hour prior to surgery and will be discontinued within 24 hours.  General anesthetic.  Placed in the lateral decubitus position with axillary roll and all prominences well padded.  Chlorohexidine prescrub and the right hip was prepped with Chloroprep and draped in the usual sterile  fashion.  A posterolateral incision was made.  Dissection was carried through the IT band and tensor fascia.  A leg length suture was placed.  The external rotators and capsule were identified, tagged, divided, and preserved for later repair.  Piriformis sparred.  The hip was dislocated.  Severe degenerative osteoarthritis.  The femoral head and neck were resected approximately 15mm proximal to lesser trochanter.  The acetabulum was exposed and advanced osteoarthritis present.  A labrectomy was performed.  The pulvinar was excised.  The pulvinar was excised.  The acetabulum was sequentially reamed from 4 to 52 mm.  A 52 mm solid back Tritanium cup was implanted with slightly increased anteversion reduced pelvic abduction of approximately 40 degrees.  Actual neutral liner placed.      The femoral canal was then opened with box osteotome, canal finder, and then lateralizer.  Sequentially broached to accept a size # 4 Accolade 2 stem trial.  After multiple trial reductions, leg lengths were equal by palpation and leg length suture4.   Appropriate shuck.  The hip was stable throughout a full range of motion using a +0 x 36 mm head including full extension external rotation, sleeping position, and flexed to 90 deg with over 70 degrees internal rotation.  Portable x-ray showed canal fill slightly under and appropriate cup position.  Broached up to a size 5 stem was implanted in an approximately 5 degrees of increased anteversion as trialed for stability.  The  Biolox head was implanted after stability exam repeated on cleaned and dried gamez taper.  The hip was relocated.  The wound was was washed with Rush betadine protocol and then irrigated with antibiotic irrigating solution.  The rotators and capsule were reattached to the trochanter through drill holes.  The wound was then irrigated thoroughly and closed in layers over drained with #1 Vicryl, 2-0 Vicryl, 4-0 Monocryl and exofin glue.  A sterile dressing was applied.   There were no intraoperative complications.  Blood loss was 300 cc.  Sponge and needle counts were correct and the patient was returned to the recovery room in stable condition.    Post Op Plan:  1.)  WBAT with Posterior hip precautions  2.)  Ancef x 24 hours  3.)  DVT prophylaxis SCDs with ASA EC 325mg PO qday x 6 weeks   4.)  Keep dressing c/d/i x 2 weeks, OK to shower  5.)  Hip abduction pillow  6.)  PACU x-rays    Follow Up:  1.)  2 week wound check with AP pelvis and cross table lateral hip  2.)  8 weeks with AP pelvis and cross table lateral hip    [facility]  [facility]    Monday 1-5 PM  and Thursday 7:30-12:00 AM\  [address] [place] [address]  [phone]    [phone]    Or    Tuesday 7:30-5 PM  [address] [facility] [address]  [place], [place]      Cirilo Man M.D.

## 2020-02-26 NOTE — ANESTHESIA CARE TRANSFER NOTE
Patient: Svetlana Serrano    Procedure(s):  Right total hip arthroplasty posterior approach    Diagnosis: Osteoarthritis of right hip, unspecified osteoarthritis type [M16.11]  Diagnosis Additional Information: No value filed.    Anesthesia Type:   General, ETT     Note:  Airway :Blow-by and Face Mask  Patient transferred to:PACU  Comments: VSS, report to RN, resting comfortably, patent airway. Handoff Report: Identifed the Patient, Identified the Reponsible Provider, Reviewed the pertinent medical history, Discussed the surgical course, Reviewed Intra-OP anesthesia mangement and issues during anesthesia and Allowed opportunity for questions and acknowledgement of understanding      Vitals: (Last set prior to Anesthesia Care Transfer)    CRNA VITALS  2/26/2020 1513 - 2/26/2020 1550      2/26/2020             NIBP:  153/82    Pulse:  101    NIBP Mean:  86    SpO2:  97 %                Electronically Signed By: CAMELIA Yarbrough CRNA  February 26, 2020  3:50 PM

## 2020-02-26 NOTE — BRIEF OP NOTE
[facility]    Brief Operative Note    Pre-operative diagnosis: Osteoarthritis of right hip, unspecified osteoarthritis type [M16.11]  Post-operative diagnosis Same as pre-operative diagnosis    Procedure: Procedure(s):  Right total hip arthroplasty posterior approach  Surgeon: Surgeon(s) and Role:     * Cirilo Man MD - Primary     * Keith Aguilar PA-C - Assisting  Anesthesia: General   Estimated blood loss: 300 ml  Drains: None  Specimens: * No specimens in log *  Findings:   None.  Complications: None.  Implants:   Implant Name Type Inv. Item Serial No.  Lot No. LRB No. Used   IMP LINER HOWM ACETABULUM 36MM 50-52MM [id number] Total Joint Component/Insert IMP LINER HOWM ACETABULUM 36MM 50-52MM [id number]  [employer] [id number] Right 1   Trident II Tritanium clusterhole acetabular shell, 52mm, E Total Joint Component/Insert   [employer] [id number] Right 1   IMP STEM FEMORAL HIP STRK ACCOLADE II 132DEG  5 [id number] Total Joint Component/Insert IMP STEM FEMORAL HIP STRK ACCOLADE II 132DEG SZ [id number]  [employer] [id number] Right 1   IMP HEAD FEMORAL STRK BIOLOX DELTA CERAMIC 36MM +0MM Total Joint Component/Insert IMP HEAD FEMORAL STRK BIOLOX DELTA CERAMIC 36MM +0MM  [employer] [id number] Right 1

## 2020-02-26 NOTE — ANESTHESIA POSTPROCEDURE EVALUATION
Patient: Svetlana Serrano    Procedure(s):  Right total hip arthroplasty posterior approach    Diagnosis:Osteoarthritis of right hip, unspecified osteoarthritis type [M16.11]  Diagnosis Additional Information: No value filed.    Anesthesia Type:  General, ETT    Note:  Anesthesia Post Evaluation    Patient location during evaluation: PACU  Patient participation: Able to fully participate in evaluation  Level of consciousness: awake and alert  Pain management: adequate  Airway patency: patent  Cardiovascular status: acceptable  Respiratory status: acceptable  Hydration status: acceptable  PONV: controlled     Anesthetic complications: None          Last vitals:  Vitals:    02/26/20 1630 02/26/20 1635 02/26/20 1640   BP: (!) 142/96 (!) 130/97 (!) 139/99   Pulse:   82   Resp: 9 9 18   Temp: 98.7  F (37.1  C)     SpO2: 96% 97% 96%         Electronically Signed By: Brennen Pantoja MD  February 26, 2020  5:02 PM

## 2020-02-26 NOTE — ANESTHESIA PREPROCEDURE EVALUATION
Anesthesia Pre-Procedure Evaluation    Patient: Svetlana Serrano   MRN: [id number] : 1945          Preoperative Diagnosis: Osteoarthritis of right hip, unspecified osteoarthritis type [M16.11]    Procedure(s):  Right total hip arthroplasty posterior approach (ipack if available and no narcotic in spinal)    Past Medical History:   Diagnosis Date     Arthritis     right shoulder     Back pain      Fibromyalgia      HTN (hypertension)      Mild intermittent asthma      Other chronic pain     from fibromyalgia     Restless leg      Tubular adenoma of colon 8/15     Past Surgical History:   Procedure Laterality Date     ARTHROPLASTY SHOULDER Left      C/SECTION, CLASSICAL      , Classical     CHOLECYSTECTOMY, LAPOROSCOPIC      Cholecystectomy, Laparoscopic     COLONOSCOPY N/A 10/8/2018    Procedure: COLONOSCOPY;  Colonoscopy with polypectomies;  Surgeon: Anton Stroud MD;  Location: [facility] OR     HYSTERECTOMY, JOSHUA       JOINT REPLACEMTN, KNEE RT/LT      bilateral     Anesthesia Evaluation     . Pt has had prior anesthetic. Type: General    No history of anesthetic complications          ROS/MED HX    ENT/Pulmonary:     (+)Intermittent asthma , . .    Neurologic:  - neg neurologic ROS     Cardiovascular:     (+) hypertension----. : . . . :. .       METS/Exercise Tolerance:     Hematologic:  - neg hematologic  ROS       Musculoskeletal:  - neg musculoskeletal ROS       GI/Hepatic:  - neg GI/hepatic ROS       Renal/Genitourinary:  - ROS Renal section negative       Endo:     (+) Obesity, .      Psychiatric:  - neg psychiatric ROS       Infectious Disease:  - neg infectious disease ROS       Malignancy:      - no malignancy   Other:    - neg other ROS                      Physical Exam  Normal systems: cardiovascular and pulmonary    Airway   Mallampati: III  TM distance: >3 FB  Neck ROM: full    Dental   (+) upper dentures and lower dentures    Cardiovascular       Pulmonary             Lab Results  "  Component Value Date    WBC 6.9 01/14/2020    HGB 16.3 (H) 02/25/2020    HCT 47.2 (H) 01/14/2020     01/14/2020     01/14/2020    POTASSIUM 3.9 02/25/2020    CHLORIDE 106 01/14/2020    CO2 30 01/14/2020    BUN 14 01/14/2020    CR 0.86 02/25/2020     (H) 01/14/2020    ELIZABETH 9.0 01/14/2020    ALT 34 05/21/2015    PTT 30 07/08/2011    INR 0.90 07/08/2011    TSH 1.21 05/05/2016       Preop Vitals  BP Readings from Last 3 Encounters:   02/26/20 (!) 160/94   02/13/20 (!) 147/93   01/14/20 136/87    Pulse Readings from Last 3 Encounters:   02/13/20 87   01/14/20 117   11/12/19 93      Resp Readings from Last 3 Encounters:   02/26/20 14   02/13/20 20   01/14/20 16    SpO2 Readings from Last 3 Encounters:   02/26/20 96%   02/13/20 96%   01/14/20 97%      Temp Readings from Last 1 Encounters:   02/26/20 97.2  F (36.2  C) (Temporal)    Ht Readings from Last 1 Encounters:   02/26/20 1.6 m (5' 3\")      Wt Readings from Last 1 Encounters:   02/26/20 110.7 kg (244 lb)    Estimated body mass index is 43.22 kg/m  as calculated from the following:    Height as of this encounter: 1.6 m (5' 3\").    Weight as of this encounter: 110.7 kg (244 lb).       Anesthesia Plan      History & Physical Review  History and physical reviewed and following examination; no interval change.    ASA Status:  3 .    NPO Status:  > 8 hours    Plan for General and ETT with Intravenous and Propofol induction. Maintenance will be Balanced.    PONV prophylaxis:  Ondansetron (or other 5HT-3) and Dexamethasone or Solumedrol       Postoperative Care  Postoperative pain management:  IV analgesics and Oral pain medications.      Consents  Anesthetic plan, risks, benefits and alternatives discussed with:  Patient or representative and Patient..                 Teo Byrd MD                    .  "

## 2020-02-27 ENCOUNTER — APPOINTMENT (OUTPATIENT)
Dept: PHYSICAL THERAPY | Facility: CLINIC | Age: 75
DRG: 470 | End: 2020-02-27
Attending: PHYSICIAN ASSISTANT
Payer: MEDICARE

## 2020-02-27 ENCOUNTER — APPOINTMENT (OUTPATIENT)
Dept: OCCUPATIONAL THERAPY | Facility: CLINIC | Age: 75
DRG: 470 | End: 2020-02-27
Attending: PHYSICIAN ASSISTANT
Payer: MEDICARE

## 2020-02-27 LAB
GLUCOSE SERPL-MCNC: 125 MG/DL (ref 70–99)
HGB BLD-MCNC: 13.4 G/DL (ref 11.7–15.7)

## 2020-02-27 PROCEDURE — 12000000 ZZH R&B MED SURG/OB

## 2020-02-27 PROCEDURE — 36415 COLL VENOUS BLD VENIPUNCTURE: CPT | Performed by: PHYSICIAN ASSISTANT

## 2020-02-27 PROCEDURE — 97162 PT EVAL MOD COMPLEX 30 MIN: CPT | Mod: GP | Performed by: PHYSICAL THERAPIST

## 2020-02-27 PROCEDURE — 99207 ZZC APP CREDIT; MD BILLING SHARED VISIT: CPT | Performed by: PHYSICIAN ASSISTANT

## 2020-02-27 PROCEDURE — 97530 THERAPEUTIC ACTIVITIES: CPT | Mod: GP | Performed by: PHYSICAL THERAPIST

## 2020-02-27 PROCEDURE — 99207 ZZC CONSULT E&M CHANGED TO INITIAL LEVEL: CPT | Performed by: INTERNAL MEDICINE

## 2020-02-27 PROCEDURE — 97535 SELF CARE MNGMENT TRAINING: CPT | Mod: GO | Performed by: STUDENT IN AN ORGANIZED HEALTH CARE EDUCATION/TRAINING PROGRAM

## 2020-02-27 PROCEDURE — 97110 THERAPEUTIC EXERCISES: CPT | Mod: GP | Performed by: PHYSICAL THERAPIST

## 2020-02-27 PROCEDURE — 25000132 ZZH RX MED GY IP 250 OP 250 PS 637: Mod: GY | Performed by: PHYSICIAN ASSISTANT

## 2020-02-27 PROCEDURE — 25800030 ZZH RX IP 258 OP 636: Performed by: PHYSICIAN ASSISTANT

## 2020-02-27 PROCEDURE — 99222 1ST HOSP IP/OBS MODERATE 55: CPT | Performed by: INTERNAL MEDICINE

## 2020-02-27 PROCEDURE — 97165 OT EVAL LOW COMPLEX 30 MIN: CPT | Mod: GO | Performed by: STUDENT IN AN ORGANIZED HEALTH CARE EDUCATION/TRAINING PROGRAM

## 2020-02-27 PROCEDURE — 25000128 H RX IP 250 OP 636: Performed by: PHYSICIAN ASSISTANT

## 2020-02-27 PROCEDURE — 97116 GAIT TRAINING THERAPY: CPT | Mod: GP | Performed by: PHYSICAL THERAPIST

## 2020-02-27 PROCEDURE — 82947 ASSAY GLUCOSE BLOOD QUANT: CPT | Performed by: PHYSICIAN ASSISTANT

## 2020-02-27 PROCEDURE — 85018 HEMOGLOBIN: CPT | Performed by: PHYSICIAN ASSISTANT

## 2020-02-27 RX ORDER — ACETAMINOPHEN 325 MG/1
650 TABLET ORAL EVERY 4 HOURS PRN
Qty: 50 TABLET | Refills: 0 | Status: SHIPPED | OUTPATIENT
Start: 2020-02-29 | End: 2020-06-26

## 2020-02-27 RX ORDER — HYDROXYZINE HYDROCHLORIDE 10 MG/1
10 TABLET, FILM COATED ORAL EVERY 6 HOURS PRN
Qty: 15 TABLET | Refills: 0 | Status: SHIPPED | OUTPATIENT
Start: 2020-02-27 | End: 2020-11-30

## 2020-02-27 RX ORDER — AMOXICILLIN 250 MG
2 CAPSULE ORAL 2 TIMES DAILY
Qty: 15 TABLET | Refills: 0 | Status: SHIPPED | OUTPATIENT
Start: 2020-02-27 | End: 2020-06-26

## 2020-02-27 RX ORDER — TRAMADOL HYDROCHLORIDE 50 MG/1
50 TABLET ORAL EVERY 6 HOURS PRN
Qty: 30 TABLET | Refills: 0 | Status: SHIPPED | OUTPATIENT
Start: 2020-02-27 | End: 2020-06-30

## 2020-02-27 RX ORDER — FLUCONAZOLE 50 MG/1
50 TABLET ORAL DAILY
Qty: 4 TABLET | Refills: 0 | Status: ON HOLD | OUTPATIENT
Start: 2020-02-28 | End: 2020-07-15

## 2020-02-27 RX ORDER — LOSARTAN POTASSIUM 50 MG/1
50 TABLET ORAL DAILY
Status: DISCONTINUED | OUTPATIENT
Start: 2020-02-27 | End: 2020-02-28 | Stop reason: HOSPADM

## 2020-02-27 RX ORDER — CODEINE PHOSPHATE AND GUAIFENESIN 10; 100 MG/5ML; MG/5ML
10 SOLUTION ORAL EVERY 4 HOURS PRN
Status: DISCONTINUED | OUTPATIENT
Start: 2020-02-27 | End: 2020-02-28 | Stop reason: HOSPADM

## 2020-02-27 RX ORDER — ALBUTEROL SULFATE 90 UG/1
2 AEROSOL, METERED RESPIRATORY (INHALATION) EVERY 6 HOURS PRN
Status: DISCONTINUED | OUTPATIENT
Start: 2020-02-27 | End: 2020-02-28 | Stop reason: HOSPADM

## 2020-02-27 RX ORDER — OXYCODONE HYDROCHLORIDE 5 MG/1
5-10 TABLET ORAL EVERY 4 HOURS PRN
Qty: 30 TABLET | Refills: 0 | Status: SHIPPED | OUTPATIENT
Start: 2020-02-27 | End: 2020-06-30

## 2020-02-27 RX ORDER — ONDANSETRON 4 MG/1
4 TABLET, ORALLY DISINTEGRATING ORAL EVERY 6 HOURS PRN
Qty: 8 TABLET | Refills: 0 | Status: SHIPPED | OUTPATIENT
Start: 2020-02-27 | End: 2020-12-17

## 2020-02-27 RX ORDER — ASPIRIN 325 MG
325 TABLET, DELAYED RELEASE (ENTERIC COATED) ORAL DAILY
Qty: 42 TABLET | Refills: 0 | Status: ON HOLD | OUTPATIENT
Start: 2020-02-28 | End: 2020-07-16

## 2020-02-27 RX ORDER — AMLODIPINE BESYLATE 5 MG/1
5 TABLET ORAL DAILY
Status: DISCONTINUED | OUTPATIENT
Start: 2020-02-28 | End: 2020-02-28 | Stop reason: HOSPADM

## 2020-02-27 RX ADMIN — MICONAZOLE NITRATE: 20 POWDER TOPICAL at 23:12

## 2020-02-27 RX ADMIN — FLUCONAZOLE 50 MG: 50 TABLET ORAL at 07:37

## 2020-02-27 RX ADMIN — DULOXETINE HYDROCHLORIDE 60 MG: 30 CAPSULE, DELAYED RELEASE ORAL at 19:19

## 2020-02-27 RX ADMIN — SENNOSIDES AND DOCUSATE SODIUM 2 TABLET: 8.6; 5 TABLET ORAL at 07:37

## 2020-02-27 RX ADMIN — AMLODIPINE BESYLATE 5 MG: 5 TABLET ORAL at 07:37

## 2020-02-27 RX ADMIN — HYDROMORPHONE HYDROCHLORIDE 0.2 MG: 1 INJECTION, SOLUTION INTRAMUSCULAR; INTRAVENOUS; SUBCUTANEOUS at 05:54

## 2020-02-27 RX ADMIN — OXYCODONE HYDROCHLORIDE 10 MG: 5 TABLET ORAL at 16:13

## 2020-02-27 RX ADMIN — ACETAMINOPHEN 975 MG: 325 TABLET, FILM COATED ORAL at 01:46

## 2020-02-27 RX ADMIN — HYDROXYZINE HYDROCHLORIDE 10 MG: 10 TABLET, FILM COATED ORAL at 17:57

## 2020-02-27 RX ADMIN — OXYCODONE HYDROCHLORIDE 5 MG: 5 TABLET ORAL at 00:05

## 2020-02-27 RX ADMIN — OXYCODONE HYDROCHLORIDE 10 MG: 5 TABLET ORAL at 03:40

## 2020-02-27 RX ADMIN — MICONAZOLE NITRATE: 20 POWDER TOPICAL at 07:41

## 2020-02-27 RX ADMIN — ACETAMINOPHEN 975 MG: 325 TABLET, FILM COATED ORAL at 17:57

## 2020-02-27 RX ADMIN — DULOXETINE HYDROCHLORIDE 60 MG: 30 CAPSULE, DELAYED RELEASE ORAL at 07:37

## 2020-02-27 RX ADMIN — SENNOSIDES AND DOCUSATE SODIUM 1 TABLET: 8.6; 5 TABLET ORAL at 19:19

## 2020-02-27 RX ADMIN — OXYCODONE HYDROCHLORIDE 10 MG: 5 TABLET ORAL at 12:52

## 2020-02-27 RX ADMIN — OXYCODONE HYDROCHLORIDE 10 MG: 5 TABLET ORAL at 09:26

## 2020-02-27 RX ADMIN — OXYCODONE HYDROCHLORIDE 10 MG: 5 TABLET ORAL at 23:12

## 2020-02-27 RX ADMIN — OXYCODONE HYDROCHLORIDE 10 MG: 5 TABLET ORAL at 06:53

## 2020-02-27 RX ADMIN — ACETAMINOPHEN 975 MG: 325 TABLET, FILM COATED ORAL at 09:26

## 2020-02-27 RX ADMIN — POLYETHYLENE GLYCOL 3350 17 G: 17 POWDER, FOR SOLUTION ORAL at 07:39

## 2020-02-27 RX ADMIN — ASPIRIN 325 MG: 325 TABLET, DELAYED RELEASE ORAL at 07:37

## 2020-02-27 RX ADMIN — OXYCODONE HYDROCHLORIDE 10 MG: 5 TABLET ORAL at 19:19

## 2020-02-27 RX ADMIN — VANCOMYCIN HYDROCHLORIDE 1500 MG: 10 INJECTION, POWDER, LYOPHILIZED, FOR SOLUTION INTRAVENOUS at 00:06

## 2020-02-27 ASSESSMENT — ACTIVITIES OF DAILY LIVING (ADL)
ADLS_ACUITY_SCORE: 14
ADLS_ACUITY_SCORE: 14
ADLS_ACUITY_SCORE: 18

## 2020-02-27 NOTE — PLAN OF CARE
Evening RN  Pt A/O x4.  VSS and afebrile.  Pain managed adequately with scheduled PO Tylenol and one dose of PRN PO oxycodone (5mg) - states pain has been minimal (1-2/10 on pain scale).  CMS intact.  Dressing CDI.  Up A2 with walker and gait belt, dangled and stood at bedside this shift.  Voiding in small amounts and incontinent at baseline - purewick in place.  BS around 2300 showed 184ml.  Bowel sounds hypoactive and no gas yet, no nausea present.  Tolerating clear liquid diet well.  Plan is home on discharge.  Will continue to monitor.

## 2020-02-27 NOTE — PROGRESS NOTES
Pt arrived to floor from PACU around 1745.  Transferred from cart to bed with hovermat.  VSS and rates pain 1-2 out of 10.  Capnography reinitiated.  Pt family at bedside.  Orientated to room, call light, and staff.  Will continue to monitor.

## 2020-02-27 NOTE — PROGRESS NOTES
02/27/20 0900   Quick Adds   Type of Visit Initial PT Evaluation   Living Environment   Lives With spouse   Living Arrangements house   Home Accessibility stairs within home   Number of Stairs, Within Home, Primary other (see comments)  (13)   Stair Railings, Within Home, Primary railings on both sides of stairs   Living Environment Comment walk in shoiwer    Self-Care   Usual Activity Tolerance fair   Current Activity Tolerance poor   Regular Exercise No   Equipment Currently Used at Home grab bar, toilet;grab bar, tub/shower;cane, straight;wheelchair, power;walker, rolling   Functional Level Prior   Ambulation 1-->assistive equipment   Transferring 0-->independent   Toileting 0-->independent   Bathing 0-->independent   Communication 0-->understands/communicates without difficulty   Swallowing 0-->swallows foods/liquids without difficulty   Cognition 0 - no cognition issues reported   Fall history within last six months yes   Number of times patient has fallen within last six months 1   Which of the above functional risks had a recent onset or change? none   General Information   Onset of Illness/Injury or Date of Surgery - Date 02/26/20   Referring Physician Keith Aguilar   Patient/Family Goals Statement Pt plans to discharge to home, son is staying with her   Pertinent History of Current Problem (include personal factors and/or comorbidities that impact the POC) s/p GABRIEL, post prec   Precautions/Limitations fall precautions;right hip precautions   Weight-Bearing Status - LUE full weight-bearing   Weight-Bearing Status - RUE full weight-bearing   Weight-Bearing Status - LLE full weight-bearing   Weight-Bearing Status - RLE weight-bearing as tolerated   Cognitive Status Examination   Orientation orientation to person, place and time   Level of Consciousness alert   Follows Commands and Answers Questions 100% of the time   Personal Safety and Judgment intact   Memory intact   Pain Assessment   Patient Currently  "in Pain Yes, see Vital Sign flowsheet  (4/10)   Integumentary/Edema   Integumentary/Edema Comments R hip per surgery   Range of Motion (ROM)   ROM Comment R hip limited sp GABRIEL with pain   Strength   Strength Comments decreased R LE strength, limited by pain and deconditioning   Bed Mobility   Bed Mobility Comments bed mob with heavy use of rail and cues for precautions   Transfer Skills   Transfer Comments sit to stand with min A and FWW, cues for safety   Gait   Gait Comments amb with CGA and FWW, 10'   Balance   Balance Comments impaired standing static and dynamic balance requires B UE support due to decreased WB on R   General Therapy Interventions   Planned Therapy Interventions bed mobility training;gait training;ROM;strengthening;stretching;transfer training   Clinical Impression   Criteria for Skilled Therapeutic Intervention yes, treatment indicated   PT Diagnosis difficulty walking   Influenced by the following impairments post op pain, decreased ROM, strength, blaance, act ander, post prec   Functional limitations due to impairments impaired functional mob I and safety   Clinical Presentation Evolving/Changing   Clinical Presentation Rationale 3-5 deficts   Clinical Decision Making (Complexity) Moderate complexity   Therapy Frequency 2x/day   Predicted Duration of Therapy Intervention (days/wks) 2 days   Anticipated Discharge Disposition Home with Assist   Risk & Benefits of therapy have been explained Yes   Patient, Family & other staff in agreement with plan of care Yes   [facility] [employer] TM \"6 Clicks\"   2016, Trustees of [facility], under license to [employer].  All rights reserved.   6 Clicks Short Forms Basic Mobility Inpatient Short Form   [facility] [employer]  \"6 Clicks\" V.2 Basic Mobility Inpatient Short Form   1. Turning from your back to your side while in a flat bed without using bedrails? 3 - A Little   2. Moving from lying on your back to sitting on the side of a flat bed " without using bedrails? 3 - A Little   3. Moving to and from a bed to a chair (including a wheelchair)? 3 - A Little   4. Standing up from a chair using your arms (e.g., wheelchair, or bedside chair)? 3 - A Little   5. To walk in hospital room? 4 - None   6. Climbing 3-5 steps with a railing? 2 - A Lot   Basic Mobility Raw Score (Score out of 24.Lower scores equate to lower levels of function) 18   Total Evaluation Time   Total Evaluation Time (Minutes) 13

## 2020-02-27 NOTE — PROGRESS NOTES
Orthopedic Surgery  Svetlana TREVIÑO [facility]  2020  Admit Date:  2020  POD 1 Day Post-Op  S/P Procedure(s):  Right total hip arthroplasty posterior approach    Patient resting comfortably in bed  Pain controlled.    Tolerating oral intake.   No events overnight.   Denies chest pain or shortness of breath  Alert and orient to person, place, and time.    Vital Sign Ranges  Temperature Temp  Av.5  F (36.9  C)  Min: 97.2  F (36.2  C)  Max: 99.3  F (37.4  C)   Blood pressure Systolic (24hrs), Av , Min:99 , Max:176        Diastolic (24hrs), Av, Min:51, Max:111      Pulse Pulse  Av.4  Min: 80  Max: 90   Respirations Resp  Av.9  Min: 8  Max: 22   Pulse oximetry SpO2  Av.6 %  Min: 93 %  Max: 100 %       Dressing is clean, dry, and intact. Minimal erythema of the surrounding skin.  Bilateral calves are soft, non-tender.  Right lower extremity lower extremity is NVI.  5/5 df/pf/ehl. SILT + DP pulses    Labs:  Recent Labs   Lab Test 20  1321 20  1455 10/15/19  1205   POTASSIUM 3.9 3.6 4.1     Recent Labs   Lab Test 20  0618 20  1321 20  1455   HGB 13.4 16.3* 15.3     No results for input(s): INR in the last 62212 hours.  Recent Labs   Lab Test 20  1455          A/P: 74 year old female status post right total hip arthroplasty posterior approach. DOS: 20     1.)  WBAT with Posterior hip precautions  2.)  Ancef x 24 hours  3.)  DVT prophylaxis SCDs with ASA EC 325mg PO qday x 6 weeks   4.)  Keep dressing c/d/i x 2 weeks, OK to shower  5.)  Hip abduction pillow  6.)  PACU x-rays    Follow Up:  1.)  2 week wound check with AP pelvis and cross table lateral hip  2.)  8 weeks with AP pelvis and cross table lateral hip     Disposition   Anticipate d/c to home with  later today pending progress with PT/OT.     Keith Aguilar PA-C

## 2020-02-27 NOTE — PLAN OF CARE
Patient plan: home with A from son  Current status: PT eval completed, pt admitted for GABRIEL R,with post perc, WBAT.  Pt previously living at home with spouse and son has been staying with them and will stay with her post surgery.  Pt has splt level with 13 steps to living level with B rails, she amb with SEC at baseline for short distances and uses motorized scooter for longer and community mob.    Pt currently moving slowly, CGA and cues for bed mob with heavy use of rail, has rail at home, sit to stand withmin A and FWW, cues for precautions, amb with FWW with CGA, 95' very slow with many standing rests, some SOB, very labored and difficulty for pt. min response to cues for mechanics.  Micah LE ex with some A.  Anticipated status at discharge: Pt will need min to CGA for stairs, SBA for transfers and mob with FWW in home, A for household tasks.

## 2020-02-27 NOTE — CONSULTS
Hospitalist Consultation      Svetlana Serrano MRN# [id number]   YOB: 1945 Age: 74 year old   Date of Admission: 2020     Requesting Physician:  Lauren Cage  Reason for consult: Post-op medical management            Assessment and Plan:   This patient is a 74 year old female who is POD #1 s/p right GABRIEL.  Overall doing well, no complaints. Pain controlled.    1. Right GABRIEL - pain, prophylaxis, diet and PT/OT per ortho.  2. HTN - resume home meds with parameters  3. Asthma - pt has albuterol inhaler, has chronic cough as well. No wheezing on exam. Pt may use home inhaler as needed. Resume Robitussin.   4. Fibromyalgia - resume Cymbalta             History of Present Illness:   This patient is a 74 year old female who is POD #1 s/p right GABRIEL. 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 She tolerated the surgery well, pain is well controlled, has had adequate I&Os, and she is tolerating PO intake. Lee is out, purwick in place with adequate output, she is not passing gas yet or had a BM yet. She denies fever, chills, chest pain, SOB, abdominal pain, nausea, vomiting, or diarrhea. She does note a chronic cough and becomes dizzy and SOB with coughing fits. She also has a PMHx of HTN, asthma and fibromyalgia.              Past Medical History:     Past Medical History:   Diagnosis Date     Arthritis     right shoulder     Back pain      Fibromyalgia      HTN (hypertension)      Mild intermittent asthma      Other chronic pain     from fibromyalgia     Restless leg      Tubular adenoma of colon 8/15               Past Surgical History:     Past Surgical History:   Procedure Laterality Date     ARTHROPLASTY SHOULDER Left      C/SECTION, CLASSICAL      , Classical     CHOLECYSTECTOMY, LAPOROSCOPIC      Cholecystectomy, Laparoscopic     COLONOSCOPY N/A 10/8/2018    Procedure:  COLONOSCOPY;  Colonoscopy with polypectomies;  Surgeon: Anton Stroud MD;  Location: [facility]     HYSTERECTOMY, JOSHUA       JOINT REPLACEMTN, KNEE RT/LT      bilateral                 Social History:     Social History     Tobacco Use     Smoking status: Former Smoker     Last attempt to quit: 1969     Years since quittin.1     Smokeless tobacco: Never Used   Substance Use Topics     Alcohol use: Yes     Alcohol/week: 0.0 standard drinks     Comment: casual     Drug use: No                 Family History:   I have reviewed this patient's family history  family history includes Family History Negative in her father and mother.            Allergies:     Allergies   Allergen Reactions     Clindamycin Shortness Of Breath     Amoxicillin      Rash.     Sulfa Drugs      Hives               Medications:     Prior to Admission medications    Medication Sig Last Dose Taking? Auth Provider   acetaminophen (TYLENOL) 325 MG tablet Take 2 tablets (650 mg) by mouth every 4 hours as needed for other (For optimal non-opioid multimodal pain management to improve pain control and physical function.)  Yes Keith Aguilar PA-C   albuterol (PROAIR HFA/PROVENTIL HFA/VENTOLIN HFA) 108 (90 BASE) MCG/ACT Inhaler Inhale 2 puffs into the lungs every 6 hours as needed for shortness of breath / dyspnea Past Month at Unknown time Yes Dolores Blanton MD   amLODIPine (NORVASC) 5 MG tablet TAKE 1 TABLET BY MOUTH  DAILY 2020 at 0700 Yes Dolores Blanton MD   aspirin (ASA) 325 MG EC tablet Take 1 tablet (325 mg) by mouth daily  Yes Keith Aguilar PA-C   DULoxetine (CYMBALTA) 60 MG capsule TAKE 1 CAPSULE BY MOUTH TWO TIMES DAILY 2020 at 0700 Yes Dolores Blanton MD   fluconazole (DIFLUCAN) 50 MG tablet Take 1 tablet (50 mg) by mouth daily  Yes Keith Aguilar PA-C   guaiFENesin-codeine (ROBITUSSIN AC) 100-10 MG/5ML solution Take 10 mLs by mouth every 4 hours as needed for cough Past Month at Unknown time Yes Dolores Blanton MD  "  hydrOXYzine (ATARAX) 10 MG tablet Take 1 tablet (10 mg) by mouth every 6 hours as needed for other (adjuvant pain)  Yes Keith Aguilar PA-C   LORazepam (ATIVAN) 0.5 MG tablet TAKE 1 po q8 hours prn anxiety  Patient taking differently: Take 0.5 mg by mouth every 8 hours as needed for anxiety TAKE 1 po q8 hours prn anxiety Past Week at Unknown time Yes Dolores Blanton MD   losartan (COZAAR) 50 MG tablet Take 1 tablet (50 mg) by mouth daily Past Week at Unknown time Yes Dolores Blanton MD   ondansetron (ZOFRAN-ODT) 4 MG ODT tab Take 1 tablet (4 mg) by mouth every 6 hours as needed for nausea or vomiting  Yes Keith Aguilar PA-C   OVER-THE-COUNTER Ideal collegen  Yes Reported, Patient   OVER-THE-COUNTER womens ultra zulema  Yes Reported, Patient   oxyCODONE (ROXICODONE) 5 MG tablet Take 1-2 tablets (5-10 mg) by mouth every 4 hours as needed for breakthrough pain or severe pain  Yes Keith Aguilar PA-C   senna-docusate (SENOKOT-S/PERICOLACE) 8.6-50 MG tablet Take 2 tablets by mouth 2 times daily  Yes Keith Aguilar PA-C   traMADol (ULTRAM) 50 MG tablet Take 1 tablet (50 mg) by mouth every 6 hours as needed for moderate pain  Yes Keith Aguilar PA-C   BIOTIN PO Take by mouth daily More than a month at Unknown time  Reported, Patient               Review of Systems:   A comprehensive greater than 10 system review of systems was carried out.  Pertinent positives and negatives are noted above.  Otherwise negative for contributory info.            Physical Exam:   Vitals were reviewed  Blood pressure 124/51, pulse 85, temperature 99.3  F (37.4  C), temperature source Temporal, resp. rate 18, height 1.6 m (5' 3\"), weight 110.7 kg (244 lb), SpO2 96 %.  Exam:    GENERAL:  Comfortable.  PSYCH: pleasant, oriented, No acute distress.  HEENT:  Normal conjunctiva, normal hearing, nasal mucosa and Oropharynx are normal.  NECK:  Supple, no neck vein distention  HEART:  Normal S1, S2 with no murmur, no pericardial rub, S3 or " S4.  LUNGS:  Clear to auscultation, normal Respiratory effort.  GI:  Soft, normal bowel sounds.  EXTREMITIES:  No pedal edema, +2 pulses bilateral and equal.  R hip dressing c/d/i  SKIN:  Dry to touch, No rash, wound or ulcerations.  NEUROLOGIC:  Nonfocal with normal cranial nerve and motor power and sensation.            Data:   Past 24 hours labs, studies, and imaging were reviewed.  Recent Labs   Lab 02/27/20 0618 02/25/20  1321   HGB 13.4 16.3*     Recent Labs   Lab 02/27/20 0618 02/25/20  1321   POTASSIUM  --  3.9   *  --    CR  --  0.86   GFRESTIMATED  --  67   GFRESTBLACK  --  77       Chante Arguello PA-C    Pt discussed with Dr. Vicente who agrees with the care as discussed above.

## 2020-02-27 NOTE — PLAN OF CARE
Pt A/O x4. Afebrile. Pain managed with Oxycodone and IV Dilaudid. Dressing is CDI. CMS intact. Advanced to regular diet. Intradry between abdominal folds. Incontinent at times- pure wick in place.Will continue to monitor.

## 2020-02-27 NOTE — PLAN OF CARE
"OT: Evaluation completed, treatment initiated POD#1 R GABRIEL with posterior precautions. Pt has a history of fibromyalgia and HTN    At baseline pt and  live together, one of their sons has been living with them for past ~6 months to \"help out.\" Pt reports independent with ADL/IADLs    Patient plan: home with family  Current status: Pt completed sit<>stand transfers, FWW, CGA-close SBA; pt completed functional mobility in room with FWW, CGA-close SBA; pt completed comfort height toilet transfer, FWW, grab bar, close SBA; pt required education for maintaining precautions, educated to use AE for clothing however pt requested assist; pt completed 2 standing self cares at sink, FWW, SBA with verbal instructions for FWW safety and maintaining precautions; pt transferred to chair, receptive to education on LE dressing with AE due to precautions however pt declined to complete tasks; pt quite distracted and tangential throughout session, required frequent redirection and repetition of education  Anticipated status at discharge: assist with ADLs with AE; assist for IADLs    "

## 2020-02-27 NOTE — PLAN OF CARE
Afeb, BP< 120 so cozaar held today, dressing to hip clean and dry, cms intact,  external cath removed, pt has incontinent problems, stood up to go and depends filled up and trailed urine to the BR.  Hgb 13.4 but c/o being so tired, does get dyspneic on exersion. Pain moderate except when getting into and out of bed then it spikes. Using oxycodone with good control. Pt planning on going home but probably not until tomorrow.

## 2020-02-27 NOTE — PROGRESS NOTES
02/27/20 1404   Quick Adds   Type of Visit Initial Occupational Therapy Evaluation   Living Environment   Lives With spouse   Living Arrangements house   Transportation Anticipated family or friend will provide   Living Environment Comment pt,  and son live together in house   Self-Care   Usual Activity Tolerance fair   Current Activity Tolerance fair   Regular Exercise No   Equipment Currently Used at Home cane, straight;dressing device;grab bar, toilet;grab bar, tub/shower   Functional Level   Ambulation 1-->assistive equipment   Transferring 0-->independent   Toileting 0-->independent  (comfort height toilet, also has a RTS)   Bathing 0-->independent  (step in shower)   Dressing 0-->independent   Fall history within last six months yes   Number of times patient has fallen within last six months 1   Prior Functional Level Comment pt reports independent at baseline   General Information   Onset of Illness/Injury or Date of Surgery - Date 02/26/20   Referring Physician Lauren ARTEAGA   Patient/Family Goals Statement return home   Additional Occupational Profile Info/Pertinent History of Current Problem POD#1 R GABRIEL with posterior precautions, PMHx fibromyalgia, HTN   Precautions/Limitations right hip precautions   Weight-Bearing Status - RLE weight-bearing as tolerated   Cognitive Status Examination   Orientation orientation to person, place and time   Level of Consciousness alert   Follows Commands (Cognition) repetition of directions required   Attention Distractible during evaluation;Sustained attention impaired   Pain Assessment   Patient Currently in Pain Yes, see Vital Sign flowsheet   Mobility   Bed Mobility Comments Min A-SBA   Transfer Skills   Transfer Comments FWW, CGA-close SBA   Balance   Balance Comments impaired post-op, no LOB with FWW   Toileting   Level of [place]: Toilet contact guard   Physical Assist/Nonphysical Assist: Toilet verbal cues   Grooming   Level of [place]: Grooming  "stand-by assist   Physical Assist/Nonphysical Assist: Grooming verbal cues   Instrumental Activities of Daily Living (IADL)   IADL Comments pt unclear on specific IADLs and management between her and son   Activities of Daily Living Analysis   Impairments Contributing to Impaired Activities of Daily Living balance impaired;cognition impaired;pain;post surgical precautions;strength decreased   General Therapy Interventions   Planned Therapy Interventions ADL retraining;IADL retraining;cognition;transfer training   Clinical Impression   Criteria for Skilled Therapeutic Interventions Met yes, treatment indicated   OT Diagnosis impaired ability to manage ADL/IADLs   Influenced by the following impairments post-op pain, precautions, fatigue, impaired activity tolerance, balance, cognition   Assessment of Occupational Performance 3-5 Performance Deficits   Identified Performance Deficits dressing, bathing, toileting, meals,    Clinical Decision Making (Complexity) Low complexity   Therapy Frequency Daily   Predicted Duration of Therapy Intervention (days/wks) 2 days   Anticipated Discharge Disposition Other (see comments)  (per surgical team)   Risks and Benefits of Treatment have been explained. Yes   Patient, Family & other staff in agreement with plan of care Yes   Clinical Impression Comments demonstrates ability to benefit from skilled OT services   [facility] TM \"6 Clicks\"   2016, Trustees of [facility], under license to [facility].  All rights reserved.   6 Clicks Short Forms Daily Activity Inpatient Short Form   [facility] AM-PAC  \"6 Clicks\" Daily Activity Inpatient Short Form   1. Putting on and taking off regular lower body clothing? 3 - A Little   2. Bathing (including washing, rinsing, drying)? 3 - A Little   3. Toileting, which includes using toilet, bedpan or urinal? 3 - A Little   4. Putting on and taking off regular upper body clothing? 4 - None   5. Taking care of personal " grooming such as brushing teeth? 3 - A Little   6. Eating meals? 4 - None   Daily Activity Raw Score (Score out of 24.Lower scores equate to lower levels of function) 20   Total Evaluation Time   Total Evaluation Time (Minutes) 8

## 2020-02-28 ENCOUNTER — APPOINTMENT (OUTPATIENT)
Dept: OCCUPATIONAL THERAPY | Facility: CLINIC | Age: 75
DRG: 470 | End: 2020-02-28
Attending: ORTHOPAEDIC SURGERY
Payer: MEDICARE

## 2020-02-28 ENCOUNTER — APPOINTMENT (OUTPATIENT)
Dept: PHYSICAL THERAPY | Facility: CLINIC | Age: 75
DRG: 470 | End: 2020-02-28
Attending: ORTHOPAEDIC SURGERY
Payer: MEDICARE

## 2020-02-28 VITALS
SYSTOLIC BLOOD PRESSURE: 127 MMHG | TEMPERATURE: 98.7 F | BODY MASS INDEX: 43.23 KG/M2 | DIASTOLIC BLOOD PRESSURE: 46 MMHG | HEIGHT: 63 IN | WEIGHT: 244 LBS | OXYGEN SATURATION: 94 % | HEART RATE: 94 BPM | RESPIRATION RATE: 18 BRPM

## 2020-02-28 LAB
GLUCOSE SERPL-MCNC: 108 MG/DL (ref 70–99)
HGB BLD-MCNC: 12.2 G/DL (ref 11.7–15.7)

## 2020-02-28 PROCEDURE — 97530 THERAPEUTIC ACTIVITIES: CPT | Mod: GP | Performed by: PHYSICAL THERAPIST

## 2020-02-28 PROCEDURE — 97116 GAIT TRAINING THERAPY: CPT | Mod: GP | Performed by: PHYSICAL THERAPIST

## 2020-02-28 PROCEDURE — 36415 COLL VENOUS BLD VENIPUNCTURE: CPT | Performed by: PHYSICIAN ASSISTANT

## 2020-02-28 PROCEDURE — 97535 SELF CARE MNGMENT TRAINING: CPT | Mod: GO | Performed by: REHABILITATION PRACTITIONER

## 2020-02-28 PROCEDURE — 25000132 ZZH RX MED GY IP 250 OP 250 PS 637: Mod: GY | Performed by: PHYSICIAN ASSISTANT

## 2020-02-28 PROCEDURE — 99232 SBSQ HOSP IP/OBS MODERATE 35: CPT | Performed by: INTERNAL MEDICINE

## 2020-02-28 PROCEDURE — 85018 HEMOGLOBIN: CPT | Performed by: PHYSICIAN ASSISTANT

## 2020-02-28 PROCEDURE — 82947 ASSAY GLUCOSE BLOOD QUANT: CPT | Performed by: PHYSICIAN ASSISTANT

## 2020-02-28 RX ADMIN — ACETAMINOPHEN 975 MG: 325 TABLET, FILM COATED ORAL at 01:58

## 2020-02-28 RX ADMIN — ASPIRIN 325 MG: 325 TABLET, DELAYED RELEASE ORAL at 07:40

## 2020-02-28 RX ADMIN — AMLODIPINE BESYLATE 5 MG: 5 TABLET ORAL at 07:40

## 2020-02-28 RX ADMIN — OXYCODONE HYDROCHLORIDE 10 MG: 5 TABLET ORAL at 01:58

## 2020-02-28 RX ADMIN — LOSARTAN POTASSIUM 50 MG: 50 TABLET, FILM COATED ORAL at 07:40

## 2020-02-28 RX ADMIN — OXYCODONE HYDROCHLORIDE 10 MG: 5 TABLET ORAL at 06:47

## 2020-02-28 RX ADMIN — FLUCONAZOLE 50 MG: 50 TABLET ORAL at 07:40

## 2020-02-28 RX ADMIN — ACETAMINOPHEN 975 MG: 325 TABLET, FILM COATED ORAL at 10:56

## 2020-02-28 RX ADMIN — POLYETHYLENE GLYCOL 3350 17 G: 17 POWDER, FOR SOLUTION ORAL at 07:42

## 2020-02-28 RX ADMIN — DULOXETINE HYDROCHLORIDE 60 MG: 30 CAPSULE, DELAYED RELEASE ORAL at 07:40

## 2020-02-28 RX ADMIN — SENNOSIDES AND DOCUSATE SODIUM 2 TABLET: 8.6; 5 TABLET ORAL at 07:40

## 2020-02-28 RX ADMIN — MICONAZOLE NITRATE: 20 POWDER TOPICAL at 07:44

## 2020-02-28 ASSESSMENT — ACTIVITIES OF DAILY LIVING (ADL)
ADLS_ACUITY_SCORE: 17
ADLS_ACUITY_SCORE: 17
ADLS_ACUITY_SCORE: 18
ADLS_ACUITY_SCORE: 17

## 2020-02-28 NOTE — PLAN OF CARE
Evening RN  Pt A/O x4.  VSS and afebrile.  Pain managed adequately with PRN PO oxycodone (10mg), atarax, and tylenol - reports hip pain and fibromyalgia pain in calves.  CMS intact.  Dressing CDI.  Up A1 with walker and gait belt.  Voiding adequately - incontinent of urine frequently and trying to do a toileting plan q2 hours.  Tolerating regular diet well.  Plan is home or TCU on discharge.  Will continue to monitor.

## 2020-02-28 NOTE — PLAN OF CARE
Pt A/O x4, afebrile. CMS intact. Dressing is CDI. Tolerating regular diet. Pain managed with PO meds. Up with assist of 1 with walker and gait belt. Voiding and drinking adequate. Intradry in abdominal folds. Will continue to monitor.

## 2020-02-28 NOTE — PLAN OF CARE
Patient plan: home with A from son  Current status: Patient is seated bedside in wheelchair upon arrival. Review of GABRIEL precautions x2. Performs sit <> stand x2 with FWW and SBA. Patient navigates 4 stairs up/down x2 with use of 1 railing, SEC, and CGA. Performs stairs slowly but safely and with correct technique. Patient ambulates 90' with FWW and CGA. Demonstrates slow gait speed, but does not require any rest breaks throughout ambulation. Requests to sit and be wheeled remaining 30' to room. Patient seated in wheelchair bedside at end of session with all needs in reach.   Anticipated status at discharge: Pt will need min to CGA for stairs, SBA for transfers and mob with FWW in home, A for household tasks.    Physical Therapy Discharge Summary    Reason for therapy discharge:    All goals and outcomes met, no further needs identified.    Progress towards therapy goal(s). See goals on Care Plan in [facility] electronic health record for goal details.  Goals met    Therapy recommendation(s):    No further therapy is recommended.

## 2020-02-28 NOTE — PROGRESS NOTES
Orthopedic Surgery  Svetlana TREVIÑO [facility]  2020  Admit Date:  2020  POD 2 Days Post-Op  S/P Procedure(s):  Right total hip arthroplasty.  Posterior lateral piriformis sparing approach.    Patient resting comfortably in bed  Pain controlled.    Tolerating oral intake.   No events overnight.   Denies chest pain or shortness of breath  Alert and orient to person, place, and time.    Vital Sign Ranges  Temperature Temp  Av.5  F (36.9  C)  Min: 97.2  F (36.2  C)  Max: 99.3  F (37.4  C)   Blood pressure Systolic (24hrs), Av , Min:99 , Max:176        Diastolic (24hrs), Av, Min:51, Max:111      Pulse Pulse  Av.4  Min: 80  Max: 90   Respirations Resp  Av.9  Min: 8  Max: 22   Pulse oximetry SpO2  Av.6 %  Min: 93 %  Max: 100 %       Dressing is clean, dry, and intact. Minimal erythema of the surrounding skin.  Bilateral calves are soft, non-tender.  Right lower extremity lower extremity is NVI.  5/5 df/pf/ehl. SILT + DP pulses    Labs:  Recent Labs   Lab Test 20  1321 20  1455 10/15/19  1205   POTASSIUM 3.9 3.6 4.1     Recent Labs   Lab Test 20  0618 20  1321 20  1455   HGB 13.4 16.3* 15.3     No results for input(s): INR in the last 48104 hours.  Recent Labs   Lab Test 20  1455          A/P: 74 year old female status post right total hip arthroplasty posterior approach. DOS: 20     1.)  WBAT with Posterior hip precautions  2.)  Ancef x 24 hours  3.)  DVT prophylaxis SCDs with ASA EC 325mg PO qday x 6 weeks   4.)  Keep dressing c/d/i x 2 weeks, OK to shower  5.)  Hip abduction pillow  6.)  PACU x-rays    Follow Up:  1.)  2 week wound check with AP pelvis and cross table lateral hip  2.)  8 weeks with AP pelvis and cross table lateral hip     Disposition   Anticipate d/c to home with  later today pending progress with PT/OT.     Valentino Charlton PA-C

## 2020-02-28 NOTE — PLAN OF CARE
Patient plan: home today  Current status: Patient declined to trial AE for LE dressing, reports she has a device to use at home in place of reacher that works well and she feels she will be able to maintain her precautions with. SBA, fww for sit<>stand, CGA to SBA, fww, grab bars (present at home) as needed for functional mobility in room, toilet transfers, including clothing management over hips. A provided for pericares as patient uses toileting aid at home. After instruction, patient was CGA to SBA, fww and grab bars (present at home) for walk in shower transfers. Min A with TB dressing to manage latching bra and donning clothing over feet (spouse can A at home and patient will use already owned AE for LE dressing). Educated in walker safety throughout session and reinforced at end of session, EC/WS techniques, advancement of activity following surgery, car transfers and driving readiness, patient was engaged in instruction and verbalized understanding.   Anticipated status at discharge:  SBA, fww with basic functional mobility and ADL's, min A for dressing as needed. Anticipate spouse support for IADL's; household chores, driving, errands, cooking and bathing. Patient has all needed AE. Will discharge from OT services.      Occupational Therapy Discharge Summary    Reason for therapy discharge:    All goals and outcomes met, no further needs identified.    Progress towards therapy goal(s). See goals on Care Plan in [facility] electronic health record for goal details.  Goals met    Therapy recommendation(s):    No further therapy is recommended.

## 2020-03-02 ENCOUNTER — TELEPHONE (OUTPATIENT)
Dept: INTERNAL MEDICINE | Facility: CLINIC | Age: 75
End: 2020-03-02

## 2020-03-02 DIAGNOSIS — Z92.89 HISTORY OF RECENT HOSPITALIZATION: Primary | ICD-10-CM

## 2020-03-02 NOTE — TELEPHONE ENCOUNTER
"Hospital/TCU/ED for chronic condition Discharge Protocol    \"Hi, my name is Michelle Tamez RN, a registered nurse, and I am calling from [facility].  I am calling to follow up and see how things are going for you after your recent emergency visit/hospital/TCU stay.\"    Tell me how you are doing now that you are home?\" patient states she is doing ok.  Having pain today.      Discharge Instructions    \"Let's review your discharge instructions.  What is/are the follow-up recommendations?  Pt. Response: f/u with surgeon    \"Has an appointment with your primary care provider been scheduled?\"   pt states she has an appt scheduled with ortho surgeon.    \"When you see the provider, I would recommend that you bring your medications with you.\"    Medications    \"Tell me what changed about your medicines when you discharged?\"    Changes to chronic meds?    2 or more - Epic MTM referral needed    \"What questions do you have about your medications?\"    None     New diagnoses of heart failure, COPD, diabetes, or MI?    No              Post Discharge Medication Reconciliation Status: discharge medications reconciled, continue medications without change as per discharge instructions.    Was MTM referral placed (*Make sure to put transitions as reason for referral)?   Yes - \"The Medication Therapy  will call you within the next few days to schedule an appointment with an MTM pharmacist to discuss your medicines and make sure they are working as well as they possibly can for you. This visit can be done in a [facility] or on the phone and is at no charge to you.\"    Call Summary    \"What questions or concerns do you have about your recent visit and your follow-up care?\"     none    \"If you have questions or things don't continue to improve, we encourage you contact us through the main clinic number (give number).  Even if the clinic is not open, triage nurses are available 24/7 to help you.     We " "would like you to know that our clinic has extended hours (provide information).  We also have urgent care (provide details on closest location and hours/contact info)\"      \"Thank you for your time and take care!\"             "

## 2020-03-02 NOTE — TELEPHONE ENCOUNTER
IP F/U    Date: 2-28-20  Diagnosis: osteoarthritis of R hip, unspecified osteoarthritis type, s/p total hip arthroplasty  Is patient active in care coordination? No  Was patient in TCU? No    Next 5 appointments (look out 90 days)    Mar 23, 2020  2:00 PM CDT  Return Visit with AILIN Us  [facility] ([facility] [id number] [facility] [id number] [phone]

## 2020-03-03 ENCOUNTER — TELEPHONE (OUTPATIENT)
Dept: INTERNAL MEDICINE | Facility: CLINIC | Age: 75
End: 2020-03-03

## 2020-03-03 NOTE — TELEPHONE ENCOUNTER
This sometimes can be a reaction to having had the catheter and will take some time to improve.  It can also be sometimes related to the medications.  It definitely could be related to not completely emptying the bladder.  When she goes to the bathroom, she should stand up try to shift her weight a little bit side to side back and forth and sit back down and try to go again.  She should be on a bathroom schedule every 2 hours and not wait till she needs to go the bathroom.  She should be sure she is not taking excessive amounts of fluids, for to five 8 ounce glasses of all liquids per day is adequate.  She should avoid caffeine.  If this is not resolving, she will need to see urology.

## 2020-03-03 NOTE — TELEPHONE ENCOUNTER
Patient calls to report increase urination urgency where she is unable to make it to the toilet. She had hip replacement surgery last week. Please call her back at [phone]

## 2020-03-03 NOTE — TELEPHONE ENCOUNTER
Patient had a GABRIEL on 2/26/20 and had a catheter placed while in the hospital.  Patient stated that she has been having problems with incontinence since her surgery.  Patient stated that she stands up to go to the toilet and her bladder just releases.  Patient stated that if she can make it to the toilet that she can't even get her pants down all the way and her bladder empties. Patient also stated that sometimes when she has an incontinent episode that she can also void in the toilet.  Patient denies any burning or pain with voiding and denies any fevers.      Writer is wondering if patient is emptying her bladder all the way.  Please advise, thanks.

## 2020-03-05 ENCOUNTER — TELEPHONE (OUTPATIENT)
Dept: INTERNAL MEDICINE | Facility: CLINIC | Age: 75
End: 2020-03-05

## 2020-03-05 NOTE — TELEPHONE ENCOUNTER
MTM referral from: [facility] visit (referral by provider)    MTM referral outreach attempt #2 on March 5, 2020 at 11:30 AM      Outcome: Patient not reachable after several attempts, will route to MTM Pharmacist/Provider as an FYI. Thank you for the referral.    See Yomi More[facility] Pharmacy Coordinator

## 2020-03-10 ENCOUNTER — TRANSFERRED RECORDS (OUTPATIENT)
Dept: HEALTH INFORMATION MANAGEMENT | Facility: CLINIC | Age: 75
End: 2020-03-10

## 2020-03-10 NOTE — DISCHARGE SUMMARY
Discharge Summary    Svetlana Serrano MRN# [id number]   YOB: 1945 Age: 74 year old     Date of Admission:  2/26/2020  Date of Discharge:  3/11/20  Admitting Physician:  Cirilo Man MD  Discharge Physician:  Cirilo Man MD     Primary Provider: Dolores Blanton          Admission Diagnoses:   Osteoarthritis right hip          Discharge Diagnosis:   Same           Surgical Procedure:   Total Hip arthroplasty           Discharge Disposition:     Discharged to home           Medications Prior to Admission:     No medications prior to admission.             Discharge Medications:     Discharge Medication List as of 2/28/2020 11:03 AM      START taking these medications    Details   acetaminophen (TYLENOL) 325 MG tablet Take 2 tablets (650 mg) by mouth every 4 hours as needed for other (For optimal non-opioid multimodal pain management to improve pain control and physical function.), Disp-50 tablet, R-0, Local Print      aspirin (ASA) 325 MG EC tablet Take 1 tablet (325 mg) by mouth daily, Disp-42 tablet, R-0, E-Prescribe      fluconazole (DIFLUCAN) 50 MG tablet Take 1 tablet (50 mg) by mouth daily, Disp-4 tablet, R-0, E-Prescribe      hydrOXYzine (ATARAX) 10 MG tablet Take 1 tablet (10 mg) by mouth every 6 hours as needed for other (adjuvant pain), Disp-15 tablet, R-0, E-Prescribe      ondansetron (ZOFRAN-ODT) 4 MG ODT tab Take 1 tablet (4 mg) by mouth every 6 hours as needed for nausea or vomiting, Disp-8 tablet, R-0, E-Prescribe      oxyCODONE (ROXICODONE) 5 MG tablet Take 1-2 tablets (5-10 mg) by mouth every 4 hours as needed for breakthrough pain or severe pain, Disp-30 tablet, R-0, E-Prescribe      senna-docusate (SENOKOT-S/PERICOLACE) 8.6-50 MG tablet Take 2 tablets by mouth 2 times daily, Disp-15 tablet, R-0, E-Prescribe      traMADol (ULTRAM) 50 MG tablet Take 1 tablet (50 mg) by mouth every 6 hours as needed for moderate pain, Disp-30 tablet, R-0, E-Prescribe         CONTINUE these  medications which have NOT CHANGED    Details   albuterol (PROAIR HFA/PROVENTIL HFA/VENTOLIN HFA) 108 (90 BASE) MCG/ACT Inhaler Inhale 2 puffs into the lungs every 6 hours as needed for shortness of breath / dyspnea, Disp-1 Inhaler, R-11, MIKEY, E-Prescribe### DO NOT FILL NOW.  Please update patient's profile to reflect additional refills.  ####      amLODIPine (NORVASC) 5 MG tablet TAKE 1 TABLET BY MOUTH  DAILY, Disp-90 tablet, R-1, E-Prescribe      BIOTIN PO Take by mouth daily, Historical      DULoxetine (CYMBALTA) 60 MG capsule TAKE 1 CAPSULE BY MOUTH TWO TIMES DAILY, Disp-180 capsule, R-1, E-Prescribe      guaiFENesin-codeine (ROBITUSSIN AC) 100-10 MG/5ML solution Take 10 mLs by mouth every 4 hours as needed for cough, Disp-8 oz, R-0, E-Prescribe      LORazepam (ATIVAN) 0.5 MG tablet TAKE 1 po q8 hours prn anxiety, Disp-10 tablet, R-0, E-Prescribe      losartan (COZAAR) 50 MG tablet Take 1 tablet (50 mg) by mouth daily, Disp-90 tablet, R-1, E-Prescribe### DO NOT FILL NOW.  Please update patient's profile to reflect additional refills.  ####      !! OVER-THE-COUNTER Ideal collegen, Historical      !! OVER-THE-COUNTER womens ultra zulema, Historical       !! - Potential duplicate medications found. Please discuss with provider.      STOP taking these medications       HYDROcodone-acetaminophen (NORCO) 5-325 MG tablet Comments:   Reason for Stopping:                     Consultations:     Physical therapy: assistance with ambulation and home exercise program  Occupational therapy: assistance with ADLs   Hospital Medicine: post-operative medical co-mangement.            Hospital Course:     The patient was admitted after the surgical procedure.The patient underwent an uneventful Total hip arthroplasty. Postoperatively, Aspirin was prescribed for DVT prophylaxis. Home medications have been reconciled. oxycodone 5 mg. was prescribed for pain.             Discharge Instructions and Follow-Up:          Discharge activity:  WBAT with a walker   Discharge follow-up: Follow-up with Dr. Man in ~14 days post-op. [phone]   Outpatient therapy: Should already be arranged by office.  If not, patient should call to schedule 2x/week x 3 weeks.   Home Care agency: None   Supplies and equipment: None        Wound care: Leave dressing intact until your 2 week follow-up appointment. Okay to Shower.   Other instructions: Posterior hip precautions.  No hip flexion up past 90deg.  No ADduction of the surgical leg across the midline.     Keith Aguilar PA-C

## 2020-03-12 ENCOUNTER — TELEPHONE (OUTPATIENT)
Dept: INTERNAL MEDICINE | Facility: CLINIC | Age: 75
End: 2020-03-12

## 2020-03-12 DIAGNOSIS — G89.4 CHRONIC PAIN SYNDROME: ICD-10-CM

## 2020-03-12 DIAGNOSIS — M79.7 FIBROMYALGIA: ICD-10-CM

## 2020-03-12 DIAGNOSIS — M51.369 DDD (DEGENERATIVE DISC DISEASE), LUMBAR: ICD-10-CM

## 2020-03-12 NOTE — TELEPHONE ENCOUNTER
Dr Man's office ([facility]) called to let Dr Blanton know that he will no longer be prescribing narcotics for this patient after hip replacement surgery. Patient is aware she will need to reach out to her PCP for those medications. [facility]'s phone number is [phone].

## 2020-03-13 NOTE — TELEPHONE ENCOUNTER
Please contact their office and find out what medication and what dose they are prescribing to her, when the last prescription was given and for what amount.  Find out if they have given her any kind of tapering schedule.

## 2020-03-16 NOTE — TELEPHONE ENCOUNTER
Spoke with Arabella from Dr Man's office.     No tapering schedule was given. Patient was prescribed oxycodone 5 mg, #30 tablets, with directions to take 1-2 tablets every 4 hours as needed for pain. Patient was also given Tramadol 50 mg, #30 tablets with directions take every 6 hours for pain. Both of these prescriptions were given 2/27/20 as seen in [facility].    Arabella states she also believes patient was given an prescription for Norco, but it is not seen in the system because their system was down at the time. Per , patient filled #30 tablets of Norco 5-325 mg on 3/10/20. Per , this is a 5 day supply.

## 2020-03-17 NOTE — TELEPHONE ENCOUNTER
Patient calling and requesting more pain medication. Please call her ok to call and zoe [phone]

## 2020-03-17 NOTE — TELEPHONE ENCOUNTER
Please get more, as she completely her hearing things Norco I did last month, the oxycodone the tramadol and the Norco that the surgeon gave her?  Helping much with the Norco did she take per day in the last few days?  Yes and I agree with that but I would think at this point we should probably be getting her back to her usual dose of the Norco

## 2020-03-17 NOTE — TELEPHONE ENCOUNTER
Please advise if primary care provider will be prescribing any more pain medication and which ones.  Thanks.      Patient has requested more pain medication.

## 2020-03-18 RX ORDER — HYDROCODONE BITARTRATE AND ACETAMINOPHEN 5; 325 MG/1; MG/1
1-2 TABLET ORAL EVERY 6 HOURS PRN
Qty: 180 TABLET | Refills: 0 | Status: SHIPPED | OUTPATIENT
Start: 2020-03-18 | End: 2020-04-16

## 2020-03-18 NOTE — TELEPHONE ENCOUNTER
Patient is not taking Oxycodone and Tramadol, states that these were not effective. Patient states she is taking regular dose of Narco (1-2 tablets by mouth every 6 hours) that was prescribed to her and is just finishing prescription of Narco that primary care provider prescribed last month. Patient states she only has enough for today and will need refill done ASAP. Per chart prescription was lasted prescribed  2/14/20. Medication pended, please advise,     Thank you.

## 2020-04-13 DIAGNOSIS — M51.369 DDD (DEGENERATIVE DISC DISEASE), LUMBAR: ICD-10-CM

## 2020-04-13 DIAGNOSIS — G89.4 CHRONIC PAIN SYNDROME: ICD-10-CM

## 2020-04-13 DIAGNOSIS — M79.7 FIBROMYALGIA: ICD-10-CM

## 2020-04-13 NOTE — TELEPHONE ENCOUNTER
Controlled Substance Refill Request for Norco  Problem List Complete:    No     PROVIDER TO CONSIDER COMPLETION OF PROBLEM LIST AND OVERVIEW/CONTROLLED SUBSTANCE AGREEMENT    Last Written Prescription Date:  3/18/20  Last Fill Quantity: 180,   # refills: 0    THE MOST RECENT OFFICE VISIT MUST BE WITHIN THE PAST 3 MONTHS. AT LEAST ONE FACE TO FACE VISIT MUST OCCUR EVERY 6 MONTHS. ADDITIONAL VISITS CAN BE VIRTUAL.  (THIS STATEMENT SHOULD BE DELETED.)    Last Office Visit with [facility] primary care provider: 2/13/20    Future Office visit:     Controlled substance agreement:   Encounter-Level CSA - 04/21/2016:    Controlled Substance Agreement - Scan on 4/28/2016  9:48 AM: [place] Controlled Substance Agreement     Patient-Level CSA:    There are no patient-level csa.         Last Urine Drug Screen:   Pain Drug SCR UR W RPTD Meds   Date Value Ref Range Status   05/25/2017   Final    FINAL  (Note)  ====================================================================  [facility]RE COMP DRUG ANALYSIS,UR  ====================================================================  Test                             Result       Flag       Units    Drug Present   Hydrocodone                    2505                    ng/mg creat   Hydromorphone                  162                     ng/mg creat   Dihydrocodeine                 513                     ng/mg creat   Norhydrocodone                 1605                    ng/mg creat    Sources of hydrocodone include scheduled prescription    medications. Hydromorphone, dihydrocodeine and norhydrocodone are    expected metabolites of hydrocodone. Hydromorphone and    dihydrocodeine are also available as scheduled prescription    medications.     Duloxetine                     PRESENT   Acetaminophen                  PRESENT   Naproxen                       PRESENT  ====================================================================  Test                      Result    Flag   Units      Ref  Range   Creatinine              191              mg/dL      >=20  ====================================================================  Declared Medications:  Medication list was not provided.  ====================================================================  For clinical consultation, please call [phone].  ====================================================================  Analysis performed by [employer] [place] [address]     , No results found for: COMDAT,   Cannabinoids (57-rbc-6-carboxy-9-THC)   Date Value Ref Range Status   10/15/2019 Not Detected NDET^Not Detected ng/mL Final     Comment:     Cutoff for a negative cannabinoid is 50 ng/mL or less.     Phencyclidine (Phencyclidine)   Date Value Ref Range Status   10/15/2019 Not Detected NDET^Not Detected ng/mL Final     Comment:     Cutoff for a negative PCP is 25 ng/mL or less.     Cocaine (Benzoylecgonine)   Date Value Ref Range Status   10/15/2019 Not Detected NDET^Not Detected ng/mL Final     Comment:     Cutoff for a negative cocaine is 150 ng/ml or less.     Methamphetamine (d-Methamphetamine)   Date Value Ref Range Status   10/15/2019 Not Detected NDET^Not Detected ng/mL Final     Comment:     Cutoff for a negative methamphetamine is 500 ng/ml or less.     Opiates (Morphine)   Date Value Ref Range Status   10/15/2019 Detected, Abnormal Result (A) NDET^Not Detected ng/mL Final     Comment:     Cutoff for a positive opiate is greater than 100 ng/ml.  This is an unconfirmed screening result to be used for medical purposes only.   Order [id number] for confirmation or individual confirmation tests to [employer].       Amphetamine (d-Amphetamine)   Date Value Ref Range Status   10/15/2019 Not Detected NDET^Not Detected ng/mL Final     Comment:     Cutoff for a negative amphetamine is 500 ng/mL or less.     Benzodiazepines (Nordiazepam)   Date Value Ref Range Status   10/15/2019 Detected, Abnormal Result (A) NDET^Not Detected ng/mL Final      Comment:     Cutoff for a positive benzodiazepines is greater than 150 ng/ml.  This is an unconfirmed screening result to be used for medical purposes only.   Order [id number] for confirmation or individual confirmation tests to [employer].       Tricyclic Antidepressants (Desipramine)   Date Value Ref Range Status   10/15/2019 Not Detected NDET^Not Detected ng/mL Final     Comment:     Cutoff for a negative tricyclic antidepressant is 300 ng/ml or less.     Methadone (Methadone)   Date Value Ref Range Status   10/15/2019 Not Detected NDET^Not Detected ng/mL Final     Comment:     Cutoff for a negative methadone is 200 ng/ml or less.     Barbiturates (Butalbital)   Date Value Ref Range Status   10/15/2019 Not Detected NDET^Not Detected ng/mL Final     Comment:     Cutoff for a negative barbituate is 200 ng/ml or less.     Oxycodone (Oxycodone)   Date Value Ref Range Status   10/15/2019 Not Detected NDET^Not Detected ng/mL Final     Comment:     Cutoff for a negative Oxycodone is 100 ng/mL or less.     Propoxyphene (Norpropoxyphene)   Date Value Ref Range Status   10/15/2019 Not Detected NDET^Not Detected ng/mL Final     Comment:     Cutoff for a negative propoxyphene is 300 ng/ml or less     Buprenorphine (Buprenorphine)   Date Value Ref Range Status   10/15/2019 Not Detected NDET^Not Detected ng/mL Final     Comment:     Cutoff for a negative buprenorphine is 10 ng/ml or less        Processing:  Rx to be electronically transmitted to pharmacy by provider      [email or web address]       checked in past 3 months?  No, route to RN

## 2020-04-16 RX ORDER — HYDROCODONE BITARTRATE AND ACETAMINOPHEN 5; 325 MG/1; MG/1
1-2 TABLET ORAL EVERY 6 HOURS PRN
Qty: 180 TABLET | Refills: 0 | Status: SHIPPED | OUTPATIENT
Start: 2020-04-16 | End: 2020-05-12

## 2020-04-21 ENCOUNTER — TRANSFERRED RECORDS (OUTPATIENT)
Dept: HEALTH INFORMATION MANAGEMENT | Facility: CLINIC | Age: 75
End: 2020-04-21

## 2020-05-11 DIAGNOSIS — M51.369 DDD (DEGENERATIVE DISC DISEASE), LUMBAR: ICD-10-CM

## 2020-05-11 DIAGNOSIS — G89.4 CHRONIC PAIN SYNDROME: ICD-10-CM

## 2020-05-11 DIAGNOSIS — M79.7 FIBROMYALGIA: ICD-10-CM

## 2020-05-11 NOTE — TELEPHONE ENCOUNTER
Pt calling for below refill request:    Controlled Substance Refill Request for Norco  Problem List Complete:    No     PROVIDER TO CONSIDER COMPLETION OF PROBLEM LIST AND OVERVIEW/CONTROLLED SUBSTANCE AGREEMENT    Last Written Prescription Date:  4/16/20  Last Fill Quantity: 180,   # refills: 0    THE MOST RECENT OFFICE VISIT MUST BE WITHIN THE PAST 3 MONTHS. AT LEAST ONE FACE TO FACE VISIT MUST OCCUR EVERY 6 MONTHS. ADDITIONAL VISITS CAN BE VIRTUAL.  (THIS STATEMENT SHOULD BE DELETED.)    [place] [facility] [place] 2/13/20    Future Office visit:     Controlled substance agreement:   Encounter-Level CSA - 04/21/2016:    Controlled Substance Agreement - Scan on 4/28/2016  9:48 AM: [place] Controlled Substance Agreement     Patient-Level CSA:    There are no patient-level csa.         Last Urine Drug Screen:   Pain Drug SCR UR W RPTD Meds   Date Value Ref Range Status   05/25/2017   Final    FINAL  (Note)  ====================================================================  TOX[facility]RE COMP DRUG ANALYSIS,UR  ====================================================================  Test                             Result       Flag       Units    Drug Present   Hydrocodone                    2505                    ng/mg creat   Hydromorphone                  162                     ng/mg creat   Dihydrocodeine                 513                     ng/mg creat   Norhydrocodone                 1605                    ng/mg creat    Sources of hydrocodone include scheduled prescription    medications. Hydromorphone, dihydrocodeine and norhydrocodone are    expected metabolites of hydrocodone. Hydromorphone and    dihydrocodeine are also available as scheduled prescription    medications.     Duloxetine                     PRESENT   Acetaminophen                  PRESENT   Naproxen                       PRESENT  ====================================================================  Test                       Result    Flag   Units      Ref Range   Creatinine              191              mg/dL      >=20  ====================================================================  Declared Medications:  Medication list was not provided.  ====================================================================  For clinical consultation, please call [phone].  ====================================================================  Analysis performed by [facility] [place] [address]     , No results found for: COMDAT,   Cannabinoids (10-gep-7-carboxy-9-THC)   Date Value Ref Range Status   10/15/2019 Not Detected NDET^Not Detected ng/mL Final     Comment:     Cutoff for a negative cannabinoid is 50 ng/mL or less.     Phencyclidine (Phencyclidine)   Date Value Ref Range Status   10/15/2019 Not Detected NDET^Not Detected ng/mL Final     Comment:     Cutoff for a negative PCP is 25 ng/mL or less.     Cocaine (Benzoylecgonine)   Date Value Ref Range Status   10/15/2019 Not Detected NDET^Not Detected ng/mL Final     Comment:     Cutoff for a negative cocaine is 150 ng/ml or less.     Methamphetamine (d-Methamphetamine)   Date Value Ref Range Status   10/15/2019 Not Detected NDET^Not Detected ng/mL Final     Comment:     Cutoff for a negative methamphetamine is 500 ng/ml or less.     Opiates (Morphine)   Date Value Ref Range Status   10/15/2019 Detected, Abnormal Result (A) NDET^Not Detected ng/mL Final     Comment:     Cutoff for a positive opiate is greater than 100 ng/ml.  This is an unconfirmed screening result to be used for medical purposes only.   Order [id number] for confirmation or individual confirmation tests to [employer].       Amphetamine (d-Amphetamine)   Date Value Ref Range Status   10/15/2019 Not Detected NDET^Not Detected ng/mL Final     Comment:     Cutoff for a negative amphetamine is 500 ng/mL or less.     Benzodiazepines (Nordiazepam)   Date Value Ref Range Status   10/15/2019 Detected, Abnormal  Result (A) NDET^Not Detected ng/mL Final     Comment:     Cutoff for a positive benzodiazepines is greater than 150 ng/ml.  This is an unconfirmed screening result to be used for medical purposes only.   Order [id number] for confirmation or individual confirmation tests to [employer].       Tricyclic Antidepressants (Desipramine)   Date Value Ref Range Status   10/15/2019 Not Detected NDET^Not Detected ng/mL Final     Comment:     Cutoff for a negative tricyclic antidepressant is 300 ng/ml or less.     Methadone (Methadone)   Date Value Ref Range Status   10/15/2019 Not Detected NDET^Not Detected ng/mL Final     Comment:     Cutoff for a negative methadone is 200 ng/ml or less.     Barbiturates (Butalbital)   Date Value Ref Range Status   10/15/2019 Not Detected NDET^Not Detected ng/mL Final     Comment:     Cutoff for a negative barbituate is 200 ng/ml or less.     Oxycodone (Oxycodone)   Date Value Ref Range Status   10/15/2019 Not Detected NDET^Not Detected ng/mL Final     Comment:     Cutoff for a negative Oxycodone is 100 ng/mL or less.     Propoxyphene (Norpropoxyphene)   Date Value Ref Range Status   10/15/2019 Not Detected NDET^Not Detected ng/mL Final     Comment:     Cutoff for a negative propoxyphene is 300 ng/ml or less     Buprenorphine (Buprenorphine)   Date Value Ref Range Status   10/15/2019 Not Detected NDET^Not Detected ng/mL Final     Comment:     Cutoff for a negative buprenorphine is 10 ng/ml or less        Processing:  Rx to be electronically transmitted to pharmacy by provider      [email or web address]       checked in past 3 months?  No, route to RN

## 2020-05-12 RX ORDER — HYDROCODONE BITARTRATE AND ACETAMINOPHEN 5; 325 MG/1; MG/1
1-2 TABLET ORAL EVERY 6 HOURS PRN
Qty: 180 TABLET | Refills: 0 | Status: SHIPPED | OUTPATIENT
Start: 2020-05-16 | End: 2020-06-12

## 2020-06-08 DIAGNOSIS — Z11.59 ENCOUNTER FOR SCREENING FOR OTHER VIRAL DISEASES: Primary | ICD-10-CM

## 2020-06-10 DIAGNOSIS — M51.369 DDD (DEGENERATIVE DISC DISEASE), LUMBAR: ICD-10-CM

## 2020-06-10 DIAGNOSIS — G89.4 CHRONIC PAIN SYNDROME: ICD-10-CM

## 2020-06-10 DIAGNOSIS — M79.7 FIBROMYALGIA: ICD-10-CM

## 2020-06-11 NOTE — TELEPHONE ENCOUNTER
Controlled Substance Refill Request for Norco  Problem List Complete:    No     PROVIDER TO CONSIDER COMPLETION OF PROBLEM LIST AND OVERVIEW/CONTROLLED SUBSTANCE AGREEMENT    Last Written Prescription Date:  5/16/20  Last Fill Quantity: 180,   # refills: 0    Last Office Visit with [facility] [place] [place] primary care provider: 2/13/20    Future Office visit:     Controlled substance agreement:   Encounter-Level CSA - 04/21/2016:    Controlled Substance Agreement - Scan on 4/28/2016  9:48 AM: [place] Controlled Substance Agreement     Patient-Level CSA:    There are no patient-level csa.         Last Urine Drug Screen:   Pain Drug SCR UR W RPTD Meds   Date Value Ref Range Status   05/25/2017   Final    FINAL  (Note)  ====================================================================  TOXAS[facility]RE COMP DRUG ANALYSIS,UR  ====================================================================  Test                             Result       Flag       Units    Drug Present   Hydrocodone                    2505                    ng/mg creat   Hydromorphone                  162                     ng/mg creat   Dihydrocodeine                 513                     ng/mg creat   Norhydrocodone                 1605                    ng/mg creat    Sources of hydrocodone include scheduled prescription    medications. Hydromorphone, dihydrocodeine and norhydrocodone are    expected metabolites of hydrocodone. Hydromorphone and    dihydrocodeine are also available as scheduled prescription    medications.     Duloxetine                     PRESENT   Acetaminophen                  PRESENT   Naproxen                       PRESENT  ====================================================================  Test                      Result    Flag   Units      Ref Range   Creatinine              191              mg/dL      >=20  ====================================================================  Declared Medications:  Medication list was not  provided.  ====================================================================  For clinical consultation, please call [phone].  ====================================================================  Analysis performed by [facility] [employer] [employer] [place] [address]     , No results found for: COMDAT,   Cannabinoids (86-gum-6-carboxy-9-THC)   Date Value Ref Range Status   10/15/2019 Not Detected NDET^Not Detected ng/mL Final     Comment:     Cutoff for a negative cannabinoid is 50 ng/mL or less.     Phencyclidine (Phencyclidine)   Date Value Ref Range Status   10/15/2019 Not Detected NDET^Not Detected ng/mL Final     Comment:     Cutoff for a negative PCP is 25 ng/mL or less.     Cocaine (Benzoylecgonine)   Date Value Ref Range Status   10/15/2019 Not Detected NDET^Not Detected ng/mL Final     Comment:     Cutoff for a negative cocaine is 150 ng/ml or less.     Methamphetamine (d-Methamphetamine)   Date Value Ref Range Status   10/15/2019 Not Detected NDET^Not Detected ng/mL Final     Comment:     Cutoff for a negative methamphetamine is 500 ng/ml or less.     Opiates (Morphine)   Date Value Ref Range Status   10/15/2019 Detected, Abnormal Result (A) NDET^Not Detected ng/mL Final     Comment:     Cutoff for a positive opiate is greater than 100 ng/ml.  This is an unconfirmed screening result to be used for medical purposes only.   Order [id number] for confirmation or individual confirmation tests to [employer].       Amphetamine (d-Amphetamine)   Date Value Ref Range Status   10/15/2019 Not Detected NDET^Not Detected ng/mL Final     Comment:     Cutoff for a negative amphetamine is 500 ng/mL or less.     Benzodiazepines (Nordiazepam)   Date Value Ref Range Status   10/15/2019 Detected, Abnormal Result (A) NDET^Not Detected ng/mL Final     Comment:     Cutoff for a positive benzodiazepines is greater than 150 ng/ml.  This is an unconfirmed screening result to be used for medical purposes only.   Order [id number] for  confirmation or individual confirmation tests to [employer].       Tricyclic Antidepressants (Desipramine)   Date Value Ref Range Status   10/15/2019 Not Detected NDET^Not Detected ng/mL Final     Comment:     Cutoff for a negative tricyclic antidepressant is 300 ng/ml or less.     Methadone (Methadone)   Date Value Ref Range Status   10/15/2019 Not Detected NDET^Not Detected ng/mL Final     Comment:     Cutoff for a negative methadone is 200 ng/ml or less.     Barbiturates (Butalbital)   Date Value Ref Range Status   10/15/2019 Not Detected NDET^Not Detected ng/mL Final     Comment:     Cutoff for a negative barbituate is 200 ng/ml or less.     Oxycodone (Oxycodone)   Date Value Ref Range Status   10/15/2019 Not Detected NDET^Not Detected ng/mL Final     Comment:     Cutoff for a negative Oxycodone is 100 ng/mL or less.     Propoxyphene (Norpropoxyphene)   Date Value Ref Range Status   10/15/2019 Not Detected NDET^Not Detected ng/mL Final     Comment:     Cutoff for a negative propoxyphene is 300 ng/ml or less     Buprenorphine (Buprenorphine)   Date Value Ref Range Status   10/15/2019 Not Detected NDET^Not Detected ng/mL Final     Comment:     Cutoff for a negative buprenorphine is 10 ng/ml or less         [email or web address]       checked in past 3 months?  Yes 4/15/20

## 2020-06-12 ENCOUNTER — HOSPITAL ENCOUNTER (OUTPATIENT)
Dept: MAMMOGRAPHY | Facility: CLINIC | Age: 75
Discharge: HOME | End: 2020-06-12
Attending: INTERNAL MEDICINE | Admitting: INTERNAL MEDICINE
Payer: MEDICARE

## 2020-06-12 DIAGNOSIS — Z12.31 VISIT FOR SCREENING MAMMOGRAM: ICD-10-CM

## 2020-06-12 PROCEDURE — 77067 SCR MAMMO BI INCL CAD: CPT

## 2020-06-12 RX ORDER — HYDROCODONE BITARTRATE AND ACETAMINOPHEN 5; 325 MG/1; MG/1
1-2 TABLET ORAL EVERY 6 HOURS PRN
Qty: 180 TABLET | Refills: 0 | Status: SHIPPED | OUTPATIENT
Start: 2020-06-15 | End: 2020-07-17

## 2020-06-26 ENCOUNTER — VIRTUAL VISIT (OUTPATIENT)
Dept: INTERNAL MEDICINE | Facility: CLINIC | Age: 75
End: 2020-06-26
Payer: MEDICARE

## 2020-06-26 DIAGNOSIS — R05.9 COUGH: ICD-10-CM

## 2020-06-26 DIAGNOSIS — F11.20 NARCOTIC DEPENDENCE (H): ICD-10-CM

## 2020-06-26 DIAGNOSIS — Z00.00 ENCOUNTER FOR MEDICARE ANNUAL WELLNESS EXAM: Primary | ICD-10-CM

## 2020-06-26 DIAGNOSIS — B37.2 YEAST INFECTION OF THE SKIN: ICD-10-CM

## 2020-06-26 DIAGNOSIS — E66.01 MORBID OBESITY (H): ICD-10-CM

## 2020-06-26 DIAGNOSIS — J45.20 MILD INTERMITTENT ASTHMA WITHOUT COMPLICATION: ICD-10-CM

## 2020-06-26 DIAGNOSIS — I10 BENIGN ESSENTIAL HYPERTENSION: ICD-10-CM

## 2020-06-26 PROCEDURE — 99214 OFFICE O/P EST MOD 30 MIN: CPT | Mod: 25 | Performed by: INTERNAL MEDICINE

## 2020-06-26 PROCEDURE — G0439 PPPS, SUBSEQ VISIT: HCPCS | Mod: 95 | Performed by: INTERNAL MEDICINE

## 2020-06-26 RX ORDER — CODEINE PHOSPHATE AND GUAIFENESIN 10; 100 MG/5ML; MG/5ML
2 SOLUTION ORAL EVERY 4 HOURS PRN
Qty: 8 OZ | Refills: 0 | Status: SHIPPED | OUTPATIENT
Start: 2020-06-26 | End: 2020-11-30

## 2020-06-26 NOTE — PROGRESS NOTES
"Svetlana Serrano is a 75 year old female who is being evaluated via a billable video visit.      The patient has been notified of following:     \"This video visit will be conducted via a call between you and your physician/provider. We have found that certain health care needs can be provided without the need for an in-person physical exam.  This service lets us provide the care you need with a video conversation.  If a prescription is necessary we can send it directly to your pharmacy.  If lab work is needed we can place an order for that and you can then stop by our lab to have the test done at a later time.    Video visits are billed at different rates depending on your insurance coverage.  Please reach out to your insurance provider with any questions.    If during the course of the call the physician/provider feels a video visit is not appropriate, you will not be charged for this service.\"    Patient has given verbal consent for Video visit? Yes    How would you like to obtain your AVS? Mail a copy  Patient would like the video invitation sent by: Text to cell phone: [phone]    Will anyone else be joining your video visit? No    Subjective     Svetlana Serrano is a 75 year old female who presents today via video visit for the following health issues:    HPI  Annual Wellness Visit    Are you in the first 12 months of your Medicare Part B coverage?  No    Physical Health:    In general, how would you rate your overall physical health? fair    Outside of work, how many days during the week do you exercise?none    Outside of work, approximately how many minutes a day do you exercise?not applicable    If you drink alcohol do you typically have >3 drinks per day or >7 drinks per week? No    Do you usually eat at least 4 servings of fruit and vegetables a day, include whole grains & fiber and avoid regularly eating high fat or \"junk\" foods? Yes    Do you have any problems taking medications regularly? No    Do " you have any side effects from medications? not applicable    Needs assistance for the following daily activities: no assistance needed    Which of the following safety concerns are present in your home?  none identified     Hearing impairment: No    In the past 6 months, have you been bothered by leaking of urine? no    Mental Health:    In general, how would you rate your overall mental or emotional health? excellent  PHQ-2 Score:      Do you feel safe in your environment? No    Have you ever done Advance Care Planning? (For example, a Health Directive, POLST, or a discussion with a medical provider or your loved ones about your wishes)? Yes, advance care planning is on file.    Fall risk:  Unable to complete due to virtual visit; need for additional assessment in future face-to-face visit    Cognitive Screening: Unable to complete due to virtual visit; need for additional assessment in future face-to-face visit    Do you have sleep apnea, excessive snoring or daytime drowsiness?: no    Current providers sharing in care for this patient include:   Patient Care Team:  Dolores Blanton MD as PCP - General (Internal Medicine)  Dolores Blanton MD as Assigned PCP      Video Start Time: 3:06    Hypertension Follow-up      Do you check your blood pressure regularly outside of the clinic? Yes     Are you following a low salt diet? Yes    Are your blood pressures ever more than 140 on the top number (systolic) OR more   than 90 on the bottom number (diastolic), for example 140/90? No    Asthma Follow-Up    Was ACT completed today?  No      Do you have a cough?  No    Are you experiencing any wheezing in your chest?  No    Chronic pain/narcotic dependence: She is stable with her pain medication use    Other concerns:  1.  Persistent cough: She had a significant illness January and February and has had a chronic cough since then.  She does have some clear mucus production, does have a little wheezing.  She reports no heartburn.   The inhaler does seem to help.  She would like a refill of cough syrup  2.  Urinary incontinence: This is relatively recent onset, it is primarily stress incontinence.  She does have a little nocturia.  3.  She has had recurrent yeast rash under her breasts and was given some miconazole powder which worked very well.  She would like a refill of this.  4.  She has had a red rash at her labia for some time.  It can be itchy.      Patient Active Problem List   Diagnosis     Mild intermittent asthma     Restless leg syndrome     Fibromyalgia     Advanced directives, counseling/discussion     DDD (degenerative disc disease), lumbar     Spinal stenosis     Controlled substance agreement signed     Morbid obesity (H)     Anxiety     Narcotic dependence (H)     Benign essential hypertension     CARDIOVASCULAR SCREENING; LDL GOAL LESS THAN 130     S/P total hip arthroplasty     Current Outpatient Medications   Medication Sig Dispense Refill     albuterol (PROAIR HFA/PROVENTIL HFA/VENTOLIN HFA) 108 (90 BASE) MCG/ACT Inhaler Inhale 2 puffs into the lungs every 6 hours as needed for shortness of breath / dyspnea 1 Inhaler 11     amLODIPine (NORVASC) 5 MG tablet TAKE 1 TABLET BY MOUTH  DAILY 90 tablet 1     BIOTIN PO Take by mouth daily       DULoxetine (CYMBALTA) 60 MG capsule TAKE 1 CAPSULE BY MOUTH TWO TIMES DAILY 180 capsule 1     fluconazole (DIFLUCAN) 50 MG tablet Take 1 tablet (50 mg) by mouth daily 4 tablet 0     HYDROcodone-acetaminophen (NORCO) 5-325 MG tablet Take 1-2 tablets by mouth every 6 hours as needed for pain 180 tablet 0     hydrOXYzine (ATARAX) 10 MG tablet Take 1 tablet (10 mg) by mouth every 6 hours as needed for other (adjuvant pain) 15 tablet 0     LORazepam (ATIVAN) 0.5 MG tablet TAKE 1 po q8 hours prn anxiety (Patient taking differently: Take 0.5 mg by mouth every 8 hours as needed for anxiety TAKE 1 po q8 hours prn anxiety) 10 tablet 0     losartan (COZAAR) 50 MG tablet Take 1 tablet (50 mg) by mouth  daily 90 tablet 1     naloxone (NARCAN) 4 MG/0.1ML nasal spray Spray 1 spray (4 mg) into one nostril alternating nostrils once as needed for opioid reversal every 2-3 minutes until assistance arrives 0.2 mL 0     OVER-THE-COUNTER Ideal collegen       OVER-THE-COUNTER womens ultra zulema       oxyCODONE (ROXICODONE) 5 MG tablet Take 1-2 tablets (5-10 mg) by mouth every 4 hours as needed for breakthrough pain or severe pain 30 tablet 0     traMADol (ULTRAM) 50 MG tablet Take 1 tablet (50 mg) by mouth every 6 hours as needed for moderate pain 30 tablet 0     aspirin (ASA) 325 MG EC tablet Take 1 tablet (325 mg) by mouth daily (Patient not taking: Reported on 2020) 42 tablet 0     guaiFENesin-codeine (ROBITUSSIN AC) 100-10 MG/5ML solution Take 10 mLs by mouth every 4 hours as needed for cough (Patient not taking: Reported on 2020) 8 oz 0     ondansetron (ZOFRAN-ODT) 4 MG ODT tab Take 1 tablet (4 mg) by mouth every 6 hours as needed for nausea or vomiting (Patient not taking: Reported on 2020) 8 tablet 0      Social History     Tobacco Use     Smoking status: Former Smoker     Last attempt to quit: 1969     Years since quittin.5     Smokeless tobacco: Never Used   Substance Use Topics     Alcohol use: Yes     Alcohol/week: 0.0 standard drinks     Comment: casual     Drug use: No      Reviewed and updated as needed this visit by Provider         Review of Systems   General: No fever chills  ENT: Negative  Respiratory: The cough as above  Cardiovascular: No chest pain or palpitations, syncope  GI: No acute nausea, vomiting  : As above  Musculoskeletal: She will be having hip replacement surgery coming up  Neurologic: Negative  Skin: As above      Objective             Physical Exam     GENERAL: Healthy, alert and no distress  EYES: Eyes grossly normal to inspection.  No discharge or erythema, or obvious scleral/conjunctival abnormalities.  RESP: No audible wheeze, cough, or visible cyanosis.  No  "visible retractions or increased work of breathing.    SKIN: Visible skin clear. No significant rash, abnormal pigmentation or lesions.  NEURO: Cranial nerves grossly intact.  Mentation and speech appropriate for age.  PSYCH: Mentation appears normal, affect normal/bright, judgement and insight intact, normal speech and appearance well-groomed.           Assessment & Plan     1. Encounter for Medicare annual wellness exam  Up to date    2. Narcotic dependence (H)  Stable with medication use, her prescription will be taken care of after surgery and advised that surgery will manage her medication at the time of discharge and then send her back to me for management  3. Morbid obesity (H)  Work on weight loss    4. Benign essential hypertension  Well-controlled, continue medication    5. Yeast infection of the skin  Refill powder  - miconazole (MICATIN) 2 % external powder; Apply topically daily  Dispense: 90 g; Refill: 11    6. Cough  This may be some persistent asthma  - guaiFENesin-codeine (ROBITUSSIN AC) 100-10 MG/5ML solution; Take 10 mLs by mouth every 4 hours as needed for cough  Dispense: 8 oz; Refill: 0    7. Mild intermittent asthma without complication  Recommend she use her inhaler consistently, may consider adding a steroid inhaler, will assess it at her preop in a week       BMI:   Estimated body mass index is 43.22 kg/m  as calculated from the following:    Height as of 2/26/20: 1.6 m (5' 3\").    Weight as of 2/26/20: 110.7 kg (244 lb).   Weight management plan: Discussed healthy diet and exercise guidelines            Video-Visit Details    Type of service:  Video Visit    Video End Time:3:30    Originating Location (pt. Location): Home    Distant Location (provider location):  [facility]     Platform used for Video Visit: Doximity    Return in about 6 months (around 12/26/2020).       Dolores Blanton MD      "

## 2020-07-01 PROBLEM — Z96.649 S/P TOTAL HIP ARTHROPLASTY: Status: RESOLVED | Noted: 2020-02-26 | Resolved: 2020-07-01

## 2020-07-09 ENCOUNTER — OFFICE VISIT (OUTPATIENT)
Dept: INTERNAL MEDICINE | Facility: CLINIC | Age: 75
End: 2020-07-09
Payer: MEDICARE

## 2020-07-09 VITALS
WEIGHT: 240 LBS | RESPIRATION RATE: 18 BRPM | TEMPERATURE: 98.7 F | OXYGEN SATURATION: 100 % | HEART RATE: 101 BPM | BODY MASS INDEX: 42.52 KG/M2 | SYSTOLIC BLOOD PRESSURE: 138 MMHG | HEIGHT: 63 IN | DIASTOLIC BLOOD PRESSURE: 82 MMHG

## 2020-07-09 DIAGNOSIS — F11.20 NARCOTIC DEPENDENCE (H): ICD-10-CM

## 2020-07-09 DIAGNOSIS — E66.01 MORBID OBESITY (H): ICD-10-CM

## 2020-07-09 DIAGNOSIS — M16.12 PRIMARY OSTEOARTHRITIS OF LEFT HIP: ICD-10-CM

## 2020-07-09 DIAGNOSIS — Z01.818 PREOP GENERAL PHYSICAL EXAM: Primary | ICD-10-CM

## 2020-07-09 DIAGNOSIS — J45.20 MILD INTERMITTENT ASTHMA WITHOUT COMPLICATION: ICD-10-CM

## 2020-07-09 DIAGNOSIS — M51.369 DDD (DEGENERATIVE DISC DISEASE), LUMBAR: ICD-10-CM

## 2020-07-09 DIAGNOSIS — I10 BENIGN ESSENTIAL HYPERTENSION: ICD-10-CM

## 2020-07-09 DIAGNOSIS — M79.7 FIBROMYALGIA: ICD-10-CM

## 2020-07-09 LAB
BASOPHILS # BLD AUTO: 0 10E9/L (ref 0–0.2)
BASOPHILS NFR BLD AUTO: 0.3 %
DIFFERENTIAL METHOD BLD: ABNORMAL
EOSINOPHIL # BLD AUTO: 0.2 10E9/L (ref 0–0.7)
EOSINOPHIL NFR BLD AUTO: 3.7 %
ERYTHROCYTE [DISTWIDTH] IN BLOOD BY AUTOMATED COUNT: 12.6 % (ref 10–15)
HCT VFR BLD AUTO: 45.9 % (ref 35–47)
HGB BLD-MCNC: 14.6 G/DL (ref 11.7–15.7)
LYMPHOCYTES # BLD AUTO: 1.1 10E9/L (ref 0.8–5.3)
LYMPHOCYTES NFR BLD AUTO: 19.5 %
MCH RBC QN AUTO: 32.4 PG (ref 26.5–33)
MCHC RBC AUTO-ENTMCNC: 31.8 G/DL (ref 31.5–36.5)
MCV RBC AUTO: 102 FL (ref 78–100)
MONOCYTES # BLD AUTO: 0.7 10E9/L (ref 0–1.3)
MONOCYTES NFR BLD AUTO: 11.3 %
MRSA DNA SPEC QL NAA+PROBE: NEGATIVE
NEUTROPHILS # BLD AUTO: 3.8 10E9/L (ref 1.6–8.3)
NEUTROPHILS NFR BLD AUTO: 65.2 %
PLATELET # BLD AUTO: 247 10E9/L (ref 150–450)
RBC # BLD AUTO: 4.5 10E12/L (ref 3.8–5.2)
SPECIMEN SOURCE: NORMAL
WBC # BLD AUTO: 5.8 10E9/L (ref 4–11)

## 2020-07-09 PROCEDURE — 93000 ELECTROCARDIOGRAM COMPLETE: CPT | Performed by: INTERNAL MEDICINE

## 2020-07-09 PROCEDURE — 99215 OFFICE O/P EST HI 40 MIN: CPT | Performed by: INTERNAL MEDICINE

## 2020-07-09 PROCEDURE — 85025 COMPLETE CBC W/AUTO DIFF WBC: CPT | Performed by: INTERNAL MEDICINE

## 2020-07-09 PROCEDURE — 87640 STAPH A DNA AMP PROBE: CPT | Mod: XU | Performed by: INTERNAL MEDICINE

## 2020-07-09 PROCEDURE — 87641 MR-STAPH DNA AMP PROBE: CPT | Performed by: INTERNAL MEDICINE

## 2020-07-09 PROCEDURE — 36415 COLL VENOUS BLD VENIPUNCTURE: CPT | Performed by: INTERNAL MEDICINE

## 2020-07-09 PROCEDURE — 80048 BASIC METABOLIC PNL TOTAL CA: CPT | Performed by: INTERNAL MEDICINE

## 2020-07-09 ASSESSMENT — MIFFLIN-ST. JEOR: SCORE: 1557.76

## 2020-07-09 NOTE — PROGRESS NOTES
[place] [address]  [facility] [address]  [phone]  Dept: [phone]    PRE-OP EVALUATION:  Today's date: 2020    Svetlana Serrano (: 1945) presents for pre-operative evaluation assessment as requested by Cirilo Man MD   She requires evaluation and anesthesia risk assessment prior to undergoing surgery/procedure for treatment ofLeft total hip arthroplasty with posterior approach (Spinal with no narcotic in spinal)   .          HPI:     HPI related to upcoming procedure: ***      {Ch. Problems:379676}    MEDICAL HISTORY:     Patient Active Problem List    Diagnosis Date Noted     Benign essential hypertension 07/10/2018     Priority: Medium     CARDIOVASCULAR SCREENING; LDL GOAL LESS THAN 130 07/10/2018     Priority: Medium     Narcotic dependence (H) 10/21/2017     Priority: Medium     Anxiety 2016     Priority: Medium     Morbid obesity (H) 2016     Priority: Medium     Controlled substance agreement signed 2016     Priority: Medium     Patient is followed by Data Unavailable for ongoing prescription of pain medication.  All refills should be approved by this provider, or covering partner.    Medication(s):   norco.   Maximum quantity per month: 180  Clinic visit frequency required: Q 6  months     Controlled substance agreement on file: Yes       Date(s): 16    Pain Clinic evaluation in the past: Yes       Date/Location:      DIRE Total Score(s):  No flowsheet data found.    Last [facility] website verification:  none   [email or web address]         DDD (degenerative disc disease), lumbar 2014     Priority: Medium     Spinal stenosis 2014     Priority: Medium     Advanced directives, counseling/discussion 2013     Priority: Medium     Discussed Advance Directive planning with patient; however, patient declined at this time.          Fibromyalgia 2013     Priority: Medium     Mild intermittent asthma  12/10/2012     Priority: Medium     Asthma taken care by Dr. Kristin Puckett       Restless leg syndrome 12/10/2012     Priority: Medium      Past Medical History:   Diagnosis Date     Arthritis     right shoulder     Back pain      Fibromyalgia      HTN (hypertension)      Incontinence of urine in female      Mild intermittent asthma      Other chronic pain     from fibromyalgia     Restless leg      Tubular adenoma of colon 8/15     Past Surgical History:   Procedure Laterality Date     ARTHROPLASTY HIP Right 2020    Procedure: Right total hip arthroplasty.  Posterior lateral piriformis sparing approach.;  Surgeon: Cirilo Man MD;  Location: [facility] OR     ARTHROPLASTY SHOULDER Left 2011     C/SECTION, CLASSICAL      , Classical     CHOLECYSTECTOMY, LAPOROSCOPIC      Cholecystectomy, Laparoscopic     COLONOSCOPY N/A 10/8/2018    Procedure: COLONOSCOPY;  Colonoscopy with polypectomies;  Surgeon: Anton Stroud MD;  Location: [facility] OR     HYSTERECTOMY, JOSHUA       JOINT REPLACEMTN, KNEE RT/LT      bilateral     Current Outpatient Medications   Medication Sig Dispense Refill     albuterol (PROAIR HFA/PROVENTIL HFA/VENTOLIN HFA) 108 (90 BASE) MCG/ACT Inhaler Inhale 2 puffs into the lungs every 6 hours as needed for shortness of breath / dyspnea 1 Inhaler 11     amLODIPine (NORVASC) 5 MG tablet TAKE 1 TABLET BY MOUTH  DAILY 90 tablet 1     aspirin (ASA) 325 MG EC tablet Take 1 tablet (325 mg) by mouth daily 42 tablet 0     BIOTIN PO Take by mouth daily       DULoxetine (CYMBALTA) 60 MG capsule TAKE 1 CAPSULE BY MOUTH TWO TIMES DAILY 180 capsule 1     fluconazole (DIFLUCAN) 50 MG tablet Take 1 tablet (50 mg) by mouth daily 4 tablet 0     guaiFENesin-codeine (ROBITUSSIN AC) 100-10 MG/5ML solution Take 10 mLs by mouth every 4 hours as needed for cough 8 oz 0     HYDROcodone-acetaminophen (NORCO) 5-325 MG tablet Take 1-2 tablets by mouth every 6 hours as needed for pain 180 tablet 0     hydrOXYzine (ATARAX)  "10 MG tablet Take 1 tablet (10 mg) by mouth every 6 hours as needed for other (adjuvant pain) 15 tablet 0     LORazepam (ATIVAN) 0.5 MG tablet TAKE 1 po q8 hours prn anxiety (Patient taking differently: Take 0.5 mg by mouth every 8 hours as needed for anxiety TAKE 1 po q8 hours prn anxiety) 10 tablet 0     losartan (COZAAR) 50 MG tablet Take 1 tablet (50 mg) by mouth daily 90 tablet 1     miconazole (MICATIN) 2 % external powder Apply topically daily 90 g 11     naloxone (NARCAN) 4 MG/0.1ML nasal spray Spray 1 spray (4 mg) into one nostril alternating nostrils once as needed for opioid reversal every 2-3 minutes until assistance arrives 0.2 mL 0     ondansetron (ZOFRAN-ODT) 4 MG ODT tab Take 1 tablet (4 mg) by mouth every 6 hours as needed for nausea or vomiting 8 tablet 0     OTC products: {OTC ANALGESICS:732083}    Allergies   Allergen Reactions     Clindamycin Shortness Of Breath     Erythromycin Anaphylaxis     PN: LW Reaction: ANAPHYLAXIS     Amoxicillin      Rash.     Cephalexin Hives     Doxycycline GI Disturbance and Hives     gi distress       Sulfa Drugs      Hives        Latex Allergy: {YES/NO WITH DEFAULT:126413::\"NO\"}    Social History     Tobacco Use     Smoking status: Former Smoker     Last attempt to quit: 1969     Years since quittin.5     Smokeless tobacco: Never Used   Substance Use Topics     Alcohol use: Yes     Alcohol/week: 0.0 standard drinks     Comment: casual     History   Drug Use No       REVIEW OF SYSTEMS:   {ROS Preop Choices:256446}    EXAM:   Ht 1.6 m (5' 3\")   BMI 43.22 kg/m    {EXAM Preop Choices:961817}    DIAGNOSTICS:   {DIAGNOSTIC FOR PREOP:443079}    Recent Labs   Lab Test 20  0617 20  0618 20  1321 20  1455 10/15/19  1205   HGB 12.2 13.4 16.3* 15.3  --    PLT  --   --   --  296  --    NA  --   --   --  140 137   POTASSIUM  --   --  3.9 3.6 4.1   CR  --   --  0.86 0.81 0.76        IMPRESSION:   {PREOP REASONS:074549::\"Reason for " "surgery/procedure: ***\",\"Diagnosis/reason for consult: ***\"}    The proposed surgical procedure is considered {HIGH=major cardiovascular or procedures requiring prolonged anesthesia >4 hours or large fluid shifts;    INTERMEDIATE=abdominal, most orthopedic and intrathoracic surgery; LOW= endoscopy, cataract and breast surgery:884380} risk.    REVISED CARDIAC RISK INDEX  The patient has the following serious cardiovascular risks for perioperative complications such as (MI, PE, VFib and 3  AV Block):  {PREOP REVISED CARDIAC INDEX (RCI):682778:p:\"No serious cardiac risks\"}  INTERPRETATION: {REVISED CARDIAC RISK INTERPRETATION:889423}    The patient has the following additional risks for perioperative complications:  {Additional perioperative risks:754785:p:\"No identified additional risks\"}      ICD-10-CM    1. Preop general physical exam  Z01.818 EKG 12-lead complete w/read - Clinics     Methicillin Resistant Staph Aureus PCR       RECOMMENDATIONS:     {IMPORTANT - Conditions - complete carefully!!:822906}    {IMPORTANT - Medications:286037::\"--Patient is to take all scheduled medications on the day of surgery EXCEPT for modifications listed below.\"}    {IMPORTANT - Approval:785501:p:\"APPROVAL GIVEN to proceed with proposed procedure, without further diagnostic evaluation\"}       Signed Electronically by: Beck Gardiner MD, MD    Copy of this evaluation report is provided to requesting physician.    [facility] Preop Guidelines    Revised Cardiac Risk Index  "

## 2020-07-09 NOTE — PROGRESS NOTES
[facility]  [address]  [facility] [address]  [phone]  Dept: [phone]    PRE-OP EVALUATION:  Today's date: 2020    Svetlana Serrano (: 1945) presents for pre-operative evaluation assessment as requested by Cirilo Cage MD .  She requires evaluation and anesthesia risk assessment prior to undergoing surgery/procedure for treatment ofLeft total hip arthroplasty with posterior approach (Spinal with no narcotic in spinal)   .    Fax number for surgical facility: [facility]  Primary Physician: Dolores Blanton  Type of Anesthesia Anticipated: General    Patient has a Health Care Directive or Living Will:  NO    Preop Questions 2020   Who is doing your surgery? cirilo peñaloza   What are you having done? right hip replacement   Date of Surgery/Procedure: 2020   Facility or Hospital where procedure/surgery will be performed: [facility]   1.  Do you have a history of Heart attack, stroke, stent, coronary bypass surgery, or other heart surgery? No   2.  Do you ever have any pain or discomfort in your chest? No   3.  Do you have a history of  Heart Failure? No   4.   Are you troubled by shortness of breath when:  walking on a level surface, or up a slight hill, or at night? YES - ***   5.  Do you currently have a cold, bronchitis or other respiratory infection? No   6.  Do you have a cough, shortness of breath, or wheezing? YES - ***   7.  Do you sometimes get pains in the calves of your legs when you walk? No   8. Do you or anyone in your family have previous history of blood clots? YES - ***   9.  Do you or does anyone in your family have a serious bleeding problem such as prolonged bleeding following surgeries or cuts? YES - ***   10. Have you ever had problems with anemia or been told to take iron pills? YES - ***   11. Have you had any abnormal blood loss such as black, tarry or bloody stools, or abnormal vaginal bleeding? No    12. Have you ever had a blood transfusion? No   13. Have you or any of your relatives ever had problems with anesthesia? No   14. Do you have sleep apnea, excessive snoring or daytime drowsiness? No   15. Do you have any prosthetic heart valves? No   16. Do you have prosthetic joints? YES - ***   17. Is there any chance that you may be pregnant? No         HPI:     HPI related to upcoming procedure: ***      {. Problems:330267}    MEDICAL HISTORY:     Patient Active Problem List    Diagnosis Date Noted     Benign essential hypertension 07/10/2018     Priority: Medium     CARDIOVASCULAR SCREENING; LDL GOAL LESS THAN 130 07/10/2018     Priority: Medium     Narcotic dependence (H) 10/21/2017     Priority: Medium     Anxiety 12/08/2016     Priority: Medium     Morbid obesity (H) 05/07/2016     Priority: Medium     Controlled substance agreement signed 04/25/2016     Priority: Medium     Patient is followed by Data Unavailable for ongoing prescription of pain medication.  All refills should be approved by this provider, or covering partner.    Medication(s):   norco.   Maximum quantity per month: 180  Clinic visit frequency required: Q 6  months     Controlled substance agreement on file: Yes       Date(s): 4/21/16    Pain Clinic evaluation in the past: Yes       Date/Location:      DIRE Total Score(s):  No flowsheet data found.    Last [facility] website verification:  none   [email or web address]         DDD (degenerative disc disease), lumbar 04/01/2014     Priority: Medium     Spinal stenosis 04/01/2014     Priority: Medium     Advanced directives, counseling/discussion 07/02/2013     Priority: Medium     Discussed Advance Directive planning with patient; however, patient declined at this time.          Fibromyalgia 01/24/2013     Priority: Medium     Mild intermittent asthma 12/10/2012     Priority: Medium     Asthma taken care by Dr. Kristin Puckett       Restless leg syndrome 12/10/2012     Priority: Medium       Past Medical History:   Diagnosis Date     Arthritis     right shoulder     Back pain      Fibromyalgia      HTN (hypertension)      Incontinence of urine in female      Mild intermittent asthma      Other chronic pain     from fibromyalgia     Restless leg      Tubular adenoma of colon 8/15     Past Surgical History:   Procedure Laterality Date     ARTHROPLASTY HIP Right 2020    Procedure: Right total hip arthroplasty.  Posterior lateral piriformis sparing approach.;  Surgeon: Cirilo Man MD;  Location: [facility] OR     ARTHROPLASTY SHOULDER Left      C/SECTION, CLASSICAL      , Classical     CHOLECYSTECTOMY, LAPOROSCOPIC      Cholecystectomy, Laparoscopic     COLONOSCOPY N/A 10/8/2018    Procedure: COLONOSCOPY;  Colonoscopy with polypectomies;  Surgeon: Anton Stroud MD;  Location: [facility] OR     HYSTERECTOMY, JOSHUA       JOINT REPLACEMTN, KNEE RT/LT      bilateral     Current Outpatient Medications   Medication Sig Dispense Refill     albuterol (PROAIR HFA/PROVENTIL HFA/VENTOLIN HFA) 108 (90 BASE) MCG/ACT Inhaler Inhale 2 puffs into the lungs every 6 hours as needed for shortness of breath / dyspnea 1 Inhaler 11     amLODIPine (NORVASC) 5 MG tablet TAKE 1 TABLET BY MOUTH  DAILY 90 tablet 1     aspirin (ASA) 325 MG EC tablet Take 1 tablet (325 mg) by mouth daily 42 tablet 0     BIOTIN PO Take by mouth daily       DULoxetine (CYMBALTA) 60 MG capsule TAKE 1 CAPSULE BY MOUTH TWO TIMES DAILY 180 capsule 1     fluconazole (DIFLUCAN) 50 MG tablet Take 1 tablet (50 mg) by mouth daily 4 tablet 0     guaiFENesin-codeine (ROBITUSSIN AC) 100-10 MG/5ML solution Take 10 mLs by mouth every 4 hours as needed for cough 8 oz 0     HYDROcodone-acetaminophen (NORCO) 5-325 MG tablet Take 1-2 tablets by mouth every 6 hours as needed for pain 180 tablet 0     hydrOXYzine (ATARAX) 10 MG tablet Take 1 tablet (10 mg) by mouth every 6 hours as needed for other (adjuvant pain) 15 tablet 0     LORazepam (ATIVAN) 0.5 MG  "tablet TAKE 1 po q8 hours prn anxiety (Patient taking differently: Take 0.5 mg by mouth every 8 hours as needed for anxiety TAKE 1 po q8 hours prn anxiety) 10 tablet 0     losartan (COZAAR) 50 MG tablet Take 1 tablet (50 mg) by mouth daily 90 tablet 1     miconazole (MICATIN) 2 % external powder Apply topically daily 90 g 11     naloxone (NARCAN) 4 MG/0.1ML nasal spray Spray 1 spray (4 mg) into one nostril alternating nostrils once as needed for opioid reversal every 2-3 minutes until assistance arrives 0.2 mL 0     ondansetron (ZOFRAN-ODT) 4 MG ODT tab Take 1 tablet (4 mg) by mouth every 6 hours as needed for nausea or vomiting 8 tablet 0     OTC products: ***    Allergies   Allergen Reactions     Clindamycin Shortness Of Breath     Erythromycin Anaphylaxis     PN: LW Reaction: ANAPHYLAXIS     Amoxicillin      Rash.     Cephalexin Hives     Doxycycline GI Disturbance and Hives     gi distress       Sulfa Drugs      Hives        Latex Allergy: {YES/NO WITH DEFAULT:622816::\"NO\"}    Social History     Tobacco Use     Smoking status: Former Smoker     Last attempt to quit: 1969     Years since quittin.5     Smokeless tobacco: Never Used   Substance Use Topics     Alcohol use: Yes     Alcohol/week: 0.0 standard drinks     Comment: casual     History   Drug Use No       REVIEW OF SYSTEMS:   {ROS Preop Choices:136402}    EXAM:   There were no vitals taken for this visit.  {EXAM Preop Choices:392987}    DIAGNOSTICS:   {DIAGNOSTIC FOR PREOP:289930}    Recent Labs   Lab Test 20  0617 20  0618 20  1321 20  1455 10/15/19  1205   HGB 12.2 13.4 16.3* 15.3  --    PLT  --   --   --  296  --    NA  --   --   --  140 137   POTASSIUM  --   --  3.9 3.6 4.1   CR  --   --  0.86 0.81 0.76        IMPRESSION:   {PREOP REASONS:489561::\"Reason for surgery/procedure: ***\",\"Diagnosis/reason for consult: ***\"}    The proposed surgical procedure is considered {HIGH=major cardiovascular or procedures requiring " "prolonged anesthesia >4 hours or large fluid shifts;    INTERMEDIATE=abdominal, most orthopedic and intrathoracic surgery; LOW= endoscopy, cataract and breast surgery:423602} risk.    REVISED CARDIAC RISK INDEX  The patient has the following serious cardiovascular risks for perioperative complications such as (MI, PE, VFib and 3  AV Block):  {PREOP REVISED CARDIAC INDEX (RCI):561696:p:\"No serious cardiac risks\"}  INTERPRETATION: {REVISED CARDIAC RISK INTERPRETATION:196382}    The patient has the following additional risks for perioperative complications:  {Additional perioperative risks:514380:p:\"No identified additional risks\"}      ICD-10-CM    1. Preop general physical exam  Z01.818        RECOMMENDATIONS:     {IMPORTANT - Conditions - complete carefully!!:506831}    {IMPORTANT - Medications:823748::\"--Patient is to take all scheduled medications on the day of surgery EXCEPT for modifications listed below.\"}    {IMPORTANT - Approval:580660:p:\"APPROVAL GIVEN to proceed with proposed procedure, without further diagnostic evaluation\"}       Signed Electronically by: Beck Gardiner MD, MD    Copy of this evaluation report is provided to requesting physician.    [facility] Preop Guidelines    Revised Cardiac Risk Index  "

## 2020-07-09 NOTE — PATIENT INSTRUCTIONS
Before Your Surgery      Call your surgeon if there is any change in your health. This includes signs of a cold or flu (such as a sore throat, runny nose, cough, rash or fever).    Do not smoke, drink alcohol or take over the counter medicine (unless your surgeon or primary care doctor tells you to) for the 24 hours before and after surgery.    If you take prescribed drugs: Follow your doctor s orders about which medicines to take and which to stop until after surgery.    Eating and drinking prior to surgery: follow the instructions from your surgeon    Take a shower or bath the night before surgery. Use the soap your surgeon gave you to gently clean your skin. If you do not have soap from your surgeon, use your regular soap. Do not shave or scrub the surgery site.  Wear clean pajamas and have clean sheets on your bed.   Before Your Surgery    Call your surgeon if there is any change in your health. This includes signs of a cold or flu (such as a sore throat, runny nose, cough, rash or fever).  Do not smoke, drink alcohol or take over the counter medicine (unless your surgeon or primary care doctor tells you to) for the 24 hours before and after surgery.  If you take prescribed drugs: Follow your doctor s orders about which medicines to take and which to stop until after surgery.  Eating and drinking prior to surgery: follow the instructions from your surgeon  Take a shower or bath the night before surgery. Use the soap your surgeon gave you to gently clean your skin. If you do not have soap from your surgeon, use your regular soap. Do not shave or scrub the surgery site.  Wear clean pajamas and have clean sheets on your bed.   Before Your Surgery    Call your surgeon if there is any change in your health. This includes signs of a cold or flu (such as a sore throat, runny nose, cough, rash or fever).  Do not smoke, drink alcohol or take over the counter medicine (unless your surgeon or primary care doctor tells  you to) for the 24 hours before and after surgery.  If you take prescribed drugs: Follow your doctor s orders about which medicines to take and which to stop until after surgery.  Eating and drinking prior to surgery: follow the instructions from your surgeon  Take a shower or bath the night before surgery. Use the soap your surgeon gave you to gently clean your skin. If you do not have soap from your surgeon, use your regular soap. Do not shave or scrub the surgery site.  Wear clean pajamas and have clean sheets on your bed.   Before Your Surgery    Call your surgeon if there is any change in your health. This includes signs of a cold or flu (such as a sore throat, runny nose, cough, rash or fever).  Do not smoke, drink alcohol or take over the counter medicine (unless your surgeon or primary care doctor tells you to) for the 24 hours before and after surgery.  If you take prescribed drugs: Follow your doctor s orders about which medicines to take and which to stop until after surgery.  Eating and drinking prior to surgery: follow the instructions from your surgeon  Take a shower or bath the night before surgery. Use the soap your surgeon gave you to gently clean your skin. If you do not have soap from your surgeon, use your regular soap. Do not shave or scrub the surgery site.  Wear clean pajamas and have clean sheets on your bed.       Take BP pills (amlodipine and losartan) and duloxetine the morning of surgery with sips of water.     Stop by the lab ([address]) to get preop labs done.     Everything looks fine to go ahead with surgery. If asthma/breathing worsens between now and July 15th, contact us or your surgeon.     See Dr Blanton for your next Annual Wellness Visit in July of 2021, or sooner if needed.

## 2020-07-09 NOTE — PROGRESS NOTES
[facility]  [address]  [facility] [address]  [phone]  Dept: [phone]    PRE-OP EVALUATION:  Today's date: 2020    Svetlana Serrano (: 1945) presents for pre-operative evaluation assessment as requested by .Cirilo Man MD   She requires evaluation and anesthesia risk assessment prior to undergoing surgery/procedure for treatment of LEFT HIP REPLACEMENT .    Proposed Surgery/ Procedure: left hip replacement  Date of Surgery/ Procedure: 07/15/2020  Time of Surgery/ Procedure:   Hospital/Surgical Facility: [facility]    Primary Physician: Dolores Blanton  Type of Anesthesia Anticipated: General    Patient has a Health Care Directive or Living Will:  NO    1. NO - Do you have a history of heart attack, stroke, stent, bypass or surgery on an artery in the head, neck, heart or legs?  2. NO - Do you ever have any pain or discomfort in your chest?  3. NO - Do you have a history of  Heart Failure?  4. YES - Are you troubled by shortness of breath when: walking on the level, up a slight hill or at night?  5. NO - Do you currently have a cold, bronchitis or other respiratory infection?  6. YES - Do you have a cough, shortness of breath or wheezing? Occasional cough/wheezing related to asthma  7. NO - Do you sometimes get pains in the calves of your legs when you walk?  8. YES - Do you or anyone in your family have previous history of blood clots?  9. NO - Do you or does anyone in your family have a serious bleeding problem such as prolonged bleeding following surgeries or cuts?  10. NO - Have you ever had problems with anemia or been told to take iron pills?  11. NO - Have you had any abnormal blood loss such as black, tarry or bloody stools, or abnormal vaginal bleeding?  12. NO - Have you ever had a blood transfusion?  13. NO - Have you or any of your relatives ever had problems with anesthesia?  14. NO - Do you have sleep apnea, excessive snoring or daytime  "drowsiness?  15. NO - Do you have any prosthetic heart valves?  16. YES - Do you have prosthetic joints?  17. NO - Is there any chance that you may be pregnant?      HPI:     HPI related to upcoming procedure: Patient noted a mild \"asthma attack\" last night requiring use of albuterol inhaler,which she uses infrequently. Breathing seems stable today.  No recent fever or chills or URI symptoms. Cough nonproductive.       See problem list for active medical problems.  Problems all longstanding and stable, except as noted/documented.  See ROS for pertinent symptoms related to these conditions.      MEDICAL HISTORY:     Patient Active Problem List    Diagnosis Date Noted     Benign essential hypertension 07/10/2018     Priority: Medium     CARDIOVASCULAR SCREENING; LDL GOAL LESS THAN 130 07/10/2018     Priority: Medium     Narcotic dependence (H) 10/21/2017     Priority: Medium     Anxiety 12/08/2016     Priority: Medium     Morbid obesity (H) 05/07/2016     Priority: Medium     Controlled substance agreement signed 04/25/2016     Priority: Medium     Patient is followed by Data Unavailable for ongoing prescription of pain medication.  All refills should be approved by this provider, or covering partner.    Medication(s):   norco.   Maximum quantity per month: 180  Clinic visit frequency required: Q 6  months     Controlled substance agreement on file: Yes       Date(s): 4/21/16    Pain Clinic evaluation in the past: Yes       Date/Location:      DIRE Total Score(s):  No flowsheet data found.    Last [facility] website verification:  none   [email or web address]         DDD (degenerative disc disease), lumbar 04/01/2014     Priority: Medium     Spinal stenosis 04/01/2014     Priority: Medium     Advanced directives, counseling/discussion 07/02/2013     Priority: Medium     Discussed Advance Directive planning with patient; however, patient declined at this time.          Fibromyalgia 01/24/2013     Priority: Medium     " Mild intermittent asthma 12/10/2012     Priority: Medium     Asthma taken care by Dr. Kristin Puckett       Restless leg syndrome 12/10/2012     Priority: Medium      Past Medical History:   Diagnosis Date     Arthritis     right shoulder     Back pain      Fibromyalgia      HTN (hypertension)      Incontinence of urine in female      Mild intermittent asthma      Other chronic pain     from fibromyalgia     Restless leg      Tubular adenoma of colon 8/15     Past Surgical History:   Procedure Laterality Date     ARTHROPLASTY HIP Right 2020    Procedure: Right total hip arthroplasty.  Posterior lateral piriformis sparing approach.;  Surgeon: Cirilo Man MD;  Location: [facility] OR     ARTHROPLASTY SHOULDER Left 2011     C/SECTION, CLASSICAL      , Classical     CHOLECYSTECTOMY, LAPOROSCOPIC      Cholecystectomy, Laparoscopic     COLONOSCOPY N/A 10/8/2018    Procedure: COLONOSCOPY;  Colonoscopy with polypectomies;  Surgeon: Anton Stroud MD;  Location: [facility] OR     HYSTERECTOMY, JOSHUA       JOINT REPLACEMTN, KNEE RT/LT      bilateral     Current Outpatient Medications   Medication Sig Dispense Refill     albuterol (PROAIR HFA/PROVENTIL HFA/VENTOLIN HFA) 108 (90 BASE) MCG/ACT Inhaler Inhale 2 puffs into the lungs every 6 hours as needed for shortness of breath / dyspnea 1 Inhaler 11     amLODIPine (NORVASC) 5 MG tablet TAKE 1 TABLET BY MOUTH  DAILY 90 tablet 1     aspirin (ASA) 325 MG EC tablet Take 1 tablet (325 mg) by mouth daily 42 tablet 0     BIOTIN PO Take by mouth daily       DULoxetine (CYMBALTA) 60 MG capsule TAKE 1 CAPSULE BY MOUTH TWO TIMES DAILY 180 capsule 1     fluconazole (DIFLUCAN) 50 MG tablet Take 1 tablet (50 mg) by mouth daily 4 tablet 0     guaiFENesin-codeine (ROBITUSSIN AC) 100-10 MG/5ML solution Take 10 mLs by mouth every 4 hours as needed for cough 8 oz 0     HYDROcodone-acetaminophen (NORCO) 5-325 MG tablet Take 1-2 tablets by mouth every 6 hours as needed for pain 180 tablet 0  "    hydrOXYzine (ATARAX) 10 MG tablet Take 1 tablet (10 mg) by mouth every 6 hours as needed for other (adjuvant pain) 15 tablet 0     LORazepam (ATIVAN) 0.5 MG tablet TAKE 1 po q8 hours prn anxiety (Patient taking differently: Take 0.5 mg by mouth every 8 hours as needed for anxiety TAKE 1 po q8 hours prn anxiety) 10 tablet 0     losartan (COZAAR) 50 MG tablet Take 1 tablet (50 mg) by mouth daily 90 tablet 1     miconazole (MICATIN) 2 % external powder Apply topically daily 90 g 11     naloxone (NARCAN) 4 MG/0.1ML nasal spray Spray 1 spray (4 mg) into one nostril alternating nostrils once as needed for opioid reversal every 2-3 minutes until assistance arrives 0.2 mL 0     ondansetron (ZOFRAN-ODT) 4 MG ODT tab Take 1 tablet (4 mg) by mouth every 6 hours as needed for nausea or vomiting 8 tablet 0     OTC products: none    Allergies   Allergen Reactions     Clindamycin Shortness Of Breath     Erythromycin Anaphylaxis     PN: LW Reaction: ANAPHYLAXIS     Amoxicillin      Rash.     Cephalexin Hives     Doxycycline GI Disturbance and Hives     gi distress       Sulfa Drugs      Hives        Latex Allergy: NO    Social History     Tobacco Use     Smoking status: Former Smoker     Last attempt to quit: 1969     Years since quittin.5     Smokeless tobacco: Never Used   Substance Use Topics     Alcohol use: Yes     Alcohol/week: 0.0 standard drinks     Comment: casual     History   Drug Use No       REVIEW OF SYSTEMS:   REVIEW OF SYSTEMS: The following systems have been completely reviewed and are negative except as noted above:   Constitutional, HEENT, respiratory, cardiovascular, gastrointestinal, genitourinary, musculoskeletal, dermatologic, hematologic, endocrine, psychiatric, and neurologic systems.      EXAM:   Pulse 101   Resp 18   Ht 1.6 m (5' 3\")   Wt 108.9 kg (240 lb)   SpO2 100%   BMI 42.51 kg/m    GENERAL APPEARANCE: healthy, alert and no distress  HENT: ear canals and TM's normal and nose and " mouth without ulcers or lesions  RESP: lungs clear to auscultation - no rales, rhonchi or wheezes  CV: regular rate and rhythm, normal S1 S2, no S3 or S4 and no murmur, click or rub   ABDOMEN: soft, nontender, no HSM or masses and bowel sounds normal  NEURO: Normal strength and tone, sensory exam grossly normal, mentation intact and speech normal    DIAGNOSTICS:     EKG: appears normal, NSR, Normal Sinus Rhythm, reduced voltage in precordial leads, normal axis, normal intervals, no acute ST/T changes c/w ischemia, no LVH by voltage criteria, unchanged from previous tracings  Labs Drawn and in Process:   Unresulted Labs Ordered in the Past 30 Days of this Admission     Date and Time Order Name Status Description    7/9/2020 1156 CBC WITH PLATELETS DIFFERENTIAL In process     7/9/2020 1156 BASIC METABOLIC PANEL In process           Recent Labs   Lab Test 02/28/20  0617 02/27/20  0618 02/25/20  1321 01/14/20  1455 10/15/19  1205   HGB 12.2 13.4 16.3* 15.3  --    PLT  --   --   --  296  --    NA  --   --   --  140 137   POTASSIUM  --   --  3.9 3.6 4.1   CR  --   --  0.86 0.81 0.76        IMPRESSION:   Reason for surgery/procedure: Left total hip arthroplasty   Diagnosis/reason for consult:  Preoperative risk assessment.     The proposed surgical procedure is considered INTERMEDIATE risk.    REVISED CARDIAC RISK INDEX  The patient has the following serious cardiovascular risks for perioperative complications such as (MI, PE, VFib and 3  AV Block):  No serious cardiac risks  INTERPRETATION: 0 risks: Class I (very low risk - 0.4% complication rate)    The patient has the following additional risks for perioperative complications:  Morbid obesity      ICD-10-CM    1. Preop general physical exam  Z01.818 EKG 12-lead complete w/read - Clinics     Methicillin Resistant Staph Aureus PCR     (Z01.818) Preop general physical exam  (primary encounter diagnosis)  Comment: Breathing stable.  Satisfactory operative candidate with  anesthesia as required. Patient to call our office or surgeon if cough/wheezing worsens prior to scheduled date of surgery.  Plan: EKG 12-lead complete w/read - Clinics,         Methicillin Resistant Staph Aureus PCR, Basic         metabolic panel  (Ca, Cl, CO2, Creat, Gluc, K,         Na, BUN), CBC with platelets and differential          (M16.12) Primary osteoarthritis of left hip  Comment: Reason for surgery.     (J45.20) Mild intermittent asthma without complication  Comment: Needed inhaler last night. Lungs clear today and symptoms stable.     (I10) Benign essential hypertension  Comment: BP at target. Continue current meds.     (M79.7) Fibromyalgia  (M51.36) DDD (degenerative disc disease), lumbar  (F11.20) Narcotic dependence (H)  Comment: Chronic opioid dependency, may influence pain tolerance post-op.     (E66.01) Morbid obesity (H)  Comment: Chronic condition. Patient unable to do substantial exercise.      RECOMMENDATIONS:     Patient Instructions     Take BP pills (amlodipine and losartan) and duloxetine the morning of surgery with sips of water.     Stop by the lab ([address]) to get preop labs done.     Everything looks fine to go ahead with surgery. If asthma/breathing worsens between now and July 15th, contact us or your surgeon.     See Dr Blanton for your next Annual Wellness Visit in July of 2021, or sooner if needed.        APPROVAL GIVEN to proceed with proposed procedure, without further diagnostic evaluation       Signed Electronically by: Beck Gardiner MD    Copy of this evaluation report is provided to requesting physician.    [place] Preop Guidelines    Revised Cardiac Risk Index

## 2020-07-10 LAB
ANION GAP SERPL CALCULATED.3IONS-SCNC: 5 MMOL/L (ref 3–14)
BUN SERPL-MCNC: 14 MG/DL (ref 7–30)
CALCIUM SERPL-MCNC: 8.8 MG/DL (ref 8.5–10.1)
CHLORIDE SERPL-SCNC: 107 MMOL/L (ref 94–109)
CO2 SERPL-SCNC: 28 MMOL/L (ref 20–32)
CREAT SERPL-MCNC: 0.88 MG/DL (ref 0.52–1.04)
GFR SERPL CREATININE-BSD FRML MDRD: 65 ML/MIN/{1.73_M2}
GLUCOSE SERPL-MCNC: 110 MG/DL (ref 70–99)
POTASSIUM SERPL-SCNC: 3.9 MMOL/L (ref 3.4–5.3)
SODIUM SERPL-SCNC: 140 MMOL/L (ref 133–144)

## 2020-07-13 DIAGNOSIS — Z11.59 ENCOUNTER FOR SCREENING FOR OTHER VIRAL DISEASES: ICD-10-CM

## 2020-07-13 PROCEDURE — U0003 INFECTIOUS AGENT DETECTION BY NUCLEIC ACID (DNA OR RNA); SEVERE ACUTE RESPIRATORY SYNDROME CORONAVIRUS 2 (SARS-COV-2) (CORONAVIRUS DISEASE [COVID-19]), AMPLIFIED PROBE TECHNIQUE, MAKING USE OF HIGH THROUGHPUT TECHNOLOGIES AS DESCRIBED BY CMS-2020-01-R: HCPCS | Performed by: ORTHOPAEDIC SURGERY

## 2020-07-13 PROCEDURE — 99207 ZZC NO BILLABLE SERVICE THIS VISIT: CPT

## 2020-07-14 LAB
SARS-COV-2 RNA SPEC QL NAA+PROBE: NOT DETECTED
SPECIMEN SOURCE: NORMAL

## 2020-07-15 ENCOUNTER — APPOINTMENT (OUTPATIENT)
Dept: PHYSICAL THERAPY | Facility: CLINIC | Age: 75
End: 2020-07-15
Attending: ORTHOPAEDIC SURGERY
Payer: MEDICARE

## 2020-07-15 ENCOUNTER — APPOINTMENT (OUTPATIENT)
Dept: GENERAL RADIOLOGY | Facility: CLINIC | Age: 75
End: 2020-07-15
Attending: PHYSICIAN ASSISTANT
Payer: MEDICARE

## 2020-07-15 ENCOUNTER — ANESTHESIA EVENT (OUTPATIENT)
Dept: SURGERY | Facility: CLINIC | Age: 75
End: 2020-07-15
Payer: MEDICARE

## 2020-07-15 ENCOUNTER — HOSPITAL ENCOUNTER (OUTPATIENT)
Facility: CLINIC | Age: 75
Discharge: HOME | End: 2020-07-16
Attending: ORTHOPAEDIC SURGERY | Admitting: ORTHOPAEDIC SURGERY
Payer: MEDICARE

## 2020-07-15 ENCOUNTER — ANESTHESIA (OUTPATIENT)
Dept: SURGERY | Facility: CLINIC | Age: 75
End: 2020-07-15
Payer: MEDICARE

## 2020-07-15 DIAGNOSIS — Z96.642 STATUS POST TOTAL REPLACEMENT OF LEFT HIP: ICD-10-CM

## 2020-07-15 DIAGNOSIS — Z96.642 STATUS POST TOTAL HIP REPLACEMENT, LEFT: Primary | ICD-10-CM

## 2020-07-15 PROBLEM — Z96.649 S/P TOTAL HIP ARTHROPLASTY: Status: ACTIVE | Noted: 2020-07-15

## 2020-07-15 PROCEDURE — 25000128 H RX IP 250 OP 636: Performed by: PHYSICIAN ASSISTANT

## 2020-07-15 PROCEDURE — 36000093 ZZH SURGERY LEVEL 4 1ST 30 MIN: Performed by: ORTHOPAEDIC SURGERY

## 2020-07-15 PROCEDURE — 25000128 H RX IP 250 OP 636: Performed by: ANESTHESIOLOGY

## 2020-07-15 PROCEDURE — 97161 PT EVAL LOW COMPLEX 20 MIN: CPT | Mod: GP | Performed by: PHYSICAL THERAPIST

## 2020-07-15 PROCEDURE — 27210794 ZZH OR GENERAL SUPPLY STERILE: Performed by: ORTHOPAEDIC SURGERY

## 2020-07-15 PROCEDURE — C1776 JOINT DEVICE (IMPLANTABLE): HCPCS | Performed by: ORTHOPAEDIC SURGERY

## 2020-07-15 PROCEDURE — 36000063 ZZH SURGERY LEVEL 4 EA 15 ADDTL MIN: Performed by: ORTHOPAEDIC SURGERY

## 2020-07-15 PROCEDURE — 37000009 ZZH ANESTHESIA TECHNICAL FEE, EACH ADDTL 15 MIN: Performed by: ORTHOPAEDIC SURGERY

## 2020-07-15 PROCEDURE — 40000306 ZZH STATISTIC PRE PROC ASSESS II: Performed by: ORTHOPAEDIC SURGERY

## 2020-07-15 PROCEDURE — 25000128 H RX IP 250 OP 636: Performed by: ORTHOPAEDIC SURGERY

## 2020-07-15 PROCEDURE — 25000132 ZZH RX MED GY IP 250 OP 250 PS 637: Mod: GY | Performed by: PHYSICIAN ASSISTANT

## 2020-07-15 PROCEDURE — 71000013 ZZH RECOVERY PHASE 1 LEVEL 1 EA ADDTL HR: Performed by: ORTHOPAEDIC SURGERY

## 2020-07-15 PROCEDURE — 25800030 ZZH RX IP 258 OP 636: Performed by: NURSE ANESTHETIST, CERTIFIED REGISTERED

## 2020-07-15 PROCEDURE — 40000986 XR PELVIS AND HIP PORTABLE LEFT 1 VIEW

## 2020-07-15 PROCEDURE — 97110 THERAPEUTIC EXERCISES: CPT | Mod: GP | Performed by: PHYSICAL THERAPIST

## 2020-07-15 PROCEDURE — 25800030 ZZH RX IP 258 OP 636: Performed by: ANESTHESIOLOGY

## 2020-07-15 PROCEDURE — 97530 THERAPEUTIC ACTIVITIES: CPT | Mod: GP | Performed by: PHYSICAL THERAPIST

## 2020-07-15 PROCEDURE — 25000128 H RX IP 250 OP 636: Performed by: NURSE ANESTHETIST, CERTIFIED REGISTERED

## 2020-07-15 PROCEDURE — 25800030 ZZH RX IP 258 OP 636: Performed by: PHYSICIAN ASSISTANT

## 2020-07-15 PROCEDURE — 25000125 ZZHC RX 250: Performed by: NURSE ANESTHETIST, CERTIFIED REGISTERED

## 2020-07-15 PROCEDURE — 37000008 ZZH ANESTHESIA TECHNICAL FEE, 1ST 30 MIN: Performed by: ORTHOPAEDIC SURGERY

## 2020-07-15 PROCEDURE — 71000012 ZZH RECOVERY PHASE 1 LEVEL 1 FIRST HR: Performed by: ORTHOPAEDIC SURGERY

## 2020-07-15 PROCEDURE — 97116 GAIT TRAINING THERAPY: CPT | Mod: GP | Performed by: PHYSICAL THERAPIST

## 2020-07-15 DEVICE — IMPLANTABLE DEVICE: Type: IMPLANTABLE DEVICE | Site: HIP | Status: FUNCTIONAL

## 2020-07-15 RX ORDER — CODEINE PHOSPHATE AND GUAIFENESIN 10; 100 MG/5ML; MG/5ML
10 SOLUTION ORAL EVERY 6 HOURS PRN
Status: COMPLETED | OUTPATIENT
Start: 2020-07-15 | End: 2020-07-16

## 2020-07-15 RX ORDER — SODIUM CHLORIDE, SODIUM LACTATE, POTASSIUM CHLORIDE, CALCIUM CHLORIDE 600; 310; 30; 20 MG/100ML; MG/100ML; MG/100ML; MG/100ML
INJECTION, SOLUTION INTRAVENOUS CONTINUOUS
Status: DISCONTINUED | OUTPATIENT
Start: 2020-07-15 | End: 2020-07-15 | Stop reason: HOSPADM

## 2020-07-15 RX ORDER — LABETALOL 20 MG/4 ML (5 MG/ML) INTRAVENOUS SYRINGE
10
Status: DISCONTINUED | OUTPATIENT
Start: 2020-07-15 | End: 2020-07-15 | Stop reason: HOSPADM

## 2020-07-15 RX ORDER — DIMENHYDRINATE 50 MG/ML
25 INJECTION, SOLUTION INTRAMUSCULAR; INTRAVENOUS
Status: DISCONTINUED | OUTPATIENT
Start: 2020-07-15 | End: 2020-07-15 | Stop reason: HOSPADM

## 2020-07-15 RX ORDER — FENTANYL CITRATE 50 UG/ML
INJECTION, SOLUTION INTRAMUSCULAR; INTRAVENOUS PRN
Status: DISCONTINUED | OUTPATIENT
Start: 2020-07-15 | End: 2020-07-15

## 2020-07-15 RX ORDER — PROPOFOL 10 MG/ML
INJECTION, EMULSION INTRAVENOUS PRN
Status: DISCONTINUED | OUTPATIENT
Start: 2020-07-15 | End: 2020-07-15

## 2020-07-15 RX ORDER — LABETALOL 20 MG/4 ML (5 MG/ML) INTRAVENOUS SYRINGE
PRN
Status: DISCONTINUED | OUTPATIENT
Start: 2020-07-15 | End: 2020-07-15

## 2020-07-15 RX ORDER — ONDANSETRON 2 MG/ML
INJECTION INTRAMUSCULAR; INTRAVENOUS PRN
Status: DISCONTINUED | OUTPATIENT
Start: 2020-07-15 | End: 2020-07-15

## 2020-07-15 RX ORDER — KETAMINE HYDROCHLORIDE 10 MG/ML
INJECTION INTRAMUSCULAR; INTRAVENOUS PRN
Status: DISCONTINUED | OUTPATIENT
Start: 2020-07-15 | End: 2020-07-15

## 2020-07-15 RX ORDER — NALOXONE HYDROCHLORIDE 0.4 MG/ML
.1-.4 INJECTION, SOLUTION INTRAMUSCULAR; INTRAVENOUS; SUBCUTANEOUS
Status: ACTIVE | OUTPATIENT
Start: 2020-07-15 | End: 2020-07-16

## 2020-07-15 RX ORDER — LIDOCAINE 40 MG/G
CREAM TOPICAL
Status: DISCONTINUED | OUTPATIENT
Start: 2020-07-15 | End: 2020-07-16 | Stop reason: HOSPADM

## 2020-07-15 RX ORDER — HYDROXYZINE HYDROCHLORIDE 10 MG/1
10 TABLET, FILM COATED ORAL EVERY 6 HOURS PRN
Qty: 20 TABLET | Refills: 0 | Status: SHIPPED | OUTPATIENT
Start: 2020-07-15 | End: 2023-07-31

## 2020-07-15 RX ORDER — TRAMADOL HYDROCHLORIDE 50 MG/1
50 TABLET ORAL EVERY 6 HOURS PRN
Status: DISCONTINUED | OUTPATIENT
Start: 2020-07-15 | End: 2020-07-15

## 2020-07-15 RX ORDER — NEOSTIGMINE METHYLSULFATE 1 MG/ML
VIAL (ML) INJECTION PRN
Status: DISCONTINUED | OUTPATIENT
Start: 2020-07-15 | End: 2020-07-15

## 2020-07-15 RX ORDER — HYDRALAZINE HYDROCHLORIDE 20 MG/ML
2.5-5 INJECTION INTRAMUSCULAR; INTRAVENOUS EVERY 10 MIN PRN
Status: DISCONTINUED | OUTPATIENT
Start: 2020-07-15 | End: 2020-07-15 | Stop reason: HOSPADM

## 2020-07-15 RX ORDER — GABAPENTIN 100 MG/1
CAPSULE ORAL
Qty: 9 CAPSULE | Refills: 0 | Status: SHIPPED | OUTPATIENT
Start: 2020-07-15 | End: 2020-11-30

## 2020-07-15 RX ORDER — HYDROMORPHONE HYDROCHLORIDE 1 MG/ML
0.2 INJECTION, SOLUTION INTRAMUSCULAR; INTRAVENOUS; SUBCUTANEOUS
Status: DISCONTINUED | OUTPATIENT
Start: 2020-07-15 | End: 2020-07-16 | Stop reason: HOSPADM

## 2020-07-15 RX ORDER — AMOXICILLIN 250 MG
1-2 CAPSULE ORAL 2 TIMES DAILY
Qty: 30 TABLET | Refills: 0 | Status: SHIPPED | OUTPATIENT
Start: 2020-07-15 | End: 2020-12-17

## 2020-07-15 RX ORDER — LOSARTAN POTASSIUM 50 MG/1
50 TABLET ORAL DAILY
Status: DISCONTINUED | OUTPATIENT
Start: 2020-07-16 | End: 2020-07-16 | Stop reason: HOSPADM

## 2020-07-15 RX ORDER — OXYCODONE HYDROCHLORIDE 5 MG/1
5-10 TABLET ORAL
Qty: 30 TABLET | Refills: 0 | Status: SHIPPED | OUTPATIENT
Start: 2020-07-15 | End: 2020-11-30

## 2020-07-15 RX ORDER — DULOXETIN HYDROCHLORIDE 60 MG/1
60 CAPSULE, DELAYED RELEASE ORAL 2 TIMES DAILY
Status: DISCONTINUED | OUTPATIENT
Start: 2020-07-15 | End: 2020-07-16 | Stop reason: HOSPADM

## 2020-07-15 RX ORDER — TRAMADOL HYDROCHLORIDE 50 MG/1
50 TABLET ORAL EVERY 6 HOURS PRN
Qty: 30 TABLET | Refills: 0 | Status: SHIPPED | OUTPATIENT
Start: 2020-07-15 | End: 2020-07-16

## 2020-07-15 RX ORDER — HYDROXYZINE HYDROCHLORIDE 10 MG/1
10 TABLET, FILM COATED ORAL EVERY 6 HOURS PRN
Status: DISCONTINUED | OUTPATIENT
Start: 2020-07-15 | End: 2020-07-16 | Stop reason: HOSPADM

## 2020-07-15 RX ORDER — ACETAMINOPHEN 325 MG/1
650 TABLET ORAL EVERY 4 HOURS PRN
Qty: 100 TABLET | Refills: 0 | Status: SHIPPED | OUTPATIENT
Start: 2020-07-15 | End: 2023-01-17

## 2020-07-15 RX ORDER — LIDOCAINE 40 MG/G
CREAM TOPICAL
Status: DISCONTINUED | OUTPATIENT
Start: 2020-07-15 | End: 2020-07-15 | Stop reason: HOSPADM

## 2020-07-15 RX ORDER — SODIUM CHLORIDE, SODIUM LACTATE, POTASSIUM CHLORIDE, CALCIUM CHLORIDE 600; 310; 30; 20 MG/100ML; MG/100ML; MG/100ML; MG/100ML
INJECTION, SOLUTION INTRAVENOUS CONTINUOUS
Status: DISCONTINUED | OUTPATIENT
Start: 2020-07-15 | End: 2020-07-16 | Stop reason: HOSPADM

## 2020-07-15 RX ORDER — FENTANYL CITRATE 50 UG/ML
25-50 INJECTION, SOLUTION INTRAMUSCULAR; INTRAVENOUS
Status: DISCONTINUED | OUTPATIENT
Start: 2020-07-15 | End: 2020-07-15 | Stop reason: HOSPADM

## 2020-07-15 RX ORDER — EPHEDRINE SULFATE 50 MG/ML
INJECTION, SOLUTION INTRAMUSCULAR; INTRAVENOUS; SUBCUTANEOUS PRN
Status: DISCONTINUED | OUTPATIENT
Start: 2020-07-15 | End: 2020-07-15

## 2020-07-15 RX ORDER — CODEINE PHOSPHATE AND GUAIFENESIN 10; 100 MG/5ML; MG/5ML
10 SOLUTION ORAL EVERY 6 HOURS PRN
Status: DISCONTINUED | OUTPATIENT
Start: 2020-07-15 | End: 2020-07-15

## 2020-07-15 RX ORDER — ONDANSETRON 2 MG/ML
4 INJECTION INTRAMUSCULAR; INTRAVENOUS EVERY 6 HOURS PRN
Status: DISCONTINUED | OUTPATIENT
Start: 2020-07-15 | End: 2020-07-16 | Stop reason: HOSPADM

## 2020-07-15 RX ORDER — NALOXONE HYDROCHLORIDE 0.4 MG/ML
.1-.4 INJECTION, SOLUTION INTRAMUSCULAR; INTRAVENOUS; SUBCUTANEOUS
Status: DISCONTINUED | OUTPATIENT
Start: 2020-07-15 | End: 2020-07-16 | Stop reason: HOSPADM

## 2020-07-15 RX ORDER — ONDANSETRON 4 MG/1
4-8 TABLET, ORALLY DISINTEGRATING ORAL EVERY 8 HOURS PRN
Qty: 4 TABLET | Refills: 0 | Status: SHIPPED | OUTPATIENT
Start: 2020-07-15 | End: 2020-12-17

## 2020-07-15 RX ORDER — ONDANSETRON 2 MG/ML
4 INJECTION INTRAMUSCULAR; INTRAVENOUS EVERY 30 MIN PRN
Status: DISCONTINUED | OUTPATIENT
Start: 2020-07-15 | End: 2020-07-15 | Stop reason: HOSPADM

## 2020-07-15 RX ORDER — AMLODIPINE BESYLATE 5 MG/1
5 TABLET ORAL DAILY
Status: DISCONTINUED | OUTPATIENT
Start: 2020-07-16 | End: 2020-07-16 | Stop reason: HOSPADM

## 2020-07-15 RX ORDER — DEXAMETHASONE SODIUM PHOSPHATE 4 MG/ML
INJECTION, SOLUTION INTRA-ARTICULAR; INTRALESIONAL; INTRAMUSCULAR; INTRAVENOUS; SOFT TISSUE PRN
Status: DISCONTINUED | OUTPATIENT
Start: 2020-07-15 | End: 2020-07-15

## 2020-07-15 RX ORDER — ALBUTEROL SULFATE 90 UG/1
2 AEROSOL, METERED RESPIRATORY (INHALATION) EVERY 6 HOURS PRN
Status: DISCONTINUED | OUTPATIENT
Start: 2020-07-15 | End: 2020-07-16 | Stop reason: HOSPADM

## 2020-07-15 RX ORDER — TRANEXAMIC ACID 650 MG/1
1950 TABLET ORAL ONCE
Status: COMPLETED | OUTPATIENT
Start: 2020-07-15 | End: 2020-07-15

## 2020-07-15 RX ORDER — OXYCODONE HYDROCHLORIDE 5 MG/1
5-10 TABLET ORAL
Status: DISCONTINUED | OUTPATIENT
Start: 2020-07-15 | End: 2020-07-16 | Stop reason: HOSPADM

## 2020-07-15 RX ORDER — GABAPENTIN 300 MG/1
300 CAPSULE ORAL 3 TIMES DAILY
Status: DISCONTINUED | OUTPATIENT
Start: 2020-07-15 | End: 2020-07-16 | Stop reason: HOSPADM

## 2020-07-15 RX ORDER — METHADONE HYDROCHLORIDE 10 MG/ML
10 INJECTION, SOLUTION INTRAMUSCULAR; INTRAVENOUS; SUBCUTANEOUS ONCE
Status: COMPLETED | OUTPATIENT
Start: 2020-07-15 | End: 2020-07-15

## 2020-07-15 RX ORDER — ONDANSETRON 4 MG/1
4 TABLET, ORALLY DISINTEGRATING ORAL EVERY 6 HOURS PRN
Status: DISCONTINUED | OUTPATIENT
Start: 2020-07-15 | End: 2020-07-16 | Stop reason: HOSPADM

## 2020-07-15 RX ORDER — LIDOCAINE HYDROCHLORIDE 10 MG/ML
INJECTION, SOLUTION INFILTRATION; PERINEURAL PRN
Status: DISCONTINUED | OUTPATIENT
Start: 2020-07-15 | End: 2020-07-15

## 2020-07-15 RX ORDER — ACETAMINOPHEN 325 MG/1
975 TABLET ORAL ONCE
Status: COMPLETED | OUTPATIENT
Start: 2020-07-15 | End: 2020-07-15

## 2020-07-15 RX ORDER — CALCIUM CARBONATE 500 MG/1
1000 TABLET, CHEWABLE ORAL 4 TIMES DAILY PRN
Status: DISCONTINUED | OUTPATIENT
Start: 2020-07-15 | End: 2020-07-16 | Stop reason: HOSPADM

## 2020-07-15 RX ORDER — VANCOMYCIN HYDROCHLORIDE 1 G/20ML
INJECTION, POWDER, LYOPHILIZED, FOR SOLUTION INTRAVENOUS PRN
Status: DISCONTINUED | OUTPATIENT
Start: 2020-07-15 | End: 2020-07-15 | Stop reason: HOSPADM

## 2020-07-15 RX ORDER — ONDANSETRON 4 MG/1
4 TABLET, ORALLY DISINTEGRATING ORAL EVERY 30 MIN PRN
Status: DISCONTINUED | OUTPATIENT
Start: 2020-07-15 | End: 2020-07-15 | Stop reason: HOSPADM

## 2020-07-15 RX ORDER — FLUCONAZOLE 50 MG/1
50 TABLET ORAL DAILY
Status: DISCONTINUED | OUTPATIENT
Start: 2020-07-15 | End: 2020-07-15 | Stop reason: CLARIF

## 2020-07-15 RX ORDER — GLYCOPYRROLATE 0.2 MG/ML
INJECTION, SOLUTION INTRAMUSCULAR; INTRAVENOUS PRN
Status: DISCONTINUED | OUTPATIENT
Start: 2020-07-15 | End: 2020-07-15

## 2020-07-15 RX ADMIN — PHENYLEPHRINE HYDROCHLORIDE 50 MCG: 10 INJECTION INTRAVENOUS at 09:00

## 2020-07-15 RX ADMIN — FENTANYL CITRATE 100 MCG: 50 INJECTION, SOLUTION INTRAMUSCULAR; INTRAVENOUS at 07:35

## 2020-07-15 RX ADMIN — LABETALOL 20 MG/4 ML (5 MG/ML) INTRAVENOUS SYRINGE 5 MG: at 07:49

## 2020-07-15 RX ADMIN — VANCOMYCIN HYDROCHLORIDE 1500 MG: 5 INJECTION, POWDER, LYOPHILIZED, FOR SOLUTION INTRAVENOUS at 06:39

## 2020-07-15 RX ADMIN — HYDROMORPHONE HYDROCHLORIDE 0.3 MG: 1 INJECTION, SOLUTION INTRAMUSCULAR; INTRAVENOUS; SUBCUTANEOUS at 10:11

## 2020-07-15 RX ADMIN — SODIUM CHLORIDE, POTASSIUM CHLORIDE, SODIUM LACTATE AND CALCIUM CHLORIDE: 600; 310; 30; 20 INJECTION, SOLUTION INTRAVENOUS at 09:10

## 2020-07-15 RX ADMIN — OXYCODONE HYDROCHLORIDE 10 MG: 5 TABLET ORAL at 14:39

## 2020-07-15 RX ADMIN — DULOXETINE HYDROCHLORIDE 60 MG: 60 CAPSULE, DELAYED RELEASE ORAL at 19:38

## 2020-07-15 RX ADMIN — GUAIFENESIN AND CODEINE PHOSPHATE 10 ML: 100; 10 SOLUTION ORAL at 19:50

## 2020-07-15 RX ADMIN — FENTANYL CITRATE 25 MCG: 50 INJECTION, SOLUTION INTRAMUSCULAR; INTRAVENOUS at 09:09

## 2020-07-15 RX ADMIN — SODIUM CHLORIDE, POTASSIUM CHLORIDE, SODIUM LACTATE AND CALCIUM CHLORIDE: 600; 310; 30; 20 INJECTION, SOLUTION INTRAVENOUS at 07:00

## 2020-07-15 RX ADMIN — DEXMEDETOMIDINE 0.4 MCG/KG/HR: 100 INJECTION, SOLUTION, CONCENTRATE INTRAVENOUS at 07:48

## 2020-07-15 RX ADMIN — HYDROMORPHONE HYDROCHLORIDE 0.3 MG: 1 INJECTION, SOLUTION INTRAMUSCULAR; INTRAVENOUS; SUBCUTANEOUS at 10:48

## 2020-07-15 RX ADMIN — OXYCODONE HYDROCHLORIDE 10 MG: 5 TABLET ORAL at 17:40

## 2020-07-15 RX ADMIN — FENTANYL CITRATE 50 MCG: 50 INJECTION, SOLUTION INTRAMUSCULAR; INTRAVENOUS at 10:36

## 2020-07-15 RX ADMIN — GABAPENTIN 300 MG: 300 CAPSULE ORAL at 14:39

## 2020-07-15 RX ADMIN — LIDOCAINE HYDROCHLORIDE 50 MG: 10 INJECTION, SOLUTION INFILTRATION; PERINEURAL at 07:35

## 2020-07-15 RX ADMIN — TRANEXAMIC ACID 1950 MG: 650 TABLET ORAL at 06:34

## 2020-07-15 RX ADMIN — SODIUM CHLORIDE, POTASSIUM CHLORIDE, SODIUM LACTATE AND CALCIUM CHLORIDE: 600; 310; 30; 20 INJECTION, SOLUTION INTRAVENOUS at 12:07

## 2020-07-15 RX ADMIN — ROCURONIUM BROMIDE 10 MG: 10 INJECTION INTRAVENOUS at 08:23

## 2020-07-15 RX ADMIN — LABETALOL 20 MG/4 ML (5 MG/ML) INTRAVENOUS SYRINGE 5 MG: at 08:21

## 2020-07-15 RX ADMIN — Medication 50 MG: at 07:46

## 2020-07-15 RX ADMIN — ROCURONIUM BROMIDE 35 MG: 10 INJECTION INTRAVENOUS at 07:35

## 2020-07-15 RX ADMIN — OXYCODONE HYDROCHLORIDE 10 MG: 5 TABLET ORAL at 21:28

## 2020-07-15 RX ADMIN — Medication 5 MG: at 08:56

## 2020-07-15 RX ADMIN — VANCOMYCIN HYDROCHLORIDE 1500 MG: 10 INJECTION, POWDER, LYOPHILIZED, FOR SOLUTION INTRAVENOUS at 19:38

## 2020-07-15 RX ADMIN — PROPOFOL 150 MG: 10 INJECTION, EMULSION INTRAVENOUS at 07:35

## 2020-07-15 RX ADMIN — FENTANYL CITRATE 25 MCG: 50 INJECTION, SOLUTION INTRAMUSCULAR; INTRAVENOUS at 09:07

## 2020-07-15 RX ADMIN — ACETAMINOPHEN 975 MG: 325 TABLET, FILM COATED ORAL at 06:34

## 2020-07-15 RX ADMIN — Medication 2 MG: at 09:13

## 2020-07-15 RX ADMIN — GLYCOPYRROLATE 0.4 MG: 0.2 INJECTION, SOLUTION INTRAMUSCULAR; INTRAVENOUS at 09:13

## 2020-07-15 RX ADMIN — GABAPENTIN 300 MG: 300 CAPSULE ORAL at 19:38

## 2020-07-15 RX ADMIN — PHENYLEPHRINE HYDROCHLORIDE 50 MCG: 10 INJECTION INTRAVENOUS at 09:02

## 2020-07-15 RX ADMIN — HYDROXYZINE HYDROCHLORIDE 10 MG: 10 TABLET, FILM COATED ORAL at 22:15

## 2020-07-15 RX ADMIN — DEXAMETHASONE SODIUM PHOSPHATE 4 MG: 4 INJECTION, SOLUTION INTRA-ARTICULAR; INTRALESIONAL; INTRAMUSCULAR; INTRAVENOUS; SOFT TISSUE at 07:35

## 2020-07-15 RX ADMIN — ONDANSETRON HYDROCHLORIDE 4 MG: 2 INJECTION, SOLUTION INTRAVENOUS at 07:35

## 2020-07-15 RX ADMIN — HYDROMORPHONE HYDROCHLORIDE 0.4 MG: 1 INJECTION, SOLUTION INTRAMUSCULAR; INTRAVENOUS; SUBCUTANEOUS at 10:26

## 2020-07-15 RX ADMIN — ROCURONIUM BROMIDE 5 MG: 10 INJECTION INTRAVENOUS at 07:59

## 2020-07-15 RX ADMIN — GLYCOPYRROLATE 0.2 MG: 0.2 INJECTION, SOLUTION INTRAMUSCULAR; INTRAVENOUS at 07:35

## 2020-07-15 RX ADMIN — METHADONE HYDROCHLORIDE 10 MG: 10 INJECTION, SOLUTION INTRAMUSCULAR; INTRAVENOUS; SUBCUTANEOUS at 07:46

## 2020-07-15 RX ADMIN — FENTANYL CITRATE 50 MCG: 50 INJECTION, SOLUTION INTRAMUSCULAR; INTRAVENOUS at 09:59

## 2020-07-15 ASSESSMENT — COPD QUESTIONNAIRES: COPD: 0

## 2020-07-15 ASSESSMENT — LIFESTYLE VARIABLES: TOBACCO_USE: 1

## 2020-07-15 ASSESSMENT — ENCOUNTER SYMPTOMS
STRIDOR: 0
DYSRHYTHMIAS: 0

## 2020-07-15 ASSESSMENT — MIFFLIN-ST. JEOR: SCORE: 1630.34

## 2020-07-15 NOTE — ANESTHESIA POSTPROCEDURE EVALUATION
Patient: Svetlana Serrano    Procedure(s):  Left total hip arthroplasty with posterior approach    Diagnosis:Degenerative joint disease of left hip [M16.12]  Diagnosis Additional Information: No value filed.    Anesthesia Type:  General    Note:  Anesthesia Post Evaluation    Patient location during evaluation: PACU  Patient participation: Able to fully participate in evaluation  Level of consciousness: sleepy but conscious  Pain management: adequate  multimodal analgesia used between 6 hours prior to anesthesia start to PACU dischargeAirway patency: patent  Cardiovascular status: acceptable  Respiratory status: acceptable  Hydration status: acceptable  PONV: none             Last vitals:  Vitals:    07/15/20 0929 07/15/20 0930 07/15/20 0935   BP: (!) 173/90 (!) 153/92 (!) 151/83   Pulse: 89  82   Resp:  14 25   Temp: 98.8  F (37.1  C)     SpO2: 100%  97%         Electronically Signed By: Olman Larry MD  July 15, 2020  9:39 AM

## 2020-07-15 NOTE — CONSULTS
Hospitalist Consultation      Svetlana Serrano MRN# [id number]   YOB: 1945 Age: 75 year old   Date of Admission: 7/15/2020     Requesting Physician: Keith Augilar PA-C  Reason for consult:  Post operative medical management            Assessment and Plan:   This patient is a 75 year old female with a PMH significant for mild intermittent asthma, obesity, HTN, anxiety, fibromyalgia, DJD due to OA, and RLS who is POD 0 s/p left GABRIEL.    #S/p left GABRIEL: doing well, cont PT/OT and pain control as per primary    #HTN: stable, continue PTA Amlodipine and Losartan with parameters    #Asthma: stable but reports recent increased congestion without wheezing or shortness of breath. Chronic nonproductive cough.   - PRN albuterol inhaler and Robitussin     #Fibromyalgia: continue Cymbalta     DVT Prophylaxis: on ASA and PCDs as per primary  D/C planning: To home with assistance from sam Gold PA-C  [facility]  Pt discussed with Dr. Hunter who agrees with the care as discussed above.              History of Present Illness:   This patient is a 75 year old female who is POD 0 s/p left GABRIEL. Intra-op report reviewed and showed no intra-op complications. I/o's reviewed, currently net +1.7 L with good UOP since OR. Hgb stable preoperatively on 7/9 at 14.6.    Since procedure this morning the patient has done pretty well, no complaints, VSS.  Currently tolerating clear liquids and does not have an appetite, pain controlled, denies chest pain, shortness of breath, nausea, vomiting, abdominal pain, or urinary symptoms. She has not voided or passed flatus since procedure. She reports her cough is a little worse than usual since procedure due to dry throat. O/w other medical problems have been stable, with no recent c/o illness.               Past Medical History:     Past Medical History:   Diagnosis Date     Arthritis     right shoulder     Back pain      Fibromyalgia      HTN (hypertension)       Incontinence of urine in female      Mild intermittent asthma      Other chronic pain     from fibromyalgia     Restless leg      Tubular adenoma of colon 8/15          Past Surgical History:     Past Surgical History:   Procedure Laterality Date     ARTHROPLASTY HIP Right 2020    Procedure: Right total hip arthroplasty.  Posterior lateral piriformis sparing approach.;  Surgeon: Cirilo Man MD;  Location: [facility] OR     ARTHROPLASTY SHOULDER Left      C/SECTION, CLASSICAL      , Classical     CHOLECYSTECTOMY, LAPOROSCOPIC      Cholecystectomy, Laparoscopic     COLONOSCOPY N/A 10/8/2018    Procedure: COLONOSCOPY;  Colonoscopy with polypectomies;  Surgeon: Anton Stroud MD;  Location: [facility] OR     HYSTERECTOMY, JOSHUA       JOINT REPLACEMTN, KNEE RT/LT      bilateral          Social History:     Social History     Tobacco Use     Smoking status: Former Smoker     Last attempt to quit: 1969     Years since quittin.5     Smokeless tobacco: Never Used   Substance Use Topics     Alcohol use: Yes     Alcohol/week: 0.0 standard drinks     Comment: casual     Drug use: No          Family History:   Family history fully reviewed with patient and noncontributory.           Allergies:     Allergies   Allergen Reactions     Clindamycin Shortness Of Breath     Erythromycin Anaphylaxis     PN: LW Reaction: ANAPHYLAXIS     Amoxicillin      Rash.     Cephalexin Hives     Doxycycline GI Disturbance and Hives     gi distress       Sulfa Drugs      Hives             Medications:     Prior to Admission medications    Medication Sig Last Dose Taking? Auth Provider   albuterol (PROAIR HFA/PROVENTIL HFA/VENTOLIN HFA) 108 (90 BASE) MCG/ACT Inhaler Inhale 2 puffs into the lungs every 6 hours as needed for shortness of breath / dyspnea 2020 at Unknown time Yes Dolores Blanton MD   amLODIPine (NORVASC) 5 MG tablet TAKE 1 TABLET BY MOUTH  DAILY 7/15/2020 at Unknown time Yes Dolores Blanton MD   aspirin (ASA) 325  MG EC tablet Take 1 tablet (325 mg) by mouth daily Past Month at Unknown time Yes Keith Aguilar PA-C   BIOTIN PO Take by mouth daily  Yes Reported, Patient   DULoxetine (CYMBALTA) 60 MG capsule TAKE 1 CAPSULE BY MOUTH TWO TIMES DAILY 7/15/2020 at Unknown time Yes Dolores Blanton MD   fluconazole (DIFLUCAN) 50 MG tablet Take 1 tablet (50 mg) by mouth daily  Yes Keith Aguilar PA-C   guaiFENesin-codeine (ROBITUSSIN AC) 100-10 MG/5ML solution Take 10 mLs by mouth every 4 hours as needed for cough 7/15/2020 at Unknown time Yes Dolores Blanton MD   HYDROcodone-acetaminophen (NORCO) 5-325 MG tablet Take 1-2 tablets by mouth every 6 hours as needed for pain 7/14/2020 at Unknown time Yes Dolores Blanton MD   hydrOXYzine (ATARAX) 10 MG tablet Take 1 tablet (10 mg) by mouth every 6 hours as needed for other (adjuvant pain)  Yes Keith Aguilar PA-C   LORazepam (ATIVAN) 0.5 MG tablet TAKE 1 po q8 hours prn anxiety  Patient taking differently: Take 0.5 mg by mouth every 8 hours as needed for anxiety TAKE 1 po q8 hours prn anxiety  Yes Dolores Blanton MD   losartan (COZAAR) 50 MG tablet Take 1 tablet (50 mg) by mouth daily 7/15/2020 at Unknown time Yes Dolores Blanton MD   miconazole (MICATIN) 2 % external powder Apply topically daily Past Week at Unknown time Yes Dolores Blanton MD   naloxone (NARCAN) 4 MG/0.1ML nasal spray Spray 1 spray (4 mg) into one nostril alternating nostrils once as needed for opioid reversal every 2-3 minutes until assistance arrives  Yes Jessy Nieto MD   ondansetron (ZOFRAN-ODT) 4 MG ODT tab Take 1 tablet (4 mg) by mouth every 6 hours as needed for nausea or vomiting  Yes Keith Aguilar PA-C          Review of Systems:   A comprehensive greater than 10 system review of systems was carried out.  Pertinent positives and negatives are noted above.  Otherwise negative for contributory info.            Physical Exam:   Vitals were reviewed  Blood pressure 120/63, pulse 73, temperature 98.6  F (37  C),  "temperature source Tympanic, resp. rate 14, height 1.6 m (5' 3\"), weight 116.1 kg (256 lb), SpO2 94 %.  Exam:    GENERAL:  Comfortable.  PSYCH: pleasant, oriented, No acute distress.  HEENT:  PERRLA. Normal conjunctiva, normal hearing, nasal mucosa and Oropharynx are normal.  NECK:  Supple, no neck vein distention, adenopathy or bruits, normal thyroid.  HEART:  Normal S1, S2 with no murmur, no pericardial rub, S3 or S4.  LUNGS:  Clear to auscultation, normal respiratory effort.  ABDOMEN:  Soft, no hepatosplenomegaly, normal bowel sounds.  EXTREMITIES:  No pedal edema, +2 pulses bilateral and equal. Left hip dressing c/d/i  SKIN:  Dry to touch, No rash, wound or ulcerations.  NEUROLOGIC:  Nonfocal with normal cranial nerve and motor power and sensation.          Data:   Past 24 hours labs, studies, and imaging were reviewed.    Results for orders placed or performed during the hospital encounter of 07/15/20   XR Pelvis w Hip Port Left 1 View     Status: None    Narrative    PELVIS AND HIP PORTABLE LEFT ONE VIEW   7/15/2020 10:01 AM     HISTORY: Post op hip arthroplasty.    COMPARISON: 2/26/2020.      Impression    IMPRESSION: There is a new left total hip arthroplasty in place. Right  total hip arthroplasty unchanged. No evidence of complication.    ILIANA GUZMAN MD             "

## 2020-07-15 NOTE — ANESTHESIA PREPROCEDURE EVALUATION
Anesthesia Pre-Procedure Evaluation    Patient: Svetlana Serrano   MRN: [id number] : 1945          Preoperative Diagnosis: Degenerative joint disease of left hip [M16.12]    Procedure(s):  Left total hip arthroplasty with posterior approach (Spinal with no narcotic in spinal)    Past Medical History:   Diagnosis Date     Arthritis     right shoulder     Back pain      Fibromyalgia      HTN (hypertension)      Incontinence of urine in female      Mild intermittent asthma      Other chronic pain     from fibromyalgia     Restless leg      Tubular adenoma of colon 8/15     Past Surgical History:   Procedure Laterality Date     ARTHROPLASTY HIP Right 2020    Procedure: Right total hip arthroplasty.  Posterior lateral piriformis sparing approach.;  Surgeon: Cirilo Man MD;  Location: [facility] OR     ARTHROPLASTY SHOULDER Left      C/SECTION, CLASSICAL      , Classical     CHOLECYSTECTOMY, LAPOROSCOPIC      Cholecystectomy, Laparoscopic     COLONOSCOPY N/A 10/8/2018    Procedure: COLONOSCOPY;  Colonoscopy with polypectomies;  Surgeon: Anton Stroud MD;  Location: [facility] OR     HYSTERECTOMY, JOSHUA       JOINT REPLACEMTN, KNEE RT/LT      bilateral     Anesthesia Evaluation     . Pt has had prior anesthetic. Type: General    No history of anesthetic complications          ROS/MED HX    ENT/Pulmonary:     (+)BRISEYDA risk factors hypertension, obese, other ENT- chronic dry cough, tobacco use, Past use Intermittent asthma , . .   (-) COPD and recent URI   Neurologic:  - neg neurologic ROS     Cardiovascular:     (+) hypertension----. : . . . :. . Previous cardiac testing date:results:date: results:ECG reviewed date: results: date: results:         (-) CAD, arrhythmias and valvular problems/murmurs   METS/Exercise Tolerance:     Hematologic: Comments: Lab Test        20      --          20                       1201          0617          0618           --            1455          WBC          5.8           --           --           --          6.9           HGB          14.6         12.2         13.4           < >        15.3          MCV          102*          --           --           --          101*          PLT          247           --           --           --          296            < > = values in this interval not displayed.                  Lab Test        07/09/20     02/28/20     02/27/20     02/25/20     01/14/20     10/15/19                       1201          0617          0618          1321          1455          1205          NA           140           --           --           --          140          137           POTASSIUM    3.9           --           --          3.9          3.6          4.1           CHLORIDE     107           --           --           --          106          105           CO2          28            --           --           --          30           26            BUN          14            --           --           --          14           15            CR           0.88          --           --          0.86         0.81         0.76          ANIONGAP     5             --           --           --          4            6             ELIZABETH          8.8           --           --           --          9.0          9.4           GLC          110*         108*         125*          --          102*         96            - neg hematologic  ROS       Musculoskeletal:  - neg musculoskeletal ROS       GI/Hepatic:  - neg GI/hepatic ROS       Renal/Genitourinary:  - ROS Renal section negative       Endo:     (+) Obesity, .   (-) Type I DM, Type II DM, thyroid disease and chronic steroid usage   Psychiatric:  - neg psychiatric ROS       Infectious Disease:  - neg infectious disease ROS       Malignancy:      - no malignancy   Other: Comment: fibromyalgia   (+) H/O Chronic Pain,H/O chronic opiod use ,                         Physical Exam  Normal  "systems: cardiovascular, pulmonary and dental    Airway   Mallampati: II  TM distance: >3 FB  Neck ROM: full    Dental   (+) lower dentures and upper dentures    Cardiovascular   Rhythm and rate: regular and normal  (-) no friction rub, no systolic click and no murmur    Pulmonary    breath sounds clear to auscultation(-) no rhonchi, no decreased breath sounds, no wheezes, no rales and no stridor            Lab Results   Component Value Date    WBC 5.8 07/09/2020    HGB 14.6 07/09/2020    HCT 45.9 07/09/2020     07/09/2020     07/09/2020    POTASSIUM 3.9 07/09/2020    CHLORIDE 107 07/09/2020    CO2 28 07/09/2020    BUN 14 07/09/2020    CR 0.88 07/09/2020     (H) 07/09/2020    ELIZABETH 8.8 07/09/2020    ALT 34 05/21/2015    PTT 30 07/08/2011    INR 0.90 07/08/2011    TSH 1.21 05/05/2016       Preop Vitals  BP Readings from Last 3 Encounters:   07/15/20 (!) 172/84   07/09/20 138/82   02/28/20 127/46    Pulse Readings from Last 3 Encounters:   07/09/20 101   02/28/20 94   02/13/20 87      Resp Readings from Last 3 Encounters:   07/15/20 12   07/09/20 18   02/28/20 (P) 16    SpO2 Readings from Last 3 Encounters:   07/09/20 100%   02/28/20 (P) 95%   02/13/20 96%      Temp Readings from Last 1 Encounters:   07/15/20 97.3  F (36.3  C) (Temporal)    Ht Readings from Last 1 Encounters:   07/15/20 1.6 m (5' 3\")      Wt Readings from Last 1 Encounters:   07/15/20 116.1 kg (256 lb)    Estimated body mass index is 45.35 kg/m  as calculated from the following:    Height as of this encounter: 1.6 m (5' 3\").    Weight as of this encounter: 116.1 kg (256 lb).       Anesthesia Plan      History & Physical Review  History and physical reviewed and following examination; no interval change.    ASA Status:  3 .    NPO Status:  > 8 hours    Plan for General with Intravenous induction. Maintenance will be Balanced.    PONV prophylaxis:  Ondansetron (or other 5HT-3) and Dexamethasone or Solumedrol         Postoperative " Care  Postoperative pain management:  IV analgesics and Multi-modal analgesia.      Consents  Anesthetic plan, risks, benefits and alternatives discussed with:  Patient or representative, Patient and Daughter/Son..                 Dayton Sanchez MD                    .

## 2020-07-15 NOTE — ANESTHESIA CARE TRANSFER NOTE
Patient: Svetlana Serrano    Procedure(s):  Left total hip arthroplasty with posterior approach    Diagnosis: Degenerative joint disease of left hip [M16.12]  Diagnosis Additional Information: No value filed.    Anesthesia Type:   General     Note:  Airway :Face Mask  Patient transferred to:PACU  Handoff Report: Identifed the Patient, Identified the Reponsible Provider, Reviewed the pertinent medical history, Discussed the surgical course, Reviewed Intra-OP anesthesia mangement and issues during anesthesia, Set expectations for post-procedure period and Allowed opportunity for questions and acknowledgement of understanding      Vitals: (Last set prior to Anesthesia Care Transfer)    CRNA VITALS  7/15/2020 0858 - 7/15/2020 0937      7/15/2020             Pulse:  91    SpO2:  98 %    Resp Rate (observed):  16    EKG:  NSR                Electronically Signed By: CAMELIA Crabtree CRNA  July 15, 2020  9:37 AM

## 2020-07-15 NOTE — OP NOTE
[facility]  Operative Note    Posterior Total Hip Arthroplasty -     Svetlana Serrano MRN# [id number]   YOB: 1945  Procedure Date:  7/15/2020  Age: 75 year old       PREOPERATIVE DIAGNOSIS:  Degenerative osteoarthritis, left hip.    POSTOPERATIVE DIAGNOSIS:  Degenerative osteoarthritis,left hip.    PROCEDURE PERFORMED:  Left total hip arthroplasty.  Posterior lateral piriformis sparing approach.    SURGEON:  Cirilo Man M.D.    FIRST ASSISTANT:  Keith Aguilar PA-C, whose was critical for positioning, retraction during exposure, placement of implants, and closure.    ANESTHESIA:  General    INDICATIONS:  Svetlana Serrano  is a75 year old-year-old with severe disabling degenerative osteoarthritis of left hip.  Discussed both operative and nonoperative management.  Risks of surgery discussed included but not limited to bleeding, infection, damage to surrounding neurovascular structures, leg length inequality, dislocation, periprosthetic fracture, need for revision surgery, blood clots, pulmonary embolus, stroke, anesthetic complications and even death.  No guarantees given or implied. Patient thoughtfully acknowledges risks and wishes to proceed.  Brought to surgery for left total hip arthroplasty.    IMPLANTS:  Shay Accolade II size 5 [id number] high offset neck, size 52 Trident II cup, Crossfire neutral liner, and +0 mm 36 mm Biolox head    PROCEDURE:  After obtaining informed surgical consent, and marking patient operative site the patient was brought to the operating room.  Tranexamic acid 1 g given prior to surgery and additional gram at closure.  Vanco was administered intravenously within one hour prior to surgery and will be discontinued within 24 hours.  General anesthetic.  Placed in the lateral decubitus position with axillary roll and all prominences well padded.  Chlorohexidine prescrub and the left hip was prepped with Chloroprep and draped in the usual sterile fashion.  A  posterolateral incision was made.  Dissection was carried through the IT band and tensor fascia.  A leg length suture was placed.  The external rotators and capsule were identified, tagged, divided, and preserved for later repair.  Piriformis sparred.  The hip was dislocated.  Severe degenerative osteoarthritis.  The femoral head and neck were resected approximately 15mm proximal to lesser trochanter.  The acetabulum was exposed and advanced osteoarthritis present.  A labrectomy was performed.  The pulvinar was excised.  The pulvinar was excised.  The acetabulum was sequentially reamed from 47 to 52 mm.  A 52 mm solid back Tritanium cup was implanted with slightly increased anteversion reduced pelvic abduction of approximately 40 degrees.  Crossfire neutral liner placed.      The femoral canal was then opened with box osteotome, canal finder, and then lateralizer.  Sequentially broached to accept a size # 5 Accolade 2 stem trial.  After multiple trial reductions, leg lengths were equal by palpation and leg length suture.   Appropriate shuck.  The hip was stable throughout a full range of motion using a +0 x 36 mm head including full extension external rotation, sleeping position, and flexed to 90 deg with over 80 degrees internal rotation. The stem was implanted in matched anteversion as trialed for stability.  The  Biolox head was implanted after stability exam repeated on cleaned and dried gamez taper.  The hip was relocated.  The wound was was washed with Rush betadine protocol and then irrigated with antibiotic irrigating solution.  The rotators and capsule were reattached to the trochanter through drill holes.  The wound was then irrigated thoroughly and closed in layers over drained with #1 Vicryl, 2-0 Vicryl, 4-0 Monocryl and exofin glue.  A sterile dressing was applied.  There were no intraoperative complications.  Blood loss was 300 cc.  Sponge and needle counts were correct and the patient was returned to  the recovery room in stable condition.    Post Op Plan:  1.)  WBAT with Posterior hip precautions  2.)  Ancef x 24 hours  3.)  DVT prophylaxis SCDs with ASA EC 325mg PO qday x 6 weeks   4.)  Keep dressing c/d/i x 2 weeks, OK to shower  5.)  Hip abduction pillow  6.)  PACU x-rays    Follow Up:  1.)  2 week wound check with AP pelvis and cross table lateral hip  2.)  8 weeks with AP pelvis and cross table lateral hip    [facility]  [facility]    Monday 1-5 PM  and Thursday 7:30-12:00 AM\  [address] [place] [address]  [phone]    [phone]    Or    Tuesday 7:30-5 PM  [address]   [address]      Cirilo Man M.D.

## 2020-07-15 NOTE — PLAN OF CARE
[facility]      OUTPATIENT PHYSICAL THERAPY EVALUATION  PLAN OF TREATMENT FOR OUTPATIENT REHABILITATION  (COMPLETE FOR INITIAL CLAIMS ONLY)  Patient's Last Name, First Name, M.I.  YOB: 1945  Svetlana Serrano                        Provider's Name  [facility] Medical Record No.  [id number]                               Onset Date:  07/15/20   Start of Care Date:    7/15/2020     Type:     _X_PT   ___OT   ___SLP Medical Diagnosis:   L GABRIEL                        PT Diagnosis:  Impaired functional mobility   Visits from SOC:  1   _________________________________________________________________________________  Plan of Treatment/Functional Goals    Planned Interventions:  ,    balance training, bed mobility training, gait training, neuromuscular re-education, strengthening, transfer training, home program guidelines, progressive activity/exercise,       Goals: See Physical Therapy Goals on Care Plan in [facility] electronic health record.    Therapy Frequency: Daily  Predicted Duration of Therapy Intervention: 2 days  _________________________________________________________________________________    I CERTIFY THE NEED FOR THESE SERVICES FURNISHED UNDER        THIS PLAN OF TREATMENT AND WHILE UNDER MY CARE     (Physician co-signature of this document indicates review and certification of the therapy plan).               ,      Referring Physician: Keith Aguilar PA-C             Initial Assessment        See Physical Therapy evaluation dated   in Epic electronic health record.

## 2020-07-15 NOTE — BRIEF OP NOTE
[facility]    Brief Operative Note    Pre-operative diagnosis: Degenerative joint disease of left hip [M16.12]  Post-operative diagnosis Same as pre-operative diagnosis    Procedure: Procedure(s):  Left total hip arthroplasty with posterior approach  Surgeon: Surgeon(s) and Role:     * Cirilo Man MD - Primary     * Keith Aguilar PA-C - Assisting  Anesthesia: General   Estimated blood loss: 300 ml  Drains: None  Specimens: * No specimens in log *  Findings:   None.  Complications: None.  Implants:   Implant Name Type Inv. Item Serial No.  Lot No. LRB No. Used Action   Trident Crossfire 0 deg Poly Insert, 36mm, ALPH CDE E    MICHELLE [id number] Left 1 Implanted   Trident II Tritanium Clusterhole Acetabular Shell, 52mm, ALPH CDE E    MICHELLE [id number] Left 1 Implanted   Accolade  deg Neck Angle Hip Stem, [place] #5, NK LNTH 35mm, STM LNTH 108mm, TPR V40    MICHELLE [id number] Left 1 Implanted   Biolox delta, Ceramic V40 Femoral Head, OD 36mm, NK LNTH +0mm    MICHELLE [id number] Left 1 Implanted

## 2020-07-15 NOTE — PROGRESS NOTES
07/15/20 1607   Quick Adds   Type of Visit Initial PT Evaluation   Living Environment   Living Environment Comment Pt lives in a split level house with  and son. 13 stairs to maneuver with single rail. Reports mod ind at baseline, has scooter for community mobility   Self-Care   Usual Activity Tolerance fair   Current Activity Tolerance fair   Regular Exercise No   Equipment Currently Used at Home cane, straight;walker, rolling;other (see comments);grab bar, toilet;grab bar, tub/shower;raised toilet  (scooter)   Functional Level Prior   Ambulation 1-->assistive equipment   Transferring 1-->assistive equipment   Toileting 1-->assistive equipment   Bathing 1-->assistive equipment   Fall history within last six months no   General Information   Onset of Illness/Injury or Date of Surgery - Date 07/15/20   Referring Physician Keith Aguilar PA-C    Patient/Family Goals Statement To go home   Pertinent History of Current Problem (include personal factors and/or comorbidities that impact the POC) Pt is a 75 year old female POD0 s/p L GABRIEL   Precautions/Limitations left hip precautions   Weight-Bearing Status - LLE weight-bearing as tolerated   Cognitive Status Examination   Orientation orientation to person, place and time   Level of Consciousness alert   Follows Commands and Answers Questions 100% of the time;able to follow multistep instructions   Personal Safety and Judgment intact   Memory intact   Pain Assessment   Patient Currently in Pain Yes, see Vital Sign flowsheet  (3/10 L hip)   Range of Motion (ROM)   ROM Comment B LE WFL; L hip decreased   Strength   Strength Comments not formally assessed, able to complete SLR, functionally >3/5 B LE Strength   Bed Mobility   Bed Mobility Comments SBA supine to sit with armrail   Transfer Skills   Transfer Comments CGA sit to stand with FWW   Gait   Gait Comments CGA with FWW   Balance   Balance Comments good unsupported sitting; good standing with FWW   Sensory  "Examination   Sensory Perception Comments no c/o B/N/T   General Therapy Interventions   Planned Therapy Interventions balance training;bed mobility training;gait training;neuromuscular re-education;strengthening;transfer training;home program guidelines;progressive activity/exercise   Clinical Impression   Criteria for Skilled Therapeutic Intervention yes, treatment indicated   PT Diagnosis Impaired functional mobility   Influenced by the following impairments L hip precautions   Functional limitations due to impairments impaired bed mobility, transfers, ambulation, stairs   Clinical Presentation Stable/Uncomplicated   Clinical Presentation Rationale Pt is medically stable   Clinical Decision Making (Complexity) Low complexity   Therapy Frequency Daily   Predicted Duration of Therapy Intervention (days/wks) 2 days   Anticipated Equipment Needs at Discharge walker   Anticipated Discharge Disposition Home with Assist   Risk & Benefits of therapy have been explained Yes   Patient, Family & other staff in agreement with plan of care Yes   [facility] \"6 Clicks\"   2016, Trustees of [facility], under license to [employer]  All rights reserved.   6 Clicks Short Forms Basic Mobility Inpatient Short Form   [facility]  \"6 Clicks\" V.2 Basic Mobility Inpatient Short Form   1. Turning from your back to your side while in a flat bed without using bedrails? 3 - A Little   2. Moving from lying on your back to sitting on the side of a flat bed without using bedrails? 3 - A Little   3. Moving to and from a bed to a chair (including a wheelchair)? 3 - A Little   4. Standing up from a chair using your arms (e.g., wheelchair, or bedside chair)? 3 - A Little   5. To walk in hospital room? 3 - A Little   6. Climbing 3-5 steps with a railing? 3 - A Little   Basic Mobility Raw Score (Score out of 24.Lower scores equate to lower levels of function) 18   Total Evaluation Time   Total Evaluation Time " (Minutes) 5

## 2020-07-15 NOTE — PLAN OF CARE
PT: order received and evaluation complete. Pt is a 75 year old female POD0 s/p L GABRIEL. Pt lives in a split level house with  and son. 13 stairs to maneuver with single rail. Reports mod ind at baseline, has scooter for community mobility.    Patient plan: home with   Current status: Pt supine in bed, willing to participate in PT. Educated pt in WBAT status and posterior precautions. Supine to sit with SBA. Sit to stand with CGA and FWW. Instructed pt in pregait activities with FWW, no instability noted. Instructed pt to ambulate ~140 feet with CGA and FWW. Pt demonstrated good step through reciprocal patterning and upright posturing throughout with cuing initially. No LOB or instability noted. Instructed pt in navigation x4 stairs with unilateral railing. Cuing for step to patterning technique. Following ambulation, controlled descent to bed. SBA with sit to supine transfer. Pt able to stay within precautions throughout. Sp02 at 94% following mobility. All needs in place, nursing updated.   Anticipated status at discharge: at least SBA with all mobility and use of FWW

## 2020-07-15 NOTE — PLAN OF CARE
Patient vital signs are at baseline: Yes  Patient able to ambulate as they were prior to admission or with assist devices provided by therapies during their stay:  No,  Reason:  pt has not been out of bed yet, PT scheduled for 1600  Patient MUST void prior to discharge:  Yes  Patient able to tolerate oral intake:  Yes  Pain has adequate pain control using Oral analgesics:  No,  Reason:  TBD, pain meds just started.

## 2020-07-15 NOTE — PROGRESS NOTES
Arrived to room 602 from PACU at 1125 via bed, alert and oriented x 4, oriented to room and call system, dressing CDI, CMS intact, IV patent and infusing, rates pain 3/10 w/movement, 0/10 at rest, will review welcome folder and pain/medication information packet with patient once pt less tired. SCD's on BLE. Micah ice chips well. Frequent VS started.

## 2020-07-16 ENCOUNTER — APPOINTMENT (OUTPATIENT)
Dept: PHYSICAL THERAPY | Facility: CLINIC | Age: 75
End: 2020-07-16
Attending: ORTHOPAEDIC SURGERY
Payer: MEDICARE

## 2020-07-16 ENCOUNTER — APPOINTMENT (OUTPATIENT)
Dept: OCCUPATIONAL THERAPY | Facility: CLINIC | Age: 75
End: 2020-07-16
Attending: ORTHOPAEDIC SURGERY
Payer: MEDICARE

## 2020-07-16 VITALS
RESPIRATION RATE: 21 BRPM | OXYGEN SATURATION: 94 % | SYSTOLIC BLOOD PRESSURE: 169 MMHG | WEIGHT: 256 LBS | TEMPERATURE: 98.5 F | DIASTOLIC BLOOD PRESSURE: 95 MMHG | BODY MASS INDEX: 45.36 KG/M2 | HEART RATE: 90 BPM | HEIGHT: 63 IN

## 2020-07-16 LAB
GLUCOSE SERPL-MCNC: 107 MG/DL (ref 70–99)
HGB BLD-MCNC: 12.4 G/DL (ref 11.7–15.7)

## 2020-07-16 PROCEDURE — 97110 THERAPEUTIC EXERCISES: CPT | Mod: GP | Performed by: PHYSICAL THERAPIST

## 2020-07-16 PROCEDURE — 25000132 ZZH RX MED GY IP 250 OP 250 PS 637: Mod: GY | Performed by: PHYSICIAN ASSISTANT

## 2020-07-16 PROCEDURE — 97165 OT EVAL LOW COMPLEX 30 MIN: CPT | Mod: GO | Performed by: REHABILITATION PRACTITIONER

## 2020-07-16 PROCEDURE — 82947 ASSAY GLUCOSE BLOOD QUANT: CPT | Performed by: PHYSICIAN ASSISTANT

## 2020-07-16 PROCEDURE — 97530 THERAPEUTIC ACTIVITIES: CPT | Mod: GP | Performed by: PHYSICAL THERAPIST

## 2020-07-16 PROCEDURE — 97116 GAIT TRAINING THERAPY: CPT | Mod: GP | Performed by: PHYSICAL THERAPIST

## 2020-07-16 PROCEDURE — 97535 SELF CARE MNGMENT TRAINING: CPT | Mod: GO | Performed by: REHABILITATION PRACTITIONER

## 2020-07-16 PROCEDURE — 36415 COLL VENOUS BLD VENIPUNCTURE: CPT | Performed by: PHYSICIAN ASSISTANT

## 2020-07-16 PROCEDURE — 25000132 ZZH RX MED GY IP 250 OP 250 PS 637: Mod: GY | Performed by: ORTHOPAEDIC SURGERY

## 2020-07-16 PROCEDURE — 85018 HEMOGLOBIN: CPT | Performed by: PHYSICIAN ASSISTANT

## 2020-07-16 RX ORDER — ASPIRIN 325 MG
325 TABLET, DELAYED RELEASE (ENTERIC COATED) ORAL DAILY
Qty: 42 TABLET | Refills: 0 | Status: SHIPPED | OUTPATIENT
Start: 2020-07-16 | End: 2021-05-28

## 2020-07-16 RX ADMIN — OXYCODONE HYDROCHLORIDE 10 MG: 5 TABLET ORAL at 04:39

## 2020-07-16 RX ADMIN — MICONAZOLE NITRATE: 20 POWDER TOPICAL at 08:08

## 2020-07-16 RX ADMIN — AMLODIPINE BESYLATE 5 MG: 5 TABLET ORAL at 08:08

## 2020-07-16 RX ADMIN — DULOXETINE HYDROCHLORIDE 60 MG: 60 CAPSULE, DELAYED RELEASE ORAL at 08:08

## 2020-07-16 RX ADMIN — GUAIFENESIN AND CODEINE PHOSPHATE 10 ML: 100; 10 SOLUTION ORAL at 08:08

## 2020-07-16 RX ADMIN — OXYCODONE HYDROCHLORIDE 10 MG: 5 TABLET ORAL at 08:08

## 2020-07-16 RX ADMIN — GABAPENTIN 300 MG: 300 CAPSULE ORAL at 08:08

## 2020-07-16 RX ADMIN — LOSARTAN POTASSIUM 50 MG: 50 TABLET, FILM COATED ORAL at 08:08

## 2020-07-16 RX ADMIN — OXYCODONE HYDROCHLORIDE 10 MG: 5 TABLET ORAL at 11:04

## 2020-07-16 RX ADMIN — ASPIRIN 325 MG: 325 TABLET, DELAYED RELEASE ORAL at 08:08

## 2020-07-16 RX ADMIN — HYDROXYZINE HYDROCHLORIDE 10 MG: 10 TABLET, FILM COATED ORAL at 11:04

## 2020-07-16 ASSESSMENT — ACTIVITIES OF DAILY LIVING (ADL): IADL_COMMENTS: FAMILY TO COMPLETE AS NEEDED

## 2020-07-16 NOTE — PROGRESS NOTES
Orthopedic Surgery  Svetlana HUDSON [facility]  2020  Admit Date:  7/15/2020  POD 1 Day Post-Op  S/P Procedure(s):  Left total hip arthroplasty with posterior approach    Patient resting comfortably in bed.    Pain controlled.    Tolerating oral intake.    No events overnight.   Denies chest pain or shortness of breath   Alert and orient to person, place, and time.    Vital Sign Ranges  Temperature Temp  Av.9  F (36.6  C)  Min: 96.3  F (35.7  C)  Max: 99.3  F (37.4  C)   Blood pressure Systolic (24hrs), Av , Min:114 , Max:177        Diastolic (24hrs), Av, Min:59, Max:92      Pulse Pulse  Av.4  Min: 68  Max: 89   Respirations Resp  Av.5  Min: 10  Max: 38   Pulse oximetry SpO2  Av.4 %  Min: 92 %  Max: 100 %       Unlabored breathing without audible wheeze   Dressing is clean, dry, and intact. Minimal erythema of the surrounding skin.  Bilateral calves are soft, non-tender.  LLE NVI.   5/5 df/pf/ehl. Palpable DP pulse    Labs:  Recent Labs   Lab Test 20  1201 20  1321 20  1455   POTASSIUM 3.9 3.9 3.6     Recent Labs   Lab Test 20  1201 20  0617 20  0618   HGB 14.6 12.2 13.4     No results for input(s): INR in the last 70069 hours.  Recent Labs   Lab Test 20  1201 20  1455    296     PACU x-rays show a well aligned total hip arthroplasty     A/P: 75 year old female status post left total hip arthroplasty performed on 7/15/2020    1.)  WBAT with Posterior hip precautions  2.)  Ancef x 24 hours  3.)  DVT prophylaxis SCDs with ASA EC 325mg PO qday x 6 weeks   4.)  Keep dressing c/d/i x 2 weeks, OK to shower  5.)  Hip abduction pillow  6.)  PACU x-rays     Follow Up:  1.)  2 week wound check with AP pelvis and cross table lateral hip  2.)  8 weeks with AP pelvis and cross table lateral hip       2. Disposition   Anticipate d/c home with family later today if cleared by PT.     Keith Aguilar PA-C

## 2020-07-16 NOTE — PLAN OF CARE
Patient plan: home with   Current status: Pt supine in bed, willing to participate in PT. Pt requires re-education in hip precautions, particularly the flexion restriction, as pt thought this restriction was only when sitting on the toilet. After education pt is more understanding. Pt completes sit<>supine with Clarisa and cues for precautions. Pt reports that she will have assist with bed mobility at discharge and has bed rails as well. Pt completes sit<>stand with SBA, cue for hand placement. Pt ambulates 100' with use of FWW and SBA with cues for step length, posture, and walker management. Pt declines the need to practice stairs again as she was able to practice at previous PT session and would like to save her energy for OT and discharge. Reviewed safe/appropriate exercises to be completing at this time. Pt sitting in bedside chair awaiting breakfast at end of session, all needs in reach.   Anticipated status at discharge: Clarisa for bed mobility, SBA for transfers, gait, and stairs.       Physical Therapy Discharge Summary    Reason for therapy discharge:    All goals and outcomes met, no further needs identified.    Progress towards therapy goal(s). See goals on Care Plan in [facility] electronic health record for goal details.  Goals met    Therapy recommendation(s):    Per ortho

## 2020-07-16 NOTE — PROGRESS NOTES
Reviewed discharge instructions and medications with patient. Questions answered. Patient discharged to home with son driving, discharge instructions, medications (Oxycodone/Aspirin/Zofran/Tylenol/Gabapentin/Atarax/Senna), and belongings at this time.

## 2020-07-16 NOTE — PROGRESS NOTES
07/16/20 1024   Quick Adds   Type of Visit Initial Occupational Therapy Evaluation   Living Environment   Lives With spouse   Living Environment Comment Pt lives in a split level house with  and son. 13 stairs to maneuver with single rail. Reports mod ind at baseline, has scooter for community mobility. Has walk in shower   Self-Care   Usual Activity Tolerance fair   Regular Exercise No   Equipment Currently Used at Home raised toilet;walker, rolling;cane, straight;grab bar, tub/shower;grab bar, toilet  (scooter, multiple reachers)   Functional Level   Ambulation 1-->assistive equipment   Transferring 1-->assistive equipment   Toileting 1-->assistive equipment   Bathing 1-->assistive equipment   Dressing 0-->independent   Eating 0-->independent   Communication 0-->understands/communicates without difficulty   Swallowing 0-->swallows foods/liquids without difficulty   Cognition 0 - no cognition issues reported   Fall history within last six months no   Which of the above functional risks had a recent onset or change? ambulation;transferring;toileting;bathing;dressing   General Information   Onset of Illness/Injury or Date of Surgery - Date 07/15/20   Referring Physician Keith Aguilar PA-C   Patient/Family Goals Statement to return home today   Additional Occupational Profile Info/Pertinent History of Current Problem Patient is POD #1 for L GABRIEL, had successful R GABRIEL on 2/20   Precautions/Limitations fall precautions;right hip precautions   Weight-Bearing Status - LLE weight-bearing as tolerated   General Observations patient was in bed and agreeable to TRUE collins   Cognitive Status Examination   Orientation orientation to person, place and time   Level of Consciousness alert   Follows Commands (Cognition) WNL   Memory intact   Visual Perception   Visual Perception Wears glasses   Sensory Examination   Sensory Comments 2 finger and 1 toes on L which are numb   Pain Assessment   Patient Currently in Pain Yes,  see Vital Sign flowsheet  (rating pain 4/10 with movement)   Posture   Posture not impaired   Strength   Manual Muscle Testing Quick Adds No deficits were identified   Hand Strength   Hand Strength Comments intact   Muscle Tone Assessment   Muscle Tone Quick Adds No deficits were identified   Coordination   Upper Extremity Coordination No deficits were identified   Mobility   Bed Mobility Comments SBA for bed mobility   Transfer Skill: Sit to Stand   Level of [place]: Sit/Stand stand-by assist   Physical Assist/Nonphysical Assist: Sit/Stand supervision   Transfer Skill: Sit to Stand weight-bearing as tolerated   Assistive Device for Transfer: Sit/Stand rolling walker   Toilet Transfer   Toilet Transfer Comments patient declined further practice, patient has been completing with RN and declined need for further practice   Tub/Shower Transfer   Tub/Shower Transfer Comments defer to OT daily note for details- treatment initiated   Balance   Balance Comments LOB was not noted, general unsteadiness to be expected   Upper Body Dressing   Level of [place]: Dress Upper Body independent   Lower Body Dressing   Level of [place]: Dress Lower Body   (defer to OT daily note for details- treatment initiated)   Eating/Self Feeding   Level of [place]: Eating contact guard   Instrumental Activities of Daily Living (IADL)   IADL Comments family to complete as needed   Activities of Daily Living Analysis   Impairments Contributing to Impaired Activities of Daily Living balance impaired;post surgical precautions;pain;ROM decreased;strength decreased   General Therapy Interventions   Planned Therapy Interventions ADL retraining;progressive activity/exercise;transfer training   Clinical Impression   Criteria for Skilled Therapeutic Interventions Met yes, treatment indicated   OT Diagnosis decreased ADLs/IADL   Influenced by the following impairments balance impaired;post surgical precautions;pain;ROM  "decreased;strength decreased   Assessment of Occupational Performance 5 or more Performance Deficits   Identified Performance Deficits decreased ADLs/IADL-dsg, toileting, bathing, functional mobility, driving, household chores   Clinical Decision Making (Complexity) Low complexity   Predicted Duration of Therapy Intervention (days/wks) 1 time session   Anticipated Discharge Disposition   (defer to ortho team)   Risks and Benefits of Treatment have been explained. Yes   Patient, Family & other staff in agreement with plan of care Yes   [facility] TM \"6 Clicks\"   2016, Trustees of [facility], under license to [employer].  All rights reserved.   6 Clicks Short Forms Daily Activity Inpatient Short Form   [facility]  \"6 Clicks\" Daily Activity Inpatient Short Form   1. Putting on and taking off regular lower body clothing? 3 - A Little   2. Bathing (including washing, rinsing, drying)? 3 - A Little   3. Toileting, which includes using toilet, bedpan or urinal? 3 - A Little   4. Putting on and taking off regular upper body clothing? 4 - None   5. Taking care of personal grooming such as brushing teeth? 4 - None   6. Eating meals? 4 - None   Daily Activity Raw Score (Score out of 24.Lower scores equate to lower levels of function) 21   Total Evaluation Time   Total Evaluation Time (Minutes) 10     "

## 2020-07-16 NOTE — PROGRESS NOTES
[facility]      OUTPATIENT OCCUPATIONAL THERAPY  EVALUATION  PLAN OF TREATMENT FOR OUTPATIENT REHABILITATION  (COMPLETE FOR INITIAL CLAIMS ONLY)  Patient's Last Name, First Name, M.I.  YOB: 1945  Svetlana Serrano                          Provider's Name  [facility] Medical Record No.  [id number]                               Onset Date:  07/15/20   Start of Care Date:  07/16/20     Type:     ___PT   _X_OT   ___SLP Medical Diagnosis:  L GABRIEL                        OT Diagnosis:  decreased ADLs/IADL   Visits from SOC:  1   _________________________________________________________________________________  Plan of Treatment/Functional Goals    Planned Interventions: ADL retraining, progressive activity/exercise, transfer training,       Goals: See Occupational Therapy Goals on Care Plan in [facility] electronic health record.    Therapy Frequency:    Predicted Duration of Therapy Intervention: 1 time session  _________________________________________________________________________________    I CERTIFY THE NEED FOR THESE SERVICES FURNISHED UNDER        THIS PLAN OF TREATMENT AND WHILE UNDER MY CARE     (Physician co-signature of this document indicates review and certification of the therapy plan).                Certification date from: 07/16/20, Certification date to: 07/16/20    Referring Physician: Keith Aguilar PA-C            Initial Assessment        See Occupational Therapy evaluation dated 07/16/20 in Epic electronic health record.

## 2020-07-16 NOTE — PHARMACY-ADMISSION MEDICATION HISTORY
Medication reconciliation interview completed by pre-admitting nurse, reviewed by pharmacy. No further clarifications needed.     Last Reviewed by Lauren Sidhu RN on 7/15/2020 at 6:29 AM    Prior to Admission medications    Medication Sig Last Dose Taking? Auth Provider          albuterol (PROAIR HFA/PROVENTIL HFA/VENTOLIN HFA) 108 (90 BASE) MCG/ACT Inhaler Inhale 2 puffs into the lungs every 6 hours as needed for shortness of breath / dyspnea 7/14/2020 at Unknown time Yes Dolores Blanton MD   amLODIPine (NORVASC) 5 MG tablet TAKE 1 TABLET BY MOUTH  DAILY 7/15/2020 at Unknown time Yes Dolores Blanton MD   aspirin (ASA) 325 MG EC tablet Take 1 tablet (325 mg) by mouth daily  Yes Keith Aguilar PA-C          BIOTIN PO Take by mouth daily  Yes Reported, Patient   DULoxetine (CYMBALTA) 60 MG capsule TAKE 1 CAPSULE BY MOUTH TWO TIMES DAILY 7/15/2020 at Unknown time Yes Dolores Blanton MD          guaiFENesin-codeine (ROBITUSSIN AC) 100-10 MG/5ML solution Take 10 mLs by mouth every 4 hours as needed for cough 7/15/2020 at Unknown time Yes Dolores Blanton MD   HYDROcodone-acetaminophen (NORCO) 5-325 MG tablet Take 1-2 tablets by mouth every 6 hours as needed for pain 7/14/2020 at Unknown time Yes Dolores Blanton MD   hydrOXYzine (ATARAX) 10 MG tablet Take 1 tablet (10 mg) by mouth every 6 hours as needed for itching or anxiety (with pain, moderate pain)  Yes Keith Aguilar PA-C          LORazepam (ATIVAN) 0.5 MG tablet TAKE 1 po q8 hours prn anxiety  Patient taking differently: Take 0.5 mg by mouth every 8 hours as needed for anxiety TAKE 1 po q8 hours prn anxiety  Yes Dolores Blanton MD   losartan (COZAAR) 50 MG tablet Take 1 tablet (50 mg) by mouth daily 7/15/2020 at Unknown time Yes Dolores Blanton MD   miconazole (MICATIN) 2 % external powder Apply topically daily Past Week at Unknown time Yes Dolores Blanton MD   naloxone (NARCAN) 4 MG/0.1ML nasal spray Spray 1 spray (4 mg) into one nostril alternating nostrils once as  needed for opioid reversal every 2-3 minutes until assistance arrives  Yes Jessy Nieto MD          ondansetron (ZOFRAN-ODT) 4 MG ODT tab Take 1 tablet (4 mg) by mouth every 6 hours as needed for nausea or vomiting  Yes Keith Aguilar PA-C

## 2020-07-16 NOTE — PLAN OF CARE
OT- eval and treatment completed. Patient POD #1 for L GABRIEL, patient had previous R GABRIEL in 2/20 and B TKA's ~20 years ago. Patient resides with spouse and son in home and has needed AE to use at home.  Patient plan: home today  Current status: Educated in and reviewed as needed hip precautions, walker safety, EC/WS techniques, advancement of activity following surgery, car transfers and driving readiness, patient was engaged in instruction and verbalized understanding. After review, patient was SBA for TB with use of reacher as needed for LE dressing. SBA, fww for sit<>stand, functional mobility in room. After review, patient was SBA, use of grab bars for walk in shower transfer. Patient educated in how to maintain hip precautions when completing, patient was receptive. SBA for bed mobility. Patient declined offer to complete toilet transfer as she has been managing with RN staff.   Anticipated status at discharge:  SBA, fww with basic functional mobility and ADL's. Anticipate spouse support for IADL's; household chores, driving, errands, cooking and bathing. Patient has all needed AE. Will discharge from OT services.     Occupational Therapy Discharge Summary    Reason for therapy discharge:    All goals and outcomes met, no further needs identified.    Progress towards therapy goal(s). See goals on Care Plan in [facility] electronic health record for goal details.  Goals met    Therapy recommendation(s):    No further therapy is recommended.

## 2020-07-16 NOTE — DISCHARGE INSTRUCTIONS
Return to clinic in10-14 days. Call [phone] to schedule or if you experience any problems before your scheduled appointment.    See general discharge instruction sheet for hips.  1. Do exercises as instructed by therapist.  2. Observe your hip precautions.  3. Walk daily increasing distance and time each day by a little.  4. Ice hip for swelling and discomfort.  5. May shower, inspect dressing daily for problems listed below   6.Notify your dentist or physician of your implant so you can get antibiotics before any dental work or any other invasive procedure (ie: colonoscopy).  7. Do not cross legs.    Aquacel dressing:  DO NOT CHANGE DRESSING DAILY.  Leave this dressing on until follow-up appointment with Orthopedic Surgeon.  Dressing is waterproof, can shower with it on, pat dry when done.  No soaking such as in tub baths, pools or hot tubs    While on narcotic pain medication, to prevent constipation:  1. Drink plenty of water to keep well hydrated   2. May take over the counter Colace or Senna (follow instructions on label)     Call your physician if: ([phone])   1. Persistent fever greater than 100 degrees with body chills or excessive sweating.  2. Increased/persistent redness, localized warmth, tenderness, drainage or swelling at incision site. Greater than 50% drainage on dressing.   3. Pain not controlled with oral pain medications, ice and rest.   4. No bowel movement in 3 days (may use Milk of Magnesia or other over the counter remedy).  5. Chest pain, shortness of breath, and/or calf pain with excessive swelling.  6. Generalized feeling of illness.  7. Any other questions or concerns related to your surgery/recovery.      Thank you for allowing [facility] to participate in your cares!!

## 2020-07-17 DIAGNOSIS — M79.7 FIBROMYALGIA: ICD-10-CM

## 2020-07-17 DIAGNOSIS — G89.4 CHRONIC PAIN SYNDROME: ICD-10-CM

## 2020-07-17 DIAGNOSIS — M51.369 DDD (DEGENERATIVE DISC DISEASE), LUMBAR: ICD-10-CM

## 2020-07-17 RX ORDER — HYDROCODONE BITARTRATE AND ACETAMINOPHEN 5; 325 MG/1; MG/1
1-2 TABLET ORAL EVERY 6 HOURS PRN
Qty: 180 TABLET | Refills: 0 | Status: SHIPPED | OUTPATIENT
Start: 2020-07-17 | End: 2020-08-15

## 2020-07-17 NOTE — TELEPHONE ENCOUNTER
Patient calling for refill. She would like this filled asap if possible. She was in the hospital and so her usual refill is overdue. She did get a short supply on 7/15/20    Controlled Substance Refill Request for oxycodone  Problem List Complete:    No     PROVIDER TO CONSIDER COMPLETION OF PROBLEM LIST AND OVERVIEW/CONTROLLED SUBSTANCE AGREEMENT    Last Written Prescription Date:  7/15/20  Last Fill Quantity: 30,   # refills: 0    THE MOST RECENT OFFICE VISIT MUST BE WITHIN THE PAST 3 MONTHS. AT LEAST ONE FACE TO FACE VISIT MUST OCCUR EVERY 6 MONTHS. ADDITIONAL VISITS CAN BE VIRTUAL.  (THIS STATEMENT SHOULD BE DELETED.)    Last Office Visit with [facility] care provider: 7/9/20 Z[place]yka    Future Office visit:     Controlled substance agreement:   Encounter-Level CSA - 04/21/2016:    Controlled Substance Agreement - Scan on 4/28/2016  9:48 AM: [place] Controlled Substance Agreement     Patient-Level CSA:    There are no patient-level csa.         Last Urine Drug Screen:   Pain Drug SCR UR W RPTD Meds   Date Value Ref Range Status   05/25/2017   Final    FINAL  (Note)  ====================================================================  [facility] COMP DRUG ANALYSIS,UR  ====================================================================  Test                             Result       Flag       Units    Drug Present   Hydrocodone                    2505                    ng/mg creat   Hydromorphone                  162                     ng/mg creat   Dihydrocodeine                 513                     ng/mg creat   Norhydrocodone                 1605                    ng/mg creat    Sources of hydrocodone include scheduled prescription    medications. Hydromorphone, dihydrocodeine and norhydrocodone are    expected metabolites of hydrocodone. Hydromorphone and    dihydrocodeine are also available as scheduled prescription    medications.     Duloxetine                     PRESENT   Acetaminophen                   PRESENT   Naproxen                       PRESENT  ====================================================================  Test                      Result    Flag   Units      Ref Range   Creatinine              191              mg/dL      >=20  ====================================================================  Declared Medications:  Medication list was not provided.  ====================================================================  For clinical consultation, please call [phone].  ====================================================================  Analysis performed by [employer] [place] [address]     , No results found for: COMDAT,   Cannabinoids (74-lma-0-carboxy-9-THC)   Date Value Ref Range Status   10/15/2019 Not Detected NDET^Not Detected ng/mL Final     Comment:     Cutoff for a negative cannabinoid is 50 ng/mL or less.     Phencyclidine (Phencyclidine)   Date Value Ref Range Status   10/15/2019 Not Detected NDET^Not Detected ng/mL Final     Comment:     Cutoff for a negative PCP is 25 ng/mL or less.     Cocaine (Benzoylecgonine)   Date Value Ref Range Status   10/15/2019 Not Detected NDET^Not Detected ng/mL Final     Comment:     Cutoff for a negative cocaine is 150 ng/ml or less.     Methamphetamine (d-Methamphetamine)   Date Value Ref Range Status   10/15/2019 Not Detected NDET^Not Detected ng/mL Final     Comment:     Cutoff for a negative methamphetamine is 500 ng/ml or less.     Opiates (Morphine)   Date Value Ref Range Status   10/15/2019 Detected, Abnormal Result (A) NDET^Not Detected ng/mL Final     Comment:     Cutoff for a positive opiate is greater than 100 ng/ml.  This is an unconfirmed screening result to be used for medical purposes only.   Order [id number] for confirmation or individual confirmation tests to [employer].       Amphetamine (d-Amphetamine)   Date Value Ref Range Status   10/15/2019 Not Detected NDET^Not Detected ng/mL Final     Comment:     Cutoff for  a negative amphetamine is 500 ng/mL or less.     Benzodiazepines (Nordiazepam)   Date Value Ref Range Status   10/15/2019 Detected, Abnormal Result (A) NDET^Not Detected ng/mL Final     Comment:     Cutoff for a positive benzodiazepines is greater than 150 ng/ml.  This is an unconfirmed screening result to be used for medical purposes only.   Order [id number] for confirmation or individual confirmation tests to [employer].       Tricyclic Antidepressants (Desipramine)   Date Value Ref Range Status   10/15/2019 Not Detected NDET^Not Detected ng/mL Final     Comment:     Cutoff for a negative tricyclic antidepressant is 300 ng/ml or less.     Methadone (Methadone)   Date Value Ref Range Status   10/15/2019 Not Detected NDET^Not Detected ng/mL Final     Comment:     Cutoff for a negative methadone is 200 ng/ml or less.     Barbiturates (Butalbital)   Date Value Ref Range Status   10/15/2019 Not Detected NDET^Not Detected ng/mL Final     Comment:     Cutoff for a negative barbituate is 200 ng/ml or less.     Oxycodone (Oxycodone)   Date Value Ref Range Status   10/15/2019 Not Detected NDET^Not Detected ng/mL Final     Comment:     Cutoff for a negative Oxycodone is 100 ng/mL or less.     Propoxyphene (Norpropoxyphene)   Date Value Ref Range Status   10/15/2019 Not Detected NDET^Not Detected ng/mL Final     Comment:     Cutoff for a negative propoxyphene is 300 ng/ml or less     Buprenorphine (Buprenorphine)   Date Value Ref Range Status   10/15/2019 Not Detected NDET^Not Detected ng/mL Final     Comment:     Cutoff for a negative buprenorphine is 10 ng/ml or less        Processing:  e scribe      [email or web address]       checked in past 3 months?  No, route to RN

## 2020-07-17 NOTE — TELEPHONE ENCOUNTER
checked.  Patient got oxycodone after her hip replacement on 7/15/20 (30 tabs)    norco last filled on 6/15/20    Routed to covering provider for the letter S.

## 2020-07-24 ENCOUNTER — NURSE TRIAGE (OUTPATIENT)
Dept: INTERNAL MEDICINE | Facility: CLINIC | Age: 75
End: 2020-07-24

## 2020-07-24 DIAGNOSIS — R05.9 COUGH: ICD-10-CM

## 2020-07-24 NOTE — TELEPHONE ENCOUNTER
Patient calling  Patient is coughing, no fever, no SOB. She would like a cough medication.   Ok to call and  [phone]

## 2020-07-27 RX ORDER — CODEINE PHOSPHATE AND GUAIFENESIN 10; 100 MG/5ML; MG/5ML
2 SOLUTION ORAL EVERY 4 HOURS PRN
Refills: 0 | Status: CANCELLED | OUTPATIENT
Start: 2020-07-27

## 2020-07-27 NOTE — TELEPHONE ENCOUNTER
Call to patient, spoke to son and  (on consent to communicate) as patient is currently resting, they would like to request refill for cough medication Robitussin. No further questions or concerns at this time. Would like medication refilled asap.     Controlled Substance Refill Request for guaiFENesin-codeine (ROBITUSSIN AC)   Problem List Complete:    Yes    Last Written Prescription Date:  6/26/20  Last Fill Quantity: 8oz,   # refills: 0    THE MOST RECENT OFFICE VISIT MUST BE WITHIN THE PAST 3 MONTHS. AT LEAST ONE FACE TO FACE VISIT MUST OCCUR EVERY 6 MONTHS. ADDITIONAL VISITS CAN BE VIRTUAL.      Last Office Visit with [facility] primary care provider: 7/9/20    Future Office visit:     Controlled substance agreement:   Encounter-Level CSA - 04/21/2016:    Controlled Substance Agreement - Scan on 4/28/2016  9:48 AM: [place] Controlled Substance Agreement     Patient-Level CSA:    There are no patient-level csa.         Last Urine Drug Screen:   Pain Drug SCR UR W RPTD Meds   Date Value Ref Range Status   05/25/2017   Final    FINAL  (Note)  ====================================================================  TOXASSURE COMP DRUG ANALYSIS,UR  ====================================================================  Test                             Result       Flag       Units    Drug Present   Hydrocodone                    2505                    ng/mg creat   Hydromorphone                  162                     ng/mg creat   Dihydrocodeine                 513                     ng/mg creat   Norhydrocodone                 1605                    ng/mg creat    Sources of hydrocodone include scheduled prescription    medications. Hydromorphone, dihydrocodeine and norhydrocodone are    expected metabolites of hydrocodone. Hydromorphone and    dihydrocodeine are also available as scheduled prescription    medications.     Duloxetine                     PRESENT   Acetaminophen                  PRESENT   Naproxen                        PRESENT  ====================================================================  Test                      Result    Flag   Units      Ref Range   Creatinine              191              mg/dL      >=20  ====================================================================  Declared Medications:  Medication list was not provided.  ====================================================================  For clinical consultation, please call [phone].  ====================================================================  Analysis performed by [employer] [place] [address]     , No results found for: COMDAT,   Cannabinoids (93-twa-9-carboxy-9-THC)   Date Value Ref Range Status   10/15/2019 Not Detected NDET^Not Detected ng/mL Final     Comment:     Cutoff for a negative cannabinoid is 50 ng/mL or less.     Phencyclidine (Phencyclidine)   Date Value Ref Range Status   10/15/2019 Not Detected NDET^Not Detected ng/mL Final     Comment:     Cutoff for a negative PCP is 25 ng/mL or less.     Cocaine (Benzoylecgonine)   Date Value Ref Range Status   10/15/2019 Not Detected NDET^Not Detected ng/mL Final     Comment:     Cutoff for a negative cocaine is 150 ng/ml or less.     Methamphetamine (d-Methamphetamine)   Date Value Ref Range Status   10/15/2019 Not Detected NDET^Not Detected ng/mL Final     Comment:     Cutoff for a negative methamphetamine is 500 ng/ml or less.     Opiates (Morphine)   Date Value Ref Range Status   10/15/2019 Detected, Abnormal Result (A) NDET^Not Detected ng/mL Final     Comment:     Cutoff for a positive opiate is greater than 100 ng/ml.  This is an unconfirmed screening result to be used for medical purposes only.   Order [id number] for confirmation or individual confirmation tests to [employer].       Amphetamine (d-Amphetamine)   Date Value Ref Range Status   10/15/2019 Not Detected NDET^Not Detected ng/mL Final     Comment:     Cutoff for a negative amphetamine is  500 ng/mL or less.     Benzodiazepines (Nordiazepam)   Date Value Ref Range Status   10/15/2019 Detected, Abnormal Result (A) NDET^Not Detected ng/mL Final     Comment:     Cutoff for a positive benzodiazepines is greater than 150 ng/ml.  This is an unconfirmed screening result to be used for medical purposes only.   Order [id number] for confirmation or individual confirmation tests to [employer].       Tricyclic Antidepressants (Desipramine)   Date Value Ref Range Status   10/15/2019 Not Detected NDET^Not Detected ng/mL Final     Comment:     Cutoff for a negative tricyclic antidepressant is 300 ng/ml or less.     Methadone (Methadone)   Date Value Ref Range Status   10/15/2019 Not Detected NDET^Not Detected ng/mL Final     Comment:     Cutoff for a negative methadone is 200 ng/ml or less.     Barbiturates (Butalbital)   Date Value Ref Range Status   10/15/2019 Not Detected NDET^Not Detected ng/mL Final     Comment:     Cutoff for a negative barbituate is 200 ng/ml or less.     Oxycodone (Oxycodone)   Date Value Ref Range Status   10/15/2019 Not Detected NDET^Not Detected ng/mL Final     Comment:     Cutoff for a negative Oxycodone is 100 ng/mL or less.     Propoxyphene (Norpropoxyphene)   Date Value Ref Range Status   10/15/2019 Not Detected NDET^Not Detected ng/mL Final     Comment:     Cutoff for a negative propoxyphene is 300 ng/ml or less     Buprenorphine (Buprenorphine)   Date Value Ref Range Status   10/15/2019 Not Detected NDET^Not Detected ng/mL Final     Comment:     Cutoff for a negative buprenorphine is 10 ng/ml or less        Processing:  Rx to be electronically transmitted to pharmacy by provider     [email or web address]   checked in past 3 months?  Yes 7/17/20- no concerns      Routing refill request to provider for review/approval because:  Drug not on the [facility] refill protocol

## 2020-07-27 NOTE — TELEPHONE ENCOUNTER
She may need to be seen if cough is worse, especially since she had surgery since I did a virtual visit a month ago.   Get more informamtion from the pateint.

## 2020-07-28 NOTE — TELEPHONE ENCOUNTER
Call to patient. Advised. States she recently had her hip replaced so she is not agile enough to go to urgent care and will not go to urgent care.

## 2020-07-28 NOTE — TELEPHONE ENCOUNTER
"Call to patient. States the cough was better but it comes and goes. Patient also asking for a zpack. States this is the same cough that she had a month ago but it has come and gone over the past month. States when she takes the cough medicine she does not cough at all during the night but otherwise some nights she coughs all night long. No fevers. Patient is coughing up white sputum. No difficulty breathing. Some shortness of breath but not different from her normal asthma. Patient has tried over the counter cough medications and they don't work. No other cold symptoms. States she is not coming into the clinic. No in clinic appointments this week even if patient would agree to appointment which she is not agreeable to. States she just wants this \"one last kick\" to try to knock this cough out.    Additional Information    Negative: Bluish (or gray) lips or face    Negative: Severe difficulty breathing (e.g., struggling for each breath, speaks in single words)    Negative: Rapid onset of cough and has hives    Negative: Coughing started suddenly after medicine, an allergic food or bee sting    Negative: Difficulty breathing after exposure to flames, smoke, or fumes    Negative: Sounds like a life-threatening emergency to the triager    Negative: Previous asthma attacks and this feels like asthma attack    Negative: Chest pain present when not coughing    Negative: Difficulty breathing    Negative: Passed out (i.e., fainted, collapsed and was not responding)    Negative: Patient sounds very sick or weak to the triager    Negative: Coughed up > 1 tablespoon (15 ml) blood (Exception: blood-tinged sputum)    Negative: Fever > 103 F (39.4 C)    Negative: Fever > 101 F (38.3 C) and over 60 years of age    Negative: Fever > 100.0 F (37.8 C) and has diabetes mellitus or a weak immune system (e.g., HIV positive, cancer chemotherapy, organ transplant, splenectomy, chronic steroids)    Negative: Fever > 100.0 F (37.8 C) and " bedridden (e.g., nursing home patient, stroke, chronic illness, recovering from surgery)    Negative: Increasing ankle swelling    Negative: Wheezing is present    Negative: SEVERE coughing spells (e.g., whooping sound after coughing, vomiting after coughing)    Negative: Coughing up pepper-colored (reddish-brown) or blood-tinged sputum    Negative: Fever present > 3 days (72 hours)    Negative: Fever returns after gone for over 24 hours and symptoms worse or not improved    Negative: Using nasal washes and pain medicine > 24 hours and sinus pain persists    Negative: Known COPD or other severe lung disease (i.e., bronchiectasis, cystic fibrosis, lung surgery) and worsening symptoms (i.e., increased sputum purulence or amount, increased breathing difficulty)    Negative: Continuous (nonstop) coughing interferes with work or school and no improvement using cough treatment per Care Advice    Cough has been present for > 3 weeks     Off and on    Negative: Allergy symptoms are also present (e.g., itchy eyes, clear nasal discharge, postnasal drip)    Negative: Nasal discharge present > 10 days    Negative: Exposure to TB (Tuberculosis)    Negative: Taking an ACE Inhibitor medication (e.g., benazepril/LOTENSIN, captopril/CAPOTEN, enalapril/VASOTEC, lisinopril/ZESTRIL)    Negative: Patient wants to be seen    Protocols used: COUGH-A-OH

## 2020-07-28 NOTE — TELEPHONE ENCOUNTER
She needs an evaluation done.  I cannot continue to prescribe cough syrup or antibiotics over the phone, she had had surgery recently so could have some other cause of the cough, if she is not going to come here she can go to urgent care

## 2020-07-31 DIAGNOSIS — R05.9 COUGH: ICD-10-CM

## 2020-07-31 RX ORDER — CODEINE PHOSPHATE AND GUAIFENESIN 10; 100 MG/5ML; MG/5ML
2 SOLUTION ORAL EVERY 4 HOURS PRN
Qty: 8 OZ | Refills: 0 | OUTPATIENT
Start: 2020-07-31

## 2020-08-10 ENCOUNTER — TELEPHONE (OUTPATIENT)
Dept: INTERNAL MEDICINE | Facility: CLINIC | Age: 75
End: 2020-08-10

## 2020-08-10 DIAGNOSIS — M79.7 FIBROMYALGIA: ICD-10-CM

## 2020-08-10 DIAGNOSIS — M51.369 DDD (DEGENERATIVE DISC DISEASE), LUMBAR: ICD-10-CM

## 2020-08-10 DIAGNOSIS — G89.4 CHRONIC PAIN SYNDROME: ICD-10-CM

## 2020-08-10 NOTE — TELEPHONE ENCOUNTER
Pt calling requesting the name of the over the counter cream that Dr. Blanton suggested pt start trying for groin itch at 6/26/20 video visit. She stated that it worked well but that she misplaced the tube of it and cannot remember the name. She also would like refill request below. Pt can be reached at [phone]. OK to leave a detailed message. Please advise. Thanks.      Controlled Substance Refill Request for Norco  Problem List Complete:    No     PROVIDER TO CONSIDER COMPLETION OF PROBLEM LIST AND OVERVIEW/CONTROLLED SUBSTANCE AGREEMENT    Last Written Prescription Date:  7/17/20  Last Fill Quantity: 180,   # refills: 0    THE MOST RECENT OFFICE VISIT MUST BE WITHIN THE PAST 3 MONTHS. AT LEAST ONE FACE TO FACE VISIT MUST OCCUR EVERY 6 MONTHS. ADDITIONAL VISITS CAN BE VIRTUAL.  (THIS STATEMENT SHOULD BE DELETED.)    Last Office Visit with [facility] [place] provider: 7/9/20 with Dr. Blanton    Future Office visit:     Controlled substance agreement:   Encounter-Level CSA - 04/21/2016:    Controlled Substance Agreement - Scan on 4/28/2016  9:48 AM: [place] Controlled Substance Agreement     Patient-Level CSA:    There are no patient-level csa.         Last Urine Drug Screen:   Pain Drug SCR UR W RPTD Meds   Date Value Ref Range Status   05/25/2017   Final    FINAL  (Note)  ====================================================================  TOXASSURE COMP DRUG ANALYSIS,UR  ====================================================================  Test                             Result       Flag       Units    Drug Present   Hydrocodone                    2505                    ng/mg creat   Hydromorphone                  162                     ng/mg creat   Dihydrocodeine                 513                     ng/mg creat   Norhydrocodone                 1605                    ng/mg creat    Sources of hydrocodone include scheduled prescription    medications. Hydromorphone, dihydrocodeine and norhydrocodone are     expected metabolites of hydrocodone. Hydromorphone and    dihydrocodeine are also available as scheduled prescription    medications.     Duloxetine                     PRESENT   Acetaminophen                  PRESENT   Naproxen                       PRESENT  ====================================================================  Test                      Result    Flag   Units      Ref Range   Creatinine              191              mg/dL      >=20  ====================================================================  Declared Medications:  Medication list was not provided.  ====================================================================  For clinical consultation, please call [phone].  ====================================================================  Analysis performed by [employer] [place] [address]     , No results found for: COMDAT,   Cannabinoids (06-pjs-2-carboxy-9-THC)   Date Value Ref Range Status   10/15/2019 Not Detected NDET^Not Detected ng/mL Final     Comment:     Cutoff for a negative cannabinoid is 50 ng/mL or less.     Phencyclidine (Phencyclidine)   Date Value Ref Range Status   10/15/2019 Not Detected NDET^Not Detected ng/mL Final     Comment:     Cutoff for a negative PCP is 25 ng/mL or less.     Cocaine (Benzoylecgonine)   Date Value Ref Range Status   10/15/2019 Not Detected NDET^Not Detected ng/mL Final     Comment:     Cutoff for a negative cocaine is 150 ng/ml or less.     Methamphetamine (d-Methamphetamine)   Date Value Ref Range Status   10/15/2019 Not Detected NDET^Not Detected ng/mL Final     Comment:     Cutoff for a negative methamphetamine is 500 ng/ml or less.     Opiates (Morphine)   Date Value Ref Range Status   10/15/2019 Detected, Abnormal Result (A) NDET^Not Detected ng/mL Final     Comment:     Cutoff for a positive opiate is greater than 100 ng/ml.  This is an unconfirmed screening result to be used for medical purposes only.   Order [id number] for  confirmation or individual confirmation tests to [employer].       Amphetamine (d-Amphetamine)   Date Value Ref Range Status   10/15/2019 Not Detected NDET^Not Detected ng/mL Final     Comment:     Cutoff for a negative amphetamine is 500 ng/mL or less.     Benzodiazepines (Nordiazepam)   Date Value Ref Range Status   10/15/2019 Detected, Abnormal Result (A) NDET^Not Detected ng/mL Final     Comment:     Cutoff for a positive benzodiazepines is greater than 150 ng/ml.  This is an unconfirmed screening result to be used for medical purposes only.   Order [id number] for confirmation or individual confirmation tests to [facility] [facility].       Tricyclic Antidepressants (Desipramine)   Date Value Ref Range Status   10/15/2019 Not Detected NDET^Not Detected ng/mL Final     Comment:     Cutoff for a negative tricyclic antidepressant is 300 ng/ml or less.     Methadone (Methadone)   Date Value Ref Range Status   10/15/2019 Not Detected NDET^Not Detected ng/mL Final     Comment:     Cutoff for a negative methadone is 200 ng/ml or less.     Barbiturates (Butalbital)   Date Value Ref Range Status   10/15/2019 Not Detected NDET^Not Detected ng/mL Final     Comment:     Cutoff for a negative barbituate is 200 ng/ml or less.     Oxycodone (Oxycodone)   Date Value Ref Range Status   10/15/2019 Not Detected NDET^Not Detected ng/mL Final     Comment:     Cutoff for a negative Oxycodone is 100 ng/mL or less.     Propoxyphene (Norpropoxyphene)   Date Value Ref Range Status   10/15/2019 Not Detected NDET^Not Detected ng/mL Final     Comment:     Cutoff for a negative propoxyphene is 300 ng/ml or less     Buprenorphine (Buprenorphine)   Date Value Ref Range Status   10/15/2019 Not Detected NDET^Not Detected ng/mL Final     Comment:     Cutoff for a negative buprenorphine is 10 ng/ml or less        Processing:  Rx to be electronically transmitted to pharmacy by provider      [email or web address]       checked in past 3 months?  No,  route to RN

## 2020-08-11 NOTE — TELEPHONE ENCOUNTER
Call to patient, informed of over the counter medication per providers message. Prescription refill is not yet due will keep encounter open for medication refill.

## 2020-08-11 NOTE — TELEPHONE ENCOUNTER
She can get 180 for a month. She is not supposed to be taking 8 per day. She has always gotten 180 per month.

## 2020-08-11 NOTE — TELEPHONE ENCOUNTER
Narco prescription at max dose (2 tablets Q6hr) is 22.5 days of medication. Patient would be due for refill at this time. Please advise,     Thank you

## 2020-08-15 RX ORDER — HYDROCODONE BITARTRATE AND ACETAMINOPHEN 5; 325 MG/1; MG/1
1-2 TABLET ORAL EVERY 6 HOURS PRN
Qty: 180 TABLET | Refills: 0 | Status: SHIPPED | OUTPATIENT
Start: 2020-08-16 | End: 2020-09-14

## 2020-08-18 ENCOUNTER — TRANSFERRED RECORDS (OUTPATIENT)
Dept: HEALTH INFORMATION MANAGEMENT | Facility: CLINIC | Age: 75
End: 2020-08-18

## 2020-08-31 DIAGNOSIS — I10 BENIGN ESSENTIAL HYPERTENSION: ICD-10-CM

## 2020-09-02 RX ORDER — LOSARTAN POTASSIUM 50 MG/1
50 TABLET ORAL DAILY
Qty: 90 TABLET | Refills: 1 | Status: SHIPPED | OUTPATIENT
Start: 2020-09-02 | End: 2020-10-20

## 2020-09-02 RX ORDER — AMLODIPINE BESYLATE 5 MG/1
5 TABLET ORAL DAILY
Qty: 90 TABLET | Refills: 1 | Status: SHIPPED | OUTPATIENT
Start: 2020-09-02 | End: 2020-10-20

## 2020-09-04 DIAGNOSIS — M79.7 FIBROMYALGIA: Primary | ICD-10-CM

## 2020-09-04 RX ORDER — MONTELUKAST SODIUM 10 MG/1
10 TABLET ORAL AT BEDTIME
Status: CANCELLED | OUTPATIENT
Start: 2020-09-04

## 2020-09-04 NOTE — TELEPHONE ENCOUNTER
Per chart, primary care provider has not prescribed Singulair previously. Call to patient. Patient reports she was previously prescribed this when she was a patient at [facility], and she also recently got an prescription from urgent care to help prevent asthma flare ups. Patient reports this medication is helpful and she would like to continue to take it if OK by primary care provider.    Patient also wondering if she could get a referral to the pain clinic in [place] from primary care provider for her fibromyalgia. Patient reports her daughter saw a Claire GAMBLE (patient unsure of last name) at the office for her fibromyalgia and found it helpful. Patient also notes this provider will prescribe medical marijuana.

## 2020-09-04 NOTE — TELEPHONE ENCOUNTER
Needs a visit to evaluate her asthma and consideration of treatment with new medication.   Referral done.

## 2020-09-04 NOTE — TELEPHONE ENCOUNTER
Montelukast Sodium      Last Written Prescription Date:  05/14/20  Last Fill Quantity: 90,   # refills: 0  Last Office Visit: 07/09/20  Future Office visit:       Routing refill request to provider for review/approval because:  Medication is reported/historical

## 2020-09-11 DIAGNOSIS — G89.4 CHRONIC PAIN SYNDROME: ICD-10-CM

## 2020-09-11 DIAGNOSIS — M79.7 FIBROMYALGIA: ICD-10-CM

## 2020-09-11 DIAGNOSIS — M51.369 DDD (DEGENERATIVE DISC DISEASE), LUMBAR: ICD-10-CM

## 2020-09-14 RX ORDER — HYDROCODONE BITARTRATE AND ACETAMINOPHEN 5; 325 MG/1; MG/1
1-2 TABLET ORAL EVERY 6 HOURS PRN
Qty: 180 TABLET | Refills: 0 | Status: SHIPPED | OUTPATIENT
Start: 2020-09-14 | End: 2020-10-13

## 2020-09-14 NOTE — TELEPHONE ENCOUNTER
Controlled Substance Refill Request for norco  Problem List Complete:    Yes    Last Written Prescription Date:  8/15/20  Last Fill Quantity: 180,   # refills: 0    Last Office Visit with [facility] primary care provider: 7/9/20    Future Office visit:     Controlled substance agreement:   Encounter-Level CSA - 04/21/2016:    Controlled Substance Agreement - Scan on 4/28/2016  9:48 AM: [place] Controlled Substance Agreement     Patient-Level CSA:    There are no patient-level csa.         Last Urine Drug Screen:   Pain Drug SCR UR W RPTD Meds   Date Value Ref Range Status   05/25/2017   Final    FINAL  (Note)  ====================================================================  TOXASSURE COMP DRUG ANALYSIS,UR  ====================================================================  Test                             Result       Flag       Units    Drug Present   Hydrocodone                    2505                    ng/mg creat   Hydromorphone                  162                     ng/mg creat   Dihydrocodeine                 513                     ng/mg creat   Norhydrocodone                 1605                    ng/mg creat    Sources of hydrocodone include scheduled prescription    medications. Hydromorphone, dihydrocodeine and norhydrocodone are    expected metabolites of hydrocodone. Hydromorphone and    dihydrocodeine are also available as scheduled prescription    medications.     Duloxetine                     PRESENT   Acetaminophen                  PRESENT   Naproxen                       PRESENT  ====================================================================  Test                      Result    Flag   Units      Ref Range   Creatinine              191              mg/dL      >=20  ====================================================================  Declared Medications:  Medication list was not provided.  ====================================================================  For clinical consultation,  please call [phone].  ====================================================================  Analysis performed by [employer] [employer] [place] [address]     , No results found for: COMDAT,   Cannabinoids (13-zlr-8-carboxy-9-THC)   Date Value Ref Range Status   10/15/2019 Not Detected NDET^Not Detected ng/mL Final     Comment:     Cutoff for a negative cannabinoid is 50 ng/mL or less.     Phencyclidine (Phencyclidine)   Date Value Ref Range Status   10/15/2019 Not Detected NDET^Not Detected ng/mL Final     Comment:     Cutoff for a negative PCP is 25 ng/mL or less.     Cocaine (Benzoylecgonine)   Date Value Ref Range Status   10/15/2019 Not Detected NDET^Not Detected ng/mL Final     Comment:     Cutoff for a negative cocaine is 150 ng/ml or less.     Methamphetamine (d-Methamphetamine)   Date Value Ref Range Status   10/15/2019 Not Detected NDET^Not Detected ng/mL Final     Comment:     Cutoff for a negative methamphetamine is 500 ng/ml or less.     Opiates (Morphine)   Date Value Ref Range Status   10/15/2019 Detected, Abnormal Result (A) NDET^Not Detected ng/mL Final     Comment:     Cutoff for a positive opiate is greater than 100 ng/ml.  This is an unconfirmed screening result to be used for medical purposes only.   Order [id number] for confirmation or individual confirmation tests to [email or web address].       Amphetamine (d-Amphetamine)   Date Value Ref Range Status   10/15/2019 Not Detected NDET^Not Detected ng/mL Final     Comment:     Cutoff for a negative amphetamine is 500 ng/mL or less.     Benzodiazepines (Nordiazepam)   Date Value Ref Range Status   10/15/2019 Detected, Abnormal Result (A) NDET^Not Detected ng/mL Final     Comment:     Cutoff for a positive benzodiazepines is greater than 150 ng/ml.  This is an unconfirmed screening result to be used for medical purposes only.   Order [id number] for confirmation or individual confirmation tests to [employer].       Tricyclic Antidepressants (Desipramine)   Date  Value Ref Range Status   10/15/2019 Not Detected NDET^Not Detected ng/mL Final     Comment:     Cutoff for a negative tricyclic antidepressant is 300 ng/ml or less.     Methadone (Methadone)   Date Value Ref Range Status   10/15/2019 Not Detected NDET^Not Detected ng/mL Final     Comment:     Cutoff for a negative methadone is 200 ng/ml or less.     Barbiturates (Butalbital)   Date Value Ref Range Status   10/15/2019 Not Detected NDET^Not Detected ng/mL Final     Comment:     Cutoff for a negative barbituate is 200 ng/ml or less.     Oxycodone (Oxycodone)   Date Value Ref Range Status   10/15/2019 Not Detected NDET^Not Detected ng/mL Final     Comment:     Cutoff for a negative Oxycodone is 100 ng/mL or less.     Propoxyphene (Norpropoxyphene)   Date Value Ref Range Status   10/15/2019 Not Detected NDET^Not Detected ng/mL Final     Comment:     Cutoff for a negative propoxyphene is 300 ng/ml or less     Buprenorphine (Buprenorphine)   Date Value Ref Range Status   10/15/2019 Not Detected NDET^Not Detected ng/mL Final     Comment:     Cutoff for a negative buprenorphine is 10 ng/ml or less        Processing:  Rx to be electronically transmitted to pharmacy by provider     [email or web address]   checked in past 3 months?  Yes checked- no concerns  Last filled 8/17/20

## 2020-10-06 ENCOUNTER — TRANSFERRED RECORDS (OUTPATIENT)
Dept: HEALTH INFORMATION MANAGEMENT | Facility: CLINIC | Age: 75
End: 2020-10-06

## 2020-10-09 DIAGNOSIS — M79.7 FIBROMYALGIA: ICD-10-CM

## 2020-10-09 DIAGNOSIS — G89.4 CHRONIC PAIN SYNDROME: ICD-10-CM

## 2020-10-09 DIAGNOSIS — M51.369 DDD (DEGENERATIVE DISC DISEASE), LUMBAR: ICD-10-CM

## 2020-10-12 NOTE — TELEPHONE ENCOUNTER
Controlled Substance Refill Request for Norco 5-325 mg  Problem List Complete:    Yes    Last Written Prescription Date:  9/14/2020  Last Fill Quantity: 180,   # refills: 0    Last Office Visit with [facility] primary care provider: 6/26/2020 (Franny); 7/9/2020 (Zuleyka)    Future Office visit:     Controlled substance agreement:   Encounter-Level CSA - 04/21/2016:    Controlled Substance Agreement - Scan on 4/28/2016  9:48 AM: [place] Controlled Substance Agreement     Patient-Level CSA:    There are no patient-level csa.         Last Urine Drug Screen:   Pain Drug SCR UR W RPTD Meds   Date Value Ref Range Status   05/25/2017   Final    FINAL  (Note)  ====================================================================  [facility] COMP DRUG ANALYSIS,UR  ====================================================================  Test                             Result       Flag       Units    Drug Present   Hydrocodone                    2505                    ng/mg creat   Hydromorphone                  162                     ng/mg creat   Dihydrocodeine                 513                     ng/mg creat   Norhydrocodone                 1605                    ng/mg creat    Sources of hydrocodone include scheduled prescription    medications. Hydromorphone, dihydrocodeine and norhydrocodone are    expected metabolites of hydrocodone. Hydromorphone and    dihydrocodeine are also available as scheduled prescription    medications.     Duloxetine                     PRESENT   Acetaminophen                  PRESENT   Naproxen                       PRESENT  ====================================================================  Test                      Result    Flag   Units      Ref Range   Creatinine              191              mg/dL      >=20  ====================================================================  Declared Medications:  Medication list was not  provided.  ====================================================================  For clinical consultation, please call [phone].  ====================================================================  Analysis performed by [employer] [place] [address]     , No results found for: COMDAT,   Cannabinoids (88-cli-8-carboxy-9-THC)   Date Value Ref Range Status   10/15/2019 Not Detected NDET^Not Detected ng/mL Final     Comment:     Cutoff for a negative cannabinoid is 50 ng/mL or less.     Phencyclidine (Phencyclidine)   Date Value Ref Range Status   10/15/2019 Not Detected NDET^Not Detected ng/mL Final     Comment:     Cutoff for a negative PCP is 25 ng/mL or less.     Cocaine (Benzoylecgonine)   Date Value Ref Range Status   10/15/2019 Not Detected NDET^Not Detected ng/mL Final     Comment:     Cutoff for a negative cocaine is 150 ng/ml or less.     Methamphetamine (d-Methamphetamine)   Date Value Ref Range Status   10/15/2019 Not Detected NDET^Not Detected ng/mL Final     Comment:     Cutoff for a negative methamphetamine is 500 ng/ml or less.     Opiates (Morphine)   Date Value Ref Range Status   10/15/2019 Detected, Abnormal Result (A) NDET^Not Detected ng/mL Final     Comment:     Cutoff for a positive opiate is greater than 100 ng/ml.  This is an unconfirmed screening result to be used for medical purposes only.   Order [id number] for confirmation or individual confirmation tests to [facility].       Amphetamine (d-Amphetamine)   Date Value Ref Range Status   10/15/2019 Not Detected NDET^Not Detected ng/mL Final     Comment:     Cutoff for a negative amphetamine is 500 ng/mL or less.     Benzodiazepines (Nordiazepam)   Date Value Ref Range Status   10/15/2019 Detected, Abnormal Result (A) NDET^Not Detected ng/mL Final     Comment:     Cutoff for a positive benzodiazepines is greater than 150 ng/ml.  This is an unconfirmed screening result to be used for medical purposes only.   Order [id number] for  confirmation or individual confirmation tests to [facility].       Tricyclic Antidepressants (Desipramine)   Date Value Ref Range Status   10/15/2019 Not Detected NDET^Not Detected ng/mL Final     Comment:     Cutoff for a negative tricyclic antidepressant is 300 ng/ml or less.     Methadone (Methadone)   Date Value Ref Range Status   10/15/2019 Not Detected NDET^Not Detected ng/mL Final     Comment:     Cutoff for a negative methadone is 200 ng/ml or less.     Barbiturates (Butalbital)   Date Value Ref Range Status   10/15/2019 Not Detected NDET^Not Detected ng/mL Final     Comment:     Cutoff for a negative barbituate is 200 ng/ml or less.     Oxycodone (Oxycodone)   Date Value Ref Range Status   10/15/2019 Not Detected NDET^Not Detected ng/mL Final     Comment:     Cutoff for a negative Oxycodone is 100 ng/mL or less.     Propoxyphene (Norpropoxyphene)   Date Value Ref Range Status   10/15/2019 Not Detected NDET^Not Detected ng/mL Final     Comment:     Cutoff for a negative propoxyphene is 300 ng/ml or less     Buprenorphine (Buprenorphine)   Date Value Ref Range Status   10/15/2019 Not Detected NDET^Not Detected ng/mL Final     Comment:     Cutoff for a negative buprenorphine is 10 ng/ml or less        Processing:  Rx to be electronically transmitted to pharmacy by provider     [email or web address]   checked in past 3 months?  Yes done 9/14/2020

## 2020-10-13 RX ORDER — HYDROCODONE BITARTRATE AND ACETAMINOPHEN 5; 325 MG/1; MG/1
1-2 TABLET ORAL EVERY 6 HOURS PRN
Qty: 180 TABLET | Refills: 0 | Status: SHIPPED | OUTPATIENT
Start: 2020-10-14 | End: 2020-11-11

## 2020-10-16 DIAGNOSIS — I10 BENIGN ESSENTIAL HYPERTENSION: ICD-10-CM

## 2020-10-20 RX ORDER — LOSARTAN POTASSIUM 50 MG/1
TABLET ORAL
Qty: 90 TABLET | Refills: 1 | Status: SHIPPED | OUTPATIENT
Start: 2020-10-20 | End: 2021-04-07

## 2020-10-20 RX ORDER — AMLODIPINE BESYLATE 5 MG/1
TABLET ORAL
Qty: 90 TABLET | Refills: 1 | Status: SHIPPED | OUTPATIENT
Start: 2020-10-20 | End: 2021-04-07

## 2020-11-09 ENCOUNTER — TELEPHONE (OUTPATIENT)
Dept: INTERNAL MEDICINE | Facility: CLINIC | Age: 75
End: 2020-11-09

## 2020-11-09 DIAGNOSIS — M51.369 DDD (DEGENERATIVE DISC DISEASE), LUMBAR: ICD-10-CM

## 2020-11-09 DIAGNOSIS — M79.7 FIBROMYALGIA: Primary | ICD-10-CM

## 2020-11-09 DIAGNOSIS — G89.4 CHRONIC PAIN SYNDROME: ICD-10-CM

## 2020-11-09 DIAGNOSIS — M79.7 FIBROMYALGIA: ICD-10-CM

## 2020-11-09 NOTE — TELEPHONE ENCOUNTER
Patient is not able to schedule appt for pain clinic because the referral was not signed by the doctor and the phone number was wrong to make appt. Please advise. Ok to call and zoe [phone]

## 2020-11-09 NOTE — TELEPHONE ENCOUNTER
Pain clinic [phone]    Spoke with Elif at pain clinic.  States referral was cancelled due to unable to reach patient.  Tried 2 times to contact patient to schedule an appointment.  Need to re-enter referral into chart.  Then patient can call and schedule an appointment.    Referral re-entered into chart.    Patient informed of referral info/phone number.

## 2020-11-10 ENCOUNTER — TELEPHONE (OUTPATIENT)
Dept: PALLIATIVE MEDICINE | Facility: CLINIC | Age: 75
End: 2020-11-10

## 2020-11-10 NOTE — TELEPHONE ENCOUNTER

## 2020-11-11 RX ORDER — HYDROCODONE BITARTRATE AND ACETAMINOPHEN 5; 325 MG/1; MG/1
1-2 TABLET ORAL EVERY 6 HOURS PRN
Qty: 180 TABLET | Refills: 0 | Status: SHIPPED | OUTPATIENT
Start: 2020-11-11 | End: 2020-12-11

## 2020-11-11 NOTE — TELEPHONE ENCOUNTER
Controlled Substance Refill Request for Norco  Problem List Complete:    No     PROVIDER TO CONSIDER COMPLETION OF PROBLEM LIST AND OVERVIEW/CONTROLLED SUBSTANCE AGREEMENT    Last Written Prescription Date:  10-14-20  Last Fill Quantity: 180,   # refills: 0    THE MOST RECENT OFFICE VISIT MUST BE WITHIN THE PAST 3 MONTHS. AT LEAST ONE FACE TO FACE VISIT MUST OCCUR EVERY 6 MONTHS. ADDITIONAL VISITS CAN BE VIRTUAL.  (THIS STATEMENT SHOULD BE DELETED.)    Last Office Visit with [facility] primary care provider: 7-9-20 with Dr. Gardiner, 6-26-20 virtual with primary care provider    Future Office visit:     Controlled substance agreement:   Encounter-Level CSA - 04/21/2016:    Controlled Substance Agreement - Scan on 4/28/2016  9:48 AM: [place] Controlled Substance Agreement     Patient-Level CSA:    There are no patient-level csa.         Last Urine Drug Screen:   Pain Drug SCR UR W RPTD Meds   Date Value Ref Range Status   05/25/2017   Final    FINAL  (Note)  ====================================================================  TOXASSURE COMP DRUG ANALYSIS,UR  ====================================================================  Test                             Result       Flag       Units    Drug Present   Hydrocodone                    2505                    ng/mg creat   Hydromorphone                  162                     ng/mg creat   Dihydrocodeine                 513                     ng/mg creat   Norhydrocodone                 1605                    ng/mg creat    Sources of hydrocodone include scheduled prescription    medications. Hydromorphone, dihydrocodeine and norhydrocodone are    expected metabolites of hydrocodone. Hydromorphone and    dihydrocodeine are also available as scheduled prescription    medications.     Duloxetine                     PRESENT   Acetaminophen                  PRESENT   Naproxen                        PRESENT  ====================================================================  Test                      Result    Flag   Units      Ref Range   Creatinine              191              mg/dL      >=20  ====================================================================  Declared Medications:  Medication list was not provided.  ====================================================================  For clinical consultation, please call [phone].  ====================================================================  Analysis performed by [employer] [place] [address]     , No results found for: COMDAT,   Cannabinoids (66-hqq-6-carboxy-9-THC)   Date Value Ref Range Status   10/15/2019 Not Detected NDET^Not Detected ng/mL Final     Comment:     Cutoff for a negative cannabinoid is 50 ng/mL or less.     Phencyclidine (Phencyclidine)   Date Value Ref Range Status   10/15/2019 Not Detected NDET^Not Detected ng/mL Final     Comment:     Cutoff for a negative PCP is 25 ng/mL or less.     Cocaine (Benzoylecgonine)   Date Value Ref Range Status   10/15/2019 Not Detected NDET^Not Detected ng/mL Final     Comment:     Cutoff for a negative cocaine is 150 ng/ml or less.     Methamphetamine (d-Methamphetamine)   Date Value Ref Range Status   10/15/2019 Not Detected NDET^Not Detected ng/mL Final     Comment:     Cutoff for a negative methamphetamine is 500 ng/ml or less.     Opiates (Morphine)   Date Value Ref Range Status   10/15/2019 Detected, Abnormal Result (A) NDET^Not Detected ng/mL Final     Comment:     Cutoff for a positive opiate is greater than 100 ng/ml.  This is an unconfirmed screening result to be used for medical purposes only.   Order [id number] for confirmation or individual confirmation tests to [facility] [facility].       Amphetamine (d-Amphetamine)   Date Value Ref Range Status   10/15/2019 Not Detected NDET^Not Detected ng/mL Final     Comment:     Cutoff for a negative amphetamine is 500 ng/mL or less.      Benzodiazepines (Nordiazepam)   Date Value Ref Range Status   10/15/2019 Detected, Abnormal Result (A) NDET^Not Detected ng/mL Final     Comment:     Cutoff for a positive benzodiazepines is greater than 150 ng/ml.  This is an unconfirmed screening result to be used for medical purposes only.   Order [id number] for confirmation or individual confirmation tests to [facility].       Tricyclic Antidepressants (Desipramine)   Date Value Ref Range Status   10/15/2019 Not Detected NDET^Not Detected ng/mL Final     Comment:     Cutoff for a negative tricyclic antidepressant is 300 ng/ml or less.     Methadone (Methadone)   Date Value Ref Range Status   10/15/2019 Not Detected NDET^Not Detected ng/mL Final     Comment:     Cutoff for a negative methadone is 200 ng/ml or less.     Barbiturates (Butalbital)   Date Value Ref Range Status   10/15/2019 Not Detected NDET^Not Detected ng/mL Final     Comment:     Cutoff for a negative barbituate is 200 ng/ml or less.     Oxycodone (Oxycodone)   Date Value Ref Range Status   10/15/2019 Not Detected NDET^Not Detected ng/mL Final     Comment:     Cutoff for a negative Oxycodone is 100 ng/mL or less.     Propoxyphene (Norpropoxyphene)   Date Value Ref Range Status   10/15/2019 Not Detected NDET^Not Detected ng/mL Final     Comment:     Cutoff for a negative propoxyphene is 300 ng/ml or less     Buprenorphine (Buprenorphine)   Date Value Ref Range Status   10/15/2019 Not Detected NDET^Not Detected ng/mL Final     Comment:     Cutoff for a negative buprenorphine is 10 ng/ml or less        Processing:  Rx to be electronically transmitted to pharmacy by provider      [email or web address]       checked in past 3 months?  Yes 9-14-20     Please advise, thanks.

## 2020-11-23 NOTE — PROGRESS NOTES
[facility]  Hospitalist Progress Note    Assessment & Plan   This patient is a 74 year old female who is POD #2 s/p right GABRIEL. Consulted by Orthopedic surgery for medical management.     Right total hip arthroplasty.  -Management per orthopedic team.  Diet, DVT prophylaxis, activity level per orthopedic team.    Hypertension  -Losartan and amlodipine resumed.  Holding parameter in place.    Asthma  -Stable.  Monitor.  -Albuterol inhaler prn     Fibromyalgia  -Cymbalta.  Stable.    Obesity BMI 43  -Complicates cares    DVT Prophylaxis: ASA per Ortho  Code Status: Prior  Expected discharge: Today, recommended to prior living arrangement once per primary team.    Marilee Mora, DO  [employer] (7am - 6pm, M-F)    Interval History   Extremly fatigued today. High narcotic use due to cramping lower extremity pain. Discussed the importance of proper narcotic use to limit her fatigue to allow for increased mobility. No BM. Not passing gas of this morning. On bowel regimen. No SOB, cough, or congestion. No chest pain or palpitations. Discussed with nursing.     -Data reviewed today: I reviewed all new labs and imaging results over the last 24 hours. I personally reviewed     Physical Exam   Temp: (P) 98.8  F (37.1  C) Temp src: Temporal BP: 127/46 Pulse: 94 Heart Rate: (P) 85 Resp: (P) 16 SpO2: (P) 95 % O2 Device: (P) None (Room air)    Vitals:    02/26/20 1022   Weight: 110.7 kg (244 lb)     Vital Signs with Ranges  Temp:  [98.7  F (37.1  C)-98.9  F (37.2  C)] (P) 98.8  F (37.1  C)  Pulse:  [94] 94  Heart Rate:  [84-85] (P) 85  Resp:  [15-18] (P) 16  BP: (127-141)/(46-75) 127/46  SpO2:  [94 %-95 %] (P) 95 %  I/O last 3 completed shifts:  In: -   Out: 425 [Urine:425]    Constitutional: Awake, alert but tired, cooperative, no apparent distress. Non-toxic. Appears stated age.   HEENT: Atraumatic. Normocephalic. Conjunctiva non-injected. Sclera anicteric. MMM.   Respiratory: Moves air bilaterally. Clear to  auscultation bilaterally, no crackles or wheezing  Cardiovascular: Regular rate and rhythm, normal S1 and S2, and no murmur noted  GI: Obese. Normal bowel sounds, soft, non-distended, non-tender  Skin/Integumen: No rashes, no cyanosis, no edema    Medications   Data   Recent Labs   Lab 02/28/20  0617 02/27/20  0618 02/25/20  1321   HGB 12.2 13.4 16.3*   POTASSIUM  --   --  3.9   CR  --   --  0.86   * 125*  --      No results found for this or any previous visit (from the past 24 hour(s)).   Minocycline Pregnancy And Lactation Text: This medication is Pregnancy Category D and not consider safe during pregnancy. It is also excreted in breast milk.

## 2020-11-24 ENCOUNTER — TELEPHONE (OUTPATIENT)
Dept: INTERNAL MEDICINE | Facility: CLINIC | Age: 75
End: 2020-11-24

## 2020-11-24 NOTE — PROGRESS NOTES
"Svetlana Serrano is a 75 year old female who is being evaluated via a billable video visit.      The patient has been notified of following:     \"This video visit will be conducted via a call between you and your physician/provider. We have found that certain health care needs can be provided without the need for an in-person physical exam.  This service lets us provide the care you need with a video conversation.  If a prescription is necessary we can send it directly to your pharmacy.  If lab work is needed we can place an order for that and you can then stop by our lab to have the test done at a later time.    Video visits are billed at different rates depending on your insurance coverage.  Please reach out to your insurance provider with any questions.    If during the course of the call the physician/provider feels a video visit is not appropriate, you will not be charged for this service.\"    Patient has given verbal consent for Video visit? Yes  How would you like to obtain your AVS? Mail a copy  If you are dropped from the video visit, the video invite should be resent to: Text to cell phone: [phone]  Will anyone else be joining your video visit? No    Grace Baeza MA  [facility]        [facility]     Date of visit: 11/30/2020    Reason for consultation:    Svetlana Serrano is a 75 year old female who is seen in consultation today at the request of her PCP,  Dolores Blanton for evaluation of her pain issues and recommendations for management, with specific emphasis on  My Clinical Question Is: Pt is requesting referral to consider options for fibromyalgia treatment, is interested in medical cannibis.    Timeframe Requested: Routine: Next available opening    Priority: Routine    Reason for Referral: Evaluation for Comprehensive Services    Please review opioid agreement in process instructions, do you agree to these terms? Yes    Are there any red flags " "that may impact the assessment or management of the patient? Other - Use Comments Pt has already been evaluated/treated at a pain clinic.     Please see the [facility] health questionnaire which the patient completed and reviewed with me in detail.    Review of [place] Prescription Monitoring Program (): No concern for abuse or misuse of controlled medications based on this report.     Pain medications are being prescribed by NA.     Subjective:    Chief Complaint:    Chief Complaint   Patient presents with     Pain     Video visit due to covid-19       Pain history:  Svetlana Serrano is a 75 year old female who presents for initial evaluation of chief complaint of widespread and back pain.      She first started having problems with widespread pain in her 30s or 40s. Insidious onset, without acute precipitating event. She was diagnosed with fibromyalgia by her primary care provider. She tried chiropractic care without benefit. She was referred to a pain clinic in [place] (unsure of the name) 10-15 years ago. She was started on opioids through the pain clinic and has continued since that time, has always been on Vicodin. She reports good pain benefit with Vicodin, \"it probably wouldn't hurt to up it a little bit.\" She tried Lyrica years ago but developed vision problems so stopped. Of note, she needs her right shoulder replaced and plans to have this done in 2021. Her pain is widespread, most bothersome pain is in her bilateral legs. Her pain is most notable at bedtime. Numbness and tingling in left 2nd through 5th fingers, thought to be related to her neck. Generalized weakness ongoing for years.     She first started having problems with low back pain several years ago. She has had multiple injections with DEDRA (x3 epidural steroid injections in 2012- [facility]) and Dr. Papo Medina with [facility] unsure of what kind. States none of these injections were significantly helpful. She has " done physical therapy without significant benefit. Her pain is primarily intermittent, can only tolerate a minute or two of activity. The pain is located in bilateral low back, left > right. Denies radiation. Numbness and tingling in bilateral toes, ongoing for years.       Pain description:  Location: widespread and low back   Quality: aching  Severity/Intensity: 0/10 at best, 8/10 at worst, 6/10 on average  Aggravating factors include: standing for longer periods, walking  Relieving factors include: laying down     The patient otherwise denies bowel or bladder incontinence (ongoing baseline problem), parasthesias, weakness, saddle anesthesia, unintentional weight loss, or fever/chills/sweats.     Svetlana Serrano has been seen at a pain clinic in the past. [facility] 10-15 years ago.     Pain Treatments:  (H--helped; [facility]--Helped initially; [facility]--Somewhat helpful; [facility]--No help; W--worse; [facility]--side effects; ?--Unsure if helpful)   1. Medications:       Current pain medications:   Cymbalta 60mg BID- ? for pain or mood   Tylenol 325mg 6 tabs/day- [facility]   Hydroxyzine 10mg daily prn- ?   Norco 5/325mg takes 2 tabs TID- H, less so over the years    Current calculated MME: 30    1. Previous Pain Relevant Medications:  NOTE: This medication information taken from patient's intake form, not medical records.    Opiates: Norco- [facility]   NSAIDS: no    Muscle Relaxants: cannot remember   Anti-migraine mediations: no   Anti-depressants: Cymbalta- ?   Sleep aids: no   Anxiolytics: lorazepam- ?   Neuropathics: gabapentin (prescribed after hip replacement)- ?, Lyrica (for 3 months)- SE, blurred vision   Topicals: Andrés emu cream- [facility]   Other medications not covered above: Tylenol- [facility]    2. Physical Therapy: yes for back and fibromyalgia- [facility], hasn't tried in years  3. Pain Psychology: no  4. Surgery: bilateral hip replacements with Dr. Man January and July 2020  left shoulder replacement  5. Injections: trigger point injections- [facility] but  short term  x3 epidural steroid injections with [facility] in 2012- [facility]  6. Chiropractic: yes- [facility]  7. Acupuncture: no  8. TENS Unit: yes- ?/[facility]    Imaging:  MRI of lumbar spine was completed on 7/29/19 and shows:  L5-S1: 4 mm spondylolisthesis with moderate cranially extruded central disc herniation approximately 10 mm CC by 6 mm AP with moderate right and marked left hypertrophic facet degeneration produce moderately severe trefoil central stenosis with impingement of S1 nerve roots. Mild left foraminal stenosis.    L4-L5: Moderate to marked vertical narrowing with desiccation, broad annular bulging, 5 mm AP left posterolateral protrusion with moderate central and severe left subarticular recess stenosis with left L5 impingement. Mild bilateral foraminal stenosis without nerve root impingement. Mild facet degeneration.    L3-L4: Moderate disc desiccation with mild posterolateral to far lateral annular bulging, no significant central compromise. No significant foraminal compromise. Facet joints are unremarkable.    L2-L3: Moderate disc desiccation with normal posterior annular morphology and no stenosis. No significant foraminal compromise. Facet joints are unremarkable.    L1-L2: Very small right paracentral protrusion with moderate disc desiccation and no stenosis. No significant foraminal compromise. Facet joints are unremarkable.    Lower thoracic: Moderate T12-L1 and T11-12 disc degeneration with normal posterior annular morphology, small Schmorl's nodes.    CONCLUSION: Multilevel spondylosis with mostly sacralized transitional S1 level with a narrow S1-2 disc, and these findings:    1. Grade 1 L5-S1 degenerative spondylolisthesis with moderate disc extrusion, advanced facet degeneration and severe subarticular recess stenosis impinging S1 nerve roots.    2. Marked left L4-5 subarticular recess stenosis with annular protrusion impinging left L5 nerve root.    3. Moderate disc degeneration at other thoracolumbar levels  without neural compression.    Note: Mild increased size of L5-S1 disc extrusion since prior study.    Past Medical History:  Past Medical History:   Diagnosis Date     Arthritis     right shoulder     Back pain      Fibromyalgia      HTN (hypertension)      Incontinence of urine in female      Mild intermittent asthma      Other chronic pain     from fibromyalgia     Restless leg      Tubular adenoma of colon 8/15       Past Surgical History:  Past Surgical History:   Procedure Laterality Date     ARTHROPLASTY HIP Right 2020    Procedure: Right total hip arthroplasty.  Posterior lateral piriformis sparing approach.;  Surgeon: Cirilo Man MD;  Location: [facility] OR     ARTHROPLASTY HIP Left 7/15/2020    Procedure: Left total hip arthroplasty.  Posterior lateral piriformis sparing approach;  Surgeon: Cirilo Man MD;  Location: [facility] OR     ARTHROPLASTY SHOULDER Left      C/SECTION, CLASSICAL      , Classical     CHOLECYSTECTOMY, LAPOROSCOPIC      Cholecystectomy, Laparoscopic     COLONOSCOPY N/A 10/8/2018    Procedure: COLONOSCOPY;  Colonoscopy with polypectomies;  Surgeon: Anton Stroud MD;  Location: [facility] OR     HYSTERECTOMY, JOSHUA       JOINT REPLACEMTN, KNEE RT/LT      bilateral       Medications:  Current Outpatient Medications   Medication Sig Dispense Refill     acetaminophen (TYLENOL) 325 MG tablet Take 2 tablets (650 mg) by mouth every 4 hours as needed for other (mild pain) 100 tablet 0     albuterol (PROAIR HFA/PROVENTIL HFA/VENTOLIN HFA) 108 (90 BASE) MCG/ACT Inhaler Inhale 2 puffs into the lungs every 6 hours as needed for shortness of breath / dyspnea 1 Inhaler 11     amLODIPine (NORVASC) 5 MG tablet TAKE 1 TABLET BY MOUTH  DAILY 90 tablet 1     aspirin (ASA) 325 MG EC tablet Take 1 tablet (325 mg) by mouth daily 42 tablet 0     aspirin (ASA) 325 MG EC tablet Take 1 tablet (325 mg) by mouth daily 42 tablet 0     BIOTIN PO Take by mouth daily       DULoxetine (CYMBALTA) 60 MG  "capsule TAKE 1 CAPSULE BY MOUTH TWO TIMES DAILY 180 capsule 1     HYDROcodone-acetaminophen (NORCO) 5-325 MG tablet Take 1-2 tablets by mouth every 6 hours as needed for pain 180 tablet 0     hydrOXYzine (ATARAX) 10 MG tablet Take 1 tablet (10 mg) by mouth every 6 hours as needed for itching or anxiety (with pain, moderate pain) 20 tablet 0     losartan (COZAAR) 50 MG tablet TAKE 1 TABLET BY MOUTH  DAILY 90 tablet 1     miconazole (MICATIN) 2 % external powder Apply topically daily 90 g 11     naloxone (NARCAN) 4 MG/0.1ML nasal spray Spray 1 spray (4 mg) into one nostril alternating nostrils once as needed for opioid reversal every 2-3 minutes until assistance arrives 0.2 mL 0     ondansetron (ZOFRAN-ODT) 4 MG ODT tab Take 1-2 tablets (4-8 mg) by mouth every 8 hours as needed for nausea Dissolve ON the tongue. 4 tablet 0     ondansetron (ZOFRAN-ODT) 4 MG ODT tab Take 1 tablet (4 mg) by mouth every 6 hours as needed for nausea or vomiting 8 tablet 0     senna-docusate (SENOKOT-S/PERICOLACE) 8.6-50 MG tablet Take 1-2 tablets by mouth 2 times daily Take while on oral narcotics to prevent or treat constipation. 30 tablet 0       Allergies:     Allergies   Allergen Reactions     Clindamycin Shortness Of Breath     Erythromycin Anaphylaxis     PN: LW Reaction: ANAPHYLAXIS     Amoxicillin      Rash.     Cephalexin Hives     Doxycycline GI Disturbance and Hives     gi distress       Sulfa Drugs      Hives         Social History:  Home situation: lives in a house  Support system:  and son  Occupation/Schooling: retired  Tobacco use: no  Drug use: no, \"I wish,\"  has tried cannabis from [employer] for pain  Alcohol use: yes a few days a week  History of chemical dependency treatment: no  Mental health admissions: no    Family history:  Family History   Problem Relation Age of Onset     Family History Negative Mother      Family History Negative Father        Review of Systems:    POSTIVE IN BOLD  GENERAL: " fever/chills, fatigue, general unwell feeling, weight gain/loss.  HEAD/EYES:  headache, dizziness, or vision changes.    EARS/NOSE/THROAT: nosebleeds, hearing loss, sinus infection, earache, tinnitus.  IMMUNE:  allergies, cancer, immune deficiency, or infections.  SKIN:  itching, rash, hives  HEME/Lymphatic: anemia, easy bruising, easy bleeding.  RESPIRATORY: cough, wheezing, or shortness of breath  CARDIOVASCULAR/Circulation: extremity edema, syncope, hypertension, tachycardia, or angina.  GASTROINTESTINAL: abdominal pain, nausea/emesis, diarrhea, constipation,  hematochezia, or melena.  ENDOCRINE:  diabetes, steroid use,  thyroid disease or osteoporosis.  MUSCULOSKELETAL: joint pain, stiffness, neck pain, back pain, arthritis, or gout.  GENITOURINARY: frequency, urgency, dysuria, difficulty voiding, hematuria or incontinence.  NEUROLOGIC: weakness, numbness, paresthesias, seizure, tremor, stroke or memory loss.  PSYCHIATRIC: depression, anxiety, stress, suicidal thoughts or mood swings.     Objective:    Physical Exam:  There were no vitals filed for this visit.  Exam:  Constitutional: Well developed, well nourished, appears stated age. Obese.   HEENT: Head atraumatic, normocephalic. Eyes without conjunctival injection or jaundice. Oropharynx clear. Neck supple. No obvious neck masses.  Skin: No rash, lesions, or petechiae of exposed skin.   Extremities: Peripheral pulses intact. No clubbing, cyanosis, or edema.  Psychiatric/mental status: Alert, without lethargy or stupor. Speech fluent. Appropriate affect. Mood normal. Able to follow commands without difficulty.     Musculoskeletal exam:  Patient has antalgic gait favoring neither side.   Normal bulk and tone. Unremarkable spinal curvature.     Cervical spine:  Range of motion reduced.   Rotation/ext to right: pain free  Rotation/ext to left: pain free    Thoracic spine:    Kyphosis. Yes- slight  Lumbar spine:    Flex:  90 degrees   Ext: 25  degrees   Rotation/ext to right: pain free   Rotation/ext to left: pain free    DIRE Score for ongoing opioid management is calculated as follows:   Diagnosis = 2 pts (slowly progressive; moderate pain/objective findings)   Intractability = 2 pts (most treatments tried; patient not fully engaged/barriers)   Risk    Psych = 3 pts (no significant personality dysfunction/mental illness; good communication with clinic)    Chem Hlth = 3 pts (no history of chemical dependency; not drug-focused)   Reliability = 3 pts (highly reliable with meds, appointments, treatments)   Social = 2 pts (reduction in some relationships/life rolls)   (Psych + Chem hlth + Reliability + Social) = 14     Efficacy = 2 pts (moderate benefit/function; low med dose; too early/not tried meds)         DIRE Score = 16        7-13: likely NOT suitable candidate for long-term opioid analgesia       14-21: may be a suitable candidate for long-term opioid analgesia     Assessment:  Svetlana Serrano is a 75 year old female with a past medical history significant for HTN, restless leg syndrome, mild intermittent asthma, and arthritis, who presents with complaints of widespread and low back pain.     1. Widespread pain- etiology fibromyalgia.   2. Low back pain- etiology likely due to lumbar degenerative disc disease, may be component of SI joint dysfunction, exacerbated by body habitus.   3. Mental Health - the patient's mental health concerns, specifically situational depression, affect her experience of pain and contribute to her clinically significant distress.    1. Fibromyalgia    2. DDD (degenerative disc disease), lumbar    3. Chronic pain syndrome        Plan:    We discussed participation in our pain program today. Though Svetlana might be interested, she would like to hold off on participating in our pain program for a while given COVID-19 and potential upcoming shoulder replacement. She would like follow up with her primary care provider in the  interim. She will return for follow up with me when she is interested in participating in our pain program.      1.  Pain Physical Therapy:     YES   Would highly recommend pain physical therapy.    2.  Pain Psychologist to address relaxation, behavioral change, coping style, and other factors important to improvement.     YES  Would also recommend pain psychology.    3.  Medication Management:     1. We discussed the use of opioids for chronic pain and why, in general, I do not recommend for chronic pain management. Specifically we talked about the development of tolerance over time, opioid induced hyperalgesia, sedation and cognitive impairment, sleep disordered breathing, and risk of unintentional overdose and death. Opioid induced hyperalgesia is more likely to occur in patients with fibromyalgia. That being said, Svetlana reports that she has been taking Vicodin for management of low back pain and fibromyalgia for the last 12-15 years, reports very good pain benefit, and her dose is below the CDC 2016 opioid guidelines. While it may be reasonable to continue at this dose (Norco 5/325mg 6 tabs/day) I do NOT recommend a dose increase. We discussed that actually I would recommend a long term goal of tapering off of this medication as there are other medications and therapies that are more likely to be effective long term.    2. May consider a trial of gabapentin. Could start with 100mg or 300mg capsules but goal dose would be 600mg TID with a gradual titration.    3. Could consider trial of Robaxin, a less sedating muscle relaxant, to be used in place of Norco when having more muscle spasm.     4. May consider antidepressant rotation to Effexor or another medication as she questions the effectiveness.    5. Could consider medical cannabis in the future in the above plan is not effective.    5.  Potential procedures: may consider in the future if interested, though I have some reservations given lack of previous  benefit.     6.  Referrals: would highly recommend acupuncture.    7.  Follow up with CAMELIA Cruz CNP when ready to participate in our pain program.       Video-Visit Details    Type of service:  Video Visit    Video Start Time: 1433  Video End Time: 1528    Originating Location (pt. Location): Home    Distant Location (provider location):  [facility] [place]     Platform used for Video Visit: [employer]    CAMELIA Jovel CNP

## 2020-11-24 NOTE — TELEPHONE ENCOUNTER
Cannot get through at pharmacy listed, will watch for another request and note it on fax and send back.

## 2020-11-24 NOTE — TELEPHONE ENCOUNTER
No documentation of this, advise pharmacy to call original prescriber.   Sorry accidentally closed.

## 2020-11-24 NOTE — TELEPHONE ENCOUNTER
Oxybutynin      Last Written Prescription Date:    Last Fill Quantity: ,   # refills:   Last Office Visit: 07/09/20  Future Office visit:       Routing refill request to provider for review/approval because:  Drug not active on patient's medication list

## 2020-11-30 ENCOUNTER — VIRTUAL VISIT (OUTPATIENT)
Dept: PALLIATIVE MEDICINE | Facility: CLINIC | Age: 75
End: 2020-11-30
Payer: MEDICARE

## 2020-11-30 DIAGNOSIS — G89.4 CHRONIC PAIN SYNDROME: ICD-10-CM

## 2020-11-30 DIAGNOSIS — M51.369 DDD (DEGENERATIVE DISC DISEASE), LUMBAR: ICD-10-CM

## 2020-11-30 DIAGNOSIS — M79.7 FIBROMYALGIA: Primary | ICD-10-CM

## 2020-11-30 PROCEDURE — 99204 OFFICE O/P NEW MOD 45 MIN: CPT | Mod: 95 | Performed by: NURSE PRACTITIONER

## 2020-11-30 ASSESSMENT — PAIN SCALES - GENERAL: PAINLEVEL: MODERATE PAIN (4)

## 2020-11-30 NOTE — PATIENT INSTRUCTIONS
Follow up with Dr. Blanton to discuss today's visit. May be able to start making some adjustments based on my plan.     Follow up with me when interested in participating in our pain program.     ----------------------------------------------------------------  Clinic Number:  [phone]     Call with any questions about your care and for scheduling assistance.     Calls are returned Monday through Friday between 8 AM and 4:30 PM. We usually get back to you within 2 business days depending on the issue/request.    If we are prescribing your medications:    For opioid medication refills, call the clinic or send a [employer] message 7 days in advance.  Please include:    Name of requested medication    Name of the pharmacy.    For non-opioid medications, call your pharmacy directly to request a refill. Please allow 3-4 days to be processed.     Per [place] State Law:    All controlled substance prescriptions must be filled within 30 days of being written.      For those controlled substances allowing refills, pickup must occur within 30 days of last fill.      We believe regular attendance is key to your success in our program!      Any time you are unable to keep your appointment we ask that you call us at least 24 hours in advance to cancel.This will allow us to offer the appointment time to another patient.     Multiple missed appointments may lead to dismissal from the clinic.

## 2020-12-02 ENCOUNTER — TRANSFERRED RECORDS (OUTPATIENT)
Dept: HEALTH INFORMATION MANAGEMENT | Facility: CLINIC | Age: 75
End: 2020-12-02

## 2020-12-03 ENCOUNTER — HOSPITAL ENCOUNTER (INPATIENT)
Facility: CLINIC | Age: 75
Setting detail: SURGERY ADMIT
End: 2020-12-03
Attending: ORTHOPAEDIC SURGERY | Admitting: ORTHOPAEDIC SURGERY
Payer: MEDICARE

## 2020-12-08 DIAGNOSIS — M79.7 FIBROMYALGIA: ICD-10-CM

## 2020-12-08 DIAGNOSIS — M51.369 DDD (DEGENERATIVE DISC DISEASE), LUMBAR: ICD-10-CM

## 2020-12-08 DIAGNOSIS — G89.4 CHRONIC PAIN SYNDROME: ICD-10-CM

## 2020-12-08 NOTE — TELEPHONE ENCOUNTER
Controlled Substance Refill Request for oxycodone  Problem List Complete:    No     PROVIDER TO CONSIDER COMPLETION OF PROBLEM LIST AND OVERVIEW/CONTROLLED SUBSTANCE AGREEMENT    Last Written Prescription Date:  11/11/20  Last Fill Quantity: 180,   # refills: 0    THE MOST RECENT OFFICE VISIT MUST BE WITHIN THE PAST 3 MONTHS. AT LEAST ONE FACE TO FACE VISIT MUST OCCUR EVERY 6 MONTHS. ADDITIONAL VISITS CAN BE VIRTUAL.  (THIS STATEMENT SHOULD BE DELETED.)    Last Office Visit with [facility] primary care provider: tessa Blanton 6/26/20    Future Office visit:     Controlled substance agreement:   Encounter-Level CSA - 04/21/2016:    Controlled Substance Agreement - Scan on 4/28/2016  9:48 AM: [place] Controlled Substance Agreement     Patient-Level CSA:    There are no patient-level csa.         Last Urine Drug Screen:   Pain Drug SCR UR W RPTD Meds   Date Value Ref Range Status   05/25/2017   Final    FINAL  (Note)  ====================================================================  TOXAS[facility]RE COMP DRUG ANALYSIS,UR  ====================================================================  Test                             Result       Flag       Units    Drug Present   Hydrocodone                    2505                    ng/mg creat   Hydromorphone                  162                     ng/mg creat   Dihydrocodeine                 513                     ng/mg creat   Norhydrocodone                 1605                    ng/mg creat    Sources of hydrocodone include scheduled prescription    medications. Hydromorphone, dihydrocodeine and norhydrocodone are    expected metabolites of hydrocodone. Hydromorphone and    dihydrocodeine are also available as scheduled prescription    medications.     Duloxetine                     PRESENT   Acetaminophen                  PRESENT   Naproxen                       PRESENT  ====================================================================  Test                      Result    Flag    Units      Ref Range   Creatinine              191              mg/dL      >=20  ====================================================================  Declared Medications:  Medication list was not provided.  ====================================================================  For clinical consultation, please call [phone].  ====================================================================  Analysis performed by [facility] [employer] [place] [address]     , No results found for: COMDAT,   Cannabinoids (43-ncr-1-carboxy-9-THC)   Date Value Ref Range Status   10/15/2019 Not Detected NDET^Not Detected ng/mL Final     Comment:     Cutoff for a negative cannabinoid is 50 ng/mL or less.     Phencyclidine (Phencyclidine)   Date Value Ref Range Status   10/15/2019 Not Detected NDET^Not Detected ng/mL Final     Comment:     Cutoff for a negative PCP is 25 ng/mL or less.     Cocaine (Benzoylecgonine)   Date Value Ref Range Status   10/15/2019 Not Detected NDET^Not Detected ng/mL Final     Comment:     Cutoff for a negative cocaine is 150 ng/ml or less.     Methamphetamine (d-Methamphetamine)   Date Value Ref Range Status   10/15/2019 Not Detected NDET^Not Detected ng/mL Final     Comment:     Cutoff for a negative methamphetamine is 500 ng/ml or less.     Opiates (Morphine)   Date Value Ref Range Status   10/15/2019 Detected, Abnormal Result (A) NDET^Not Detected ng/mL Final     Comment:     Cutoff for a positive opiate is greater than 100 ng/ml.  This is an unconfirmed screening result to be used for medical purposes only.   Order [id number] for confirmation or individual confirmation tests to [facility].       Amphetamine (d-Amphetamine)   Date Value Ref Range Status   10/15/2019 Not Detected NDET^Not Detected ng/mL Final     Comment:     Cutoff for a negative amphetamine is 500 ng/mL or less.     Benzodiazepines (Nordiazepam)   Date Value Ref Range Status   10/15/2019 Detected, Abnormal Result (A) NDET^Not  Detected ng/mL Final     Comment:     Cutoff for a positive benzodiazepines is greater than 150 ng/ml.  This is an unconfirmed screening result to be used for medical purposes only.   Order [id number] for confirmation or individual confirmation tests to [facility].       Tricyclic Antidepressants (Desipramine)   Date Value Ref Range Status   10/15/2019 Not Detected NDET^Not Detected ng/mL Final     Comment:     Cutoff for a negative tricyclic antidepressant is 300 ng/ml or less.     Methadone (Methadone)   Date Value Ref Range Status   10/15/2019 Not Detected NDET^Not Detected ng/mL Final     Comment:     Cutoff for a negative methadone is 200 ng/ml or less.     Barbiturates (Butalbital)   Date Value Ref Range Status   10/15/2019 Not Detected NDET^Not Detected ng/mL Final     Comment:     Cutoff for a negative barbituate is 200 ng/ml or less.     Oxycodone (Oxycodone)   Date Value Ref Range Status   10/15/2019 Not Detected NDET^Not Detected ng/mL Final     Comment:     Cutoff for a negative Oxycodone is 100 ng/mL or less.     Propoxyphene (Norpropoxyphene)   Date Value Ref Range Status   10/15/2019 Not Detected NDET^Not Detected ng/mL Final     Comment:     Cutoff for a negative propoxyphene is 300 ng/ml or less     Buprenorphine (Buprenorphine)   Date Value Ref Range Status   10/15/2019 Not Detected NDET^Not Detected ng/mL Final     Comment:     Cutoff for a negative buprenorphine is 10 ng/ml or less        Processing:  Rx to be electronically transmitted to pharmacy by provider      [email or web address]       checked in past 3 months?  No, route to RN

## 2020-12-10 NOTE — TELEPHONE ENCOUNTER
[place] reviewed 9-14-20:  reviewed- no concerns    [place]:  [email or web address]    Please advise, thanks.  Routed to covering provider due to primary care provider not in clinic today.

## 2020-12-11 RX ORDER — HYDROCODONE BITARTRATE AND ACETAMINOPHEN 5; 325 MG/1; MG/1
1-2 TABLET ORAL EVERY 6 HOURS PRN
Qty: 180 TABLET | Refills: 0 | Status: SHIPPED | OUTPATIENT
Start: 2020-12-11 | End: 2021-01-11

## 2020-12-17 ENCOUNTER — VIRTUAL VISIT (OUTPATIENT)
Dept: INTERNAL MEDICINE | Facility: CLINIC | Age: 75
End: 2020-12-17
Payer: MEDICARE

## 2020-12-17 DIAGNOSIS — I10 BENIGN ESSENTIAL HYPERTENSION: ICD-10-CM

## 2020-12-17 DIAGNOSIS — M79.7 FIBROMYALGIA: Primary | ICD-10-CM

## 2020-12-17 DIAGNOSIS — F11.20 NARCOTIC DEPENDENCE (H): ICD-10-CM

## 2020-12-17 DIAGNOSIS — F41.9 ANXIETY: ICD-10-CM

## 2020-12-17 PROBLEM — Z96.649 S/P TOTAL HIP ARTHROPLASTY: Status: RESOLVED | Noted: 2020-07-15 | Resolved: 2020-12-17

## 2020-12-17 PROCEDURE — 99214 OFFICE O/P EST MOD 30 MIN: CPT | Mod: 95 | Performed by: INTERNAL MEDICINE

## 2020-12-17 RX ORDER — VITAMIN B COMPLEX
TABLET ORAL DAILY
COMMUNITY

## 2020-12-17 NOTE — PROGRESS NOTES
"Svetlana Serrano is a 75 year old female who is being evaluated via a billable video visit.      The patient has been notified of following:     \"This video visit will be conducted via a call between you and your physician/provider. We have found that certain health care needs can be provided without the need for an in-person physical exam.  This service lets us provide the care you need with a video conversation.  If a prescription is necessary we can send it directly to your pharmacy.  If lab work is needed we can place an order for that and you can then stop by our lab to have the test done at a later time.    Video visits are billed at different rates depending on your insurance coverage.  Please reach out to your insurance provider with any questions.    If during the course of the call the physician/provider feels a video visit is not appropriate, you will not be charged for this service.\"    Patient has given verbal consent for Video visit? Yes  How would you like to obtain your AVS? Mail a copy  If you are dropped from the video visit, the video invite should be resent to: Text to cell phone: [phone]  Will anyone else be joining your video visit? No    Subjective     Svetlana Serrano is a 75 year old female who presents today via video visit for the following health issues:    [facility]      Video start time: 12:59    Chronic pain follow-up: She remains on her stable dose of narcotic.  She feels it may not be quite as effective, pain levels are slightly increased.  The PD had tried to find a provider to consider medical marijuana, may still try to find a provider for this.  She does not feel she wants to go back to pain clinic      Hypertension Follow-up      Do you check your blood pressure regularly outside of the clinic? No     Are you following a low salt diet? No    Are your blood pressures ever more than 140 on the top number (systolic) OR more   than 90 on the bottom number (diastolic), for example " 140/90? No    Anxiety Follow-Up    How are you doing with your anxiety since your last visit? No change    Are you having other symptoms that might be associated with anxiety? No    Have you had a significant life event? No     Are you feeling depressed? No    Do you have any concerns with your use of alcohol or other drugs? No      GHADA-7 SCORE 7/10/2018 5/28/2019 11/16/2019   Total Score 0 0 0           How many servings of fruits and vegetables do you eat daily?  0-1    On average, how many sweetened beverages do you drink each day (Examples: soda, juice, sweet tea, etc.  Do NOT count diet or artificially sweetened beverages)?   0    How many days per week do you exercise enough to make your heart beat faster? 3 or less    How many minutes a day do you exercise enough to make your heart beat faster? 9 or less    How many days per week do you miss taking your medication? 0    Other concerns:  She reports her toenails are very thick, not really painful though.    Patient Active Problem List   Diagnosis     Mild intermittent asthma     Restless leg syndrome     Fibromyalgia     Advanced directives, counseling/discussion     DDD (degenerative disc disease), lumbar     Spinal stenosis     Controlled substance agreement signed- [id number]/14/20     Morbid obesity (H)     Anxiety     Narcotic dependence (H)     Benign essential hypertension     CARDIOVASCULAR SCREENING; LDL GOAL LESS THAN 130     S/P total hip arthroplasty       Current Outpatient Medications   Medication Sig Dispense Refill     acetaminophen (TYLENOL) 325 MG tablet Take 2 tablets (650 mg) by mouth every 4 hours as needed for other (mild pain) 100 tablet 0     albuterol (PROAIR HFA/PROVENTIL HFA/VENTOLIN HFA) 108 (90 BASE) MCG/ACT Inhaler Inhale 2 puffs into the lungs every 6 hours as needed for shortness of breath / dyspnea 1 Inhaler 11     amLODIPine (NORVASC) 5 MG tablet TAKE 1 TABLET BY MOUTH  DAILY 90 tablet 1     aspirin (ASA) 325 MG EC tablet  Take 1 tablet (325 mg) by mouth daily 42 tablet 0     aspirin (ASA) 325 MG EC tablet Take 1 tablet (325 mg) by mouth daily 42 tablet 0     DULoxetine (CYMBALTA) 60 MG capsule TAKE 1 CAPSULE BY MOUTH TWO TIMES DAILY 180 capsule 1     HYDROcodone-acetaminophen (NORCO) 5-325 MG tablet Take 1-2 tablets by mouth every 6 hours as needed for pain 180 tablet 0     hydrOXYzine (ATARAX) 10 MG tablet Take 1 tablet (10 mg) by mouth every 6 hours as needed for itching or anxiety (with pain, moderate pain) 20 tablet 0     losartan (COZAAR) 50 MG tablet TAKE 1 TABLET BY MOUTH  DAILY 90 tablet 1     miconazole (MICATIN) 2 % external powder Apply topically daily 90 g 11     Vitamin D3 (CHOLECALCIFEROL) 25 mcg (1000 units) tablet Take by mouth daily       BIOTIN PO Take by mouth daily       naloxone (NARCAN) 4 MG/0.1ML nasal spray Spray 1 spray (4 mg) into one nostril alternating nostrils once as needed for opioid reversal every 2-3 minutes until assistance arrives 0.2 mL 0     ondansetron (ZOFRAN-ODT) 4 MG ODT tab Take 1-2 tablets (4-8 mg) by mouth every 8 hours as needed for nausea Dissolve ON the tongue. (Patient not taking: Reported on 2020) 4 tablet 0     ondansetron (ZOFRAN-ODT) 4 MG ODT tab Take 1 tablet (4 mg) by mouth every 6 hours as needed for nausea or vomiting (Patient not taking: Reported on 2020) 8 tablet 0     senna-docusate (SENOKOT-S/PERICOLACE) 8.6-50 MG tablet Take 1-2 tablets by mouth 2 times daily Take while on oral narcotics to prevent or treat constipation. (Patient not taking: Reported on 2020) 30 tablet 0         Social History     Tobacco Use     Smoking status: Former Smoker     Quit date: 1969     Years since quittin.9     Smokeless tobacco: Never Used   Substance Use Topics     Alcohol use: Yes     Alcohol/week: 0.0 standard drinks     Comment: casual          Review of Systems   No fever, chills, cough, shortness of breath, chest pain      Objective           Vitals:  No  vitals were obtained today due to virtual visit.    Physical Exam     GENERAL: Healthy, alert and no distress  EYES: Eyes grossly normal to inspection.  No discharge or erythema, or obvious scleral/conjunctival abnormalities.  RESP: No audible wheeze, cough, or visible cyanosis.  No visible retractions or increased work of breathing.    SKIN: Visible skin clear. No significant rash, abnormal pigmentation or lesions.  NEURO: Cranial nerves grossly intact.  Mentation and speech appropriate for age.  PSYCH: Mentation appears normal, affect normal/bright, judgement and insight intact, normal speech and appearance well-groomed.    Cannot really see the toenails adequately on video.          Assessment & Plan     Fibromyalgia  Pain levels are overall small, continue Caucasians  - DULoxetine (CYMBALTA) 60 MG capsule; Take 1 capsule (60 mg) by mouth 2 times daily    Narcotic dependence (H)  Stable use of medication, continue current dose    Benign essential hypertension  Blood pressure is high, no recent checks, will check next time    Anxiety  Stable, continue medication  - DULoxetine (CYMBALTA) 60 MG capsule; Take 1 capsule (60 mg) by mouth 2 times daily         Return in about 6 months (around 6/17/2021) for Wellness visit.    Dolores Blanton MD  [facility]      Video-Visit Details    Type of service:  Video Visit    Video End Time: 1:16    Originating Location (pt. Location): Home    Distant Location (provider location):  [facility]     Platform used for Video Visit: Jose

## 2020-12-26 RX ORDER — DULOXETIN HYDROCHLORIDE 60 MG/1
60 CAPSULE, DELAYED RELEASE ORAL 2 TIMES DAILY
Qty: 180 CAPSULE | Refills: 3 | Status: SHIPPED | OUTPATIENT
Start: 2020-12-26 | End: 2021-01-11

## 2021-01-04 DIAGNOSIS — Z11.59 ENCOUNTER FOR SCREENING FOR OTHER VIRAL DISEASES: ICD-10-CM

## 2021-01-07 DIAGNOSIS — G89.4 CHRONIC PAIN SYNDROME: ICD-10-CM

## 2021-01-07 DIAGNOSIS — F41.9 ANXIETY: ICD-10-CM

## 2021-01-07 DIAGNOSIS — M79.7 FIBROMYALGIA: ICD-10-CM

## 2021-01-07 DIAGNOSIS — M51.369 DDD (DEGENERATIVE DISC DISEASE), LUMBAR: ICD-10-CM

## 2021-01-11 RX ORDER — DULOXETIN HYDROCHLORIDE 60 MG/1
60 CAPSULE, DELAYED RELEASE ORAL 2 TIMES DAILY
Qty: 180 CAPSULE | Refills: 3 | Status: SHIPPED | OUTPATIENT
Start: 2021-01-11 | End: 2022-01-20

## 2021-01-11 RX ORDER — HYDROCODONE BITARTRATE AND ACETAMINOPHEN 5; 325 MG/1; MG/1
1-2 TABLET ORAL EVERY 6 HOURS PRN
Qty: 180 TABLET | Refills: 0 | Status: SHIPPED | OUTPATIENT
Start: 2021-01-11 | End: 2021-02-11

## 2021-01-11 NOTE — TELEPHONE ENCOUNTER
Patient cancelled rx that was sent in for DULoxetine (CYMBALTA) 60 MG capsule.. she wants the paper rx mailed to her home address. Please advise

## 2021-01-11 NOTE — TELEPHONE ENCOUNTER
Norco and Cymbalta refill request.   Requesting Cymbalta printed and mailed.     Last [place] refill on 12/11/20 for #180    Last VV on 12/17/20     Routing refill request to provider for review/approval because:  Drug not on the [facility] refill protocol     Encounter-Level CSA - 04/21/2016:    Controlled Substance Agreement - Scan on 4/28/2016  9:48 AM: [place] Controlled Substance Agreement     Patient-Level CSA:    There are no patient-level csa.       REviewed [place]  today, No Concerns.

## 2021-02-09 DIAGNOSIS — M51.369 DDD (DEGENERATIVE DISC DISEASE), LUMBAR: ICD-10-CM

## 2021-02-09 DIAGNOSIS — G89.4 CHRONIC PAIN SYNDROME: ICD-10-CM

## 2021-02-09 DIAGNOSIS — M79.7 FIBROMYALGIA: ICD-10-CM

## 2021-02-09 NOTE — TELEPHONE ENCOUNTER
Pt calling for below refill request:    Controlled Substance Refill Request for Norco  Problem List Complete:    No     PROVIDER TO CONSIDER COMPLETION OF PROBLEM LIST AND OVERVIEW/CONTROLLED SUBSTANCE AGREEMENT    Last Written Prescription Date:  1/11/21  Last Fill Quantity: 180,   # refills: 0    THE MOST RECENT OFFICE VISIT MUST BE WITHIN THE PAST 3 MONTHS. AT LEAST ONE FACE TO FACE VISIT MUST OCCUR EVERY 6 MONTHS. ADDITIONAL VISITS CAN BE VIRTUAL.  (THIS STATEMENT SHOULD BE DELETED.)    Last Office Visit with [facility] primary care provider: 12/17/20 virtual visit with Dr. Blanton    Future Office visit:     Controlled substance agreement:   Encounter-Level CSA - 04/21/2016:    Controlled Substance Agreement - Scan on 4/28/2016  9:48 AM: [place] Controlled Substance Agreement     Patient-Level CSA:    There are no patient-level csa.         Last Urine Drug Screen:   Pain Drug SCR UR W RPTD Meds   Date Value Ref Range Status   05/25/2017   Final    FINAL  (Note)  ====================================================================  TOXASSURE COMP DRUG ANALYSIS,UR  ====================================================================  Test                             Result       Flag       Units    Drug Present   Hydrocodone                    2505                    ng/mg creat   Hydromorphone                  162                     ng/mg creat   Dihydrocodeine                 513                     ng/mg creat   Norhydrocodone                 1605                    ng/mg creat    Sources of hydrocodone include scheduled prescription    medications. Hydromorphone, dihydrocodeine and norhydrocodone are    expected metabolites of hydrocodone. Hydromorphone and    dihydrocodeine are also available as scheduled prescription    medications.     Duloxetine                     PRESENT   Acetaminophen                  PRESENT   Naproxen                        PRESENT  ====================================================================  Test                      Result    Flag   Units      Ref Range   Creatinine              191              mg/dL      >=20  ====================================================================  Declared Medications:  Medication list was not provided.  ====================================================================  For clinical consultation, please call [phone].  ====================================================================  Analysis performed by [employer] [employer] [place] [address]     , No results found for: COMDAT,   Cannabinoids (67-nxi-3-carboxy-9-THC)   Date Value Ref Range Status   10/15/2019 Not Detected NDET^Not Detected ng/mL Final     Comment:     Cutoff for a negative cannabinoid is 50 ng/mL or less.     Phencyclidine (Phencyclidine)   Date Value Ref Range Status   10/15/2019 Not Detected NDET^Not Detected ng/mL Final     Comment:     Cutoff for a negative PCP is 25 ng/mL or less.     Cocaine (Benzoylecgonine)   Date Value Ref Range Status   10/15/2019 Not Detected NDET^Not Detected ng/mL Final     Comment:     Cutoff for a negative cocaine is 150 ng/ml or less.     Methamphetamine (d-Methamphetamine)   Date Value Ref Range Status   10/15/2019 Not Detected NDET^Not Detected ng/mL Final     Comment:     Cutoff for a negative methamphetamine is 500 ng/ml or less.     Opiates (Morphine)   Date Value Ref Range Status   10/15/2019 Detected, Abnormal Result (A) NDET^Not Detected ng/mL Final     Comment:     Cutoff for a positive opiate is greater than 100 ng/ml.  This is an unconfirmed screening result to be used for medical purposes only.   Order [id number] for confirmation or individual confirmation tests to [employer] [facility].       Amphetamine (d-Amphetamine)   Date Value Ref Range Status   10/15/2019 Not Detected NDET^Not Detected ng/mL Final     Comment:     Cutoff for a negative amphetamine is 500 ng/mL or less.      Benzodiazepines (Nordiazepam)   Date Value Ref Range Status   10/15/2019 Detected, Abnormal Result (A) NDET^Not Detected ng/mL Final     Comment:     Cutoff for a positive benzodiazepines is greater than 150 ng/ml.  This is an unconfirmed screening result to be used for medical purposes only.   Order [id number] for confirmation or individual confirmation tests to [facility].       Tricyclic Antidepressants (Desipramine)   Date Value Ref Range Status   10/15/2019 Not Detected NDET^Not Detected ng/mL Final     Comment:     Cutoff for a negative tricyclic antidepressant is 300 ng/ml or less.     Methadone (Methadone)   Date Value Ref Range Status   10/15/2019 Not Detected NDET^Not Detected ng/mL Final     Comment:     Cutoff for a negative methadone is 200 ng/ml or less.     Barbiturates (Butalbital)   Date Value Ref Range Status   10/15/2019 Not Detected NDET^Not Detected ng/mL Final     Comment:     Cutoff for a negative barbituate is 200 ng/ml or less.     Oxycodone (Oxycodone)   Date Value Ref Range Status   10/15/2019 Not Detected NDET^Not Detected ng/mL Final     Comment:     Cutoff for a negative Oxycodone is 100 ng/mL or less.     Propoxyphene (Norpropoxyphene)   Date Value Ref Range Status   10/15/2019 Not Detected NDET^Not Detected ng/mL Final     Comment:     Cutoff for a negative propoxyphene is 300 ng/ml or less     Buprenorphine (Buprenorphine)   Date Value Ref Range Status   10/15/2019 Not Detected NDET^Not Detected ng/mL Final     Comment:     Cutoff for a negative buprenorphine is 10 ng/ml or less        Processing:  Rx to be electronically transmitted to pharmacy by provider      [email or web address]       checked in past 3 months?  No, route to RN

## 2021-02-11 RX ORDER — HYDROCODONE BITARTRATE AND ACETAMINOPHEN 5; 325 MG/1; MG/1
1-2 TABLET ORAL EVERY 6 HOURS PRN
Qty: 180 TABLET | Refills: 0 | Status: SHIPPED | OUTPATIENT
Start: 2021-02-11 | End: 2021-03-10

## 2021-02-11 NOTE — TELEPHONE ENCOUNTER
[place] reviewed:  not reviewed/not due - last done on 1/11/21  [place] profile:  [email or web address]

## 2021-02-12 ENCOUNTER — MYC MEDICAL ADVICE (OUTPATIENT)
Dept: INTERNAL MEDICINE | Facility: CLINIC | Age: 76
End: 2021-02-12

## 2021-03-10 ENCOUNTER — MYC REFILL (OUTPATIENT)
Dept: INTERNAL MEDICINE | Facility: CLINIC | Age: 76
End: 2021-03-10

## 2021-03-10 DIAGNOSIS — G89.4 CHRONIC PAIN SYNDROME: ICD-10-CM

## 2021-03-10 DIAGNOSIS — M51.369 DDD (DEGENERATIVE DISC DISEASE), LUMBAR: ICD-10-CM

## 2021-03-10 DIAGNOSIS — F11.90 CHRONIC, CONTINUOUS USE OF OPIOIDS: ICD-10-CM

## 2021-03-10 DIAGNOSIS — M79.7 FIBROMYALGIA: ICD-10-CM

## 2021-03-11 NOTE — TELEPHONE ENCOUNTER
Controlled Substance Refill Request for Norco  Problem List Complete:    No     PROVIDER TO CONSIDER COMPLETION OF PROBLEM LIST AND OVERVIEW/CONTROLLED SUBSTANCE AGREEMENT    Last Written Prescription Date:  2/11/20  Last Fill Quantity: 180,   # refills: 0    Last Office Visit with [facility] care provider: 12/17/20    Future Office visit:     Controlled substance agreement:   Encounter-Level CSA - 04/21/2016:    Controlled Substance Agreement - Scan on 4/28/2016  9:48 AM: [place] Controlled Substance Agreement     Patient-Level CSA:    There are no patient-level csa.         Last Urine Drug Screen:   Pain Drug SCR UR W RPTD Meds   Date Value Ref Range Status   05/25/2017   Final    FINAL  (Note)  ====================================================================  TO[facility]RE COMP DRUG ANALYSIS,UR  ====================================================================  Test                             Result       Flag       Units    Drug Present   Hydrocodone                    2505                    ng/mg creat   Hydromorphone                  162                     ng/mg creat   Dihydrocodeine                 513                     ng/mg creat   Norhydrocodone                 1605                    ng/mg creat    Sources of hydrocodone include scheduled prescription    medications. Hydromorphone, dihydrocodeine and norhydrocodone are    expected metabolites of hydrocodone. Hydromorphone and    dihydrocodeine are also available as scheduled prescription    medications.     Duloxetine                     PRESENT   Acetaminophen                  PRESENT   Naproxen                       PRESENT  ====================================================================  Test                      Result    Flag   Units      Ref Range   Creatinine              191              mg/dL      >=20  ====================================================================  Declared Medications:  Medication list was not  provided.  ====================================================================  For clinical consultation, please call [phone].  ====================================================================  Analysis performed by [facility] [employer] [place] [address]     , No results found for: COMDAT,   Cannabinoids (31-wke-0-carboxy-9-THC)   Date Value Ref Range Status   10/15/2019 Not Detected NDET^Not Detected ng/mL Final     Comment:     Cutoff for a negative cannabinoid is 50 ng/mL or less.     Phencyclidine (Phencyclidine)   Date Value Ref Range Status   10/15/2019 Not Detected NDET^Not Detected ng/mL Final     Comment:     Cutoff for a negative PCP is 25 ng/mL or less.     Cocaine (Benzoylecgonine)   Date Value Ref Range Status   10/15/2019 Not Detected NDET^Not Detected ng/mL Final     Comment:     Cutoff for a negative cocaine is 150 ng/ml or less.     Methamphetamine (d-Methamphetamine)   Date Value Ref Range Status   10/15/2019 Not Detected NDET^Not Detected ng/mL Final     Comment:     Cutoff for a negative methamphetamine is 500 ng/ml or less.     Opiates (Morphine)   Date Value Ref Range Status   10/15/2019 Detected, Abnormal Result (A) NDET^Not Detected ng/mL Final     Comment:     Cutoff for a positive opiate is greater than 100 ng/ml.  This is an unconfirmed screening result to be used for medical purposes only.   Order [id number] for confirmation or individual confirmation tests to [facility].       Amphetamine (d-Amphetamine)   Date Value Ref Range Status   10/15/2019 Not Detected NDET^Not Detected ng/mL Final     Comment:     Cutoff for a negative amphetamine is 500 ng/mL or less.     Benzodiazepines (Nordiazepam)   Date Value Ref Range Status   10/15/2019 Detected, Abnormal Result (A) NDET^Not Detected ng/mL Final     Comment:     Cutoff for a positive benzodiazepines is greater than 150 ng/ml.  This is an unconfirmed screening result to be used for medical purposes only.   Order [id number] for  confirmation or individual confirmation tests to [facility].       Tricyclic Antidepressants (Desipramine)   Date Value Ref Range Status   10/15/2019 Not Detected NDET^Not Detected ng/mL Final     Comment:     Cutoff for a negative tricyclic antidepressant is 300 ng/ml or less.     Methadone (Methadone)   Date Value Ref Range Status   10/15/2019 Not Detected NDET^Not Detected ng/mL Final     Comment:     Cutoff for a negative methadone is 200 ng/ml or less.     Barbiturates (Butalbital)   Date Value Ref Range Status   10/15/2019 Not Detected NDET^Not Detected ng/mL Final     Comment:     Cutoff for a negative barbituate is 200 ng/ml or less.     Oxycodone (Oxycodone)   Date Value Ref Range Status   10/15/2019 Not Detected NDET^Not Detected ng/mL Final     Comment:     Cutoff for a negative Oxycodone is 100 ng/mL or less.     Propoxyphene (Norpropoxyphene)   Date Value Ref Range Status   10/15/2019 Not Detected NDET^Not Detected ng/mL Final     Comment:     Cutoff for a negative propoxyphene is 300 ng/ml or less     Buprenorphine (Buprenorphine)   Date Value Ref Range Status   10/15/2019 Not Detected NDET^Not Detected ng/mL Final     Comment:     Cutoff for a negative buprenorphine is 10 ng/ml or less        [place] reviewed:  not reviewed/not due - last done on 1/11/21  [place] profile:  [email or web address]

## 2021-03-12 PROBLEM — F11.90 CHRONIC, CONTINUOUS USE OF OPIOIDS: Status: ACTIVE | Noted: 2021-03-12

## 2021-03-12 RX ORDER — HYDROCODONE BITARTRATE AND ACETAMINOPHEN 5; 325 MG/1; MG/1
1-2 TABLET ORAL EVERY 6 HOURS PRN
Qty: 180 TABLET | Refills: 0 | Status: SHIPPED | OUTPATIENT
Start: 2021-03-13 | End: 2021-04-07

## 2021-04-05 DIAGNOSIS — I10 BENIGN ESSENTIAL HYPERTENSION: ICD-10-CM

## 2021-04-07 ENCOUNTER — MYC REFILL (OUTPATIENT)
Dept: INTERNAL MEDICINE | Facility: CLINIC | Age: 76
End: 2021-04-07

## 2021-04-07 DIAGNOSIS — G89.4 CHRONIC PAIN SYNDROME: ICD-10-CM

## 2021-04-07 DIAGNOSIS — M79.7 FIBROMYALGIA: ICD-10-CM

## 2021-04-07 DIAGNOSIS — M51.369 DDD (DEGENERATIVE DISC DISEASE), LUMBAR: ICD-10-CM

## 2021-04-07 RX ORDER — LOSARTAN POTASSIUM 50 MG/1
TABLET ORAL
Qty: 90 TABLET | Refills: 3 | Status: SHIPPED | OUTPATIENT
Start: 2021-04-07 | End: 2022-02-18

## 2021-04-07 RX ORDER — AMLODIPINE BESYLATE 5 MG/1
TABLET ORAL
Qty: 90 TABLET | Refills: 3 | Status: SHIPPED | OUTPATIENT
Start: 2021-04-07 | End: 2022-02-18

## 2021-04-07 NOTE — TELEPHONE ENCOUNTER
Pending Prescriptions:                       Disp   Refills    amLODIPine (NORVASC) 5 MG tablet [Pharmacy*90 tab*3        Sig: TAKE 1 TABLET BY MOUTH  DAILY    losartan (COZAAR) 50 MG tablet [Pharmacy M*90 tab*3        Sig: TAKE 1 TABLET BY MOUTH  DAILY    Routing refill request to provider for review/approval because:  BP Readings from Last 3 Encounters:   07/16/20 (!) 169/95   07/09/20 138/82   02/28/20 127/46

## 2021-04-08 NOTE — TELEPHONE ENCOUNTER
Pending Prescriptions:                       Disp   Refills    HYDROcodone-acetaminophen (NORCO) 5-325 MG*180 ta*0        Sig: Take 1-2 tablets by mouth every 6 hours as needed for           pain    Routing refill request to provider for review/approval because:  Drug not on the [facility] refill protocol

## 2021-04-10 RX ORDER — HYDROCODONE BITARTRATE AND ACETAMINOPHEN 5; 325 MG/1; MG/1
1-2 TABLET ORAL EVERY 6 HOURS PRN
Qty: 180 TABLET | Refills: 0 | Status: SHIPPED | OUTPATIENT
Start: 2021-04-12 | End: 2021-05-10

## 2021-04-13 ENCOUNTER — MYC MEDICAL ADVICE (OUTPATIENT)
Dept: INTERNAL MEDICINE | Facility: CLINIC | Age: 76
End: 2021-04-13

## 2021-04-14 ENCOUNTER — E-VISIT (OUTPATIENT)
Dept: INTERNAL MEDICINE | Facility: CLINIC | Age: 76
End: 2021-04-14
Payer: MEDICARE

## 2021-04-14 ENCOUNTER — MYC MEDICAL ADVICE (OUTPATIENT)
Dept: INTERNAL MEDICINE | Facility: CLINIC | Age: 76
End: 2021-04-14

## 2021-04-14 DIAGNOSIS — R05.9 COUGH: Primary | ICD-10-CM

## 2021-04-14 PROCEDURE — 99207 PR NO BILLABLE SERVICE THIS VISIT: CPT | Performed by: NURSE PRACTITIONER

## 2021-05-04 ENCOUNTER — MYC REFILL (OUTPATIENT)
Dept: INTERNAL MEDICINE | Facility: CLINIC | Age: 76
End: 2021-05-04

## 2021-05-04 DIAGNOSIS — M79.7 FIBROMYALGIA: ICD-10-CM

## 2021-05-04 DIAGNOSIS — M51.369 DDD (DEGENERATIVE DISC DISEASE), LUMBAR: ICD-10-CM

## 2021-05-04 DIAGNOSIS — G89.4 CHRONIC PAIN SYNDROME: ICD-10-CM

## 2021-05-06 RX ORDER — HYDROCODONE BITARTRATE AND ACETAMINOPHEN 5; 325 MG/1; MG/1
1-2 TABLET ORAL EVERY 6 HOURS PRN
Qty: 180 TABLET | Refills: 0 | OUTPATIENT
Start: 2021-05-06

## 2021-05-09 ENCOUNTER — MYC REFILL (OUTPATIENT)
Dept: INTERNAL MEDICINE | Facility: CLINIC | Age: 76
End: 2021-05-09

## 2021-05-09 DIAGNOSIS — M51.369 DDD (DEGENERATIVE DISC DISEASE), LUMBAR: ICD-10-CM

## 2021-05-09 DIAGNOSIS — G89.4 CHRONIC PAIN SYNDROME: ICD-10-CM

## 2021-05-09 DIAGNOSIS — M79.7 FIBROMYALGIA: ICD-10-CM

## 2021-05-09 RX ORDER — HYDROCODONE BITARTRATE AND ACETAMINOPHEN 5; 325 MG/1; MG/1
1-2 TABLET ORAL EVERY 6 HOURS PRN
Qty: 180 TABLET | Refills: 0 | Status: CANCELLED | OUTPATIENT
Start: 2021-05-09

## 2021-05-10 ENCOUNTER — MYC MEDICAL ADVICE (OUTPATIENT)
Dept: INTERNAL MEDICINE | Facility: CLINIC | Age: 76
End: 2021-05-10

## 2021-05-10 RX ORDER — HYDROCODONE BITARTRATE AND ACETAMINOPHEN 5; 325 MG/1; MG/1
1-2 TABLET ORAL EVERY 6 HOURS PRN
Qty: 180 TABLET | Refills: 0 | Status: SHIPPED | OUTPATIENT
Start: 2021-05-12 | End: 2021-06-10

## 2021-05-11 NOTE — TELEPHONE ENCOUNTER
Patient called to clarify her vicodin prescription. I let her know [facility]    She also mentioned that she never went to an Urgent care for her cough. States she went to three Urgent cares who ultimately told her to the ER, and she didn't go. States she's had the cough for over a year.     Gayathri Wick RN  [facility]

## 2021-05-11 NOTE — TELEPHONE ENCOUNTER
It is not clear why this was attached to a message from 3 weeks ago that was already closed.  Her hydrocodone refill was sent yesterday to be picked up on the 12th.  You can advise the patient to schedule an appointment for her cough.

## 2021-05-27 ENCOUNTER — ANCILLARY PROCEDURE (OUTPATIENT)
Dept: GENERAL RADIOLOGY | Facility: CLINIC | Age: 76
End: 2021-05-27
Attending: INTERNAL MEDICINE
Payer: MEDICARE

## 2021-05-27 ENCOUNTER — OFFICE VISIT (OUTPATIENT)
Dept: INTERNAL MEDICINE | Facility: CLINIC | Age: 76
End: 2021-05-27
Payer: MEDICARE

## 2021-05-27 VITALS
HEIGHT: 63 IN | WEIGHT: 257.7 LBS | RESPIRATION RATE: 16 BRPM | HEART RATE: 114 BPM | DIASTOLIC BLOOD PRESSURE: 84 MMHG | OXYGEN SATURATION: 97 % | BODY MASS INDEX: 45.66 KG/M2 | TEMPERATURE: 98.4 F | SYSTOLIC BLOOD PRESSURE: 149 MMHG

## 2021-05-27 DIAGNOSIS — F11.20 NARCOTIC DEPENDENCE (H): ICD-10-CM

## 2021-05-27 DIAGNOSIS — J45.30 MILD PERSISTENT ASTHMA WITHOUT COMPLICATION: ICD-10-CM

## 2021-05-27 DIAGNOSIS — R05.9 COUGH: Primary | ICD-10-CM

## 2021-05-27 DIAGNOSIS — E66.01 MORBID OBESITY (H): ICD-10-CM

## 2021-05-27 PROCEDURE — 71046 X-RAY EXAM CHEST 2 VIEWS: CPT | Performed by: RADIOLOGY

## 2021-05-27 PROCEDURE — 99215 OFFICE O/P EST HI 40 MIN: CPT | Performed by: INTERNAL MEDICINE

## 2021-05-27 RX ORDER — CODEINE PHOSPHATE AND GUAIFENESIN 10; 100 MG/5ML; MG/5ML
2 SOLUTION ORAL EVERY 4 HOURS PRN
Qty: 473 ML | Refills: 0 | Status: SHIPPED | OUTPATIENT
Start: 2021-05-27 | End: 2021-10-22

## 2021-05-27 RX ORDER — FLUTICASONE PROPIONATE AND SALMETEROL XINAFOATE 230; 21 UG/1; UG/1
2 AEROSOL, METERED RESPIRATORY (INHALATION) 2 TIMES DAILY
Qty: 12 G | Refills: 1 | Status: SHIPPED | OUTPATIENT
Start: 2021-05-27 | End: 2021-06-08

## 2021-05-27 ASSESSMENT — MIFFLIN-ST. JEOR: SCORE: 1638.05

## 2021-05-27 NOTE — NURSING NOTE
"BP (!) 149/84 (BP Location: Left arm, Patient Position: Sitting, Cuff Size: Adult Large)   Pulse 114   Temp 98.4  F (36.9  C) (Oral)   Resp 16   Ht 1.6 m (5' 3\")   Wt 116.9 kg (257 lb 11.2 oz)   SpO2 97%   BMI 45.65 kg/m      "

## 2021-05-27 NOTE — PATIENT INSTRUCTIONS
Guaifenesin 2400 mg total per day.   If using Mucinex extra strength it would be one twice a day.   If using regular mucinex, 2 tablets twice a day.     Cough syrups can vary.

## 2021-05-27 NOTE — PROGRESS NOTES
Assessment & Plan     Cough  Likely this is multifactorial, related to chronic postnasal drainage, asthma and possibly some acid reflux. She has a lot of mild cough and throat clearing in the room.  CXR is unremarkable, will try increasing advair strength, use regular guaifenesin, see patient instructions. Consider adding PPI in future as she declines now.   - XR Chest 2 Views  - fluticasone-salmeterol (ADVAIR-HFA) 230-21 MCG/ACT inhaler; Inhale 2 puffs into the lungs 2 times daily  - guaiFENesin-codeine (ROBITUSSIN AC) 100-10 MG/5ML solution; Take 10 mLs by mouth every 4 hours as needed for cough    Mild persistent asthma without complication  She has some mild wheezing so suspect asthma is not optimally controlled  - XR Chest 2 Views  - fluticasone-salmeterol (ADVAIR-HFA) 230-21 MCG/ACT inhaler; Inhale 2 puffs into the lungs 2 times daily    Narcotic dependence (H)  stable    Morbid obesity (H)  stable    58 minutes spent reviewing records from pulmonary, previous spirometry and xrays, reviewing current xray image, with patient in room and documenting.     Return in about 6 months (around 11/27/2021).    Dolores Blanton MD  [facility] 75 [facility] who presents for the following health issues     HPI     Chronic cough: she has had coughing for many years. It waxes and wanes, sometimes productive usually clear mucus, sometimes dry. It had worsened when traveling a month ago, could not be seen in  where she was at, did not want ED charge so did not get seen. The acute worsening has improved, more at baseline now.   She has seen pulmonary for asthma and is on inhaler with regular use. She has not really discussed her cough with them. She has chronic postnasal drainage. She does not have any heartburn, does not think she has reflux.     She has used robitussin AC intermittently which helps. She does not use anything otc regularly.     She has chronic dyspnea on  exertion that waxes and wanes, bothering a little more since acute worsening cough a month ago.     She had smoked in the past but quit in 1969, some second hand smoke at work back in the 60's.       Her chronic pain is overall stable, narcotic use stable.   Weight stable    Patient Active Problem List   Diagnosis     Mild persistent asthma with acute exacerbation     Restless leg syndrome     Fibromyalgia     Advanced directives, counseling/discussion     DDD (degenerative disc disease), lumbar     Spinal stenosis     Controlled substance agreement signed- [id number]/14/20     Morbid obesity (H)     Anxiety     Narcotic dependence (H)     Benign essential hypertension     CARDIOVASCULAR SCREENING; LDL GOAL LESS THAN 130     Chronic, continuous use of opioids     Current Outpatient Medications   Medication Sig Dispense Refill     acetaminophen (TYLENOL) 325 MG tablet Take 2 tablets (650 mg) by mouth every 4 hours as needed for other (mild pain) 100 tablet 0     albuterol (PROAIR HFA/PROVENTIL HFA/VENTOLIN HFA) 108 (90 BASE) MCG/ACT Inhaler Inhale 2 puffs into the lungs every 6 hours as needed for shortness of breath / dyspnea 1 Inhaler 11     amLODIPine (NORVASC) 5 MG tablet TAKE 1 TABLET BY MOUTH  DAILY 90 tablet 3     BIOTIN PO Take by mouth daily       DULoxetine (CYMBALTA) 60 MG capsule Take 1 capsule (60 mg) by mouth 2 times daily 180 capsule 3     fluticasone-salmeterol (ADVAIR-HFA) 230-21 MCG/ACT inhaler Inhale 2 puffs into the lungs 2 times daily 12 g 1     guaiFENesin-codeine (ROBITUSSIN AC) 100-10 MG/5ML solution Take 10 mLs by mouth every 4 hours as needed for cough 473 mL 0     HYDROcodone-acetaminophen (NORCO) 5-325 MG tablet Take 1-2 tablets by mouth every 6 hours as needed for pain 180 tablet 0     hydrOXYzine (ATARAX) 10 MG tablet Take 1 tablet (10 mg) by mouth every 6 hours as needed for itching or anxiety (with pain, moderate pain) 20 tablet 0     losartan (COZAAR) 50 MG tablet TAKE 1 TABLET  "BY MOUTH  DAILY 90 tablet 3     miconazole (MICATIN) 2 % external powder Apply topically daily 90 g 11     naloxone (NARCAN) 4 MG/0.1ML nasal spray Spray 1 spray (4 mg) into one nostril alternating nostrils once as needed for opioid reversal every 2-3 minutes until assistance arrives 0.2 mL 0     Vitamin D3 (CHOLECALCIFEROL) 25 mcg (1000 units) tablet Take by mouth daily        Social History     Tobacco Use     Smoking status: Former Smoker     Quit date: 1969     Years since quittin.4     Smokeless tobacco: Never Used   Substance Use Topics     Alcohol use: Yes     Alcohol/week: 0.0 standard drinks     Comment: casual     Drug use: No          Review of Systems   No fever, chills, no sinus pain but has some postnasal drainage, no sore throat, mucus about half the time she coughs, no PND, orthopnea, positive dyspnea on exertion, no chest tightness.       Objective    BP (!) 149/84 (BP Location: Left arm, Patient Position: Sitting, Cuff Size: Adult Large)   Pulse 114   Temp 98.4  F (36.9  C) (Oral)   Resp 16   Ht 1.6 m (5' 3\")   Wt 116.9 kg (257 lb 11.2 oz)   SpO2 97%   BMI 45.65 kg/m    Body mass index is 45.65 kg/m .  Physical Exam     Lungs: no crackles, mild wheezes with forced expiration  CV: normal S1, S2 without murmur, S3 or S4.       CXR to my reading: clear              "

## 2021-05-28 ENCOUNTER — MYC MEDICAL ADVICE (OUTPATIENT)
Dept: INTERNAL MEDICINE | Facility: CLINIC | Age: 76
End: 2021-05-28

## 2021-06-07 ENCOUNTER — TELEPHONE (OUTPATIENT)
Dept: INTERNAL MEDICINE | Facility: CLINIC | Age: 76
End: 2021-06-07

## 2021-06-07 DIAGNOSIS — G89.4 CHRONIC PAIN SYNDROME: ICD-10-CM

## 2021-06-07 DIAGNOSIS — M51.369 DDD (DEGENERATIVE DISC DISEASE), LUMBAR: ICD-10-CM

## 2021-06-07 DIAGNOSIS — J45.31 MILD PERSISTENT ASTHMA WITH ACUTE EXACERBATION: Primary | ICD-10-CM

## 2021-06-07 DIAGNOSIS — M79.7 FIBROMYALGIA: ICD-10-CM

## 2021-06-07 NOTE — TELEPHONE ENCOUNTER
Patient calls and asks for alternative to ADVAIR-HFA because it is $500.00.  Patient is requesting an RX for Breo Ellipta be sent to [facility] in [place].

## 2021-06-07 NOTE — TELEPHONE ENCOUNTER
Patient advised RX not due until 06/12/21    [facility]      Last Written Prescription Date:  05/12/21  Last Fill Quantity: 180,   # refills: 0  Last Office Visit: 05/27/21  Future Office visit:       Routing refill request to provider for review/approval because:  Drug not on the [facility], [facility] or [facility] refill protocol or controlled substance'

## 2021-06-09 ENCOUNTER — MYC MEDICAL ADVICE (OUTPATIENT)
Dept: INTERNAL MEDICINE | Facility: CLINIC | Age: 76
End: 2021-06-09

## 2021-06-09 DIAGNOSIS — J45.30 MILD PERSISTENT ASTHMA WITHOUT COMPLICATION: Primary | ICD-10-CM

## 2021-06-10 RX ORDER — HYDROCODONE BITARTRATE AND ACETAMINOPHEN 5; 325 MG/1; MG/1
1-2 TABLET ORAL EVERY 6 HOURS PRN
Qty: 180 TABLET | Refills: 0 | Status: SHIPPED | OUTPATIENT
Start: 2021-06-11 | End: 2021-07-09

## 2021-06-11 RX ORDER — FLUTICASONE PROPIONATE AND SALMETEROL 232; 14 UG/1; UG/1
1 POWDER, METERED RESPIRATORY (INHALATION) 2 TIMES DAILY
Qty: 1 EACH | Refills: 11 | Status: SHIPPED | OUTPATIENT
Start: 2021-06-11 | End: 2023-07-31

## 2021-06-11 NOTE — TELEPHONE ENCOUNTER
Looks like this is in regards to [employer], is that correct?    If so, AirDuo is a combination (fluticasone and salmeterol) that is comparable to Advair and in general is more reasonibly priced for patients.

## 2021-06-11 NOTE — TELEPHONE ENCOUNTER
Pt called back. She checked with insurance and would like [employer] to be sent to [facility]. Pharmacy attached. Please advise. Thanks.

## 2021-07-06 ENCOUNTER — TRANSFERRED RECORDS (OUTPATIENT)
Dept: HEALTH INFORMATION MANAGEMENT | Facility: CLINIC | Age: 76
End: 2021-07-06

## 2021-07-07 DIAGNOSIS — M51.369 DDD (DEGENERATIVE DISC DISEASE), LUMBAR: ICD-10-CM

## 2021-07-07 DIAGNOSIS — M79.7 FIBROMYALGIA: ICD-10-CM

## 2021-07-07 DIAGNOSIS — G89.4 CHRONIC PAIN SYNDROME: ICD-10-CM

## 2021-07-08 ENCOUNTER — VIRTUAL VISIT (OUTPATIENT)
Dept: PALLIATIVE MEDICINE | Facility: CLINIC | Age: 76
End: 2021-07-08
Payer: MEDICARE

## 2021-07-08 DIAGNOSIS — Z53.9 ERRONEOUS ENCOUNTER--DISREGARD: Primary | ICD-10-CM

## 2021-07-08 NOTE — PATIENT INSTRUCTIONS
----------------------------------------------------------------  [facility] Number:  [phone]     Call with any questions about your care and for scheduling assistance.     Calls are returned Monday through Friday between 8 AM and 4:30 PM. We usually get back to you within 2 business days depending on the issue/request.    If we are prescribing your medications:    For opioid medication refills, call the clinic or send a mDialog message 7 days in advance.  Please include:    Name of requested medication    Name of the pharmacy.    For non-opioid medications, call your pharmacy directly to request a refill. Please allow 3-4 days to be processed.     Per [place] State Law:    All controlled substance prescriptions must be filled within 30 days of being written.      For those controlled substances allowing refills, pickup must occur within 30 days of last fill.      We believe regular attendance is key to your success in our program!      Any time you are unable to keep your appointment we ask that you call us at least 24 hours in advance to cancel.This will allow us to offer the appointment time to another patient.     Multiple missed appointments may lead to dismissal from the clinic.

## 2021-07-09 RX ORDER — HYDROCODONE BITARTRATE AND ACETAMINOPHEN 5; 325 MG/1; MG/1
1-2 TABLET ORAL EVERY 6 HOURS PRN
Qty: 180 TABLET | Refills: 0 | Status: SHIPPED | OUTPATIENT
Start: 2021-07-11 | End: 2021-08-08

## 2021-07-13 ENCOUNTER — HOSPITAL ENCOUNTER (OUTPATIENT)
Dept: MAMMOGRAPHY | Facility: CLINIC | Age: 76
Discharge: HOME | End: 2021-07-13
Attending: INTERNAL MEDICINE | Admitting: INTERNAL MEDICINE
Payer: MEDICARE

## 2021-07-13 DIAGNOSIS — Z12.31 VISIT FOR SCREENING MAMMOGRAM: ICD-10-CM

## 2021-07-13 PROCEDURE — 77063 BREAST TOMOSYNTHESIS BI: CPT

## 2021-07-13 NOTE — PROGRESS NOTES
Svetlana is a 76 year old who is being evaluated via a billable video visit.    Is Pt currently in [place]? Yes  How would you like to obtain your AVS? MyChart  If the video visit is dropped, the invitation should be resent by:  Will anyone else be joining your video visit? Bisi Morin CMA   [facility]   Pain Management Center    Video Start Time: 3:06 PM    [facility]     Date of visit: 7/15/2021      Assessment:   Svetlana Serrano is a 76 year old female with a past medical history significant for HTN, restless leg syndrome, mild intermittent asthma, and arthritis, who presents with complaints of widespread and low back pain.      1. Widespread pain- etiology fibromyalgia.   2. Low back pain- etiology likely due to lumbar degenerative disc disease, may be component of SI joint dysfunction, exacerbated by body habitus.   3. Mental Health - the patient's mental health concerns, specifically situational depression, affect her experience of pain and contribute to her clinically significant distress.     Visit Diagnoses:  1. Fibromyalgia    2. DDD (degenerative disc disease), lumbar    3. Bilateral calf pain    4. Chronic pain syndrome        Plan:   1.  Pain Physical Therapy:  not at this time- she is not interested.    2.  Pain Psychologist to address relaxation, behavioral change, coping style, and other factors important to improvement.     She will let me know if interested in this.    3.  Medication Management:     1. We discussed medication management again today. Svetlana would like to try other medication to manage her pain and limit her use of Norco. Reviewed gabapentin as an option and she is interested. She will start gabapentin as directed below. Call with issues.  Gabapentin 1 tab= 100mg    AM   PM   Bedtime  0   0   100mg (1 tab).  After 3-5 days, increase as tolerated   100mg (1 tab)  0   100mg (1 tab).  After 3-5 days, increase as tolerated   100mg (1 tab)  100mg (1  tab)  100mg (1 tab).  After 3-5 days, increase as tolerated   100mg (1 tab)  100mg (1 tab)  200mg (2 tabs). After 3-5 days, increase as tolerated   200mg (2 tabs)  100mg (1 tab)  200mg (2 tabs). After 3-5 days, increase as tolerated   200mg (2 tabs)  200mg (2 tabs)  200mg (2 tabs). After 3-5 days, increase as tolerated   200mg (2 tabs)  200mg (2 tabs)  300mg (3 tabs). After 3-5 days, increase as tolerated   300mg (3 tabs)  200mg (2 tabs)  300mg (3 tabs). After 3-5 days, increase as tolerated   300mg (3 tabs)  300mg (3 tabs)  300mg (3 tabs).     Call with any problems.  Caution for sedation.    Do not drive until you know how the medication affects you.   You can go slower if you need to or increasing only one dose at a time.  Do not stop abruptly once at higher doses.  This medication must be tapered off.    2. Svetlana is very interested in medical cannabis, has had success with hemp CBD previously. She does meet criteria for intractable pain, certified her for medical cannabis through the [facility] website. Monitor pain benefit.     3. Continue current medication regimen otherwise.    4.  Potential procedures: consider trigger point injections, she has had some benefit that was short term previously .     5.  Referrals: also advised she consider acupuncture and let me know if interested.   6.  Follow up with CAMELIA Cruz CNP in 4-6 weeks.     Alanis DENISE CNP  [facility]     -------------------------------------------------    Subjective:    Chief complaint:   Chief Complaint   Patient presents with     Pain       Interval history:  Svetlana Serrano is a 76 year old female last seen on 11/30/2020.  She is seen in follow up.     Recommendations/plan at the last visit included:  We discussed participation in our pain program today. Though Svetlana might be interested, she would like to hold off on participating in our pain program for a while given COVID-19 and  potential upcoming shoulder replacement. She would like follow up with her primary care provider in the interim. She will return for follow up with me when she is interested in participating in our pain program.                  1.  Pain Physical Therapy:                YES   Would highly recommend pain physical therapy.               2.  Pain Psychologist to address relaxation, behavioral change, coping style, and other factors important to improvement.                YES  Would also recommend pain psychology.               3.  Medication Management:                           1. We discussed the use of opioids for chronic pain and why, in general, I do not recommend for chronic pain management. Specifically we talked about the development of tolerance over time, opioid induced hyperalgesia, sedation and cognitive impairment, sleep disordered breathing, and risk of unintentional overdose and death. Opioid induced hyperalgesia is more likely to occur in patients with fibromyalgia. That being said, Svetlana reports that she has been taking Vicodin for management of low back pain and fibromyalgia for the last 12-15 years, reports very good pain benefit, and her dose is below the CDC 2016 opioid guidelines. While it may be reasonable to continue at this dose (Norco 5/325mg 6 tabs/day) I do NOT recommend a dose increase. We discussed that actually I would recommend a long term goal of tapering off of this medication as there are other medications and therapies that are more likely to be effective long term.                          2. May consider a trial of gabapentin. Could start with 100mg or 300mg capsules but goal dose would be 600mg TID with a gradual titration.                          3. Could consider trial of Robaxin, a less sedating muscle relaxant, to be used in place of Norco when having more muscle spasm.                           4. May consider antidepressant rotation to Effexor or another medication as  she questions the effectiveness.                          5. Could consider medical cannabis in the future in the above plan is not effective.               5.  Potential procedures: may consider in the future if interested, though I have some reservations given lack of previous benefit.                6.  Referrals: would highly recommend acupuncture.               7.  Follow up with CAMELIA Cruz CNP when ready to participate in our pain program.     Since her last visit, Svetlana Serrano reports:  -Her pain is about the same as it was at last visit.   -Her bilateral lower extremity pain is currently most bothersome- specifically in her calves. She uses Blue Emu oil with some benefit. She also has used SCDs in the hospital post operatively with benefit.   -She states she presents to clinic today to discuss alternative ways to manage her pain beyond Norco. She states Norco seems to be less effective at managing pain.   -She continues Norco 5/325mg tabs TID with decreasing benefit.   -She recently started using hemp CBD products with some pain benefit. She notes she has had success with marijuana in the past for pain and would be interested in medical cannabis certification.      Pain Information:   Pain rating: averages 5/10 on a 0-10 scale.      Current pain medications:    Cymbalta 60mg BID- ? for pain or mood   Tylenol 325mg 6 tabs/day- [facility]   Hydroxyzine 10mg daily prn- ?   Norco 5/325mg takes 2 tabs TID- [facility], less so over the years    Current MME: 30    Review of [place] Prescription Monitoring Program (): No concern for abuse or misuse of controlled medications based on this report.     Annual Controlled Substance Agreement/UDS due date: NA- prescribed by primary care provider     Past pain treatments:  1. Previous Pain Relevant Medications:              Opiates: Norco- [facility]              NSAIDS: no              Muscle Relaxants: cannot remember              Anti-migraine mediations: no               Anti-depressants: Cymbalta- ?              Sleep aids: no              Anxiolytics: lorazepam- ?              Neuropathics: gabapentin (prescribed after hip replacement)- ?, Lyrica (for 3 months)- [place], blurred vision              Topicals: Andrés emu cream- [facility]              Other medications not covered above: Tylenol- [facility]     2. Physical Therapy: yes for back and fibromyalgia- [facility], hasn't tried in years  3. Pain Psychology: no  4. Surgery: bilateral hip replacements with Dr. Man January and July 2020  left shoulder replacement  5. Injections: trigger point injections- [facility] but short term  x3 epidural steroid injections with [facility] in 2012- [facility]  6. Chiropractic: yes- [facility]  7. Acupuncture: no  8. TENS Unit: yes- ?/[facility]       Medications:  Current Outpatient Medications   Medication Sig Dispense Refill     gabapentin (NEURONTIN) 100 MG capsule Take 3 capsules (300 mg) by mouth 3 times daily 270 capsule 1     acetaminophen (TYLENOL) 325 MG tablet Take 2 tablets (650 mg) by mouth every 4 hours as needed for other (mild pain) 100 tablet 0     albuterol (PROAIR HFA/PROVENTIL HFA/VENTOLIN HFA) 108 (90 BASE) MCG/ACT Inhaler Inhale 2 puffs into the lungs every 6 hours as needed for shortness of breath / dyspnea 1 Inhaler 11     amLODIPine (NORVASC) 5 MG tablet TAKE 1 TABLET BY MOUTH  DAILY 90 tablet 3     BIOTIN PO Take by mouth daily       DULoxetine (CYMBALTA) 60 MG capsule Take 1 capsule (60 mg) by mouth 2 times daily 180 capsule 3     fluticasone-salmeterol (AIRDUO RESPICLICK) 232-14 MCG/ACT inhaler Inhale 1 puff into the lungs 2 times daily 1 each 11     guaiFENesin-codeine (ROBITUSSIN AC) 100-10 MG/5ML solution Take 10 mLs by mouth every 4 hours as needed for cough 473 mL 0     HYDROcodone-acetaminophen (NORCO) 5-325 MG tablet Take 1-2 tablets by mouth every 6 hours as needed for pain 180 tablet 0     hydrOXYzine (ATARAX) 10 MG tablet Take 1 tablet (10 mg) by mouth every 6 hours as needed for itching or anxiety (with pain,  moderate pain) 20 tablet 0     losartan (COZAAR) 50 MG tablet TAKE 1 TABLET BY MOUTH  DAILY 90 tablet 3     miconazole (MICATIN) 2 % external powder Apply topically daily 90 g 11     naloxone (NARCAN) 4 MG/0.1ML nasal spray Spray 1 spray (4 mg) into one nostril alternating nostrils once as needed for opioid reversal every 2-3 minutes until assistance arrives 0.2 mL 0     Vitamin D3 (CHOLECALCIFEROL) 25 mcg (1000 units) tablet Take by mouth daily         Medical History: any changes in medical history since they were last seen? No    Review of Systems: A 10-point review of systems was negative, with the exception of chronic pain issues and fatigue, HTN, weakness (generalized).     Objective:    Physical Exam:  There were no vitals taken for this visit.  Constitutional: Well developed, well nourished, appears stated age. Obese.   HEENT: Head atraumatic, normocephalic. Eyes without conjunctival injection or jaundice. Oropharynx clear. Neck supple. No obvious neck masses.  Skin: No rash, lesions, or petechiae of exposed skin.   Psychiatric/mental status: Alert, without lethargy or stupor. Speech fluent. Appropriate affect. Mood normal. Able to follow commands without difficulty.     Imaging:  MRI of lumbar spine was completed on 7/29/19 and shows:  L5-S1: 4 mm spondylolisthesis with moderate cranially extruded central disc herniation approximately 10 mm CC by 6 mm AP with moderate right and marked left hypertrophic facet degeneration produce moderately severe trefoil central stenosis with impingement of S1 nerve roots. Mild left foraminal stenosis.     L4-L5: Moderate to marked vertical narrowing with desiccation, broad annular bulging, 5 mm AP left posterolateral protrusion with moderate central and severe left subarticular recess stenosis with left L5 impingement. Mild bilateral foraminal stenosis without nerve root impingement. Mild facet degeneration.     L3-L4: Moderate disc desiccation with mild posterolateral to  far lateral annular bulging, no significant central compromise. No significant foraminal compromise. Facet joints are unremarkable.     L2-L3: Moderate disc desiccation with normal posterior annular morphology and no stenosis. No significant foraminal compromise. Facet joints are unremarkable.     L1-L2: Very small right paracentral protrusion with moderate disc desiccation and no stenosis. No significant foraminal compromise. Facet joints are unremarkable.     Lower thoracic: Moderate T12-L1 and T11-12 disc degeneration with normal posterior annular morphology, small Schmorl's nodes.     CONCLUSION: Multilevel spondylosis with mostly sacralized transitional S1 level with a narrow S1-2 disc, and these findings:     1. Grade 1 L5-S1 degenerative spondylolisthesis with moderate disc extrusion, advanced facet degeneration and severe subarticular recess stenosis impinging S1 nerve roots.     2. Marked left L4-5 subarticular recess stenosis with annular protrusion impinging left L5 nerve root.     3. Moderate disc degeneration at other thoracolumbar levels without neural compression.     Note: Mild increased size of L5-S1 disc extrusion since prior study.      Video-Visit Details    Type of service:  Video Visit    Video End Time: 3:35 PM    Originating Location (pt. Location): Home    Distant Location (provider location):  [facility] [place]     Platform used for Video Visit: ffk environment     BILLING TIME DOCUMENTATION:   The total TIME spent on this patient on the date of the encounter/appointment was 30 minutes.      TOTAL TIME includes:   Time spent preparing to see the patient (reviewing records and tests)   Time spent face to face (or over the phone) with the patient   Time spent ordering tests, medications, procedures and referrals   Time spent Referring and communicating with other healthcare professionals   Time spent documenting clinical information in Epic

## 2021-07-15 ENCOUNTER — VIRTUAL VISIT (OUTPATIENT)
Dept: PALLIATIVE MEDICINE | Facility: CLINIC | Age: 76
End: 2021-07-15
Payer: MEDICARE

## 2021-07-15 DIAGNOSIS — M79.661 BILATERAL CALF PAIN: ICD-10-CM

## 2021-07-15 DIAGNOSIS — M79.7 FIBROMYALGIA: Primary | ICD-10-CM

## 2021-07-15 DIAGNOSIS — M79.662 BILATERAL CALF PAIN: ICD-10-CM

## 2021-07-15 DIAGNOSIS — G89.4 CHRONIC PAIN SYNDROME: ICD-10-CM

## 2021-07-15 DIAGNOSIS — M51.369 DDD (DEGENERATIVE DISC DISEASE), LUMBAR: ICD-10-CM

## 2021-07-15 PROCEDURE — 99214 OFFICE O/P EST MOD 30 MIN: CPT | Mod: 95 | Performed by: NURSE PRACTITIONER

## 2021-07-15 RX ORDER — GABAPENTIN 100 MG/1
300 CAPSULE ORAL 3 TIMES DAILY
Qty: 270 CAPSULE | Refills: 1 | Status: SHIPPED | OUTPATIENT
Start: 2021-07-15 | End: 2021-09-02

## 2021-07-15 ASSESSMENT — PAIN SCALES - GENERAL: PAINLEVEL: MODERATE PAIN (5)

## 2021-07-15 NOTE — PATIENT INSTRUCTIONS
1.  Pain Physical Therapy:  not at this time.    2.  Pain Psychologist to address relaxation, behavioral change, coping style, and other factors important to improvement.     Let me know if interested in this.    3.  Medication Management:     1. Start gabapentin as directed below. Call with issues.  Gabapentin 1 tab= 100mg    AM   PM   Bedtime  0   0   100mg (1 tab).  After 3-5 days, increase as tolerated   100mg (1 tab)  0   100mg (1 tab).  After 3-5 days, increase as tolerated   100mg (1 tab)  100mg (1 tab)  100mg (1 tab).  After 3-5 days, increase as tolerated   100mg (1 tab)  100mg (1 tab)  200mg (2 tabs). After 3-5 days, increase as tolerated   200mg (2 tabs)  100mg (1 tab)  200mg (2 tabs). After 3-5 days, increase as tolerated   200mg (2 tabs)  200mg (2 tabs)  200mg (2 tabs). After 3-5 days, increase as tolerated   200mg (2 tabs)  200mg (2 tabs)  300mg (3 tabs). After 3-5 days, increase as tolerated   300mg (3 tabs)  200mg (2 tabs)  300mg (3 tabs). After 3-5 days, increase as tolerated   300mg (3 tabs)  300mg (3 tabs)  300mg (3 tabs).     Call with any problems.  Caution for sedation.    Do not drive until you know how the medication affects you.   You can go slower if you need to or increasing only one dose at a time.  Do not stop abruptly once at higher doses.  This medication must be tapered off.    2. You have been certified for medical cannabis today. They will contact you for next steps. Monitor pain benefit.     3. Continue current medication regimen otherwise.    4.  Potential procedures: consider trigger point injections.     5.  Referrals: consider acupuncture and let me know if interested.   6.  Follow up with CAMELIA Cruz CNP in 4-6 weeks.       ----------------------------------------------------------------  Clinic Number:  [phone]     Call with any questions about your care and for scheduling assistance.     Calls are returned Monday through Friday between 8 AM and 4:30 PM. We  usually get back to you within 2 business days depending on the issue/request.    If we are prescribing your medications:    For opioid medication refills, call the clinic or send a [employer] message 7 days in advance.  Please include:    Name of requested medication    Name of the pharmacy.    For non-opioid medications, call your pharmacy directly to request a refill. Please allow 3-4 days to be processed.     Per [place] State Law:    All controlled substance prescriptions must be filled within 30 days of being written.      For those controlled substances allowing refills, pickup must occur within 30 days of last fill.      We believe regular attendance is key to your success in our program!      Any time you are unable to keep your appointment we ask that you call us at least 24 hours in advance to cancel.This will allow us to offer the appointment time to another patient.     Multiple missed appointments may lead to dismissal from the clinic.

## 2021-08-08 ENCOUNTER — MYC REFILL (OUTPATIENT)
Dept: INTERNAL MEDICINE | Facility: CLINIC | Age: 76
End: 2021-08-08

## 2021-08-08 DIAGNOSIS — G89.4 CHRONIC PAIN SYNDROME: ICD-10-CM

## 2021-08-08 DIAGNOSIS — M51.369 DDD (DEGENERATIVE DISC DISEASE), LUMBAR: ICD-10-CM

## 2021-08-08 DIAGNOSIS — M79.7 FIBROMYALGIA: ICD-10-CM

## 2021-08-10 RX ORDER — HYDROCODONE BITARTRATE AND ACETAMINOPHEN 5; 325 MG/1; MG/1
1-2 TABLET ORAL EVERY 6 HOURS PRN
Qty: 180 TABLET | Refills: 0 | Status: SHIPPED | OUTPATIENT
Start: 2021-08-10 | End: 2021-09-07

## 2021-08-10 NOTE — TELEPHONE ENCOUNTER
Pt called, wanting to know status of refill. Informed pt that it is still pending.    Annabel Ann Patient Representative

## 2021-08-10 NOTE — TELEPHONE ENCOUNTER
Last refill: 7/11/12  Last visit: 5/27/21   checked, no concerns.    Advise patient she should be notifying us 3 business days before needs refill and should not call back to check on refill. It will be available the date it is due if she calls ahead.   For pain medication, she should not be calling pharmacy for the refill; faxes from the pharmacy can take 2-3 days to process.   It is best to request through [employer].

## 2021-08-11 ENCOUNTER — MYC MEDICAL ADVICE (OUTPATIENT)
Dept: INTERNAL MEDICINE | Facility: CLINIC | Age: 76
End: 2021-08-11

## 2021-08-29 ENCOUNTER — HEALTH MAINTENANCE LETTER (OUTPATIENT)
Age: 76
End: 2021-08-29

## 2021-08-31 NOTE — PROGRESS NOTES
Svetlana is a 76 year old who is being evaluated via a billable video visit.    Is [place] currently in [place]? [place]  How would you like to obtain your AVS? MyChart  If the video visit is dropped, the invitation should be resent by:  Will anyone else be joining your video visit? No   [place] currently in [place]? Yes    [facility]    Video Start Time: 2:07 PM    [facility]     Date of visit: 9/2/2021      Assessment:   Svetlana Serrano is a 76 year old female with a past medical history significant for HTN, restless leg syndrome, mild intermittent asthma, and arthritis, who presents with complaints of widespread and low back pain.      1. Widespread pain- etiology fibromyalgia.   2. Low back pain- etiology likely due to lumbar degenerative disc disease, may be component of SI joint dysfunction, exacerbated by body habitus.   3. Mental Health - the patient's mental health concerns, specifically situational depression, affect her experience of pain and contribute to her clinically significant distress.     Visit Diagnoses:  1. Fibromyalgia    2. DDD (degenerative disc disease), lumbar    3. Spinal stenosis of lumbar region with neurogenic claudication    4. Chronic pain syndrome        Plan:     1.  Pain Physical Therapy:    YES  I would recommend but can hold off on for now. Encouraged Svetlana continue to increase physical activity as able.    2.  Pain Psychologist to address relaxation, behavioral change, coping style, and other factors important to improvement.  NO   3.  Medication Management:     1. Svetlana has trouble remembering TID dosing. We will adjust gabapentin to BID dosing- 300mg in the morning and 600mg in the evening for 1 week. I sent a prescription for both 300mg and 100mg capsules to pharmacy. As tolerated, she will increase by 100mg capsules to 600mg BID.     2. Continue medical cannabis as needed for pain. I advised Svetlana closely monitor the  degree of benefit she has with the red pill, skip a dose at times and see if pain is worse.     3.Svetlana expresses interest in reducing use of Norco. We discussed taking 1 tab instead of 2 midday and if this is well tolerated after a few weeks to eliminate midday dose.     4.  Potential procedures: not at this time, could consider trigger point injections if interested.     5.  Referrals: I would recommend acupuncture and she is open. Resources provided. Also would recommend Corbin Langford [phone]     6.  Follow up with CAMELIA Cruz CNP in 6 weeks.       Alanis DENISE CNP  [facility]     -------------------------------------------------    Subjective:    Chief complaint:   Chief Complaint   Patient presents with     Pain       Interval history:  Svetlana Serrano is a 76 year old female last seen on 7/15/2021.  She is seen in follow up.     Recommendations/plan at the last visit included:   1.  Pain Physical Therapy:  not at this time- she is not interested.    2.  Pain Psychologist to address relaxation, behavioral change, coping style, and other factors important to improvement.     She will let me know if interested in this.    3.  Medication Management:     1. We discussed medication management again today. Svetlana would like to try other medication to manage her pain and limit her use of Norco. Reviewed gabapentin as an option and she is interested. She will start gabapentin as directed below. Call with issues.  Gabapentin 1 tab= 100mg    AM   PM   Bedtime  0   0   100mg (1 tab).  After 3-5 days, increase as tolerated   100mg (1 tab)  0   100mg (1 tab).  After 3-5 days, increase as tolerated   100mg (1 tab)  100mg (1 tab)  100mg (1 tab).  After 3-5 days, increase as tolerated   100mg (1 tab)  100mg (1 tab)  200mg (2 tabs). After 3-5 days, increase as tolerated   200mg (2 tabs)  100mg (1 tab)  200mg (2 tabs). After 3-5 days, increase as tolerated   200mg (2  tabs)  200mg (2 tabs)  200mg (2 tabs). After 3-5 days, increase as tolerated   200mg (2 tabs)  200mg (2 tabs)  300mg (3 tabs). After 3-5 days, increase as tolerated   300mg (3 tabs)  200mg (2 tabs)  300mg (3 tabs). After 3-5 days, increase as tolerated   300mg (3 tabs)  300mg (3 tabs)  300mg (3 tabs).     Call with any problems.  Caution for sedation.    Do not drive until you know how the medication affects you.   You can go slower if you need to or increasing only one dose at a time.  Do not stop abruptly once at higher doses.  This medication must be tapered off.    2. Svetlana is very interested in medical cannabis, has had success with hemp CBD previously. She does meet criteria for intractable pain, certified her for medical cannabis through the [place] Department of Health website. Monitor pain benefit.     3. Continue current medication regimen otherwise.    4.  Potential procedures: consider trigger point injections, she has had some benefit that was short term previously .     5.  Referrals: also advised she consider acupuncture and let me know if interested.   6.  Follow up with CAMELIA Cruz CNP in 4-6 weeks.     Since her last visit, Svetlana TREVIÑO Zach reports:  -Her pain is somewhat better than it was at last visit.   -She isn't sure what to attribute this to be due to, possibly gabapentin or medical cannabis.   -Her bilateral lower extremity pain remains her most bothersome- specifically in her calves. She also has pain in bilateral feet/toes that is bothersome. Her pain is most bothersome at night.   -She started gabapentin as directed and has been taking 300mg TID for a week or two. She does report some improvement in pain but states she has a hard time remembering her midday dose.  Denies side effects with this medication.   -She was certified for medical cannabis at last visit. She started going to [facility] dispensary in [place]. She tried the starter pack with some benefit. She has been  taking a THC dominant pill BID or TID. She has been sleeping better since starting. Denies side effects.   -She continue Norco 5/325mg x2 tabs TID prn but states she has been able to skip the midday dose sometimes. She is happy about being able to take less of this and states her kids would like her to take less.     Pain Information:   Pain rating: averages 2/10 on a 0-10 scale.    Current pain medications:    Gabapentin 300mg TID- [facility]/?   Medical cannabis (THC dominant pills)- [facility]/?, started around the same time as gabapentin   Cymbalta 60mg BID- ? for pain or mood   Tylenol 325mg 6 tabs/day- [facility], has been able to skip the midday dose recently   Hydroxyzine 10mg daily prn- ?   Norco 5/325mg takes 2 tabs TID- [facility], less so over the years    Current MME: 30    Review of [place] Prescription Monitoring Program (): No concern for abuse or misuse of controlled medications based on this report.     Annual Controlled Substance Agreement/UDS due date: NA- prescribed by primary care provider     Past pain treatments:  1. Previous Pain Relevant Medications:              Opiates: Norco- [facility]              NSAIDS: no              Muscle Relaxants: cannot remember              Anti-migraine mediations: no              Anti-depressants: Cymbalta- ?              Sleep aids: no              Anxiolytics: lorazepam- ?              Neuropathics: gabapentin (prescribed after hip replacement)- ?, Lyrica (for 3 months)- [place], blurred vision              Topicals: Andrés emu cream- [facility]              Other medications not covered above: Tylenol- [facility]     2. Physical Therapy: yes for back and fibromyalgia- [place], hasn't tried in years  3. Pain Psychology: no  4. Surgery: bilateral hip replacements with Dr. Man January and July 2020  left shoulder replacement  5. Injections: trigger point injections- [facility] but short term  x3 epidural steroid injections with [facility] in 2012- [place]  6. Chiropractic: yes- [place]  7. Acupuncture: no  8. TENS Unit: yes-  ?/[facility]       Medications:  Current Outpatient Medications   Medication Sig Dispense Refill     acetaminophen (TYLENOL) 325 MG tablet Take 2 tablets (650 mg) by mouth every 4 hours as needed for other (mild pain) 100 tablet 0     albuterol (PROAIR HFA/PROVENTIL HFA/VENTOLIN HFA) 108 (90 BASE) MCG/ACT Inhaler Inhale 2 puffs into the lungs every 6 hours as needed for shortness of breath / dyspnea 1 Inhaler 11     amLODIPine (NORVASC) 5 MG tablet TAKE 1 TABLET BY MOUTH  DAILY 90 tablet 3     BIOTIN PO Take by mouth daily       DULoxetine (CYMBALTA) 60 MG capsule Take 1 capsule (60 mg) by mouth 2 times daily 180 capsule 3     fluticasone-salmeterol (AIRDUO RESPICLICK) 232-14 MCG/ACT inhaler Inhale 1 puff into the lungs 2 times daily 1 each 11     gabapentin (NEURONTIN) 100 MG capsule Take 3 capsules (300 mg) by mouth daily 90 capsule 1     gabapentin (NEURONTIN) 300 MG capsule Take 300mg in the morning and 600mg in the evening. 90 capsule 1     guaiFENesin-codeine (ROBITUSSIN AC) 100-10 MG/5ML solution Take 10 mLs by mouth every 4 hours as needed for cough 473 mL 0     HYDROcodone-acetaminophen (NORCO) 5-325 MG tablet Take 1-2 tablets by mouth every 6 hours as needed for pain 180 tablet 0     hydrOXYzine (ATARAX) 10 MG tablet Take 1 tablet (10 mg) by mouth every 6 hours as needed for itching or anxiety (with pain, moderate pain) 20 tablet 0     losartan (COZAAR) 50 MG tablet TAKE 1 TABLET BY MOUTH  DAILY 90 tablet 3     miconazole (MICATIN) 2 % external powder Apply topically daily 90 g 11     naloxone (NARCAN) 4 MG/0.1ML nasal spray Spray 1 spray (4 mg) into one nostril alternating nostrils once as needed for opioid reversal every 2-3 minutes until assistance arrives 0.2 mL 0     Vitamin D3 (CHOLECALCIFEROL) 25 mcg (1000 units) tablet Take by mouth daily         Medical History: any changes in medical history since they were last seen? No    Review of Systems: A 10-point review of systems was negative, with the exception  of chronic pain issues and fatigue, HTN, weakness (generalized).     Objective:    Physical Exam:  There were no vitals taken for this visit.  Constitutional: Well developed, well nourished, appears stated age. Obese.   HEENT: Head atraumatic, normocephalic. Eyes without conjunctival injection or jaundice. Oropharynx clear. Neck supple. No obvious neck masses.  Skin: No rash, lesions, or petechiae of exposed skin.   Psychiatric/mental status: Alert, without lethargy or stupor. Speech fluent. Appropriate affect. Mood normal. Able to follow commands without difficulty.     Imaging:  MRI of lumbar spine was completed on 7/29/19 and shows:  L5-S1: 4 mm spondylolisthesis with moderate cranially extruded central disc herniation approximately 10 mm CC by 6 mm AP with moderate right and marked left hypertrophic facet degeneration produce moderately severe trefoil central stenosis with impingement of S1 nerve roots. Mild left foraminal stenosis.     L4-L5: Moderate to marked vertical narrowing with desiccation, broad annular bulging, 5 mm AP left posterolateral protrusion with moderate central and severe left subarticular recess stenosis with left L5 impingement. Mild bilateral foraminal stenosis without nerve root impingement. Mild facet degeneration.     L3-L4: Moderate disc desiccation with mild posterolateral to far lateral annular bulging, no significant central compromise. No significant foraminal compromise. Facet joints are unremarkable.     L2-L3: Moderate disc desiccation with normal posterior annular morphology and no stenosis. No significant foraminal compromise. Facet joints are unremarkable.     L1-L2: Very small right paracentral protrusion with moderate disc desiccation and no stenosis. No significant foraminal compromise. Facet joints are unremarkable.     Lower thoracic: Moderate T12-L1 and T11-12 disc degeneration with normal posterior annular morphology, small Schmorl's nodes.     CONCLUSION: Multilevel  spondylosis with mostly sacralized transitional S1 level with a narrow S1-2 disc, and these findings:     1. Grade 1 L5-S1 degenerative spondylolisthesis with moderate disc extrusion, advanced facet degeneration and severe subarticular recess stenosis impinging S1 nerve roots.     2. Marked left L4-5 subarticular recess stenosis with annular protrusion impinging left L5 nerve root.     3. Moderate disc degeneration at other thoracolumbar levels without neural compression.     Note: Mild increased size of L5-S1 disc extrusion since prior study.      Video-Visit Details    Type of service:  Video Visit    Video End Time: 2:39 PM    Originating Location (pt. Location): [place]    Distant Location (provider location):  [facility] [place]     Platform used for Video Visit: Divitel     BILLING TIME DOCUMENTATION:   The total TIME spent on this patient on the date of the encounter/appointment was 36 minutes.      TOTAL TIME includes:   Time spent preparing to see the patient (reviewing records and tests)   Time spent face to face (or over the phone) with the patient   Time spent ordering tests, medications, procedures and referrals   Time spent Referring and communicating with other healthcare professionals   Time spent documenting clinical information in Epic

## 2021-09-02 ENCOUNTER — VIRTUAL VISIT (OUTPATIENT)
Dept: PALLIATIVE MEDICINE | Facility: CLINIC | Age: 76
End: 2021-09-02
Payer: MEDICARE

## 2021-09-02 DIAGNOSIS — G89.4 CHRONIC PAIN SYNDROME: ICD-10-CM

## 2021-09-02 DIAGNOSIS — M48.062 SPINAL STENOSIS OF LUMBAR REGION WITH NEUROGENIC CLAUDICATION: ICD-10-CM

## 2021-09-02 DIAGNOSIS — M51.369 DDD (DEGENERATIVE DISC DISEASE), LUMBAR: ICD-10-CM

## 2021-09-02 DIAGNOSIS — M79.7 FIBROMYALGIA: Primary | ICD-10-CM

## 2021-09-02 PROCEDURE — 99214 OFFICE O/P EST MOD 30 MIN: CPT | Mod: 95 | Performed by: NURSE PRACTITIONER

## 2021-09-02 RX ORDER — GABAPENTIN 300 MG/1
CAPSULE ORAL
Qty: 90 CAPSULE | Refills: 1 | Status: SHIPPED | OUTPATIENT
Start: 2021-09-02 | End: 2022-02-24 | Stop reason: SINTOL

## 2021-09-02 RX ORDER — GABAPENTIN 100 MG/1
300 CAPSULE ORAL DAILY
Qty: 90 CAPSULE | Refills: 1 | Status: SHIPPED | OUTPATIENT
Start: 2021-09-02 | End: 2022-02-24 | Stop reason: SINTOL

## 2021-09-02 ASSESSMENT — PAIN SCALES - GENERAL: PAINLEVEL: MILD PAIN (2)

## 2021-09-02 NOTE — PATIENT INSTRUCTIONS
1.  Pain Physical Therapy:    YES  I would recommend but can hold off on for now. Continue to increase physical activity as able.    2.  Pain Psychologist to address relaxation, behavioral change, coping style, and other factors important to improvement.  NO   3.  Medication Management:     1. Adjust gabapentin to twice daily dosing- 300mg in the morning and 600mg in the evening for 1 week. I sent a prescription for both 300mg and 100mg capsules to your pharmacy. As tolerated, increase to 400mg in the morning and 600mg in the evening for 3-5 days. Then increase to 500mg and 600mg in the evening for 3-5 days.  Then increase to 600mg BID twice daily.    2. Continue medical cannabis as needed for pain. Closely monitor the degree of benefit you get with red pill, skip a dose at times and see if your pain is worse.     3. Decrease Norco as able. If pain seems to be improving, try to take 1 tab in the middle of the day instead of 2. I do not recommend making significant changes quickly. If this goes well for a month or so could try to eliminate midday dose.    4.  Potential procedures: not at this time, could consider trigger point injections if interested.     5.  Referrals: I would recommend acupuncture. See resources below. Also would recommend Corbin Langford -[phone]     6.  Follow up with CAMELIA Cruz CNP in 6 weeks.     ACUPUNCTURE OPTIONS (outpatient)    [facility]  ? [facility]  Scheduling:    ? Call the [facility] at [phone]  Insurance Coverage:    ? Please check with your insurance plan to determine available coverage and benefits covered by a Licensed Acupuncturist, including, but not limited to out of pocket costs, conditions covered and visit limits  ? HSA and FSA are also accepted.  Availability:  ? Acupuncture appointments are available Monday-Friday.  ? A provider referral is required for all patients to be seen at the [facility]  ? Employees  with [employer] insurance do not need a referral.     Intranet page:  [email or web address]    ? [facility]   Scheduling:    ? Only seeing staff at the [facility] [place] [place] at this time.    ? [facility]  Scheduling: Call [phone]  Insurance Coverage:    ? Please check with your insurance plan to determine available coverage and benefits  ? HSA and FSA are also accepted.  ? Cash pay available: $124/session  Availability:  ? Acupuncture appointments are available Mondays and Wednesdays  ? Provider referral not required. Do not need to be a patient within Psychiatry to schedule.  ? [facility] [place] [facility]  Scheduling: Call [phone]  Insurance Coverage:    ? Please check with your insurance plan to determine available coverage and benefits  ? HSA and FSA are also accepted  ? Cash pay available: $124/session  Availability:  ? Acupuncture appointments are available Tuesdays  ? Provider referral not required. Do not need to be a patient within Psychiatry to schedule.      Outside of [facility]:  While we don't have specific Acupuncturists names or clinics to share with you, we want to offer you some tips in finding an Acupuncturist.      ? In order to best find an Acupuncturist that is a fit for you, we recommend starting with this website:  [facility] - Find a practitioner  ? We recommend cross-referencing that list with your insurance plan to ensure you have coverage, if that is important to you, and with the list for the location that is a fit for you.   ? Additionally, we recommend that you ask a trusted friend, family member or colleague if they have utilized an Acupuncturist in your area that they would recommend.  However, keep in mind that just like finding a primary care physician or a , you simply may need to shop around until you find one that is a fit for you.        ----------------------------------------------------------------  [facility] Number:  [phone]     Call with any questions about your care and for scheduling assistance.     Calls are returned Monday through Friday between 8 AM and 4:30 PM. We usually get back to you within 2 business days depending on the issue/request.    If we are prescribing your medications:    For opioid medication refills, call the clinic or send a [facility] [employer] message 7 days in advance.  Please include:    Name of requested medication    Name of the pharmacy.    For non-opioid medications, call your pharmacy directly to request a refill. Please allow 3-4 days to be processed.     Per [place] State Law:    All controlled substance prescriptions must be filled within 30 days of being written.      For those controlled substances allowing refills, pickup must occur within 30 days of last fill.      We believe regular attendance is key to your success in our program!      Any time you are unable to keep your appointment we ask that you call us at least 24 hours in advance to cancel.This will allow us to offer the appointment time to another patient.     Multiple missed appointments may lead to dismissal from the clinic.

## 2021-09-07 ENCOUNTER — MYC REFILL (OUTPATIENT)
Dept: INTERNAL MEDICINE | Facility: CLINIC | Age: 76
End: 2021-09-07

## 2021-09-07 DIAGNOSIS — G89.4 CHRONIC PAIN SYNDROME: ICD-10-CM

## 2021-09-07 DIAGNOSIS — M51.369 DDD (DEGENERATIVE DISC DISEASE), LUMBAR: ICD-10-CM

## 2021-09-07 DIAGNOSIS — M79.7 FIBROMYALGIA: ICD-10-CM

## 2021-09-10 RX ORDER — HYDROCODONE BITARTRATE AND ACETAMINOPHEN 5; 325 MG/1; MG/1
1-2 TABLET ORAL EVERY 6 HOURS PRN
Qty: 180 TABLET | Refills: 0 | Status: SHIPPED | OUTPATIENT
Start: 2021-09-10 | End: 2021-10-08

## 2021-09-11 ENCOUNTER — MYC MEDICAL ADVICE (OUTPATIENT)
Dept: INTERNAL MEDICINE | Facility: CLINIC | Age: 76
End: 2021-09-11

## 2021-09-17 ENCOUNTER — NURSE TRIAGE (OUTPATIENT)
Dept: NURSING | Facility: CLINIC | Age: 76
End: 2021-09-17

## 2021-09-17 NOTE — TELEPHONE ENCOUNTER
"Triage Call:    Patient is calling being very tired  And from  who tx call \"she is talking in circles\".    Started when she began taking the gabapentin and she is also on medical mariajuana.  She started x2 things the same day and isn't 100% sure which one it is, but is feeling like it may be more the gabapentin.    She got the medical marajuana from a pain clinic referred from Dr. Blanton.      Denies fever, shortness of breath, swollen lymph nodes.    \  She is taking 300 mg x2 a day     \"All of sudden everything caught up with me\".       She is wanting to know what changes, if any should/could be made so she can feel back to herself again.    Routing note high priority to PCP clinic.      Tiffanie Trejo RN on 9/17/2021 at 4:46 PM          Reason for Disposition    Caller has urgent medication question about med that PCP prescribed and triager unable to answer question    Additional Information    Negative: Drug overdose and triager unable to answer question    Negative: Caller requesting information unrelated to medicine    Negative: Caller requesting a prescription for Strep throat and has a positive culture result    Negative: Rash while taking a medication or within 3 days of stopping it    Negative: Immunization reaction suspected    Negative: Asthma and having symptoms of asthma (cough, wheezing, etc.)    Negative: Breastfeeding questions about mother's medicines and diet    Negative: MORE THAN A DOUBLE DOSE of a prescription or over-the-counter (OTC) drug    Negative: DOUBLE DOSE (an extra dose or lesser amount) of over-the-counter (OTC) drug and any symptoms (e.g., dizziness, nausea, pain, sleepiness)    Negative: DOUBLE DOSE (an extra dose or lesser amount) of prescription drug and any symptoms (e.g., dizziness, nausea, pain, sleepiness)    Negative: Took another person's prescription drug    Negative: DOUBLE DOSE (an extra dose or lesser amount) of prescription drug and NO symptoms (Exception: " a double dose of antibiotics)    Negative: Diabetes drug error or overdose (e.g., took wrong type of insulin or took extra dose)    Negative: Caller has medication question about med not prescribed by PCP and triager unable to answer question (e.g., compatibility with other med, storage)    Negative: Request for URGENT new prescription or refill of 'essential' medication (i.e., likelihood of harm to patient if not taken) and triager unable to fill per department policy    Negative: Prescription not at pharmacy and was prescribed today by PCP    Negative: Pharmacy calling with prescription questions and triager unable to answer question    Protocols used: MEDICATION QUESTION CALL-A-[place]

## 2021-09-17 NOTE — TELEPHONE ENCOUNTER
Spoke with below triage nurse. States patient expressed to her that she can not tolerate this overwhelming fatigue. Patient is asking about decreasing her Gabapentin. Please advise.    This is ordered by pain mgmt. Will route to that provider.

## 2021-09-20 NOTE — TELEPHONE ENCOUNTER
Can we please call to investigate further? If she is having side effects related to gabapentin increase, she should begin decreasing the same way she started.    CAMELIA Cruz CNP  [facility]

## 2021-10-08 ENCOUNTER — MYC REFILL (OUTPATIENT)
Dept: INTERNAL MEDICINE | Facility: CLINIC | Age: 76
End: 2021-10-08

## 2021-10-08 DIAGNOSIS — M51.369 DDD (DEGENERATIVE DISC DISEASE), LUMBAR: ICD-10-CM

## 2021-10-08 DIAGNOSIS — G89.4 CHRONIC PAIN SYNDROME: ICD-10-CM

## 2021-10-08 DIAGNOSIS — M79.7 FIBROMYALGIA: ICD-10-CM

## 2021-10-10 NOTE — TELEPHONE ENCOUNTER
Routing refill request to provider for review/approval because:  Drug not on the [facility] refill protocol   Moises Sanders RN, BSN

## 2021-10-11 RX ORDER — HYDROCODONE BITARTRATE AND ACETAMINOPHEN 5; 325 MG/1; MG/1
1-2 TABLET ORAL EVERY 6 HOURS PRN
Qty: 180 TABLET | Refills: 0 | Status: SHIPPED | OUTPATIENT
Start: 2021-10-11 | End: 2021-11-09

## 2021-10-22 ENCOUNTER — VIRTUAL VISIT (OUTPATIENT)
Dept: INTERNAL MEDICINE | Facility: CLINIC | Age: 76
End: 2021-10-22
Payer: MEDICARE

## 2021-10-22 DIAGNOSIS — J45.31 MILD PERSISTENT ASTHMA WITH ACUTE EXACERBATION: Primary | ICD-10-CM

## 2021-10-22 DIAGNOSIS — R05.9 COUGH: ICD-10-CM

## 2021-10-22 PROCEDURE — 99213 OFFICE O/P EST LOW 20 MIN: CPT | Mod: 95 | Performed by: NURSE PRACTITIONER

## 2021-10-22 RX ORDER — CODEINE PHOSPHATE AND GUAIFENESIN 10; 100 MG/5ML; MG/5ML
2 SOLUTION ORAL EVERY 4 HOURS PRN
Qty: 473 ML | Refills: 1 | Status: SHIPPED | OUTPATIENT
Start: 2021-10-22 | End: 2022-02-10

## 2021-10-22 RX ORDER — MONTELUKAST SODIUM 10 MG/1
10 TABLET ORAL AT BEDTIME
Qty: 90 TABLET | Refills: 3 | Status: SHIPPED | OUTPATIENT
Start: 2021-10-22 | End: 2022-08-14

## 2021-10-22 NOTE — PROGRESS NOTES
"Svetlana is a 76 year old who is being evaluated via a billable video visit.      How would you like to obtain your AVS? Mail a copy  If the video visit is dropped, the invitation should be resent by: Text to cell phone: [phone]  Will anyone else be joining your video visit?     Video Start Time: 11:30 AM    Assessment & Plan     Cough  Needs refill   - guaiFENesin-codeine (ROBITUSSIN AC) 100-10 MG/5ML solution; Take 10 mLs by mouth every 4 hours as needed for cough    Mild persistent asthma with acute exacerbation  Needs refill   - montelukast (SINGULAIR) 10 MG tablet; Take 1 tablet (10 mg) by mouth At Bedtime      16 minutes spent on the date of the encounter doing chart review, history and exam, documentation and further activities per the note       BMI:   Estimated body mass index is 45.65 kg/m  as calculated from the following:    Height as of 5/27/21: 1.6 m (5' 3\").    Weight as of 5/27/21: 116.9 kg (257 lb 11.2 oz).       Patient Instructions   Refill on medication       Return in about 4 weeks (around 11/19/2021) for if not improved .    CAMELIA Wills CNP  [facility]    Josh Mota is a 76 year old who presents for the following health issues     HPI   Chief Complaint   Patient presents with     Cough     pt c/o a cough x 2 years. pt uses the cough syrup and helps needs a refill.     Needs Singulair refill - usually gets from [facility]         Review of Systems   Constitutional, HEENT, cardiovascular, pulmonary, GI, , musculoskeletal, neuro, skin, endocrine and psych systems are negative, except as otherwise noted.      Objective           Vitals:  No vitals were obtained today due to virtual visit.    Physical Exam   GENERAL: Healthy, alert and no distress  EYES: Eyes grossly normal to inspection.  No discharge or erythema, or obvious scleral/conjunctival abnormalities.  RESP: No audible wheeze, cough, or visible cyanosis.  No visible retractions or " increased work of breathing.    SKIN: Visible skin clear. No significant rash, abnormal pigmentation or lesions.  NEURO: Cranial nerves grossly intact.  Mentation and speech appropriate for age.  PSYCH: Mentation appears normal, affect normal/bright, judgement and insight intact, normal speech and appearance well-groomed.                Video-Visit Details    Type of service:  Video Visit    Video End Time:11:54 AM    Originating Location (pt. Location): Home    Distant Location (provider location):  [facility]     Platform used for Video Visit: [employer]

## 2021-10-22 NOTE — NURSING NOTE
"Chief Complaint   Patient presents with     Cough     pt c/o a cough x 2 years. pt uses the cough syrup and helps needs a refill.     initial There were no vitals taken for this visit. Estimated body mass index is 45.65 kg/m  as calculated from the following:    Height as of 5/27/21: 1.6 m (5' 3\").    Weight as of 5/27/21: 116.9 kg (257 lb 11.2 oz)..  bp completed using cuff size X-large  MACI HAMILTON LPN  "

## 2021-10-24 ENCOUNTER — HEALTH MAINTENANCE LETTER (OUTPATIENT)
Age: 76
End: 2021-10-24

## 2021-11-01 ENCOUNTER — OFFICE VISIT (OUTPATIENT)
Dept: INTERNAL MEDICINE | Facility: CLINIC | Age: 76
End: 2021-11-01
Payer: MEDICARE

## 2021-11-01 VITALS
HEART RATE: 113 BPM | BODY MASS INDEX: 46.74 KG/M2 | RESPIRATION RATE: 18 BRPM | SYSTOLIC BLOOD PRESSURE: 157 MMHG | WEIGHT: 263.8 LBS | HEIGHT: 63 IN | OXYGEN SATURATION: 97 % | TEMPERATURE: 98.7 F | DIASTOLIC BLOOD PRESSURE: 61 MMHG

## 2021-11-01 DIAGNOSIS — Z48.01 ENCOUNTER FOR CHANGE OR REMOVAL OF SURGICAL WOUND DRESSING: ICD-10-CM

## 2021-11-01 DIAGNOSIS — S01.81XA LACERATION OF FOREHEAD, INITIAL ENCOUNTER: Primary | ICD-10-CM

## 2021-11-01 DIAGNOSIS — S60.222A: ICD-10-CM

## 2021-11-01 DIAGNOSIS — S80.12XA TRAUMATIC ECCHYMOSIS OF LEFT LOWER LEG, INITIAL ENCOUNTER: ICD-10-CM

## 2021-11-01 DIAGNOSIS — S40.022A ARM BRUISE, LEFT, INITIAL ENCOUNTER: ICD-10-CM

## 2021-11-01 PROCEDURE — 90662 IIV NO PRSV INCREASED AG IM: CPT | Performed by: PHYSICIAN ASSISTANT

## 2021-11-01 PROCEDURE — 0064A PR COVID VAC MODERNA 100 MCG/0.5 ML IM: CPT | Performed by: PHYSICIAN ASSISTANT

## 2021-11-01 PROCEDURE — 91301 PR COVID VAC MODERNA 100 MCG/0.5 ML IM: CPT | Performed by: PHYSICIAN ASSISTANT

## 2021-11-01 PROCEDURE — G0008 ADMIN INFLUENZA VIRUS VAC: HCPCS | Performed by: PHYSICIAN ASSISTANT

## 2021-11-01 PROCEDURE — 99214 OFFICE O/P EST MOD 30 MIN: CPT | Mod: 25 | Performed by: PHYSICIAN ASSISTANT

## 2021-11-01 ASSESSMENT — MIFFLIN-ST. JEOR: SCORE: 1660.72

## 2021-11-01 NOTE — PROGRESS NOTES
"  Assessment & Plan     Laceration of forehead, initial encounter  Interlocking running sutures were removed. There is some scabbing of the area, but no signs of infection. Reviewed signs of infection for which to monitor.  Records should be sent from [place], including documentation of tetanus booster.    Arm bruise, left, initial encounter  Healing well. Good ROM. Monitor.    Traumatic ecchymosis of hand, left, initial encounter  Healing well. Tender to touch. Monitor.    Traumatic ecchymosis of left lower leg, initial encounter  X-rays negative in [place]. Monitor.    25 minutes spent on the date of the encounter doing chart review, history and exam, documentation and further activities per the note       No follow-ups on file.    Mi Pichardo PA-C  [facility] DEVORA Mota is a 76 year old who presents for the following health issues    HPI     Suture removal:  Had a fall on 10/24/21 when she was in [place].  Says she tripped over a parking lot structure and fell onto the left side of her body.  Sustained a laceration to the L side of her forehead    Also developed bruising on her left upper arm, her left hand (ulnar aspect), and her left leg  When in the ED in [place], she had a CT scan of her head. Also had x-rays of her knee.  L hand continues to be tender to the touch.   Does not have pain at rest or with movements of the joints. Only has tenderness when pressure is applied to the area.    10/24/21: Tetanus updated  [facility]  Records will be sent    Not on any blood thinners    Review of Systems   Constitutional, HEENT, cardiovascular, pulmonary, gi and gu systems are negative, except as otherwise noted.      Objective    BP (!) 157/61 (BP Location: Right arm, Patient Position: Sitting, Cuff Size: Adult Regular)   Pulse 113   Temp 98.7  F (37.1  C) (Oral)   Resp 18   Ht 1.6 m (5' 3\")   Wt 119.7 kg (263 lb 12.8 oz)   SpO2 97%   BMI 46.73 kg/m    Body " mass index is 46.73 kg/m .  Physical Exam   GENERAL: healthy, alert and no distress  EYES: Bilateral periorbital ecchymoses, fading. No subconjunctival hemorrhages.  MS: no gross musculoskeletal defects noted, no edema. ROM of left upper extremity does not elicit pain.  SKIN: Dark ecchymosis on left upper arm. Yellowish ecchymosis on ulnar aspect of L hand. Ecchymosis on L shin. Surgical scars on knees.  PSYCH: mentation appears normal, affect normal/bright          S: Patient is here today for suture removal.  The patient reports no complications with wound.  Sutures were placed 8 days ago.  Sutures were placed at [facility] in [place].    o: Wound is well healed, no signs of secondary infection.  Sutures removed without complication.    A: Laceration    P: Sutures removed, F/U PRN.

## 2021-11-01 NOTE — NURSING NOTE
"BP (!) 157/61 (BP Location: Right arm, Patient Position: Sitting, Cuff Size: Adult Regular)   Pulse 113   Temp 98.7  F (37.1  C) (Oral)   Resp 18   Ht 1.6 m (5' 3\")   Wt 119.7 kg (263 lb 12.8 oz)   SpO2 97%   BMI 46.73 kg/m      "

## 2021-11-09 ENCOUNTER — MYC REFILL (OUTPATIENT)
Dept: INTERNAL MEDICINE | Facility: CLINIC | Age: 76
End: 2021-11-09
Payer: MEDICARE

## 2021-11-09 DIAGNOSIS — M79.7 FIBROMYALGIA: ICD-10-CM

## 2021-11-09 DIAGNOSIS — G89.4 CHRONIC PAIN SYNDROME: ICD-10-CM

## 2021-11-09 DIAGNOSIS — M51.369 DDD (DEGENERATIVE DISC DISEASE), LUMBAR: ICD-10-CM

## 2021-11-10 ENCOUNTER — MYC REFILL (OUTPATIENT)
Dept: INTERNAL MEDICINE | Facility: CLINIC | Age: 76
End: 2021-11-10
Payer: MEDICARE

## 2021-11-10 DIAGNOSIS — M51.369 DDD (DEGENERATIVE DISC DISEASE), LUMBAR: ICD-10-CM

## 2021-11-10 DIAGNOSIS — G89.4 CHRONIC PAIN SYNDROME: ICD-10-CM

## 2021-11-10 DIAGNOSIS — M79.7 FIBROMYALGIA: ICD-10-CM

## 2021-11-11 RX ORDER — HYDROCODONE BITARTRATE AND ACETAMINOPHEN 5; 325 MG/1; MG/1
1-2 TABLET ORAL EVERY 6 HOURS PRN
Qty: 180 TABLET | Refills: 0 | OUTPATIENT
Start: 2021-11-11

## 2021-11-11 RX ORDER — HYDROCODONE BITARTRATE AND ACETAMINOPHEN 5; 325 MG/1; MG/1
1-2 TABLET ORAL EVERY 6 HOURS PRN
Qty: 180 TABLET | Refills: 0 | Status: SHIPPED | OUTPATIENT
Start: 2021-11-11 | End: 2021-12-06

## 2021-11-11 NOTE — TELEPHONE ENCOUNTER
Medication refused, duplicate request    Pending Prescriptions:                       Disp   Refills    HYDROcodone-acetaminophen (NORCO) 5-325 M*180 ta*0            Sig: Take 1-2 tablets by mouth every 6 hours as needed           for pain      Myc message sent to patient.

## 2021-11-11 NOTE — TELEPHONE ENCOUNTER
Last refill date: 10/11/2021  Last visit date: 5/27/2021.  The refill was done, please advise patient she will need to see me before the next refill for her 6-month visit.  That could be a virtual visit.  Also remind her that we like to have 3 business days notice for refills so if it is due on Wednesday she should be notifying us on Monday

## 2021-11-11 NOTE — TELEPHONE ENCOUNTER
Next 5 appointments (look out 90 days)    Nov 15, 2021  3:30 PM  PHYSICAL with Dolores Blanton MD  [facility] ([facility] [place] ) [address]  [facility] [address] [phone]        Message sent via [employer].      Hermelinda Barrera CMA  [facility]  [place]

## 2022-01-01 NOTE — TELEPHONE ENCOUNTER
Reason for Call:  Medication or medication refill:    Do you use a [facility]?  Name of the pharmacy and phone number for the current request:  [facility]    Name of the medication requested: vicodin    Other request: no    Can we leave a detailed message on this number? YES    Phone number patient can be reached at: Home number on file [phone] (home)    Best Time: any      Call taken on 12/10/2018 at 4:35 PM by Dolores Galvan       English

## 2022-01-08 ENCOUNTER — MYC REFILL (OUTPATIENT)
Dept: INTERNAL MEDICINE | Facility: CLINIC | Age: 77
End: 2022-01-08
Payer: MEDICARE

## 2022-01-08 DIAGNOSIS — M51.369 DDD (DEGENERATIVE DISC DISEASE), LUMBAR: ICD-10-CM

## 2022-01-08 DIAGNOSIS — M79.7 FIBROMYALGIA: ICD-10-CM

## 2022-01-08 DIAGNOSIS — G89.4 CHRONIC PAIN SYNDROME: ICD-10-CM

## 2022-01-10 ENCOUNTER — MYC MEDICAL ADVICE (OUTPATIENT)
Dept: INTERNAL MEDICINE | Facility: CLINIC | Age: 77
End: 2022-01-10
Payer: MEDICARE

## 2022-01-10 NOTE — TELEPHONE ENCOUNTER
See her freee message. Discussed with pt briefly when called [facility].   She states her symptoms are only at night. Frequency at night. Has a dry mouth, pt was very hoarse on the phone.     She denies trying any Rx for nocturia before.     Should she do an E-visit, since no OV this week?

## 2022-01-11 ENCOUNTER — E-VISIT (OUTPATIENT)
Dept: INTERNAL MEDICINE | Facility: CLINIC | Age: 77
End: 2022-01-11
Payer: MEDICARE

## 2022-01-11 DIAGNOSIS — R35.0 URINARY FREQUENCY: Primary | ICD-10-CM

## 2022-01-11 PROCEDURE — 99421 OL DIG E/M SVC 5-10 MIN: CPT | Performed by: INTERNAL MEDICINE

## 2022-01-11 RX ORDER — HYDROCODONE BITARTRATE AND ACETAMINOPHEN 5; 325 MG/1; MG/1
1-2 TABLET ORAL EVERY 6 HOURS PRN
Qty: 180 TABLET | Refills: 0 | Status: SHIPPED | OUTPATIENT
Start: 2022-01-11 | End: 2022-02-14

## 2022-01-11 NOTE — TELEPHONE ENCOUNTER
She is overdue for 6 month appointment with me, was due in November. I sent one fill but needs to schedule.

## 2022-01-12 ENCOUNTER — LAB (OUTPATIENT)
Dept: LAB | Facility: CLINIC | Age: 77
End: 2022-01-12
Payer: MEDICARE

## 2022-01-12 DIAGNOSIS — R35.0 URINARY FREQUENCY: ICD-10-CM

## 2022-01-12 LAB
ALBUMIN UR-MCNC: NEGATIVE MG/DL
APPEARANCE UR: CLEAR
BILIRUB UR QL STRIP: NEGATIVE
COLOR UR AUTO: YELLOW
GLUCOSE UR STRIP-MCNC: NEGATIVE MG/DL
HGB UR QL STRIP: NEGATIVE
KETONES UR STRIP-MCNC: NEGATIVE MG/DL
LEUKOCYTE ESTERASE UR QL STRIP: NEGATIVE
NITRATE UR QL: NEGATIVE
PH UR STRIP: 5.5 [PH] (ref 5–7)
SP GR UR STRIP: 1.01 (ref 1–1.03)
UROBILINOGEN UR STRIP-ACNC: 0.2 E.U./DL

## 2022-01-12 PROCEDURE — 81003 URINALYSIS AUTO W/O SCOPE: CPT

## 2022-01-18 DIAGNOSIS — F41.9 ANXIETY: ICD-10-CM

## 2022-01-18 DIAGNOSIS — M79.7 FIBROMYALGIA: ICD-10-CM

## 2022-01-20 RX ORDER — DULOXETIN HYDROCHLORIDE 60 MG/1
CAPSULE, DELAYED RELEASE ORAL
Qty: 180 CAPSULE | Refills: 3 | Status: SHIPPED | OUTPATIENT
Start: 2022-01-20 | End: 2023-02-10

## 2022-01-25 ENCOUNTER — OFFICE VISIT (OUTPATIENT)
Dept: INTERNAL MEDICINE | Facility: CLINIC | Age: 77
End: 2022-01-25
Payer: MEDICARE

## 2022-01-25 VITALS
SYSTOLIC BLOOD PRESSURE: 168 MMHG | TEMPERATURE: 98.4 F | DIASTOLIC BLOOD PRESSURE: 101 MMHG | HEIGHT: 63 IN | BODY MASS INDEX: 45.95 KG/M2 | RESPIRATION RATE: 16 BRPM | WEIGHT: 259.3 LBS | OXYGEN SATURATION: 94 % | HEART RATE: 107 BPM

## 2022-01-25 DIAGNOSIS — J45.30 MILD PERSISTENT ASTHMA WITHOUT COMPLICATION: ICD-10-CM

## 2022-01-25 DIAGNOSIS — R32 URINARY INCONTINENCE, UNSPECIFIED TYPE: ICD-10-CM

## 2022-01-25 DIAGNOSIS — M54.12 CERVICAL RADICULOPATHY: ICD-10-CM

## 2022-01-25 DIAGNOSIS — F11.20 NARCOTIC DEPENDENCE (H): ICD-10-CM

## 2022-01-25 DIAGNOSIS — I10 BENIGN ESSENTIAL HYPERTENSION: ICD-10-CM

## 2022-01-25 DIAGNOSIS — Z00.00 ENCOUNTER FOR ANNUAL WELLNESS EXAM IN MEDICARE PATIENT: Primary | ICD-10-CM

## 2022-01-25 DIAGNOSIS — Z13.6 CARDIOVASCULAR SCREENING; LDL GOAL LESS THAN 130: ICD-10-CM

## 2022-01-25 DIAGNOSIS — E66.01 MORBID OBESITY (H): ICD-10-CM

## 2022-01-25 PROCEDURE — G0439 PPPS, SUBSEQ VISIT: HCPCS | Performed by: INTERNAL MEDICINE

## 2022-01-25 PROCEDURE — 99213 OFFICE O/P EST LOW 20 MIN: CPT | Mod: 25 | Performed by: INTERNAL MEDICINE

## 2022-01-25 ASSESSMENT — ACTIVITIES OF DAILY LIVING (ADL): CURRENT_FUNCTION: NO ASSISTANCE NEEDED

## 2022-01-25 ASSESSMENT — MIFFLIN-ST. JEOR: SCORE: 1640.31

## 2022-01-25 NOTE — LETTER
Opioid / Opioid Plus Controlled Substance Agreement    This is an agreement between you and your provider about the safe and appropriate use of controlled substance/opioids prescribed by your care team. Controlled substances are medicines that can cause physical and mental dependence (abuse).    There are strict laws about having and using these medicines. We here at [facility] are committing to working with you in your efforts to get better. To support you in this work, we ll help you schedule regular office appointments for medicine refills. If we must cancel or change your appointment for any reason, we ll make sure you have enough medicine to last until your next appointment.     As a Provider, I will:    Listen carefully to your concerns and treat you with respect.     Recommend a treatment plan that I believe is in your best interest. This plan may involve therapies other than opioid pain medication.     Talk with you often about the possible benefits, and the risk of harm of any medicine that we prescribe for you.     Provide a plan on how to taper (discontinue or go off) using this medicine if the decision is made to stop its use.    As a Patient, I understand that opioid(s):     Are a controlled substance prescribed by my care team to help me function or work and manage my condition(s).     Are strong medicines and can cause serious side effects such as:    Drowsiness, which can seriously affect my driving ability    A lower breathing rate, enough to cause death    Harm to my thinking ability     Depression     Abuse of and addiction to this medicine    Need to be taken exactly as prescribed. Combining opioids with certain medicines or chemicals (such as illegal drugs, sedatives, sleeping pills, and benzodiazepines) can be dangerous or even fatal. If I stop opioids suddenly, I may have severe withdrawal symptoms.    Do not work for all types of pain nor for all patients. If they re not helpful, I may  be asked to stop them.        The risks, benefits and side effects of these medicine(s) were explained to me. I agree that:  1. I will take part in other treatments as advised by my care team. This may be psychiatry or counseling, physical therapy, behavioral therapy, group treatment or a referral to a specialist.     2. I will keep all my appointments. I understand that this is part of the monitoring of opioids. My care team may require an office visit for EVERY opioid/controlled substance refill. If I miss appointments or don t follow instructions, my care team may stop my medicine.    3. I will take my medicines as prescribed. I will not change the dose or schedule unless my care team tells me to. There will be no refills if I run out early.     4. I may be asked to come to the clinic and complete a urine drug test or complete a pill count at any time. If I don t give a urine sample or participate in a pill count, the care team may stop my medicine.    5. I will only receive prescriptions from this clinic for chronic pain. If I am treated by another provider for acute pain issues, I will tell them that I am taking opioid pain medication for chronic pain and that I have a treatment agreement with this provider. I will inform my [facility] care team within one business day if I am given a prescription for any pain medication by another healthcare provider. My [facility] care team can contact other providers and pharmacists about my use of any medicines.    6. It is up to me to make sure that I don t run out of my medicines on weekends or holidays. If my care team is willing to refill my opioid prescription without a visit, I must request refills only during office hours. Refills may take up to 3 business days to process. I will use one pharmacy to fill all my opioid and other controlled substance prescriptions. I will notify the clinic about any changes to my insurance or medication  availability.    7. I am responsible for my prescriptions. If the medicine/prescription is lost, stolen or destroyed, it will not be replaced. I also agree not to share controlled substance medicines with anyone.    8. I am aware I should not use any illegal or recreational drugs. I agree not to drink alcohol unless my care team says I can.       9. If I enroll in the [place] Medical Cannabis program, I will tell my care team prior to my next refill.     10. I will tell my care team right away if I become pregnant, have a new medical problem treated outside of my regular clinic, or have a change in my medications.    11. I understand that this medicine can affect my thinking, judgment and reaction time. Alcohol and drugs affect the brain and body, which can affect the safety of my driving. Being under the influence of alcohol or drugs can affect my decision-making, behaviors, personal safety, and the safety of others. Driving while impaired (DWI) can occur if a person is driving, operating, or in physical control of a car, motorcycle, boat, snowmobile, ATV, motorbike, off-road vehicle, or any other motor vehicle ([place] Statute 169A.20). I understand the risk if I choose to drive or operate any vehicle or machinery.    I understand that if I do not follow any of the conditions above, my prescriptions or treatment may be stopped or changed.          Opioids  What You Need to Know    What are opioids?   Opioids are pain medicines that must be prescribed by a doctor. They are also known as narcotics.     Examples are:   1. morphine (MS Contin, Sara)  2. oxycodone (Oxycontin)  3. oxycodone and acetaminophen (Percocet)  4. hydrocodone and acetaminophen (Vicodin, Norco)   5. fentanyl patch (Duragesic)   6. hydromorphone (Dilaudid)   7. methadone  8. codeine (Tylenol #3)     What do opioids do well?   Opioids are best for severe short-term pain such as after a surgery or injury. They may work well for cancer pain. They may  help some people with long-lasting (chronic) pain.     What do opioids NOT do well?   Opioids never get rid of pain entirely, and they don t work well for most patients with chronic pain. Opioids don t reduce swelling, one of the causes of pain.                                    Other ways to manage chronic pain and improve function include:       Treat the health problem that may be causing pain    Anti-inflammation medicines, which reduce swelling and tenderness, such as ibuprofen (Advil, Motrin) or naproxen (Aleve)    Acetaminophen (Tylenol)    Antidepressants and anti-seizure medicines, especially for nerve pain    Topical treatments such as patches or creams    Injections or nerve blocks    Chiropractic or osteopathic treatment    Acupuncture, massage, deep breathing, meditation, visual imagery, aromatherapy    Use heat or ice at the pain site    Physical therapy     Exercise    Stop smoking    Take part in therapy       Risks and side effects     Talk to your doctor before you start or decide to keep taking opioids. Possible side effects include:      Lowering your breathing rate enough to cause death    Overdose, including death, especially if taking higher than prescribed doses    Worse depression symptoms; less pleasure in things you usually enjoy    Feeling tired or sluggish    Slower thoughts or cloudy thinking    Being more sensitive to pain over time; pain is harder to control    Trouble sleeping or restless sleep    Changes in hormone levels (for example, less testosterone)    Changes in sex drive or ability to have sex    Constipation    Unsafe driving    Itching and sweating    Dizziness    Nausea, throwing up and dry mouth    What else should I know about opioids?    Opioids may lead to dependence, tolerance, or addiction.      Dependence means that if you stop or reduce the medicine too quickly, you will have withdrawal symptoms. These include loose poop (diarrhea), jitters, flu-like symptoms,  nervousness and tremors. Dependence is not the same as addiction.                       Tolerance means needing higher doses over time to get the same effect. This may increase the chance of serious side effects.      Addiction is when people improperly use a substance that harms their body, their mind or their relations with others. Use of opiates can cause a relapse of addiction if you have a history of drug or alcohol abuse.      People who have used opioids for a long time may have a lower quality of life, worse depression, higher levels of pain and more visits to doctors.    You can overdose on opioids. Take these steps to lower your risk of overdose:    1. Recognize the signs:  Signs of overdose include decrease or loss of consciousness (blackout), slowed breathing, trouble waking up and blue lips. If someone is worried about overdose, they should call 911.    2. Talk to your doctor about Narcan (naloxone).   If you are at risk for overdose, you may be given a prescription for Narcan. This medicine very quickly reverses the effects of opioids.   If you overdose, a friend or family member can give you Narcan while waiting for the ambulance. They need to know the signs of overdose and how to give Narcan.     3. Don't use alcohol or street drugs.   Taking them with opioids can cause death.    4. Do not take any of these medicines unless your doctor says it s OK. Taking these with opioids can cause death:    Benzodiazepines, such as lorazepam (Ativan), alprazolam (Xanax) or diazepam (Valium)    Muscle relaxers, such as cyclobenzaprine (Flexeril)    Sleeping pills like zolpidem (Ambien)     Other opioids      How to keep you and other people safe while taking opioids:    1. Never share your opioids with others.  Opioid medicines are regulated by the Drug Enforcement Agency ([employer]ON[employer]). Selling or sharing medications is a criminal act.    2. Be sure to store opioids in a secure place, locked up if possible. Young children  can easily swallow them and overdose.    3. When you are traveling with your medicines, keep them in the original bottles. If you use a pill box, be sure you also carry a copy of your medicine list from your clinic or pharmacy.    4. Safe disposal of opioids    Most pharmacies have places to get rid of medicine, called disposal kiosks. Medicine disposal options are also available in every [facility]. Search your county and  medication disposal  to find more options. You can find more details at:  [email or web address]-[email or web address]-[email or web address]-medications     I agree that my provider, clinic care team, and pharmacy may work with any city, state or federal law enforcement agency that investigates the misuse, sale, or other diversion of my controlled medicine. I will allow my provider to discuss my care with, or share a copy of, this agreement with any other treating provider, pharmacy or emergency room where I receive care.    I have read this agreement and have asked questions about anything I did not understand.    _______________________________________________________  Patient Signature - Svetlana Serrano _____________________                   Date     _______________________________________________________  Provider Signature - Dolores Blanton MD   _____________________                   Date     _______________________________________________________  Witness Signature (required if provider not present while patient signing)   _____________________                   Date

## 2022-01-25 NOTE — PROGRESS NOTES
SUBJECTIVE:   Svetlana Serrano is a 76 year old female who presents for Preventive Visit.      Patient has been advised of split billing requirements and indicates understanding: Yes  Are you in the first 12 months of your Medicare coverage?  No    Healthy Habits:    In general, how would you rate your overall health?  Very good    Frequency of exercise:  None    Duration of exercise:  Less than 15 minutes    Taking medications regularly:  Yes    Barriers to taking medications:  Not applicable    Medication side effects:  Not applicable    Ability to successfully perform activities of daily living:  No assistance needed    Hearing Impairment:  No hearing concerns    In the past 6 months, have you been bothered by leaking of urine? Yes    In general, how would you rate your overall mental or emotional health?  Very good      PHQ-2 Total Score:    Additional concerns today:  Yes      Problems:   1. Asthma: stable, sees Pulmonary  2. Chronic pain from back, fibromyalgia: sees pain clinic, started on Medical Marijuana, helping  3. Morbid obesity; weight unchanged  4. HTN: does not check at home, last few checks in clinic elevated    Current concerns:   1. She has had pain down left arm for 4-5 years, was told not CTS, has some numbness in hand and fingers, gradually worsening  2. Chronic incontinence: seems mixed  3. Fatigue: does not sleep well.       Patient Active Problem List   Diagnosis     Mild persistent asthma with acute exacerbation     Restless leg syndrome     Fibromyalgia     Advanced directives, counseling/discussion     DDD (degenerative disc disease), lumbar     Spinal stenosis     Controlled substance agreement signed- [id number]     Morbid obesity (H)     Anxiety     Narcotic dependence (H)     Benign essential hypertension     CARDIOVASCULAR SCREENING; LDL GOAL LESS THAN 130     Chronic, continuous use of opioids     Current Outpatient Medications   Medication Sig Dispense Refill      acetaminophen (TYLENOL) 325 MG tablet Take 2 tablets (650 mg) by mouth every 4 hours as needed for other (mild pain) 100 tablet 0     albuterol (PROAIR HFA/PROVENTIL HFA/VENTOLIN HFA) 108 (90 BASE) MCG/ACT Inhaler Inhale 2 puffs into the lungs every 6 hours as needed for shortness of breath / dyspnea 1 Inhaler 11     amLODIPine (NORVASC) 5 MG tablet TAKE 1 TABLET BY MOUTH  DAILY 90 tablet 3     BIOTIN PO Take by mouth daily       DULoxetine (CYMBALTA) 60 MG capsule TAKE ONE CAPSULE BY MOUTH TWICE A  capsule 3     fluticasone-salmeterol (AIRDUO RESPICLICK) 232-14 MCG/ACT inhaler Inhale 1 puff into the lungs 2 times daily 1 each 11     gabapentin (NEURONTIN) 100 MG capsule Take 3 capsules (300 mg) by mouth daily 90 capsule 1     gabapentin (NEURONTIN) 300 MG capsule Take 300mg in the morning and 600mg in the evening. 90 capsule 1     HYDROcodone-acetaminophen (NORCO) 5-325 MG tablet Take 1-2 tablets by mouth every 6 hours as needed for pain 180 tablet 0     hydrOXYzine (ATARAX) 10 MG tablet Take 1 tablet (10 mg) by mouth every 6 hours as needed for itching or anxiety (with pain, moderate pain) 20 tablet 0     losartan (COZAAR) 50 MG tablet TAKE 1 TABLET BY MOUTH  DAILY 90 tablet 3     medical cannabis (Patient's own supply) (The purpose of this order is to document that the patient reports taking medical cannabis.  This is not a prescription, and is not used to certify that the patient has a qualifying medical condition.) 0 Information only 0     miconazole (MICATIN) 2 % external powder Apply topically daily 90 g 11     montelukast (SINGULAIR) 10 MG tablet Take 1 tablet (10 mg) by mouth At Bedtime 90 tablet 3     naloxone (NARCAN) 4 MG/0.1ML nasal spray Spray 1 spray (4 mg) into one nostril alternating nostrils once as needed for opioid reversal every 2-3 minutes until assistance arrives 0.2 mL 0     Vitamin D3 (CHOLECALCIFEROL) 25 mcg (1000 units) tablet Take by mouth daily              Do you feel safe in  your environment? Yes    Have you ever done Advance Care Planning? (For example, a Health Directive, POLST, or a discussion with a medical provider or your loved ones about your wishes): No, advance care planning information given to patient to review.  Patient plans to discuss their wishes with loved ones or provider.         Fall risk  Fallen 2 or more times in the past year?: No  Any fall with injury in the past year?: No    Cognitive Screening   1) Repeat 3 items (Leader, Season, Table)    2) Clock draw: NORMAL  3) 3 item recall: Recalls 2 objects   Results: NORMAL clock, 1-2 items recalled: COGNITIVE IMPAIRMENT LESS LIKELY    Mini-CogTM Copyright S Kailee. Licensed by the author for use in [facility]; reprinted with permission (soob@[facility]). All rights reserved.      Do you have sleep apnea, excessive snoring or daytime drowsiness?: no    Reviewed and updated as needed this visit by clinical staff      Reviewed and updated as needed this visit by Provider    Social History     Tobacco Use     Smoking status: Former Smoker     Quit date: 1969     Years since quittin.1     Smokeless tobacco: Never Used   Substance Use Topics     Alcohol use: Yes     Alcohol/week: 0.0 standard drinks     Comment: casual     If you drink alcohol do you typically have >3 drinks per day or >7 drinks per week? No    Alcohol Use 7/10/2018   Prescreen: >3 drinks/day or >7 drinks/week? No   Prescreen: >3 drinks/day or >7 drinks/week? -   No flowsheet data found.            Current providers sharing in care for this patient include:   Patient Care Team:  Dolores Blanton MD as PCP - General (Internal Medicine)  Dolores Blanton MD as Assigned PCP    The following health maintenance items are reviewed in Epic and correct as of today:  Health Maintenance Due   Topic Date Due     ANNUAL REVIEW OF HM ORDERS  Never done     TREATMENT AGREEMENT FOR CHRONIC PAIN MANAGEMENT  Never done     HEPATITIS B IMMUNIZATION (1 of 3 - Risk  "3-dose series) Never done     ASTHMA ACTION PLAN  05/28/2020     MICROALBUMIN  10/15/2020     URINE DRUG SCREEN  10/15/2020     GHADA ASSESSMENT  11/12/2020     PHQ-9  11/12/2020     ASTHMA CONTROL TEST  01/09/2021     MEDICARE ANNUAL WELLNESS VISIT  06/26/2021     FALL RISK ASSESSMENT  06/26/2021             Pertinent mammograms are reviewed under the imaging tab.    Review of Systems  CONSTITUTIONAL: NEGATIVE for fever, chills, change in weight  INTEGUMENTARY/SKIN: some itching of scar on forehead, seems very thick  EYES: NEGATIVE for vision changes or irritation  ENT/MOUTH: NEGATIVE for ear, mouth and throat problems  RESP: NEGATIVE for significant cough or SOB  BREAST: NEGATIVE for masses, tenderness or discharge  CV: NEGATIVE for chest pain, palpitations or peripheral edema  GI: NEGATIVE for nausea, abdominal pain, heartburn, or change in bowel habits   female: incontinence  MUSCULOSKELETAL:as above  NEURO: numbness arm  PSYCHIATRIC: NEGATIVE for changes in mood or affect    OBJECTIVE:   BP (!) 168/101 (BP Location: Left arm, Patient Position: Sitting, Cuff Size: Adult Regular)   Pulse 107   Temp 98.4  F (36.9  C) (Oral)   Resp 16   Ht 1.6 m (5' 3\")   Wt 117.6 kg (259 lb 4.8 oz)   SpO2 94%   BMI 45.93 kg/m   Estimated body mass index is 46.73 kg/m  as calculated from the following:    Height as of 11/1/21: 1.6 m (5' 3\").    Weight as of 11/1/21: 119.7 kg (263 lb 12.8 oz).  Physical Exam     HEENT: extraocular movements are intact, pupils equal and reactive to light and accommodation, TMs clear  NECK: Neck supple. No adenopathy. Thyroid symmetric, normal size,, Carotids without bruits.  PULMONARY: clear to auscultation  CARDIAC: regular rate and rhythm and no murmurs, clicks, or gallops  PULSES: 2/2 throughout  BACK: no spinal or CVAT  ABDOMINAL: Soft, nontender.  Normal bowel sounds.  No hepatosplenomegaly or abnormal masses        ASSESSMENT / PLAN:   (Z00.00) Encounter for annual wellness exam in " "Medicare patient  (primary encounter diagnosis)  Comment: up to date  Plan:     (F11.20) Narcotic dependence (H)  Comment: stable, per pain clinic   Plan:     (E66.01) Morbid obesity (H)  Comment: work on kaitlynn loss   Plan:     (M54.12) Cervical radiculopathy  Comment: arm symptoms persistent for long time, refer spine  Plan: Spine Referral            (I10) Benign essential hypertension  Comment: elevated levels, recommend check BP send some readings   Plan: Basic metabolic panel  (Ca, Cl, CO2, Creat,         Gluc, K, Na, BUN), Albumin Random Urine         Quantitative with Creat Ratio            (J45.30) Mild persistent asthma without complication  Comment: stable  Plan: per pulmonary     (Z13.6) CARDIOVASCULAR SCREENING; LDL GOAL LESS THAN 130  Comment:   Plan: Lipid panel reflex to direct LDL Fasting            (R32) Urinary incontinence, unspecified type  Comment: refer   Plan: Adult Urology Referral              Patient has been advised of split billing requirements and indicates understanding: Yes    COUNSELING:  Reviewed preventive health counseling, as reflected in patient instructions    Estimated body mass index is 46.73 kg/m  as calculated from the following:    Height as of 11/1/21: 1.6 m (5' 3\").    Weight as of 11/1/21: 119.7 kg (263 lb 12.8 oz).    Weight management plan: Discussed healthy diet and exercise guidelines    She reports that she quit smoking about 53 years ago. She has never used smokeless tobacco.      Appropriate preventive services were discussed with this patient, including applicable screening as appropriate for cardiovascular disease, diabetes, osteopenia/osteoporosis, and glaucoma.  As appropriate for age/gender, discussed screening for colorectal cancer, prostate cancer, breast cancer, and cervical cancer. Checklist reviewing preventive services available has been given to the patient.    Reviewed patients plan of care and provided an AVS. The Basic Care Plan (routine screening as " documented in Health Maintenance) for Svetlana meets the Care Plan requirement. This Care Plan has been established and reviewed with the Patient.    Counseling Resources:  ATP IV Guidelines  Pooled Cohorts Equation Calculator  Breast Cancer Risk Calculator  Breast Cancer: Medication to Reduce Risk  FRAX Risk Assessment  ICSI Preventive Guidelines  Dietary Guidelines for Americans, 2010  USDA's MyPlate  ASA Prophylaxis  Lung CA Screening    Dolores Blanton MD  [facility]    Identified Health Risks:

## 2022-01-25 NOTE — NURSING NOTE
"BP (!) 168/101 (BP Location: Left arm, Patient Position: Sitting, Cuff Size: Adult Regular)   Pulse 107   Temp 98.4  F (36.9  C) (Oral)   Resp 16   Ht 1.6 m (5' 3\")   Wt 117.6 kg (259 lb 4.8 oz)   SpO2 94%   BMI 45.93 kg/m      "

## 2022-01-27 ENCOUNTER — DOCUMENTATION ONLY (OUTPATIENT)
Dept: LAB | Facility: CLINIC | Age: 77
End: 2022-01-27
Payer: MEDICARE

## 2022-01-27 DIAGNOSIS — Z13.6 CARDIOVASCULAR SCREENING; LDL GOAL LESS THAN 130: ICD-10-CM

## 2022-01-27 DIAGNOSIS — F11.90 CHRONIC, CONTINUOUS USE OF OPIOIDS: ICD-10-CM

## 2022-01-27 DIAGNOSIS — I10 BENIGN ESSENTIAL HYPERTENSION: Primary | ICD-10-CM

## 2022-01-31 ENCOUNTER — MYC MEDICAL ADVICE (OUTPATIENT)
Dept: INTERNAL MEDICINE | Facility: CLINIC | Age: 77
End: 2022-01-31

## 2022-01-31 ENCOUNTER — LAB (OUTPATIENT)
Dept: LAB | Facility: CLINIC | Age: 77
End: 2022-01-31
Payer: MEDICARE

## 2022-01-31 DIAGNOSIS — Z13.6 CARDIOVASCULAR SCREENING; LDL GOAL LESS THAN 130: ICD-10-CM

## 2022-01-31 DIAGNOSIS — I10 BENIGN ESSENTIAL HYPERTENSION: ICD-10-CM

## 2022-01-31 LAB
ANION GAP SERPL CALCULATED.3IONS-SCNC: 5 MMOL/L (ref 3–14)
BUN SERPL-MCNC: 15 MG/DL (ref 7–30)
CALCIUM SERPL-MCNC: 9.6 MG/DL (ref 8.5–10.1)
CHLORIDE BLD-SCNC: 108 MMOL/L (ref 94–109)
CHOLEST SERPL-MCNC: 171 MG/DL
CO2 SERPL-SCNC: 27 MMOL/L (ref 20–32)
CREAT SERPL-MCNC: 0.91 MG/DL (ref 0.52–1.04)
FASTING STATUS PATIENT QL REPORTED: NORMAL
GFR SERPL CREATININE-BSD FRML MDRD: 65 ML/MIN/1.73M2
GLUCOSE BLD-MCNC: 112 MG/DL (ref 70–99)
HDLC SERPL-MCNC: 54 MG/DL
LDLC SERPL CALC-MCNC: 92 MG/DL
NONHDLC SERPL-MCNC: 117 MG/DL
POTASSIUM BLD-SCNC: 4 MMOL/L (ref 3.4–5.3)
SODIUM SERPL-SCNC: 140 MMOL/L (ref 133–144)
TRIGL SERPL-MCNC: 126 MG/DL

## 2022-01-31 PROCEDURE — 80048 BASIC METABOLIC PNL TOTAL CA: CPT

## 2022-01-31 PROCEDURE — 36415 COLL VENOUS BLD VENIPUNCTURE: CPT

## 2022-01-31 PROCEDURE — 82043 UR ALBUMIN QUANTITATIVE: CPT

## 2022-01-31 PROCEDURE — 80061 LIPID PANEL: CPT

## 2022-02-01 DIAGNOSIS — F11.90 CHRONIC, CONTINUOUS USE OF OPIOIDS: ICD-10-CM

## 2022-02-01 LAB
AMPHETAMINES UR QL: NOT DETECTED
BARBITURATES UR QL SCN: NOT DETECTED
BENZODIAZ UR QL SCN: NOT DETECTED
BUPRENORPHINE UR QL: NOT DETECTED
CANNABINOIDS UR QL: DETECTED
COCAINE UR QL SCN: NOT DETECTED
CREAT UR-MCNC: 74 MG/DL
D-METHAMPHET UR QL: NOT DETECTED
METHADONE UR QL SCN: NOT DETECTED
MICROALBUMIN UR-MCNC: 10 MG/L
MICROALBUMIN/CREAT UR: 13.51 MG/G CR (ref 0–25)
OPIATES UR QL SCN: DETECTED
OXYCODONE UR QL SCN: NOT DETECTED
PCP UR QL SCN: NOT DETECTED
PROPOXYPH UR QL: NOT DETECTED
TRICYCLICS UR QL SCN: NOT DETECTED

## 2022-02-01 PROCEDURE — 80306 DRUG TEST PRSMV INSTRMNT: CPT | Performed by: INTERNAL MEDICINE

## 2022-02-03 ENCOUNTER — OFFICE VISIT (OUTPATIENT)
Dept: NEUROSURGERY | Facility: CLINIC | Age: 77
End: 2022-02-03
Attending: INTERNAL MEDICINE
Payer: MEDICARE

## 2022-02-03 ENCOUNTER — DOCUMENTATION ONLY (OUTPATIENT)
Dept: NEUROSURGERY | Facility: CLINIC | Age: 77
End: 2022-02-03

## 2022-02-03 ENCOUNTER — ANCILLARY PROCEDURE (OUTPATIENT)
Dept: GENERAL RADIOLOGY | Facility: CLINIC | Age: 77
End: 2022-02-03
Attending: PHYSICIAN ASSISTANT
Payer: MEDICARE

## 2022-02-03 VITALS
HEART RATE: 102 BPM | DIASTOLIC BLOOD PRESSURE: 95 MMHG | OXYGEN SATURATION: 97 % | BODY MASS INDEX: 45.89 KG/M2 | WEIGHT: 259 LBS | HEIGHT: 63 IN | SYSTOLIC BLOOD PRESSURE: 160 MMHG

## 2022-02-03 DIAGNOSIS — R20.0 NUMBNESS AND TINGLING IN LEFT HAND: ICD-10-CM

## 2022-02-03 DIAGNOSIS — R20.2 NUMBNESS AND TINGLING IN LEFT HAND: Primary | ICD-10-CM

## 2022-02-03 DIAGNOSIS — R20.2 NUMBNESS AND TINGLING IN LEFT HAND: ICD-10-CM

## 2022-02-03 DIAGNOSIS — R20.0 NUMBNESS AND TINGLING IN LEFT HAND: Primary | ICD-10-CM

## 2022-02-03 DIAGNOSIS — M54.12 CERVICAL RADICULOPATHY: ICD-10-CM

## 2022-02-03 PROCEDURE — 99203 OFFICE O/P NEW LOW 30 MIN: CPT | Performed by: PHYSICIAN ASSISTANT

## 2022-02-03 PROCEDURE — 72050 X-RAY EXAM NECK SPINE 4/5VWS: CPT | Performed by: RADIOLOGY

## 2022-02-03 PROCEDURE — G0463 HOSPITAL OUTPT CLINIC VISIT: HCPCS

## 2022-02-03 ASSESSMENT — PAIN SCALES - GENERAL: PAINLEVEL: MILD PAIN (2)

## 2022-02-03 ASSESSMENT — MIFFLIN-ST. JEOR: SCORE: 1638.95

## 2022-02-03 NOTE — PROGRESS NOTES
"  NEUROSURGERY CLINIC CONSULT NOTE     DATE OF VISIT: 2/3/2022     SUBJECTIVE:     Svetlana Serrano is a pleasant 76 year old female who presents to the clinic today for consultation on left hand numbness, similar to the median nerve or C7 distribution. She is referred to the Neurosurgery Clinic by Dr. Blanton in Primary Care. She does have a left total shoulder arthroplasty performed by Dr. Rocha, formerly of [facility], in 2011.  Today, she reports an eleven-year history of symptoms. She describes a constant numb sensation that she states feels like \"frostbite\". She denies any cervical spine or arm symptoms to include paresthesia, numbness or perceived weakness. Just numbness in her left index, middle, and ring finger. She really cannot appreciate any aggravating or alleviating factores. No mechanism of injury such as trauma or a fall is associated with the onset of the pain. There are no bowel or bladder changes. She denies changes in gait, instability, or falling episodes. There has been no significant change in her handwriting or hand dexterity.   She has not participated in any conservative therapies.          Current Outpatient Medications:      acetaminophen (TYLENOL) 325 MG tablet, Take 2 tablets (650 mg) by mouth every 4 hours as needed for other (mild pain), Disp: 100 tablet, Rfl: 0     albuterol (PROAIR HFA/PROVENTIL HFA/VENTOLIN HFA) 108 (90 BASE) MCG/ACT Inhaler, Inhale 2 puffs into the lungs every 6 hours as needed for shortness of breath / dyspnea, Disp: 1 Inhaler, Rfl: 11     amLODIPine (NORVASC) 5 MG tablet, TAKE 1 TABLET BY MOUTH  DAILY, Disp: 90 tablet, Rfl: 3     BIOTIN PO, Take by mouth daily, Disp: , Rfl:      DULoxetine (CYMBALTA) 60 MG capsule, TAKE ONE CAPSULE BY MOUTH TWICE A DAY, Disp: 180 capsule, Rfl: 3     fluticasone-salmeterol (AIRDUO RESPICLICK) 232-14 MCG/ACT inhaler, Inhale 1 puff into the lungs 2 times daily, Disp: 1 each, Rfl: 11     gabapentin (NEURONTIN) 100 MG capsule, Take 3 " capsules (300 mg) by mouth daily, Disp: 90 capsule, Rfl: 1     gabapentin (NEURONTIN) 300 MG capsule, Take 300mg in the morning and 600mg in the evening., Disp: 90 capsule, Rfl: 1     guaiFENesin-codeine (ROBITUSSIN AC) 100-10 MG/5ML solution, Take 10 mLs by mouth every 4 hours as needed for cough, Disp: 473 mL, Rfl: 1     HYDROcodone-acetaminophen (NORCO) 5-325 MG tablet, Take 1-2 tablets by mouth every 6 hours as needed for pain, Disp: 180 tablet, Rfl: 0     hydrOXYzine (ATARAX) 10 MG tablet, Take 1 tablet (10 mg) by mouth every 6 hours as needed for itching or anxiety (with pain, moderate pain), Disp: 20 tablet, Rfl: 0     losartan (COZAAR) 50 MG tablet, TAKE 1 TABLET BY MOUTH  DAILY, Disp: 90 tablet, Rfl: 3     miconazole (MICATIN) 2 % external powder, Apply topically daily, Disp: 90 g, Rfl: 11     montelukast (SINGULAIR) 10 MG tablet, Take 1 tablet (10 mg) by mouth At Bedtime, Disp: 90 tablet, Rfl: 3     naloxone (NARCAN) 4 MG/0.1ML nasal spray, Spray 1 spray (4 mg) into one nostril alternating nostrils once as needed for opioid reversal every 2-3 minutes until assistance arrives, Disp: 0.2 mL, Rfl: 0     Vitamin D3 (CHOLECALCIFEROL) 25 mcg (1000 units) tablet, Take by mouth daily, Disp: , Rfl:      Allergies   Allergen Reactions     Clindamycin Shortness Of Breath     Erythromycin Anaphylaxis     PN: LW Reaction: ANAPHYLAXIS     Amoxicillin      Rash.     Cephalexin Hives     Doxycycline GI Disturbance and Hives     gi distress       Sulfa Drugs      Hives          Past Medical History:   Diagnosis Date     Arthritis     right shoulder     Back pain      Fibromyalgia      HTN (hypertension)      Incontinence of urine in female      Mild intermittent asthma      Other chronic pain     from fibromyalgia     Restless leg      Tubular adenoma of colon 8/15        ROS: 10 point review of symptoms are negative other than the symptoms noted above in the HPI.     Family History has been reviewed with the patient,  "there are no pertinent findings to presenting concern.     Past Surgical History:   Procedure Laterality Date     ARTHROPLASTY HIP Right 2020    Procedure: Right total hip arthroplasty.  Posterior lateral piriformis sparing approach.;  Surgeon: Cirilo Man MD;  Location: [facility] OR     ARTHROPLASTY HIP Left 7/15/2020    Procedure: Left total hip arthroplasty.  Posterior lateral piriformis sparing approach;  Surgeon: Cirilo Man MD;  Location: [facility] OR     ARTHROPLASTY SHOULDER Left      C/SECTION, CLASSICAL      , Classical     CHOLECYSTECTOMY, LAPOROSCOPIC      Cholecystectomy, Laparoscopic     COLONOSCOPY N/A 10/8/2018    Procedure: COLONOSCOPY;  Colonoscopy with polypectomies;  Surgeon: Anton Stroud MD;  Location: [facility] OR     HYSTERECTOMY, JOSHUA       JOINT REPLACEMTN, KNEE RT/LT      bilateral        Social History     Tobacco Use     Smoking status: Former Smoker     Quit date: 1969     Years since quittin.1     Smokeless tobacco: Never Used   Substance Use Topics     Alcohol use: Yes     Alcohol/week: 0.0 standard drinks     Comment: casual     Drug use: No        OBJECTIVE:   BP (!) 160/95   Pulse 102   Ht 5' 3\" (1.6 m)   Wt 259 lb (117.5 kg)   SpO2 97%   BMI 45.88 kg/m     Body mass index is 45.88 kg/m .     Imaging:     None    Exam:     Patient appears comfortable, conversational, and in no apparent distress.   Head: Normocephalic, without obvious abnormality, atraumatic, no facial asymmetry.   Eyes: conjunctivae/corneas clear. PERRL, EOM's intact.   Throat: lips, mucosa, and tongue normal; teeth and gums normal.   Neck: supple, symmetrical, trachea midline, no adenopathy and thyroid: not enlarged, symmetric, no tenderness/mass/nodules.   Lungs: clear to auscultation bilaterally.   Heart: regular rate and rhythm.   Abdomen: soft, non-tender; bowel sounds normal; no masses, no organomegaly.   Pulses: 2+ and symmetric.   Skin: Skin color, texture, turgor normal. " "No rashes or lesions.     CN II-XII grossly intact, alert and appropriate with conversation and following commands.   Gait is non-antalgic. Able to tandem walk. Able to walk on toes and heels without difficulty.   Cervical spine is non tender to palpation. Appropriate range of motion of neck, not concerning for lhermitte's phenomenon.   Bilateral bicep 2/4 and tricep reflexes 1/4. Sensation diminished in left hand, specifically finger tips.     UE muscle strength  Right  Left    Deltoid  5/5  5/5    Biceps  5/5  5/5    Triceps  5/5  5/5    Hand intrinsics  5/5  5/5    Hand grasp  5/5  5/5    Rain signs  neg  neg      Lumbar spine is non tender to palpation.  Intact sensation throughout lower extremities.   Bilateral patellar 2/4 and achilles reflex 1/4.    LE muscle strength  Right  Left    Iliopsoas (hip flexion)  5/5  5/5    Quad (knee extension)  5/5  5/5    Hamstring (knee flexion)  5/5  5/5    Gastrocnemius (PF)  5/5  5/5    Tibialis Ant. (DF)  5/5  5/5    EHL  5/5  5/5      Negative Babinski bilaterally. Negative for clonus.   Calves are soft and non-tender bilaterally.     ASSESSMENT/PLAN:     Svetlana Serrano is a 76 year old female who presents to the clinic for consultation on an eleven-year history of a constant numb sensation that she states feels like \"frostbite\" affecting her left hand similar to the median nerve or C7 distribution. She denies any cervical spine or arm symptoms to include pain, paresthesia, numbness or perceived weakness. The patient does not have any imaging to review today. On exam, she is noted to have appropriate strength and range of motion, but she does have diminished sensation in her left hand, specifically her left index, middle, and ring finger. She has not attempted conservative management.     Based on her physical exam, imaging review, and strong desire to avoid a cervical spine MRI, we feel that it would be in her best interest to obtain an electromyography of the left " extremity to evaluate the nerve conductivity. She is aware that a cervical spine MRI may be suggested in the future. We will also obtain cervical spine x-rays today to include ap/lat/flex/ext views.      Once the EMG has been obtained she has agreed to call our office back at [phone] to further discuss possible surgical interventions or other conservative therapies.         Respectfully,     ENIO Blevins, JENNI  [facility] Neurosurgery  [facility]  Tel: [phone]      Exam, imaging, and plan reviewed by Dr. Ro.

## 2022-02-03 NOTE — LETTER
"    2/3/2022         RE: Svetlana Serrano  [address] [place] [address]        Dear Colleague,    Thank you for referring your patient, Svetlana Serrano, to the [facility] NEUROS[facility]GERY [facility]INIC [place]. Please see a copy of my visit note below.      NEUROSURGERY CLINIC CONSULT NOTE     DATE OF VISIT: 2/3/2022     SUBJECTIVE:     Svetlana Serrano is a pleasant 76 year old female who presents to the clinic today for consultation on left hand numbness, similar to the median nerve or C7 distribution. She is referred to the Neurosurgery Clinic by Dr. Blanton in Primary Care. She does have a left total shoulder arthroplasty performed by Dr. Rocha, formerly of [facility], in 2011.  Today, she reports an eleven-year history of symptoms. She describes a constant numb sensation that she states feels like \"frostbite\". She denies any cervical spine or arm symptoms to include paresthesia, numbness or perceived weakness. Just numbness in her left index, middle, and ring finger. She really cannot appreciate any aggravating or alleviating factores. No mechanism of injury such as trauma or a fall is associated with the onset of the pain. There are no bowel or bladder changes. She denies changes in gait, instability, or falling episodes. There has been no significant change in her handwriting or hand dexterity.   She has not participated in any conservative therapies.          Current Outpatient Medications:      acetaminophen (TYLENOL) 325 MG tablet, Take 2 tablets (650 mg) by mouth every 4 hours as needed for other (mild pain), Disp: 100 tablet, Rfl: 0     albuterol (PROAIR HFA/PROVENTIL HFA/VENTOLIN HFA) 108 (90 BASE) MCG/ACT Inhaler, Inhale 2 puffs into the lungs every 6 hours as needed for shortness of breath / dyspnea, Disp: 1 Inhaler, Rfl: 11     amLODIPine (NORVASC) 5 MG tablet, TAKE 1 TABLET BY MOUTH  DAILY, Disp: 90 tablet, Rfl: 3     BIOTIN PO, Take by mouth daily, Disp: , Rfl:      DULoxetine " (CYMBALTA) 60 MG capsule, TAKE ONE CAPSULE BY MOUTH TWICE A DAY, Disp: 180 capsule, Rfl: 3     fluticasone-salmeterol (AIRDUO RESPICLICK) 232-14 MCG/ACT inhaler, Inhale 1 puff into the lungs 2 times daily, Disp: 1 each, Rfl: 11     gabapentin (NEURONTIN) 100 MG capsule, Take 3 capsules (300 mg) by mouth daily, Disp: 90 capsule, Rfl: 1     gabapentin (NEURONTIN) 300 MG capsule, Take 300mg in the morning and 600mg in the evening., Disp: 90 capsule, Rfl: 1     guaiFENesin-codeine (ROBITUSSIN AC) 100-10 MG/5ML solution, Take 10 mLs by mouth every 4 hours as needed for cough, Disp: 473 mL, Rfl: 1     HYDROcodone-acetaminophen (NORCO) 5-325 MG tablet, Take 1-2 tablets by mouth every 6 hours as needed for pain, Disp: 180 tablet, Rfl: 0     hydrOXYzine (ATARAX) 10 MG tablet, Take 1 tablet (10 mg) by mouth every 6 hours as needed for itching or anxiety (with pain, moderate pain), Disp: 20 tablet, Rfl: 0     losartan (COZAAR) 50 MG tablet, TAKE 1 TABLET BY MOUTH  DAILY, Disp: 90 tablet, Rfl: 3     miconazole (MICATIN) 2 % external powder, Apply topically daily, Disp: 90 g, Rfl: 11     montelukast (SINGULAIR) 10 MG tablet, Take 1 tablet (10 mg) by mouth At Bedtime, Disp: 90 tablet, Rfl: 3     naloxone (NARCAN) 4 MG/0.1ML nasal spray, Spray 1 spray (4 mg) into one nostril alternating nostrils once as needed for opioid reversal every 2-3 minutes until assistance arrives, Disp: 0.2 mL, Rfl: 0     Vitamin D3 (CHOLECALCIFEROL) 25 mcg (1000 units) tablet, Take by mouth daily, Disp: , Rfl:      Allergies   Allergen Reactions     Clindamycin Shortness Of Breath     Erythromycin Anaphylaxis     PN: LW Reaction: ANAPHYLAXIS     Amoxicillin      Rash.     Cephalexin Hives     Doxycycline GI Disturbance and Hives     gi distress       Sulfa Drugs      Hives          Past Medical History:   Diagnosis Date     Arthritis     right shoulder     Back pain      Fibromyalgia      HTN (hypertension)      Incontinence of urine in female      Mild  "intermittent asthma      Other chronic pain     from fibromyalgia     Restless leg      Tubular adenoma of colon 8/15        ROS: 10 point review of symptoms are negative other than the symptoms noted above in the HPI.     Family History has been reviewed with the patient, there are no pertinent findings to presenting concern.     Past Surgical History:   Procedure Laterality Date     ARTHROPLASTY HIP Right 2020    Procedure: Right total hip arthroplasty.  Posterior lateral piriformis sparing approach.;  Surgeon: Cirilo Man MD;  Location: [facility] OR     ARTHROPLASTY HIP Left 7/15/2020    Procedure: Left total hip arthroplasty.  Posterior lateral piriformis sparing approach;  Surgeon: Cirilo Man MD;  Location: [facility] OR     ARTHROPLASTY SHOULDER Left      C/SECTION, CLASSICAL      , Classical     CHOLECYSTECTOMY, LAPOROSCOPIC      Cholecystectomy, Laparoscopic     COLONOSCOPY N/A 10/8/2018    Procedure: COLONOSCOPY;  Colonoscopy with polypectomies;  Surgeon: Anton Stroud MD;  Location: [facility] OR     HYSTERECTOMY, JOSHUA       JOINT REPLACEMTN, KNEE RT/LT      bilateral        Social History     Tobacco Use     Smoking status: Former Smoker     Quit date: 1969     Years since quittin.1     Smokeless tobacco: Never Used   Substance Use Topics     Alcohol use: Yes     Alcohol/week: 0.0 standard drinks     Comment: casual     Drug use: No        OBJECTIVE:   BP (!) 160/95   Pulse 102   Ht 5' 3\" (1.6 m)   Wt 259 lb (117.5 kg)   SpO2 97%   BMI 45.88 kg/m     Body mass index is 45.88 kg/m .     Imaging:     None    Exam:     Patient appears comfortable, conversational, and in no apparent distress.   Head: Normocephalic, without obvious abnormality, atraumatic, no facial asymmetry.   Eyes: conjunctivae/corneas clear. PERRL, EOM's intact.   Throat: lips, mucosa, and tongue normal; teeth and gums normal.   Neck: supple, symmetrical, trachea midline, no adenopathy and thyroid: not " "enlarged, symmetric, no tenderness/mass/nodules.   Lungs: clear to auscultation bilaterally.   Heart: regular rate and rhythm.   Abdomen: soft, non-tender; bowel sounds normal; no masses, no organomegaly.   Pulses: 2+ and symmetric.   Skin: Skin color, texture, turgor normal. No rashes or lesions.     CN II-XII grossly intact, alert and appropriate with conversation and following commands.   Gait is non-antalgic. Able to tandem walk. Able to walk on toes and heels without difficulty.   Cervical spine is non tender to palpation. Appropriate range of motion of neck, not concerning for lhermitte's phenomenon.   Bilateral bicep 2/4 and tricep reflexes 1/4. Sensation diminished in left hand, specifically finger tips.     UE muscle strength  Right  Left    Deltoid  5/5  5/5    Biceps  5/5  5/5    Triceps  5/5  5/5    Hand intrinsics  5/5  5/5    Hand grasp  5/5  5/5    Rain signs  neg  neg      Lumbar spine is non tender to palpation.  Intact sensation throughout lower extremities.   Bilateral patellar 2/4 and achilles reflex 1/4.    LE muscle strength  Right  Left    Iliopsoas (hip flexion)  5/5  5/5    Quad (knee extension)  5/5  5/5    Hamstring (knee flexion)  5/5  5/5    Gastrocnemius (PF)  5/5  5/5    Tibialis Ant. (DF)  5/5  5/5    EHL  5/5  5/5      Negative Babinski bilaterally. Negative for clonus.   Calves are soft and non-tender bilaterally.     ASSESSMENT/PLAN:     Svetlana Serrano is a 76 year old female who presents to the clinic for consultation on an eleven-year history of a constant numb sensation that she states feels like \"frostbite\" affecting her left hand similar to the median nerve or C7 distribution. She denies any cervical spine or arm symptoms to include pain, paresthesia, numbness or perceived weakness. The patient does not have any imaging to review today. On exam, she is noted to have appropriate strength and range of motion, but she does have diminished sensation in her left hand, " specifically her left index, middle, and ring finger. She has not attempted conservative management.     Based on her physical exam, imaging review, and strong desire to avoid a cervical spine MRI, we feel that it would be in her best interest to obtain an electromyography of the left extremity to evaluate the nerve conductivity. She is aware that a cervical spine MRI may be suggested in the future. We will also obtain cervical spine x-rays today to include ap/lat/flex/ext views.      Once the EMG has been obtained she has agreed to call our office back at [phone] to further discuss possible surgical interventions or other conservative therapies.         Respectfully,     ENOI Blevins PA-C  [facility] Neurosurgery  [facility]  Tel: [phone]      Exam, imaging, and plan reviewed by Dr. Ro.       Again, thank you for allowing me to participate in the care of your patient.        Sincerely,        Pastor Munoz PA-C

## 2022-02-03 NOTE — PROGRESS NOTES
Faxed EMG referral February 3, 2022 to N at  fax number [phone]    Right Fax confirmed at 410 PM    Roshan Mendoza RN

## 2022-02-09 DIAGNOSIS — R05.9 COUGH: ICD-10-CM

## 2022-02-10 RX ORDER — CODEINE PHOSPHATE AND GUAIFENESIN 10; 100 MG/5ML; MG/5ML
SOLUTION ORAL
Qty: 473 ML | Refills: 0 | Status: SHIPPED | OUTPATIENT
Start: 2022-02-10 | End: 2022-05-23

## 2022-02-10 NOTE — TELEPHONE ENCOUNTER
Pending Prescriptions:                       Disp   Refills    guaiFENesin-codeine (ROBITUSSIN AC) 100-10*473 mL          Sig: TAKE 10 ML BY MOUTH EVERY 4 HOURS AS NEEDED FOR COUGH    Routing refill request to provider for review/approval because:  Drug not on the [facility] refill protocol

## 2022-02-14 ENCOUNTER — MYC REFILL (OUTPATIENT)
Dept: INTERNAL MEDICINE | Facility: CLINIC | Age: 77
End: 2022-02-14
Payer: MEDICARE

## 2022-02-14 DIAGNOSIS — M51.369 DDD (DEGENERATIVE DISC DISEASE), LUMBAR: ICD-10-CM

## 2022-02-14 DIAGNOSIS — M79.7 FIBROMYALGIA: ICD-10-CM

## 2022-02-14 DIAGNOSIS — G89.4 CHRONIC PAIN SYNDROME: ICD-10-CM

## 2022-02-15 RX ORDER — HYDROCODONE BITARTRATE AND ACETAMINOPHEN 5; 325 MG/1; MG/1
1-2 TABLET ORAL EVERY 6 HOURS PRN
Qty: 180 TABLET | Refills: 0 | Status: SHIPPED | OUTPATIENT
Start: 2022-02-15 | End: 2022-03-17

## 2022-02-18 NOTE — PROGRESS NOTES
Svetlana is a 76 year old who is being evaluated via a billable video visit.      How would you like to obtain your AVS? MyChart  If the video visit is dropped, the invitation should be resent by: Text to cell phone: [phone]  Will anyone else be joining your video visit? No   Is Pt currently in [place]? Yes    NOTE:  If Pt is not in [place], Appointment needs to be canceled and rescheduled.    [facility]          Video Start Time: 10:35 AM      [facility]     Date of visit: 2/24/2022      Assessment:   Svetlana Serrano is a 76 year old female with a past medical history significant for HTN, restless leg syndrome, mild intermittent asthma, and arthritis, who presents with complaints of widespread and low back pain.      1. Widespread pain- etiology fibromyalgia.   2. Low back pain- etiology likely due to multilevel lumbar spondylosis, may be component of SI joint dysfunction, exacerbated by body habitus.   3. Mental Health - the patient's mental health concerns, specifically situational depression, affect her experience of pain and contribute to her clinically significant distress.     Visit Diagnoses:  1. Lumbar spondylosis    2. Fibromyalgia    3. Chronic pain syndrome        Plan:     1.  Pain Physical Therapy:    YES  I would recommend but can hold off on for now, Let me know if interested.    2.  Pain Psychologist to address relaxation, behavioral change, coping style, and other factors important to improvement.  NO   3.  Medication Management:     1. She reports good pain benefit with medical cannabis. I encouraged she continue medical cannabis as directed by pharmacist. I did suggest she follow up with dispensary to discuss if other products might be helpful.    3. It is reasonable to continue Norco for severe pain (has been on opioids for 15 years+ with good pain benefit and no misuse) but did encourage she try to reduce use as able, 5  tabs a day instead of 6 when able. We discussed this will allow this medication to be more effective for her long term.    4.  Potential procedures: not at this time. She will let me know if interested in lumbar spine injections, we would need to do a physical exam first to determine which may be more helpful.    5.  Referrals: I would recommend acupuncture, try to have visits on a weekly basis. I would recommend Corbin Langford -[phone].    6.  Follow up with CAMELIA Cruz CNP in 2-3 months in person.       Alanis DENISE CNP  [facility]     -------------------------------------------------    Subjective:    Chief complaint:   Chief Complaint   Patient presents with     Pain       Interval history:  Svetlana Serrano is a 76 year old female last seen on 9/2/2021.  She is seen in follow up.     Recommendations/plan at the last visit included:   1.  Pain Physical Therapy:    YES  I would recommend but can hold off on for now. Encouraged Svetlana continue to increase physical activity as able.    2.  Pain Psychologist to address relaxation, behavioral change, coping style, and other factors important to improvement.  NO   3.  Medication Management:     1. Svetlana has trouble remembering TID dosing. We will adjust gabapentin to BID dosing- 300mg in the morning and 600mg in the evening for 1 week. I sent a prescription for both 300mg and 100mg capsules to pharmacy. As tolerated, she will increase by 100mg capsules to 600mg BID.     2. Continue medical cannabis as needed for pain. I advised Svetlana closely monitor the degree of benefit she has with the red pill, skip a dose at times and see if pain is worse.     3.Svetlana expresses interest in reducing use of Norco. We discussed taking 1 tab instead of 2 midday and if this is well tolerated after a few weeks to eliminate midday dose.     4.  Potential procedures: not at this time, could consider trigger point injections if  "interested.     5.  Referrals: I would recommend acupuncture and she is open. Resources provided. Also would recommend Corbin Langford -[phone]     6.  Follow up with CAMELIA Cruz CNP in 6 weeks.     Since her last visit, Svetlana TREVIÑO Reggiemargaret reports:  -Her pain is better than it was at last visit.   -She attributes this to be due to medical marijuana.   -She tried gabapentin for a while with some benefit but significant side effects, most notably loss of balance, so stopped taking it. She reports resolution of these symptoms.   -She continues medical cannabis with [employer] (certified originally in July of 2021). She has been taking a THC dominant pill, taking BID, with some good pain benefit. She didn't find significant pain benefit with topical. She would be open to trying alternative products.   -She continues Norco 5/325mg x2 tabs TID prn most days but occasionally takes only one tab in the midday. She would like to be able to take less of this medication. States her use has gone down but  does show regular fills.   -She has been struggling with low back pain recently. The pain is located in bilateral low back but denies radicular symptoms. She has had injections in her back previously with some benefit but states, \"I don't want to if I don't have to.\" States the injections are quite uncomfortable.      Pain Information:   Pain rating: averages 2/10 on a 0-10 scale.    Current pain medications:    Gabapentin 300mg TID- [facility]/?   Medical cannabis (THC dominant pills)- [facility]/?, started around the same time as gabapentin   Cymbalta 60mg BID- ? for pain or mood   Tylenol 325mg 6 tabs/day- [facility], has been able to skip the midday dose recently   Hydroxyzine 10mg daily prn- ?   Norco 5/325mg takes 2 tabs TID- [facility], less so over the years    Current MME: 25-30    Review of [place] Prescription Monitoring Program (): No concern for abuse or misuse of controlled medications based on this report.     Annual " Controlled Substance Agreement/UDS due date: NA- prescribed by primary care provider     Past pain treatments:  1. Previous Pain Relevant Medications:              Opiates: Norco- [facility]              NSAIDS: no              Muscle Relaxants: cannot remember              Anti-migraine mediations: no              Anti-depressants: Cymbalta- ?              Sleep aids: no              Anxiolytics: lorazepam- ?              Neuropathics: gabapentin (prescribed after hip replacement)- ?, Lyrica (for 3 months)- [facility], blurred vision              Topicals: Andrés emu cream- [facility]              Other medications not covered above: Tylenol- [facility]     2. Physical Therapy: yes for back and fibromyalgia- [facility], hasn't tried in years  3. Pain Psychology: no  4. Surgery: bilateral hip replacements with Dr. Man January and July 2020  left shoulder replacement  5. Injections: trigger point injections- [facility] but short term  x3 epidural steroid injections with [facility] in 2012- [facility]  6. Chiropractic: yes- [facility]  7. Acupuncture: no  8. TENS Unit: yes- ?/[facility]       Medications:  Current Outpatient Medications   Medication Sig Dispense Refill     acetaminophen (TYLENOL) 325 MG tablet Take 2 tablets (650 mg) by mouth every 4 hours as needed for other (mild pain) 100 tablet 0     albuterol (PROAIR HFA/PROVENTIL HFA/VENTOLIN HFA) 108 (90 BASE) MCG/ACT Inhaler Inhale 2 puffs into the lungs every 6 hours as needed for shortness of breath / dyspnea 1 Inhaler 11     amLODIPine (NORVASC) 5 MG tablet TAKE 1 TABLET BY MOUTH  DAILY 90 tablet 1     BIOTIN PO Take by mouth daily       DULoxetine (CYMBALTA) 60 MG capsule TAKE ONE CAPSULE BY MOUTH TWICE A  capsule 3     fluticasone-salmeterol (AIRDUO RESPICLICK) 232-14 MCG/ACT inhaler Inhale 1 puff into the lungs 2 times daily 1 each 11     guaiFENesin-codeine (ROBITUSSIN AC) 100-10 MG/5ML solution TAKE 10 ML BY MOUTH EVERY 4 HOURS AS NEEDED FOR COUGH 473 mL 0     HYDROcodone-acetaminophen (NORCO) 5-325 MG tablet Take  1-2 tablets by mouth every 6 hours as needed for pain 180 tablet 0     hydrOXYzine (ATARAX) 10 MG tablet Take 1 tablet (10 mg) by mouth every 6 hours as needed for itching or anxiety (with pain, moderate pain) 20 tablet 0     losartan (COZAAR) 50 MG tablet TAKE 1 TABLET BY MOUTH  DAILY 90 tablet 1     medical cannabis (Patient's own supply) (The purpose of this order is to document that the patient reports taking medical cannabis.  This is not a prescription, and is not used to certify that the patient has a qualifying medical condition.) 0 Information only 0     miconazole (MICATIN) 2 % external powder Apply topically daily 90 g 11     montelukast (SINGULAIR) 10 MG tablet Take 1 tablet (10 mg) by mouth At Bedtime 90 tablet 3     naloxone (NARCAN) 4 MG/0.1ML nasal spray Spray 1 spray (4 mg) into one nostril alternating nostrils once as needed for opioid reversal every 2-3 minutes until assistance arrives 0.2 mL 0     Vitamin D3 (CHOLECALCIFEROL) 25 mcg (1000 units) tablet Take by mouth daily         Medical History: any changes in medical history since they were last seen? No    Review of Systems: A 10-point review of systems was negative, with the exception of chronic pain issues and fatigue, HTN, weakness (generalized).     Objective:    Physical Exam:  There were no vitals taken for this visit.  Constitutional: Well developed, well nourished, appears stated age. Obese.   HEENT: Head atraumatic, normocephalic. Eyes without conjunctival injection or jaundice. Oropharynx clear. Neck supple. No obvious neck masses.  Skin: No rash, lesions, or petechiae of exposed skin.   Psychiatric/mental status: Alert, without lethargy or stupor. Speech fluent. Appropriate affect. Mood normal. Able to follow commands without difficulty.     Imaging:  MRI of lumbar spine was completed on 7/29/19 and shows:  L5-S1: 4 mm spondylolisthesis with moderate cranially extruded central disc herniation approximately 10 mm CC by 6 mm AP with  moderate right and marked left hypertrophic facet degeneration produce moderately severe trefoil central stenosis with impingement of S1 nerve roots. Mild left foraminal stenosis.     L4-L5: Moderate to marked vertical narrowing with desiccation, broad annular bulging, 5 mm AP left posterolateral protrusion with moderate central and severe left subarticular recess stenosis with left L5 impingement. Mild bilateral foraminal stenosis without nerve root impingement. Mild facet degeneration.     L3-L4: Moderate disc desiccation with mild posterolateral to far lateral annular bulging, no significant central compromise. No significant foraminal compromise. Facet joints are unremarkable.     L2-L3: Moderate disc desiccation with normal posterior annular morphology and no stenosis. No significant foraminal compromise. Facet joints are unremarkable.     L1-L2: Very small right paracentral protrusion with moderate disc desiccation and no stenosis. No significant foraminal compromise. Facet joints are unremarkable.     Lower thoracic: Moderate T12-L1 and T11-12 disc degeneration with normal posterior annular morphology, small Schmorl's nodes.     CONCLUSION: Multilevel spondylosis with mostly sacralized transitional S1 level with a narrow S1-2 disc, and these findings:     1. Grade 1 L5-S1 degenerative spondylolisthesis with moderate disc extrusion, advanced facet degeneration and severe subarticular recess stenosis impinging S1 nerve roots.     2. Marked left L4-5 subarticular recess stenosis with annular protrusion impinging left L5 nerve root.     3. Moderate disc degeneration at other thoracolumbar levels without neural compression.     Note: Mild increased size of L5-S1 disc extrusion since prior study.      BILLING TIME DOCUMENTATION:   The total TIME spent on this patient on the date of the encounter/appointment was 30 minutes.      TOTAL TIME includes:   Time spent preparing to see the patient (reviewing records and  tests)   Time spent face to face (or over the phone) with the patient   Time spent ordering tests, medications, procedures and referrals   Time spent Referring and communicating with other healthcare professionals   Time spent documenting clinical information in Epic           Video-Visit Details    Type of service:  Video Visit    Video End Time:10:57 AM    Originating Location (pt. Location): Home    Distant Location (provider location):  [facility] [place]     Platform used for Video Visit: Skipjump

## 2022-02-24 ENCOUNTER — VIRTUAL VISIT (OUTPATIENT)
Dept: PALLIATIVE MEDICINE | Facility: CLINIC | Age: 77
End: 2022-02-24
Payer: MEDICARE

## 2022-02-24 DIAGNOSIS — M79.7 FIBROMYALGIA: ICD-10-CM

## 2022-02-24 DIAGNOSIS — G89.4 CHRONIC PAIN SYNDROME: ICD-10-CM

## 2022-02-24 DIAGNOSIS — M47.816 LUMBAR SPONDYLOSIS: Primary | ICD-10-CM

## 2022-02-24 PROCEDURE — 99214 OFFICE O/P EST MOD 30 MIN: CPT | Mod: 95 | Performed by: NURSE PRACTITIONER

## 2022-02-24 ASSESSMENT — PAIN SCALES - GENERAL: PAINLEVEL: NO PAIN (0)

## 2022-02-24 NOTE — NURSING NOTE
PEG Score 2/24/2022   PEG Total Score 0         Grace Baeza MA  [facility] [place]

## 2022-02-24 NOTE — PATIENT INSTRUCTIONS
1.  Pain Physical Therapy:    YES  I would recommend but can hold off on for now. Let me know if interested.    2.  Pain Psychologist to address relaxation, behavioral change, coping style, and other factors important to improvement.  NO   3.  Medication Management:     1. Continue medical cannabis as needed for pain. Follow up with dispensary to discuss with pharmacist if other products might be helpful.    3. It is reasonable to continue Norco for severe pain but try to reduce use as able, 5 tabs a day instead of 6 when able. This will allow this medication to be more effective for you long term.    4.  Potential procedures: not at this time. Let me know if interested in lumbar spine injections, we would need to do a physical exam first to determine which may be more helpful.    5.  Referrals: I would recommend acupuncture, try to have visits on a weekly basis. I would recommend Corbin Langford -[phone].    6.  Follow up with CAMELIA Cruz CNP in 2-3 months in person.

## 2022-02-28 ENCOUNTER — NURSE TRIAGE (OUTPATIENT)
Dept: INTERNAL MEDICINE | Facility: CLINIC | Age: 77
End: 2022-02-28

## 2022-02-28 ENCOUNTER — OFFICE VISIT (OUTPATIENT)
Dept: UROLOGY | Facility: CLINIC | Age: 77
End: 2022-02-28
Attending: INTERNAL MEDICINE
Payer: MEDICARE

## 2022-02-28 VITALS — WEIGHT: 259 LBS | HEIGHT: 63 IN | BODY MASS INDEX: 45.89 KG/M2

## 2022-02-28 DIAGNOSIS — R32 URINARY INCONTINENCE, UNSPECIFIED TYPE: Primary | ICD-10-CM

## 2022-02-28 DIAGNOSIS — R39.15 URINARY URGENCY: ICD-10-CM

## 2022-02-28 DIAGNOSIS — N39.44 NOCTURNAL ENURESIS: ICD-10-CM

## 2022-02-28 LAB — RESIDUAL VOLUME (RV) (EXTERNAL): 16

## 2022-02-28 PROCEDURE — 51798 US URINE CAPACITY MEASURE: CPT | Performed by: PHYSICIAN ASSISTANT

## 2022-02-28 PROCEDURE — 99203 OFFICE O/P NEW LOW 30 MIN: CPT | Mod: 25 | Performed by: PHYSICIAN ASSISTANT

## 2022-02-28 RX ORDER — OXYBUTYNIN CHLORIDE 5 MG/1
TABLET ORAL
COMMUNITY
Start: 2021-12-24 | End: 2024-03-06

## 2022-02-28 ASSESSMENT — PAIN SCALES - GENERAL: PAINLEVEL: NO PAIN (0)

## 2022-02-28 NOTE — TELEPHONE ENCOUNTER
"Nurse Triage SBAR    Is this a 2nd Level Triage? NO    Situation  : Patient calls with persistent cough.    Background  : Health Hx of asthma. Has had cough for \"forever\". States Dr. Blanton is aware.    Assessment : Patient had labored breathing but is was not different than her typical breathing pattern. Has had persistent cough over several weeks. Has tried robitussin but it did not help. Denies any other symptoms (URI symptoms, chest pain, fever).     Patient requests a [facility]. Patient is leaving for [place], [place] on Thursday.     (See information below for more triage details.)    Recommendation : Routing to covering provider for recommendation on medication.    Protocol Recommended Disposition: Telephone advice      Reason for Disposition    Cough with no complications    Additional Information    Negative: Bluish (or gray) lips or face    Negative: Severe difficulty breathing (e.g., struggling for each breath, speaks in single words)    Negative: Rapid onset of cough and has hives    Negative: Coughing started suddenly after medicine, an allergic food or bee sting    Negative: Difficulty breathing after exposure to flames, smoke, or fumes    Negative: Sounds like a life-threatening emergency to the triager    Negative: Previous asthma attacks and this feels like asthma attack    Negative: Chest pain present when not coughing    Negative: Difficulty breathing    Negative: Passed out (i.e., fainted, collapsed and was not responding)    Negative: Patient sounds very sick or weak to the triager    Negative: Coughed up > 1 tablespoon (15 ml) blood (Exception: blood-tinged sputum)    Negative: Fever > 103 F (39.4 C)    Negative: Fever > 101 F (38.3 C) and over 60 years of age    Negative: Fever > 100.0 F (37.8 C) and has diabetes mellitus or a weak immune system (e.g., HIV positive, cancer chemotherapy, organ transplant, splenectomy, chronic steroids)    Negative: Fever > 100.0 F (37.8 C) and bedridden (e.g., nursing " "home patient, stroke, chronic illness, recovering from surgery)    Negative: Increasing ankle swelling    Negative: Wheezing is present    Negative: SEVERE coughing spells (e.g., whooping sound after coughing, vomiting after coughing)    Negative: Coughing up pepper-colored (reddish-brown) or blood-tinged sputum    Negative: Fever present > 3 days (72 hours)    Negative: Fever returns after gone for over 24 hours and symptoms worse or not improved    Negative: Using nasal washes and pain medicine > 24 hours and sinus pain persists    Negative: Known COPD or other severe lung disease (i.e., bronchiectasis, cystic fibrosis, lung surgery) and worsening symptoms (i.e., increased sputum purulence or amount, increased breathing difficulty)    Negative: Continuous (nonstop) coughing interferes with work or school and no improvement using cough treatment per Care Advice    Negative: Patient wants to be seen    Negative: Cough has been present for > 3 weeks    Negative: Allergy symptoms are also present (e.g., itchy eyes, clear nasal discharge, postnasal drip)    Negative: Nasal discharge present > 10 days    Negative: Exposure to TB (Tuberculosis)    Negative: Taking an ACE Inhibitor medication (e.g., benazepril/LOTENSIN, captopril/CAPOTEN, enalapril/VASOTEC, lisinopril/ZESTRIL)    Answer Assessment - Initial Assessment Questions  1. ONSET: \"When did the cough begin?\"       Has been persistent over weeks.  2. SEVERITY: \"How bad is the cough today?\"       Cough is worsening today  3. RESPIRATORY DISTRESS: \"Describe your breathing.\"       Patient is not in respiratory distress. Frustrated with cough.  4. FEVER: \"Do you have a fever?\" If so, ask: \"What is your temperature, how was it measured, and when did it start?\"      No  5. HEMOPTYSIS: \"Are you coughing up any blood?\" If so ask: \"How much?\" (flecks, streaks, tablespoons, etc.)      No  6. TREATMENT: \"What have you done so far to treat the cough?\" (e.g., meds, fluids, " "humidifier)      Fluids, robitussin, rest  7. CARDIAC HISTORY: \"Do you have any history of heart disease?\" (e.g., heart attack, congestive heart failure)       No  8. LUNG HISTORY: \"Do you have any history of lung disease?\"  (e.g., pulmonary embolus, asthma, emphysema)      Asthma  9. PE RISK FACTORS: \"Do you have a history of blood clots?\" (or: recent major surgery, recent prolonged travel, bedridden)      No  10. OTHER SYMPTOMS: \"Do you have any other symptoms? (e.g., runny nose, wheezing, chest pain)        No  11. PREGNANCY: \"Is there any chance you are pregnant?\" \"When was your last menstrual period?\"        No  12. TRAVEL: \"Have you traveled out of the country in the last month?\" (e.g., travel history, exposures)        No    Protocols used: COUGH-A-OH      "

## 2022-02-28 NOTE — PATIENT INSTRUCTIONS
We discussed PTNS. This includes placement of needles in the posterior tibial nerve. This is once a week for 12 weeks. Patient does need to make sure they do not miss more than 1 session, or they will need to start over. It is 30 minutes at a time. We do not expect improvement until at least week 5-6, and some patients take longer. It does not work for all patients.     Try to restrict fluids 4 hours prior to bedtime.      You are not a great candidate for desmopressin.  Oxybutynin is not helping at 5 mg.

## 2022-02-28 NOTE — NURSING NOTE
Chief Complaint   Patient presents with     Incontinence     Pt. Unable to leave a urine sample.    PVR: 16 mL    Jennifer Gold, EMT

## 2022-02-28 NOTE — LETTER
2/28/2022       RE: Svetlana Serrano  [address] [place] [address]     Dear Colleague,    Thank you for referring your patient, Svetlana Serrano, to the [facility] at [facility]. Please see a copy of my visit note below.    Subjective      REQUESTING PROVIDER   Dolores Blanton     REASON FOR CONSULT   Urinary leakage-nighttime    HISTORY OF PRESENT ILLNESS   Ms. Serrano is a pleasant 76-year-old female, who presents today for for further evaluation recommendations regarding urinary incontinence.  She notes that this is mainly an issue during the nighttime.  She notes that she does not make much urine during the daytime.  She does not restrict fluids prior to bedtime and continues to drink throughout the night as she endorses significant dry mouth.  She has tried Biotene and this does not help.  She is on oxybutynin 5 mg extended release.  During the daytime, she has rare episodes urinary incontinence.  Denies any hematuria, dysuria, urgency, or frequency of urination.  No urinary tract infections or nephrolithiasis.  She feels that she can empty her bladder well.  She does wear multiple pads at night.  She notes that she does not sleep particularly well and has not since she was a child.  She may go through as many as 5 per night.  Sometimes she leaks all the way through these.  Many can feel her to the same time.    She is unable to leave a urine today.  Postvoid residual 60 mL.  Denies difficulties with her bowel movement.  No peripheral edema.  Patient endorses some snoring.  Does not startle herself awake.  She does note that she drinks a lot of fluid.  No pacemaker.    The following portions of the patient's history were reviewed and updated as appropriate: allergies, current medications, past family history, past medical history, past social history, past surgical history and problem list.     REVIEW OF SYSTEMS   Review of  Systems   Constitutional: Negative for chills and fever.   HENT:        Dry mouth.   Respiratory: Positive for cough. Negative for shortness of breath.    Cardiovascular: Negative for chest pain.   Gastrointestinal: Negative for constipation, diarrhea, nausea and vomiting.   Genitourinary: Negative for dysuria, frequency, hematuria and urgency.   Musculoskeletal: Positive for arthralgias and back pain.   Psychiatric/Behavioral: Positive for sleep disturbance.      Per HPI.     Patient Active Problem List   Diagnosis     Mild persistent asthma without complication     Restless leg syndrome     Fibromyalgia     Advanced directives, counseling/discussion     DDD (degenerative disc disease), lumbar     Spinal stenosis     Controlled substance agreement signed- [id number]/14/20     Morbid obesity (H)     Anxiety     Narcotic dependence (H)     Benign essential hypertension     CARDIOVASCULAR SCREENING; LDL GOAL LESS THAN 130     Chronic, continuous use of opioids      Past Medical History:   Diagnosis Date     Arthritis     right shoulder     Back pain      Fibromyalgia      HTN (hypertension)      Incontinence of urine in female      Mild intermittent asthma      Other chronic pain     from fibromyalgia     Restless leg      Tubular adenoma of colon 8/15      Past Surgical History:   Procedure Laterality Date     ARTHROPLASTY HIP Right 2020    Procedure: Right total hip arthroplasty.  Posterior lateral piriformis sparing approach.;  Surgeon: Cirilo Man MD;  Location: [facility] OR     ARTHROPLASTY HIP Left 7/15/2020    Procedure: Left total hip arthroplasty.  Posterior lateral piriformis sparing approach;  Surgeon: Cirilo Man MD;  Location: [facility] OR     ARTHROPLASTY SHOULDER Left      C/SECTION, CLASSICAL      , Classical     CHOLECYSTECTOMY, LAPOROSCOPIC      Cholecystectomy, Laparoscopic     COLONOSCOPY N/A 10/8/2018    Procedure: COLONOSCOPY;  Colonoscopy with polypectomies;  Surgeon:  "Anton Stroud MD;  Location: [facility] OR     HYSTERECTOMY, JOSHUA       JOINT REPLACEMTN, KNEE RT/LT      bilateral      Social History:   Former smoke.    Objective      PHYSICAL EXAM   Ht 1.6 m (5' 3\")   Wt 117.5 kg (259 lb)   BMI 45.88 kg/m     Physical Exam  Constitutional:       Appearance: Normal appearance.   HENT:      Head: Normocephalic.      Nose: Nose normal.   Eyes:      General: No scleral icterus.  Pulmonary:      Effort: Pulmonary effort is normal.   Abdominal:      General: There is no distension.   Musculoskeletal:      Right lower leg: No edema.      Left lower leg: No edema.   Neurological:      Mental Status: She is alert.   Psychiatric:         Mood and Affect: Mood normal.         Behavior: Behavior normal.        LABORATORY   Recent Labs   Lab Test 01/12/22  0815 12/07/18  1017   COLOR Yellow Yellow   APPEARANCE Clear Slightly Cloudy   URINEGLC Negative Negative   URINEBILI Negative Negative   URINEKETONE Negative Trace*   SG 1.015 1.015   UBLD Negative Negative   URINEPH 5.5 5.5   PROTEIN Negative Negative   UROBILINOGEN 0.2 0.2   NITRITE Negative Negative   LEUKEST Negative Small*   RBCU  --  O - 2   WBCU  --  5-10*       TESTING    PVR: 16 mL    Assessment & Plan    1. Urinary incontinence, unspecified type    2. Nocturnal enuresis    3. Urinary urgency        I had the pleasure today of meeting with Ms. Serrano and her daughter to discuss her urinary leakage that is prickly bothersome at nighttime.  Her symptoms are not similar during the daytime.  We discussed that nocturia is particularly difficult to improve.  She is currently on oxybutynin 5 mg extended release and it is not helping.  She does drink a lot of fluids in the overnight hours due to having a dry mouth.  This may be due to the oxybutynin or other medical conditions.    Have recommended she try to drink more fluids during the daytime.  She should restrict fluids 4 hours prior to bedtime.  If she has any peripheral edema, she " should elevate her leg several hours prior to bedtime to mobilize this fluid.    If she continues to have snoring and significant difficulties at nighttime, will need to consider possible evaluation for obstructive sleep apnea.    Mild weight loss may also be of benefit.    Briefly discussed does not present.  Given that she likes to drink a significant amount of fluid, I do have some concern with starting her on this due to the need for sodium monitoring and the concern for causing hyponatremia and the side effects with this.    In discussion with the patient, she would be interested in trialing posterior tibial nerve stimulation.  We discussed this in depth.  Referral has been placed for our [place] office to arrange this.      Signed by:     Jessenia Espinoza PA-C

## 2022-03-01 ENCOUNTER — MYC MEDICAL ADVICE (OUTPATIENT)
Dept: INTERNAL MEDICINE | Facility: CLINIC | Age: 77
End: 2022-03-01
Payer: MEDICARE

## 2022-03-01 DIAGNOSIS — J06.9 UPPER RESPIRATORY TRACT INFECTION, UNSPECIFIED TYPE: Primary | ICD-10-CM

## 2022-03-02 RX ORDER — AZITHROMYCIN 250 MG/1
TABLET, FILM COATED ORAL
Qty: 6 TABLET | Refills: 0 | Status: SHIPPED | OUTPATIENT
Start: 2022-03-02 | End: 2022-06-21

## 2022-03-02 NOTE — TELEPHONE ENCOUNTER
See her [employer] message. There is a Nurse triage message too on this, on 2/28/22.   Pt is leaving for [place] on Thursday.     Will route to covering provider.

## 2022-03-08 ASSESSMENT — ENCOUNTER SYMPTOMS
HEMATURIA: 0
ARTHRALGIAS: 1
SLEEP DISTURBANCE: 1
FEVER: 0
DIARRHEA: 0
FREQUENCY: 0
COUGH: 1
BACK PAIN: 1
SHORTNESS OF BREATH: 0
DYSURIA: 0
VOMITING: 0
NAUSEA: 0
CHILLS: 0
CONSTIPATION: 0

## 2022-03-08 NOTE — PROGRESS NOTES
Subjective      REQUESTING PROVIDER   Dolores Blanton     REASON FOR CONSULT   Urinary leakage-nighttime    HISTORY OF PRESENT ILLNESS   Ms. Serrano is a pleasant 76-year-old female, who presents today for for further evaluation recommendations regarding urinary incontinence.  She notes that this is mainly an issue during the nighttime.  She notes that she does not make much urine during the daytime.  She does not restrict fluids prior to bedtime and continues to drink throughout the night as she endorses significant dry mouth.  She has tried Biotene and this does not help.  She is on oxybutynin 5 mg extended release.  During the daytime, she has rare episodes urinary incontinence.  Denies any hematuria, dysuria, urgency, or frequency of urination.  No urinary tract infections or nephrolithiasis.  She feels that she can empty her bladder well.  She does wear multiple pads at night.  She notes that she does not sleep particularly well and has not since she was a child.  She may go through as many as 5 per night.  Sometimes she leaks all the way through these.  Many can feel her to the same time.    She is unable to leave a urine today.  Postvoid residual 60 mL.  Denies difficulties with her bowel movement.  No peripheral edema.  Patient endorses some snoring.  Does not startle herself awake.  She does note that she drinks a lot of fluid.  No pacemaker.    The following portions of the patient's history were reviewed and updated as appropriate: allergies, current medications, past family history, past medical history, past social history, past surgical history and problem list.     REVIEW OF SYSTEMS   Review of Systems   Constitutional: Negative for chills and fever.   HENT:        Dry mouth.   Respiratory: Positive for cough. Negative for shortness of breath.    Cardiovascular: Negative for chest pain.   Gastrointestinal: Negative for constipation, diarrhea, nausea and vomiting.   Genitourinary: Negative for dysuria,  "frequency, hematuria and urgency.   Musculoskeletal: Positive for arthralgias and back pain.   Psychiatric/Behavioral: Positive for sleep disturbance.      Per HPI.     Patient Active Problem List   Diagnosis     Mild persistent asthma without complication     Restless leg syndrome     Fibromyalgia     Advanced directives, counseling/discussion     DDD (degenerative disc disease), lumbar     Spinal stenosis     Controlled substance agreement signed- [id number]-8/14/20     Morbid obesity (H)     Anxiety     Narcotic dependence (H)     Benign essential hypertension     CARDIOVASCULAR SCREENING; LDL GOAL LESS THAN 130     Chronic, continuous use of opioids      Past Medical History:   Diagnosis Date     Arthritis     right shoulder     Back pain      Fibromyalgia      HTN (hypertension)      Incontinence of urine in female      Mild intermittent asthma      Other chronic pain     from fibromyalgia     Restless leg      Tubular adenoma of colon 8/15      Past Surgical History:   Procedure Laterality Date     ARTHROPLASTY HIP Right 2020    Procedure: Right total hip arthroplasty.  Posterior lateral piriformis sparing approach.;  Surgeon: Cirilo Man MD;  Location: [facility] OR     ARTHROPLASTY HIP Left 7/15/2020    Procedure: Left total hip arthroplasty.  Posterior lateral piriformis sparing approach;  Surgeon: Cirilo Man MD;  Location: [facility] OR     ARTHROPLASTY SHOULDER Left      C/SECTION, CLASSICAL      , Classical     CHOLECYSTECTOMY, LAPOROSCOPIC      Cholecystectomy, Laparoscopic     COLONOSCOPY N/A 10/8/2018    Procedure: COLONOSCOPY;  Colonoscopy with polypectomies;  Surgeon: Anton Stroud MD;  Location: [facility] OR     HYSTERECTOMY, JOSHUA       JOINT REPLACEMTN, KNEE RT/LT      bilateral      Social History:   Former smoke.    Objective      PHYSICAL EXAM   Ht 1.6 m (5' 3\")   Wt 117.5 kg (259 lb)   BMI 45.88 kg/m     Physical Exam  Constitutional:       Appearance: Normal " appearance.   HENT:      Head: Normocephalic.      Nose: Nose normal.   Eyes:      General: No scleral icterus.  Pulmonary:      Effort: Pulmonary effort is normal.   Abdominal:      General: There is no distension.   Musculoskeletal:      Right lower leg: No edema.      Left lower leg: No edema.   Neurological:      Mental Status: She is alert.   Psychiatric:         Mood and Affect: Mood normal.         Behavior: Behavior normal.        LABORATORY   Recent Labs   Lab Test 01/12/22  0815 12/07/18  1017   COLOR Yellow Yellow   APPEARANCE Clear Slightly Cloudy   URINEGLC Negative Negative   URINEBILI Negative Negative   URINEKETONE Negative Trace*   SG 1.015 1.015   UBLD Negative Negative   URINEPH 5.5 5.5   PROTEIN Negative Negative   UROBILINOGEN 0.2 0.2   NITRITE Negative Negative   LEUKEST Negative Small*   RBCU  --  O - 2   WBCU  --  5-10*       TESTING    PVR: 16 mL    Assessment & Plan    1. Urinary incontinence, unspecified type    2. Nocturnal enuresis    3. Urinary urgency        I had the pleasure today of meeting with Ms. Serrano and her daughter to discuss her urinary leakage that is prickly bothersome at nighttime.  Her symptoms are not similar during the daytime.  We discussed that nocturia is particularly difficult to improve.  She is currently on oxybutynin 5 mg extended release and it is not helping.  She does drink a lot of fluids in the overnight hours due to having a dry mouth.  This may be due to the oxybutynin or other medical conditions.    Have recommended she try to drink more fluids during the daytime.  She should restrict fluids 4 hours prior to bedtime.  If she has any peripheral edema, she should elevate her leg several hours prior to bedtime to mobilize this fluid.    If she continues to have snoring and significant difficulties at nighttime, will need to consider possible evaluation for obstructive sleep apnea.    Mild weight loss may also be of benefit.    Briefly discussed does not  present.  Given that she likes to drink a significant amount of fluid, I do have some concern with starting her on this due to the need for sodium monitoring and the concern for causing hyponatremia and the side effects with this.    In discussion with the patient, she would be interested in trialing posterior tibial nerve stimulation.  We discussed this in depth.  Referral has been placed for our [place] office to arrange this.      Signed by:     Jessenia Espinoza PA-C

## 2022-03-17 ENCOUNTER — MYC MEDICAL ADVICE (OUTPATIENT)
Dept: INTERNAL MEDICINE | Facility: CLINIC | Age: 77
End: 2022-03-17
Payer: MEDICARE

## 2022-03-17 ENCOUNTER — MYC REFILL (OUTPATIENT)
Dept: INTERNAL MEDICINE | Facility: CLINIC | Age: 77
End: 2022-03-17
Payer: MEDICARE

## 2022-03-17 DIAGNOSIS — M79.7 FIBROMYALGIA: ICD-10-CM

## 2022-03-17 DIAGNOSIS — M51.369 DDD (DEGENERATIVE DISC DISEASE), LUMBAR: ICD-10-CM

## 2022-03-17 DIAGNOSIS — G89.4 CHRONIC PAIN SYNDROME: ICD-10-CM

## 2022-03-18 RX ORDER — HYDROCODONE BITARTRATE AND ACETAMINOPHEN 5; 325 MG/1; MG/1
1-2 TABLET ORAL EVERY 6 HOURS PRN
Qty: 180 TABLET | Refills: 0 | Status: SHIPPED | OUTPATIENT
Start: 2022-03-18 | End: 2022-04-13

## 2022-03-18 NOTE — TELEPHONE ENCOUNTER
Norco refill request.   Last refill on 2/15/22 for #180     Routing refill request to provider for review/approval because:  Drug not on the [facility] refill protocol

## 2022-04-13 ENCOUNTER — MYC REFILL (OUTPATIENT)
Dept: INTERNAL MEDICINE | Facility: CLINIC | Age: 77
End: 2022-04-13
Payer: MEDICARE

## 2022-04-13 DIAGNOSIS — G89.4 CHRONIC PAIN SYNDROME: ICD-10-CM

## 2022-04-13 DIAGNOSIS — M51.369 DDD (DEGENERATIVE DISC DISEASE), LUMBAR: ICD-10-CM

## 2022-04-13 DIAGNOSIS — M79.7 FIBROMYALGIA: ICD-10-CM

## 2022-04-14 NOTE — TELEPHONE ENCOUNTER
Routing refill request to provider for review/approval because:  Drug not on the [facility] refill protocol      Fela Dupree RN  [facility]

## 2022-04-15 RX ORDER — HYDROCODONE BITARTRATE AND ACETAMINOPHEN 5; 325 MG/1; MG/1
1-2 TABLET ORAL EVERY 6 HOURS PRN
Qty: 180 TABLET | Refills: 0 | Status: SHIPPED | OUTPATIENT
Start: 2022-04-16 | End: 2022-05-12

## 2022-04-25 ENCOUNTER — TRANSFERRED RECORDS (OUTPATIENT)
Dept: HEALTH INFORMATION MANAGEMENT | Facility: CLINIC | Age: 77
End: 2022-04-25
Payer: MEDICARE

## 2022-05-12 ENCOUNTER — MYC REFILL (OUTPATIENT)
Dept: INTERNAL MEDICINE | Facility: CLINIC | Age: 77
End: 2022-05-12
Payer: MEDICARE

## 2022-05-12 DIAGNOSIS — G89.4 CHRONIC PAIN SYNDROME: ICD-10-CM

## 2022-05-12 DIAGNOSIS — M51.369 DDD (DEGENERATIVE DISC DISEASE), LUMBAR: ICD-10-CM

## 2022-05-12 DIAGNOSIS — M79.7 FIBROMYALGIA: ICD-10-CM

## 2022-05-15 RX ORDER — HYDROCODONE BITARTRATE AND ACETAMINOPHEN 5; 325 MG/1; MG/1
1-2 TABLET ORAL EVERY 6 HOURS PRN
Qty: 180 TABLET | Refills: 0 | Status: SHIPPED | OUTPATIENT
Start: 2022-05-16 | End: 2022-06-13

## 2022-05-23 ENCOUNTER — MYC REFILL (OUTPATIENT)
Dept: INTERNAL MEDICINE | Facility: CLINIC | Age: 77
End: 2022-05-23
Payer: MEDICARE

## 2022-05-23 DIAGNOSIS — R05.9 COUGH: ICD-10-CM

## 2022-05-23 RX ORDER — CODEINE PHOSPHATE AND GUAIFENESIN 10; 100 MG/5ML; MG/5ML
SOLUTION ORAL
Qty: 473 ML | Refills: 0 | Status: CANCELLED | OUTPATIENT
Start: 2022-05-23

## 2022-05-24 RX ORDER — CODEINE PHOSPHATE AND GUAIFENESIN 10; 100 MG/5ML; MG/5ML
1 SOLUTION ORAL EVERY 6 HOURS PRN
Qty: 473 ML | Refills: 0 | Status: SHIPPED | OUTPATIENT
Start: 2022-05-24 | End: 2022-06-21

## 2022-06-02 ENCOUNTER — TRANSFERRED RECORDS (OUTPATIENT)
Dept: HEALTH INFORMATION MANAGEMENT | Facility: CLINIC | Age: 77
End: 2022-06-02
Payer: MEDICARE

## 2022-06-13 ENCOUNTER — MYC REFILL (OUTPATIENT)
Dept: INTERNAL MEDICINE | Facility: CLINIC | Age: 77
End: 2022-06-13
Payer: MEDICARE

## 2022-06-13 ENCOUNTER — MYC MEDICAL ADVICE (OUTPATIENT)
Dept: INTERNAL MEDICINE | Facility: CLINIC | Age: 77
End: 2022-06-13
Payer: MEDICARE

## 2022-06-13 DIAGNOSIS — M51.369 DDD (DEGENERATIVE DISC DISEASE), LUMBAR: ICD-10-CM

## 2022-06-13 DIAGNOSIS — M79.7 FIBROMYALGIA: ICD-10-CM

## 2022-06-13 DIAGNOSIS — G89.4 CHRONIC PAIN SYNDROME: ICD-10-CM

## 2022-06-14 RX ORDER — HYDROCODONE BITARTRATE AND ACETAMINOPHEN 5; 325 MG/1; MG/1
1-2 TABLET ORAL EVERY 6 HOURS PRN
Qty: 180 TABLET | Refills: 0 | Status: SHIPPED | OUTPATIENT
Start: 2022-06-15 | End: 2022-07-17

## 2022-06-14 NOTE — TELEPHONE ENCOUNTER
Please advise patient that she will be due for her 6-month follow-up at the end of July.  Since I am booking out that far she should get it scheduled soon.  This could be a virtual visit.

## 2022-06-16 ENCOUNTER — TELEPHONE (OUTPATIENT)
Dept: INTERNAL MEDICINE | Facility: CLINIC | Age: 77
End: 2022-06-16
Payer: MEDICARE

## 2022-06-21 NOTE — PROGRESS NOTES
"Svetlana is a 77 year old who is being evaluated via a billable video visit.      How would you like to obtain your AVS? MyChart  If the video visit is dropped, the invitation should be resent by: Text to cell phone: [phone]  Will anyone else be joining your video visit? No  {If patient encounters technical issues they should call [phone] :151636}        {PROVIDER CHARTING PREFERENCE:432285}    Subjective   Svetlana is a 77 year old, presenting for the following health issues:  Covid Concern      HPI       COVID-19 Symptom Review  How many days ago did these symptoms start? 6/16/22    Are any of the following symptoms significant for you?    New or worsening difficulty breathing? No    Worsening cough? Yes, I am coughing up mucus.    Fever or chills? No    Headache: YES    Sore throat: no    Chest pain: no    Diarrhea: no    Body aches? no    What treatments has patient tried? Aleve, sudafed, cough syrup    Does patient live in a nursing home, group home, or shelter? no  Does patient have a way to get food/medications during quarantined? Yes, I have a friend or family member who can help me.    {Provider  Link to COVID SmartSet :910455}          {additonal problems for provider to add (Optional):310493}    Review of Systems   {ROS COMP (Optional):900424}      Objective           Vitals:  No vitals were obtained today due to virtual visit.    Physical Exam   {video visit exam brief selected:021578::\"GENERAL: Healthy, alert and no distress\",\"EYES: Eyes grossly normal to inspection.  No discharge or erythema, or obvious scleral/conjunctival abnormalities.\",\"RESP: No audible wheeze, cough, or visible cyanosis.  No visible retractions or increased work of breathing.  \",\"SKIN: Visible skin clear. No significant rash, abnormal pigmentation or lesions.\",\"NEURO: Cranial nerves grossly intact.  Mentation and speech appropriate for age.\",\"PSYCH: Mentation appears normal, affect normal/bright, judgement and insight " "intact, normal speech and appearance well-groomed.\"}    {Diagnostic Test Results (Optional):777108}    {AMBULATORY ATTESTATION (Optional):329530}        Video-Visit Details    Video Start Time: {video visit start/end time for provider to select:453207}    Type of service:  Video Visit    Video End Time:{video visit start/end time for provider to select:173747}    Originating Location (pt. Location): {video visit patient location:701617::\"Home\"}    Distant Location (provider location):  [facility]     Platform used for Video Visit: {Virtual Visit Platforms:243357::\"[employer]\"}    .  ..  "

## 2022-06-22 ENCOUNTER — VIRTUAL VISIT (OUTPATIENT)
Dept: FAMILY MEDICINE | Facility: CLINIC | Age: 77
End: 2022-06-22
Payer: MEDICARE

## 2022-06-22 DIAGNOSIS — R05.9 COUGH: ICD-10-CM

## 2022-06-22 DIAGNOSIS — I10 BENIGN ESSENTIAL HYPERTENSION: ICD-10-CM

## 2022-06-22 DIAGNOSIS — J45.30 MILD PERSISTENT ASTHMA WITHOUT COMPLICATION: ICD-10-CM

## 2022-06-22 DIAGNOSIS — Z12.31 VISIT FOR SCREENING MAMMOGRAM: Primary | ICD-10-CM

## 2022-06-22 DIAGNOSIS — R09.81 NASAL CONGESTION: ICD-10-CM

## 2022-06-22 DIAGNOSIS — E66.01 MORBID OBESITY (H): ICD-10-CM

## 2022-06-22 DIAGNOSIS — U07.1 INFECTION DUE TO 2019 NOVEL CORONAVIRUS: ICD-10-CM

## 2022-06-22 PROCEDURE — 99442 PR PHYSICIAN TELEPHONE EVALUATION 11-20 MIN: CPT | Mod: CS | Performed by: NURSE PRACTITIONER

## 2022-06-22 RX ORDER — CODEINE PHOSPHATE/GUAIFENESIN 10-100MG/5
5 LIQUID (ML) ORAL EVERY 4 HOURS PRN
Qty: 118 ML | Refills: 0 | Status: SHIPPED | OUTPATIENT
Start: 2022-06-22 | End: 2022-08-23

## 2022-06-23 NOTE — PROGRESS NOTES
Is Pt currently in [place]? Yes    NOTE:  If Pt is not in [place], Appointment needs to be canceled and rescheduled.      Svetlana is a 77 year old who is being evaluated via a billable video visit.      How would you like to obtain your AVS? [facility]  If the video visit is dropped, the invitation should be resent by: [facility] waiting room  Will anyone else be joining your video visit? No     Berta Holman CMA ([facility]     Date of visit: 6/24/2022      Assessment:   Svetlana Serrano is a 76 year old female with a past medical history significant for HTN, restless leg syndrome, mild intermittent asthma, and arthritis, who presents with complaints of widespread and low back pain.      1. Widespread pain- etiology fibromyalgia.   2. Low back pain- etiology likely due to multilevel lumbar spondylosis, may be component of SI joint dysfunction, exacerbated by body habitus.   3. Mental Health - the patient's mental health concerns, specifically situational depression, affect her experience of pain and contribute to her clinically significant distress.     Visit Diagnoses:  1. Lumbar spondylosis    2. Fibromyalgia        Plan:     1.  Pain Physical Therapy:    YES  I would recommend- pain physical therapy, pool therapy, or in home physical therapy. She will let me know if interested.    2.  Pain Psychologist to address relaxation, behavioral change, coping style, and other factors important to improvement.  NO   3.  Medication Management:     1. Continue medical cannabis. I recommended she reach out to the pharmacist at the dispensary for recommendations for optimal management.     2. I advised Svetlana discuss Norco 5/325mg with primary care provider. She has been able to reduce to 1/2 tab a day without significant increase in pain, I advised she consider staying at this dose after Paxlovid and taking it more on a truly as needed basis.    4.  Potential procedures: not at this time.    5.   Referrals: I would recommend acupuncture if interested or open to this.    6.  Follow up with CAMELIA Cruz CNP in 2-3 months in person.       Alanis DENISE CNP  [facility]     -------------------------------------------------    Subjective:    Chief complaint:   Chief Complaint   Patient presents with     Pain       Interval history:  Svetlana Serrano is a 76 year old female last seen on 2/24/2022.  She is seen in follow up.     Recommendations/plan at the last visit included:   1.  Pain Physical Therapy:    YES  I would recommend but can hold off on for now, Let me know if interested.    2.  Pain Psychologist to address relaxation, behavioral change, coping style, and other factors important to improvement.  NO   3.  Medication Management:     1. She reports good pain benefit with medical cannabis. I encouraged she continue medical cannabis as directed by pharmacist. I did suggest she follow up with dispensary to discuss if other products might be helpful.    3. It is reasonable to continue Norco for severe pain (has been on opioids for 15 years+ with good pain benefit and no misuse) but did encourage she try to reduce use as able, 5 tabs a day instead of 6 when able. We discussed this will allow this medication to be more effective for her long term.    4.  Potential procedures: not at this time. She will let me know if interested in lumbar spine injections, we would need to do a physical exam first to determine which may be more helpful.    5.  Referrals: I would recommend acupuncture, try to have visits on a weekly basis. I would recommend [facility] -[phone].    6.  Follow up with CAMELIA Cruz CNP in 2-3 months in person.     Since her last visit, Svetlana Serrano reports:  -Her pain is somewhat worse than it was at last visit.   -She attributes this to be due to to the hot weather.   -She continues Norco 5/325mg for increased pain, she reduced from 6  "tabs/day to 1/2 tab a day due to Paxlovid therapy. States she is surprised with how well she has tolerated this.  -She continues medical cannabis, is using a THC dominant pill with some benefit. She hasn't been taking since she started Paxlovid.   -She has considered injections but decided against this, would like to avoid if possible.   -She hasn't looked into acupuncture as discussed at last visit, feels overwhelmed as is right now.   -SHe stopped gabapentin due to substantial side effects, \"I was standing next to myself.\"   -She tested positive for COVID on Monday. She has significant congestion and a cough. She is on Paxlovid for treatment. Unfortunately much of her family also are sick with COVID.   -She continues to struggle to sleep, has had a sleep study done and worked with primary care provider without progress.       Pain Information:   Pain rating: averages 10/10 on a 0-10 scale.    Current pain medications:    Medical cannabis (THC dominant pills)- [facility], started around the same time as gabapentin   Cymbalta 60mg BID- ? for pain or mood   Tylenol 325mg 6 tabs/day- [facility], has been able to skip the midday dose recently   Hydroxyzine 10mg daily prn- ?   Norco 5/325mg takes 2 tabs TID- [facility], currently taking 1/2 tab a day while on Paxlovid   Andrés emu cream- [facility]    Current MME: 25-30    Review of [place] Prescription Monitoring Program (): No concern for abuse or misuse of controlled medications based on this report.     Annual Controlled Substance Agreement/UDS due date: NA- prescribed by primary care provider     Past pain treatments:  1. Previous Pain Relevant Medications:              Opiates: Norco- [facility]              NSAIDS: no              Muscle Relaxants: cannot remember              Anti-migraine mediations: no              Anti-depressants: Cymbalta- ?              Sleep aids: no              Anxiolytics: lorazepam- ?              Neuropathics: gabapentin (prescribed after hip replacement)- ?, Lyrica " (for 3 months)- [facility], blurred vision              Topicals: Andrés emu cream- [facility]              Other medications not covered above: Tylenol- [facility]     2. Physical Therapy: yes for back and fibromyalgia- [facility], hasn't tried in years  3. Pain Psychology: no  4. Surgery: bilateral hip replacements with Dr. Man January and July 2020  left shoulder replacement  5. Injections: trigger point injections- [facility] but short term  x3 epidural steroid injections with [facility] in 2012- [facility]  6. Chiropractic: yes- [facility]  7. Acupuncture: no  8. TENS Unit: yes- ?/[facility]       Medications:  Current Outpatient Medications   Medication Sig Dispense Refill     acetaminophen (TYLENOL) 325 MG tablet Take 2 tablets (650 mg) by mouth every 4 hours as needed for other (mild pain) 100 tablet 0     albuterol (PROAIR HFA/PROVENTIL HFA/VENTOLIN HFA) 108 (90 BASE) MCG/ACT Inhaler Inhale 2 puffs into the lungs every 6 hours as needed for shortness of breath / dyspnea 1 Inhaler 11     amLODIPine (NORVASC) 5 MG tablet TAKE 1 TABLET BY MOUTH  DAILY 90 tablet 1     BIOTIN PO Take by mouth daily       DULoxetine (CYMBALTA) 60 MG capsule TAKE ONE CAPSULE BY MOUTH TWICE A  capsule 3     fluticasone-salmeterol (AIRDUO RESPICLICK) 232-14 MCG/ACT inhaler Inhale 1 puff into the lungs 2 times daily 1 each 11     guaiFENesin-codeine (GUAIFENESIN AC) 100-10 MG/5ML syrup Take 5 mLs by mouth every 4 hours as needed for congestion or cough Use sparingly 118 mL 0     HYDROcodone-acetaminophen (NORCO) 5-325 MG tablet Take 1-2 tablets by mouth every 6 hours as needed for pain 180 tablet 0     hydrOXYzine (ATARAX) 10 MG tablet Take 1 tablet (10 mg) by mouth every 6 hours as needed for itching or anxiety (with pain, moderate pain) 20 tablet 0     losartan (COZAAR) 50 MG tablet TAKE 1 TABLET BY MOUTH  DAILY 90 tablet 1     medical cannabis (Patient's own supply) (The purpose of this order is to document that the patient reports taking medical cannabis.  This is not a prescription,  and is not used to certify that the patient has a qualifying medical condition.) 0 Information only 0     miconazole (MICATIN) 2 % external powder Apply topically daily 90 g 11     montelukast (SINGULAIR) 10 MG tablet Take 1 tablet (10 mg) by mouth At Bedtime 90 tablet 3     naloxone (NARCAN) 4 MG/0.1ML nasal spray Spray 1 spray (4 mg) into one nostril alternating nostrils once as needed for opioid reversal every 2-3 minutes until assistance arrives 0.2 mL 0     nirmatrelvir and ritonavir (PAXLOVID) therapy pack Take 3 tablets by mouth 2 times daily 30 each 0     oxybutynin (DITROPAN) 5 MG tablet        Vitamin D3 (CHOLECALCIFEROL) 25 mcg (1000 units) tablet Take by mouth daily         Medical History: any changes in medical history since they were last seen? No    Review of Systems: A 10-point review of systems was negative, with the exception of chronic pain issues and fatigue, HTN, weakness (generalized).     Objective:    Physical Exam:  There were no vitals taken for this visit.  Constitutional: Well developed, well nourished, appears stated age. Obese.   HEENT: Head atraumatic, normocephalic. Eyes without conjunctival injection or jaundice. Oropharynx clear. Neck supple. No obvious neck masses.  Skin: No rash, lesions, or petechiae of exposed skin.   Psychiatric/mental status: Alert, without lethargy or stupor. Speech fluent. Appropriate affect. Mood normal. Able to follow commands without difficulty.     Imaging:  MRI of lumbar spine was completed on 7/29/19 and shows:  L5-S1: 4 mm spondylolisthesis with moderate cranially extruded central disc herniation approximately 10 mm CC by 6 mm AP with moderate right and marked left hypertrophic facet degeneration produce moderately severe trefoil central stenosis with impingement of S1 nerve roots. Mild left foraminal stenosis.     L4-L5: Moderate to marked vertical narrowing with desiccation, broad annular bulging, 5 mm AP left posterolateral protrusion with  moderate central and severe left subarticular recess stenosis with left L5 impingement. Mild bilateral foraminal stenosis without nerve root impingement. Mild facet degeneration.     L3-L4: Moderate disc desiccation with mild posterolateral to far lateral annular bulging, no significant central compromise. No significant foraminal compromise. Facet joints are unremarkable.     L2-L3: Moderate disc desiccation with normal posterior annular morphology and no stenosis. No significant foraminal compromise. Facet joints are unremarkable.     L1-L2: Very small right paracentral protrusion with moderate disc desiccation and no stenosis. No significant foraminal compromise. Facet joints are unremarkable.     Lower thoracic: Moderate T12-L1 and T11-12 disc degeneration with normal posterior annular morphology, small Schmorl's nodes.     CONCLUSION: Multilevel spondylosis with mostly sacralized transitional S1 level with a narrow S1-2 disc, and these findings:     1. Grade 1 L5-S1 degenerative spondylolisthesis with moderate disc extrusion, advanced facet degeneration and severe subarticular recess stenosis impinging S1 nerve roots.     2. Marked left L4-5 subarticular recess stenosis with annular protrusion impinging left L5 nerve root.     3. Moderate disc degeneration at other thoracolumbar levels without neural compression.     Note: Mild increased size of L5-S1 disc extrusion since prior study.      BILLING TIME DOCUMENTATION:   The total TIME spent on this patient on the date of the encounter/appointment was 29 minutes.      TOTAL TIME includes:   Time spent preparing to see the patient (reviewing records and tests)   Time spent face to face (or over the phone) with the patient   Time spent ordering tests, medications, procedures and referrals   Time spent Referring and communicating with other healthcare professionals   Time spent documenting clinical information in Epic       Video-Visit Details    Video Start Time:  2:32 PM    Type of service:  Video Visit    Video End Time:3:00 PM    Originating Location (pt. Location): Home    Distant Location (provider location):  [facility] [place]     Platform used for Video Visit: [employer]    Kita Pozo

## 2022-06-24 ENCOUNTER — VIRTUAL VISIT (OUTPATIENT)
Dept: PALLIATIVE MEDICINE | Facility: CLINIC | Age: 77
End: 2022-06-24
Payer: MEDICARE

## 2022-06-24 DIAGNOSIS — M79.7 FIBROMYALGIA: ICD-10-CM

## 2022-06-24 DIAGNOSIS — M47.816 LUMBAR SPONDYLOSIS: Primary | ICD-10-CM

## 2022-06-24 PROCEDURE — 99213 OFFICE O/P EST LOW 20 MIN: CPT | Mod: 95 | Performed by: NURSE PRACTITIONER

## 2022-06-24 ASSESSMENT — PAIN SCALES - GENERAL: PAINLEVEL: WORST PAIN (10)

## 2022-06-24 NOTE — PATIENT INSTRUCTIONS
1.  Pain Physical Therapy:    YES  I would recommend- pain physical therapy, pool therapy, or in home physical therapy.    2.  Pain Psychologist to address relaxation, behavioral change, coping style, and other factors important to improvement.  NO   3.  Medication Management:     1. Continuemedical cannabis. I encouraged she continue medical cannabis as directed by pharmacist. I did suggest she follow up with dispensary to discuss if other products might be helpful.    3. It is reasonable to continue Norco for severe pain (has been on opioids for 15 years+ with good pain benefit and no misuse) but did encourage she try to reduce use as able, 5 tabs a day instead of 6 when able. We discussed this will allow this medication to be more effective for her long term.    4.  Potential procedures: not at this time. She will let me know if interested in lumbar spine injections, we would need to do a physical exam first to determine which may be more helpful.    5.  Referrals: I would recommend acupuncture, try to have visits on a weekly basis. I would recommend [facility] -[phone].    6.  Follow up with CAMELIA Cruz CNP in 2-3 months in person.     ----------------------------------------------------------------  Clinic Number:  [phone]   Call with any questions about your care and for scheduling assistance.   Calls are returned Monday through Friday between 8 AM and 4:30 PM. We usually get back to you within 2 business days depending on the issue/request.    If we are prescribing your medications:  For opioid medication refills, call the clinic or send a [employer] message 7 days in advance.  Please include:  Name of requested medication  Name of the pharmacy.  For non-opioid medications, call your pharmacy directly to request a refill. Please allow 3-4 days to be processed.   Per [place] State Law:  All controlled substance prescriptions must be filled within 30 days of being written.    For those  controlled substances allowing refills, pickup must occur within 30 days of last fill.      We believe regular attendance is key to your success in our program!    Any time you are unable to keep your appointment we ask that you call us at least 24 hours in advance to cancel.This will allow us to offer the appointment time to another patient.   Multiple missed appointments may lead to dismissal from the clinic.

## 2022-06-24 NOTE — PROGRESS NOTES
06/24/22 1428   PEG: A Thee-Item Scale Assessing Pain Intensity and Interference        0 = No pain / No interference    10 = Pain as bad as you can imagine / Completely interferes   What number best describes your pain on average in the past week? 6   What number best describes how, during the past week, pain has interfered with your enjoyment of life? 3   What number best describes how, during the past week, pain has interfered with your general activity? 5   PEG Total Score 4.67

## 2022-07-05 ENCOUNTER — MYC MEDICAL ADVICE (OUTPATIENT)
Dept: INTERNAL MEDICINE | Facility: CLINIC | Age: 77
End: 2022-07-05

## 2022-07-06 NOTE — TELEPHONE ENCOUNTER
See [employer] message.   I think chart can be routed to p [id number] to update covid status.?

## 2022-07-17 ENCOUNTER — MYC REFILL (OUTPATIENT)
Dept: INTERNAL MEDICINE | Facility: CLINIC | Age: 77
End: 2022-07-17

## 2022-07-17 DIAGNOSIS — M51.369 DDD (DEGENERATIVE DISC DISEASE), LUMBAR: ICD-10-CM

## 2022-07-17 DIAGNOSIS — M79.7 FIBROMYALGIA: ICD-10-CM

## 2022-07-17 DIAGNOSIS — G89.4 CHRONIC PAIN SYNDROME: ICD-10-CM

## 2022-07-19 ENCOUNTER — VIRTUAL VISIT (OUTPATIENT)
Dept: INTERNAL MEDICINE | Facility: CLINIC | Age: 77
End: 2022-07-19
Payer: MEDICARE

## 2022-07-19 DIAGNOSIS — G89.29 CHRONIC MIDLINE LOW BACK PAIN WITHOUT SCIATICA: Primary | ICD-10-CM

## 2022-07-19 DIAGNOSIS — G47.00 INSOMNIA, UNSPECIFIED TYPE: ICD-10-CM

## 2022-07-19 DIAGNOSIS — F11.90 CHRONIC, CONTINUOUS USE OF OPIOIDS: ICD-10-CM

## 2022-07-19 DIAGNOSIS — R05.3 CHRONIC COUGH: ICD-10-CM

## 2022-07-19 DIAGNOSIS — M54.50 CHRONIC MIDLINE LOW BACK PAIN WITHOUT SCIATICA: Primary | ICD-10-CM

## 2022-07-19 DIAGNOSIS — F11.20 NARCOTIC DEPENDENCE (H): ICD-10-CM

## 2022-07-19 DIAGNOSIS — H60.509 ACUTE OTITIS EXTERNA, UNSPECIFIED LATERALITY, UNSPECIFIED TYPE: ICD-10-CM

## 2022-07-19 PROCEDURE — 99214 OFFICE O/P EST MOD 30 MIN: CPT | Mod: 95 | Performed by: INTERNAL MEDICINE

## 2022-07-19 RX ORDER — MIRTAZAPINE 15 MG/1
15 TABLET, FILM COATED ORAL AT BEDTIME
Qty: 30 TABLET | Refills: 0 | Status: SHIPPED | OUTPATIENT
Start: 2022-07-19 | End: 2023-07-31

## 2022-07-19 RX ORDER — HYDROCODONE BITARTRATE AND ACETAMINOPHEN 5; 325 MG/1; MG/1
1-2 TABLET ORAL EVERY 6 HOURS PRN
Qty: 180 TABLET | Refills: 0 | Status: SHIPPED | OUTPATIENT
Start: 2022-07-19 | End: 2022-08-19

## 2022-07-19 RX ORDER — NEOMYCIN SULFATE, POLYMYXIN B SULFATE, HYDROCORTISONE 3.5; 10000; 1 MG/ML; [USP'U]/ML; MG/ML
3 SOLUTION/ DROPS AURICULAR (OTIC) 4 TIMES DAILY
Qty: 10 ML | Refills: 0 | Status: SHIPPED | OUTPATIENT
Start: 2022-07-19

## 2022-07-19 NOTE — PROGRESS NOTES
Svetlana is a 77 year old who is being evaluated via a billable video visit.      How would you like to obtain your AVS? MyChart  If the video visit is dropped, the invitation should be resent by: Text to cell phone: [phone]  Will anyone else be joining your video visit? No          Assessment & Plan     Chronic midline low back pain without sciatica  Continues to have chronic stable back pain, continue pain medication    Narcotic dependence (H)  Stable use, continue medication    Chronic, continuous use of opioids  Stable, continue medication    Acute otitis externa, unspecified laterality, unspecified type  Advised that I cannot look in your ears through a virtual visit but we can try some Cortisporin drops, may have gotten some mild otitis from irritation from the wax removal.  If not improving will need to be seen  - neomycin-polymyxin-hydrocortisone (CORTISPORIN) 3.5-53133-8 otic solution; Place 3 drops in ear(s) 4 times daily    Insomnia, unspecified type  Suggest starting low-dose mirtazapine, discussed good sleep habits, avoid napping  - mirtazapine (REMERON) 15 MG tablet; Take 1 tablet (15 mg) by mouth At Bedtime    Chronic cough  Stable      Return in about 6 months (around 1/19/2023) for Wellness visit, medication follow up.    Dolores Blanton MD  [facility]   Svetlana is a 77 year old, presenting for the following health issues:      Follow-up chronic pain, narcotic dependency: She reports her pain levels are overall fairly stable.  She is continuing to take medication daily.  No new back symptoms.      HPI     Hypertension Follow-up      Do you check your blood pressure regularly outside of the clinic? No     Are you following a low salt diet? No    Are your blood pressures ever more than 140 on the top number (systolic) OR more   than 90 on the bottom number (diastolic), for example 140/90? No      How many servings of fruits and vegetables do you eat daily?   2-3    On average, how many sweetened beverages do you drink each day (Examples: soda, juice, sweet tea, etc.  Do NOT count diet or artificially sweetened beverages)?   0    How many days per week do you exercise enough to make your heart beat faster? 3 or less    How many minutes a day do you exercise enough to make your heart beat faster? 10 - 19    How many days per week do you miss taking your medication? 0        Current Concerns:   1.  Sleep problems: She is having trouble getting to sleep and awakening during the night.  This is been for some time.  She does sometimes take naps.  2.  She is having issues with ears, she had gone to another clinic and had her ears washed out because she thought there was wax.  They hurt a lot after that and apparently was not a significant amount of wax.  When she started to have pain she was given some type of eardrop that it really hurt to use.  They continue to hurt now, no discharge.    Patient Active Problem List   Diagnosis     Mild persistent asthma without complication     Restless leg syndrome     Fibromyalgia     Advanced directives, counseling/discussion     DDD (degenerative disc disease), lumbar     Spinal stenosis     Controlled substance agreement signed- [id number]/20     Morbid obesity (H)     Anxiety     Narcotic dependence (H)     Benign essential hypertension     CARDIOVASCULAR SCREENING; LDL GOAL LESS THAN 130     Chronic, continuous use of opioids       Current Outpatient Medications   Medication Sig Dispense Refill     acetaminophen (TYLENOL) 325 MG tablet Take 2 tablets (650 mg) by mouth every 4 hours as needed for other (mild pain) 100 tablet 0     albuterol (PROAIR HFA/PROVENTIL HFA/VENTOLIN HFA) 108 (90 BASE) MCG/ACT Inhaler Inhale 2 puffs into the lungs every 6 hours as needed for shortness of breath / dyspnea 1 Inhaler 11     amLODIPine (NORVASC) 5 MG tablet TAKE 1 TABLET BY MOUTH  DAILY 90 tablet 1     BIOTIN PO Take by mouth daily        DULoxetine (CYMBALTA) 60 MG capsule TAKE ONE CAPSULE BY MOUTH TWICE A  capsule 3     fluticasone-salmeterol (AIRDUO RESPICLICK) 232-14 MCG/ACT inhaler Inhale 1 puff into the lungs 2 times daily 1 each 11     guaiFENesin-codeine (GUAIFENESIN AC) 100-10 MG/5ML syrup Take 5 mLs by mouth every 4 hours as needed for congestion or cough Use sparingly 118 mL 0     HYDROcodone-acetaminophen (NORCO) 5-325 MG tablet Take 1-2 tablets by mouth every 6 hours as needed for pain 180 tablet 0     hydrOXYzine (ATARAX) 10 MG tablet Take 1 tablet (10 mg) by mouth every 6 hours as needed for itching or anxiety (with pain, moderate pain) 20 tablet 0     losartan (COZAAR) 50 MG tablet TAKE 1 TABLET BY MOUTH  DAILY 90 tablet 1     medical cannabis (Patient's own supply) (The purpose of this order is to document that the patient reports taking medical cannabis.  This is not a prescription, and is not used to certify that the patient has a qualifying medical condition.) 0 Information only 0     miconazole (MICATIN) 2 % external powder Apply topically daily 90 g 11     montelukast (SINGULAIR) 10 MG tablet Take 1 tablet (10 mg) by mouth At Bedtime 90 tablet 3     naloxone (NARCAN) 4 MG/0.1ML nasal spray Spray 1 spray (4 mg) into one nostril alternating nostrils once as needed for opioid reversal every 2-3 minutes until assistance arrives 0.2 mL 0     oxybutynin (DITROPAN) 5 MG tablet        Vitamin D3 (CHOLECALCIFEROL) 25 mcg (1000 units) tablet Take by mouth daily           Social History     Tobacco Use     Smoking status: Former Smoker     Quit date: 1969     Years since quittin.5     Smokeless tobacco: Never Used   Substance Use Topics     Alcohol use: Yes     Alcohol/week: 0.0 standard drinks     Comment: casual        Review of Systems   Negative for fever, chills, she continues have some chronic cough, no new numbness or weakness.  She has had some soreness at her Achilles tendon.      Objective           Vitals:  No  vitals were obtained today due to virtual visit.    Physical Exam   GENERAL: Healthy, alert and no distress  EYES: Eyes grossly normal to inspection.  No discharge or erythema, or obvious scleral/conjunctival abnormalities.  RESP: No audible wheeze, cough, or visible cyanosis.  No visible retractions or increased work of breathing.    SKIN: Visible skin clear. No significant rash, abnormal pigmentation or lesions.  NEURO: Cranial nerves grossly intact.  Mentation and speech appropriate for age.  PSYCH: Mentation appears normal, affect normal/bright, judgement and insight intact, normal speech and appearance well-groomed.                Video-Visit Details    Video Start Time: 5:00    Type of service:  Video Visit    Video End Time:5:25    Originating Location (pt. Location): Home    Distant Location (provider location):  [facility]     Platform used for Video Visit: [employer]    .  ..

## 2022-07-19 NOTE — PROGRESS NOTES
Svetlana is a 77 year old who is being evaluated via a billable video visit.      How would you like to obtain your AVS? MyChart  If the video visit is dropped, the invitation should be resent by: Text to cell phone: [phone]  Will anyone else be joining your video visit? No  {If patient encounters technical issues they should call [phone] :902559}        {PROVIDER CHARTING PREFERENCE:832978}    Subjective   Svetlana is a 77 year old{ACCOMPANIED BY STATEMENT (Optional):898335}, presenting for the following health issues:  No chief complaint on file.      HPI     Hypertension Follow-up      Do you check your blood pressure regularly outside of the clinic? No     Are you following a low salt diet? Yes    Are your blood pressures ever more than 140 on the top number (systolic) OR more   than 90 on the bottom number (diastolic), for example 140/90? No    Depression and Anxiety Follow-Up    How are you doing with your depression since your last visit? Improved     How are you doing with your anxiety since your last visit?  Improved     Are you having other symptoms that might be associated with depression or anxiety? No    Have you had a significant life event? No     Do you have any concerns with your use of alcohol or other drugs? No    Social History     Tobacco Use     Smoking status: Former Smoker     Quit date: 1969     Years since quittin.5     Smokeless tobacco: Never Used   Vaping Use     Vaping Use: Never used   Substance Use Topics     Alcohol use: Yes     Alcohol/week: 0.0 standard drinks     Comment: casual     Drug use: No     PHQ 2018 7/10/2018 2019   PHQ-9 Total Score 2 2 3   Q9: Thoughts of better off dead/self-harm past 2 weeks Not at all Not at all Not at all     GHADA-7 SCORE 7/10/2018 2019 2019   Total Score 0 0 0     {Last PHQ9 or GAD7 Responses (Optional):444746}    Suicide Assessment Five-step Evaluation and Treatment (SAFE-T)  {Provider  Link to Depression  "Care Package SmartSet :229171}    How many servings of fruits and vegetables do you eat daily?  2-3    On average, how many sweetened beverages do you drink each day (Examples: soda, juice, sweet tea, etc.  Do NOT count diet or artificially sweetened beverages)?   0    How many days per week do you exercise enough to make your heart beat faster? 3 or less    How many minutes a day do you exercise enough to make your heart beat faster? 10 - 19    How many days per week do you miss taking your medication? 0    {additonal problems for provider to add (Optional):268606}    Review of Systems   {ROS COMP (Optional):037013}      Objective           Vitals:  No vitals were obtained today due to virtual visit.    Physical Exam   {video visit exam brief selected:038707::\"GENERAL: Healthy, alert and no distress\",\"EYES: Eyes grossly normal to inspection.  No discharge or erythema, or obvious scleral/conjunctival abnormalities.\",\"RESP: No audible wheeze, cough, or visible cyanosis.  No visible retractions or increased work of breathing.  \",\"SKIN: Visible skin clear. No significant rash, abnormal pigmentation or lesions.\",\"NEURO: Cranial nerves grossly intact.  Mentation and speech appropriate for age.\",\"PSYCH: Mentation appears normal, affect normal/bright, judgement and insight intact, normal speech and appearance well-groomed.\"}    {Diagnostic Test Results (Optional):315716}    {AMBULATORY ATTESTATION (Optional):241873}        Video-Visit Details    Video Start Time: {video visit start/end time for provider to select:163252}    Type of service:  Video Visit    Video End Time:{video visit start/end time for provider to select:661234}    Originating Location (pt. Location): {video visit patient location:777253::\"Home\"}    Distant Location (provider location):  [facility]     Platform used for Video Visit: {Virtual Visit Platforms:789905::\"[employer]\"}    .  ..  "

## 2022-07-27 ENCOUNTER — HOSPITAL ENCOUNTER (OUTPATIENT)
Dept: MAMMOGRAPHY | Facility: CLINIC | Age: 77
Discharge: HOME | End: 2022-07-27
Attending: INTERNAL MEDICINE | Admitting: INTERNAL MEDICINE
Payer: MEDICARE

## 2022-07-27 DIAGNOSIS — Z12.31 VISIT FOR SCREENING MAMMOGRAM: ICD-10-CM

## 2022-07-27 PROCEDURE — 77067 SCR MAMMO BI INCL CAD: CPT

## 2022-08-10 DIAGNOSIS — J45.31 MILD PERSISTENT ASTHMA WITH ACUTE EXACERBATION: ICD-10-CM

## 2022-08-13 NOTE — TELEPHONE ENCOUNTER
Pending Prescriptions:                       Disp   Refills    montelukast (SINGULAIR) 10 MG tablet [Phar*90 tab*3        Sig: TAKE 1 TABLET(10 MG) BY MOUTH AT BEDTIME    Routing refill request to provider for review/approval because:  ACT Total Scores 1/28/2018 7/10/2018 5/28/2019   ACT TOTAL SCORE - - -   ASTHMA ER VISITS - - -   ASTHMA HOSPITALIZATIONS - - -   ACT TOTAL SCORE (Goal Greater than or Equal to 20) 22 24 15   In the past 12 months, how many times did you visit the emergency room for your asthma without being admitted to the hospital? 0 0 0   In the past 12 months, how many times were you hospitalized overnight because of your asthma? 0 0 0

## 2022-08-14 RX ORDER — MONTELUKAST SODIUM 10 MG/1
TABLET ORAL
Qty: 90 TABLET | Refills: 3 | Status: SHIPPED | OUTPATIENT
Start: 2022-08-14 | End: 2023-07-31

## 2022-08-19 ENCOUNTER — MYC MEDICAL ADVICE (OUTPATIENT)
Dept: INTERNAL MEDICINE | Facility: CLINIC | Age: 77
End: 2022-08-19

## 2022-08-19 ENCOUNTER — MYC REFILL (OUTPATIENT)
Dept: INTERNAL MEDICINE | Facility: CLINIC | Age: 77
End: 2022-08-19

## 2022-08-19 DIAGNOSIS — R09.81 NASAL CONGESTION: ICD-10-CM

## 2022-08-19 DIAGNOSIS — R05.9 COUGH: ICD-10-CM

## 2022-08-19 DIAGNOSIS — M51.369 DDD (DEGENERATIVE DISC DISEASE), LUMBAR: ICD-10-CM

## 2022-08-19 DIAGNOSIS — G89.4 CHRONIC PAIN SYNDROME: ICD-10-CM

## 2022-08-19 DIAGNOSIS — M79.7 FIBROMYALGIA: ICD-10-CM

## 2022-08-21 RX ORDER — HYDROCODONE BITARTRATE AND ACETAMINOPHEN 5; 325 MG/1; MG/1
1-2 TABLET ORAL EVERY 6 HOURS PRN
Qty: 180 TABLET | Refills: 0 | Status: SHIPPED | OUTPATIENT
Start: 2022-08-21 | End: 2022-09-25

## 2022-08-23 RX ORDER — CODEINE PHOSPHATE/GUAIFENESIN 10-100MG/5
5 LIQUID (ML) ORAL EVERY 4 HOURS PRN
Qty: 118 ML | Refills: 0 | Status: SHIPPED | OUTPATIENT
Start: 2022-08-23 | End: 2022-11-05

## 2022-09-13 NOTE — PROGRESS NOTES
A chronic asymptomatic uncontrolled a 1 compare lipid panel to previously increase compared with before encouraged patient to watch for the portion low-fat diet and increased physical activity Svetlana is a 77 year old who is being evaluated via a billable telephone visit.      What phone number would you like to be contacted at? [phone]  How would you like to obtain your AVS? MyChart    Assessment & Plan     Infection due to 2019 novel coronavirus    - nirmatrelvir and ritonavir (PAXLOVID) therapy pack; Take 3 tablets by mouth 2 times daily    Mild persistent asthma without complication    - nirmatrelvir and ritonavir (PAXLOVID) therapy pack; Take 3 tablets by mouth 2 times daily    Morbid obesity (H)    - nirmatrelvir and ritonavir (PAXLOVID) therapy pack; Take 3 tablets by mouth 2 times daily    Benign essential hypertension    - nirmatrelvir and ritonavir (PAXLOVID) therapy pack; Take 3 tablets by mouth 2 times daily    Cough    - guaiFENesin-codeine (GUAIFENESIN AC) 100-10 MG/5ML syrup; Take 5 mLs by mouth every 4 hours as needed for congestion or cough Use sparingly    Nasal congestion    - guaiFENesin-codeine (GUAIFENESIN AC) 100-10 MG/5ML syrup; Take 5 mLs by mouth every 4 hours as needed for congestion or cough Use sparingly    30 minutes spent on the date of the encounter doing chart review, history and exam, documentation and further activities per the note     See Patient Instructions: advised to take medication as prescribed. Monitor for low blood pressure (norvasc), monitor for anticholinergic effects (oxybutynin)- reduce dose/ break in half if needed.  Do not use airduo inhaler while taking paxlovid. Monitor for increased sedation with norco/ codeine and reduce dose if needed. If worsening symptoms, go in for in person evaluation.  Pt in agreement.     No follow-ups on file.    TOMASA BOURGEOIS  [facility] DIANA Mota is a 77 year old, presenting for the following health issues:  Covid Concern      HPI     COVID-19 Symptom Review  How many days ago did these symptoms start? 6/18/22    Patient reports she thinks she caught COVID Saturday, she  has been sick the last 2 days.  Has had positive test. Last GFR 1/22 was 65, she has no known hx of acute or chronic kidney disease. Discussed how paxlovid works, potential side effects.  Discussed med interactions of norvasc, airduo, norco, oxybutynin and codeine.  She is requesting codeine cough syrup as this is what helps her asthma the most. She would like to take antiviral at this time.   Are any of the following symptoms significant for you?    New or worsening difficulty breathing? No    Worsening cough? Yes, I am coughing up mucus.    Fever or chills? No    Headache: YES    Sore throat: no    Chest pain: no    Diarrhea: no    Body aches? no    What treatments has patient tried? Aleve, sudafed, cough syrup    Does patient live in a nursing home, group home, or shelter? no  Does patient have a way to get food/medications during quarantined? Yes, I have a friend or family member who can help me.        Review of Systems   Constitutional, HEENT, cardiovascular, pulmonary, GI, , musculoskeletal, neuro, skin, endocrine and psych systems are negative, except as otherwise noted.      Objective           Vitals:  No vitals were obtained today due to virtual visit.    Physical Exam   healthy, alert and no distress  PSYCH: Alert and oriented times 3; coherent speech, normal   rate and volume, able to articulate logical thoughts, able   to abstract reason, no tangential thoughts, no hallucinations   or delusions  Her affect is normal  RESP: No cough, no audible wheezing, able to talk in full sentences  Remainder of exam unable to be completed due to telephone visits    See orders    Phone call duration: 15 minutes    .  ..

## 2022-09-25 ENCOUNTER — MYC REFILL (OUTPATIENT)
Dept: INTERNAL MEDICINE | Facility: CLINIC | Age: 77
End: 2022-09-25

## 2022-09-25 DIAGNOSIS — M79.7 FIBROMYALGIA: ICD-10-CM

## 2022-09-25 DIAGNOSIS — M51.369 DDD (DEGENERATIVE DISC DISEASE), LUMBAR: ICD-10-CM

## 2022-09-25 DIAGNOSIS — G89.4 CHRONIC PAIN SYNDROME: ICD-10-CM

## 2022-09-27 RX ORDER — HYDROCODONE BITARTRATE AND ACETAMINOPHEN 5; 325 MG/1; MG/1
1-2 TABLET ORAL EVERY 6 HOURS PRN
Qty: 180 TABLET | Refills: 0 | Status: SHIPPED | OUTPATIENT
Start: 2022-09-27 | End: 2022-11-01

## 2022-10-15 ENCOUNTER — HEALTH MAINTENANCE LETTER (OUTPATIENT)
Age: 77
End: 2022-10-15

## 2022-11-01 ENCOUNTER — MYC REFILL (OUTPATIENT)
Dept: INTERNAL MEDICINE | Facility: CLINIC | Age: 77
End: 2022-11-01

## 2022-11-01 ENCOUNTER — TELEPHONE (OUTPATIENT)
Dept: INTERNAL MEDICINE | Facility: CLINIC | Age: 77
End: 2022-11-01

## 2022-11-01 DIAGNOSIS — G89.4 CHRONIC PAIN SYNDROME: ICD-10-CM

## 2022-11-01 DIAGNOSIS — J45.30 MILD PERSISTENT ASTHMA WITHOUT COMPLICATION: Primary | ICD-10-CM

## 2022-11-01 DIAGNOSIS — M79.7 FIBROMYALGIA: ICD-10-CM

## 2022-11-01 DIAGNOSIS — M51.369 DDD (DEGENERATIVE DISC DISEASE), LUMBAR: ICD-10-CM

## 2022-11-01 DIAGNOSIS — R05.9 COUGH: ICD-10-CM

## 2022-11-01 DIAGNOSIS — R05.9 COUGH, UNSPECIFIED TYPE: ICD-10-CM

## 2022-11-01 DIAGNOSIS — R09.81 NASAL CONGESTION: ICD-10-CM

## 2022-11-01 NOTE — TELEPHONE ENCOUNTER
Reason for Call:  Medication or medication refill:    Do you use a [facility]?  Name of the pharmacy and phone number for the current request:  [address] ([place]) - [phone]    Name of the medication requested: Cough syrup    Other request: Albuterol nebulizer solution    Can we leave a detailed message on this number? YES    Phone number patient can be reached at: Home number on file [phone] (home)    Best Time: Anytime    Call taken on 11/1/2022 at 4:10 PM by Suzi Oakley

## 2022-11-02 NOTE — TELEPHONE ENCOUNTER
Refill request for Cough syrup and Neb solution.   Last refill on 8/23/22 for #118 ml    She doesn't have the Neb sol on her current med list.     Provider approval needed.

## 2022-11-03 ENCOUNTER — TELEPHONE (OUTPATIENT)
Dept: INTERNAL MEDICINE | Facility: CLINIC | Age: 77
End: 2022-11-03

## 2022-11-03 RX ORDER — HYDROCODONE BITARTRATE AND ACETAMINOPHEN 5; 325 MG/1; MG/1
1-2 TABLET ORAL EVERY 6 HOURS PRN
Qty: 180 TABLET | Refills: 0 | Status: SHIPPED | OUTPATIENT
Start: 2022-11-03 | End: 2022-12-14

## 2022-11-03 NOTE — TELEPHONE ENCOUNTER
Pending Prescriptions:                       Disp   Refills    HYDROcodone-acetaminophen (NORCO) 5-325 MG*180 ta*0        Sig: Take 1-2 tablets by mouth every 6 hours as needed for           pain  Routing refill request to provider for review/approval because:  Drug not on the [facility] refill protocol   Last ov 7/19/2022

## 2022-11-03 NOTE — TELEPHONE ENCOUNTER
I have never ordered albuterol nebs for her.  Looks like she may have had some in the hospital in 2020 but I do not see that any prescription was ever provided for nebulizer treatments.    Is not clear her asthma is ever been severe enough for her to require nebulizer treatment so we need to have more information about what is going on and why she thinks she needs nebulizers.    Most people with mild asthma can manage with their inhaler alone.  If she is having a difficult time getting the inhaler used properly, a spacer device may be helpful.

## 2022-11-04 NOTE — TELEPHONE ENCOUNTER
Pt states she has had a Nebulizer for 30 years. She uses this when she has flare ups. She cannot breath when has flare up.     She doesn't use a spacer. She didn't like it.   [facility] but [facility] not [facility] a long time. She is trying to get appointment there but they are booking out several months.   She has 3 vials left.     She is also asking about the cough syrup refill. .     Offered to schedule appt with Dr Blanton but she didn't have her schedule with her.

## 2022-11-05 RX ORDER — ALBUTEROL SULFATE 0.63 MG/3ML
1 SOLUTION RESPIRATORY (INHALATION) EVERY 6 HOURS PRN
Qty: 90 ML | Refills: 1 | Status: SHIPPED | OUTPATIENT
Start: 2022-11-05

## 2022-11-05 RX ORDER — CODEINE PHOSPHATE/GUAIFENESIN 10-100MG/5
5 LIQUID (ML) ORAL EVERY 4 HOURS PRN
Qty: 118 ML | Refills: 0 | Status: SHIPPED | OUTPATIENT
Start: 2022-11-05 | End: 2023-01-17

## 2022-11-05 NOTE — TELEPHONE ENCOUNTER
I sent 1 refill on the neb.  If she is bad enough to need a nebulizer treatment  she should be calling because we would probably need to do some other treatment.

## 2022-12-14 ENCOUNTER — MYC REFILL (OUTPATIENT)
Dept: INTERNAL MEDICINE | Facility: CLINIC | Age: 77
End: 2022-12-14

## 2022-12-14 DIAGNOSIS — M51.369 DDD (DEGENERATIVE DISC DISEASE), LUMBAR: ICD-10-CM

## 2022-12-14 DIAGNOSIS — G89.4 CHRONIC PAIN SYNDROME: ICD-10-CM

## 2022-12-14 DIAGNOSIS — M79.7 FIBROMYALGIA: ICD-10-CM

## 2022-12-20 RX ORDER — HYDROCODONE BITARTRATE AND ACETAMINOPHEN 5; 325 MG/1; MG/1
1-2 TABLET ORAL EVERY 6 HOURS PRN
Qty: 180 TABLET | Refills: 0 | Status: SHIPPED | OUTPATIENT
Start: 2022-12-20 | End: 2023-01-17

## 2023-01-10 DIAGNOSIS — I10 BENIGN ESSENTIAL HYPERTENSION: ICD-10-CM

## 2023-01-11 RX ORDER — LOSARTAN POTASSIUM 50 MG/1
TABLET ORAL
Qty: 90 TABLET | Refills: 3 | Status: SHIPPED | OUTPATIENT
Start: 2023-01-11 | End: 2023-07-31

## 2023-01-11 RX ORDER — AMLODIPINE BESYLATE 5 MG/1
TABLET ORAL
Qty: 90 TABLET | Refills: 3 | Status: SHIPPED | OUTPATIENT
Start: 2023-01-11 | End: 2023-11-21

## 2023-01-11 NOTE — TELEPHONE ENCOUNTER
Pending Prescriptions:                       Disp   Refills    amLODIPine (NORVASC) 5 MG tablet [Pharmacy*90 tab*3        Sig: TAKE 1 TABLET BY MOUTH  DAILY    losartan (COZAAR) 50 MG tablet [Pharmacy M*90 tab*3        Sig: TAKE 1 TABLET BY MOUTH  DAILY    Routing refill request to provider for review/approval because:  BP Readings from Last 3 Encounters:   02/03/22 (!) 160/95   01/25/22 (!) 168/101   11/01/21 (!) 157/61

## 2023-01-17 ENCOUNTER — VIRTUAL VISIT (OUTPATIENT)
Dept: INTERNAL MEDICINE | Facility: CLINIC | Age: 78
End: 2023-01-17
Payer: MEDICARE

## 2023-01-17 DIAGNOSIS — M79.662 BILATERAL CALF PAIN: ICD-10-CM

## 2023-01-17 DIAGNOSIS — R35.1 NOCTURIA: ICD-10-CM

## 2023-01-17 DIAGNOSIS — M79.7 FIBROMYALGIA: ICD-10-CM

## 2023-01-17 DIAGNOSIS — R35.0 URINARY FREQUENCY: ICD-10-CM

## 2023-01-17 DIAGNOSIS — R05.9 COUGH, UNSPECIFIED TYPE: Primary | ICD-10-CM

## 2023-01-17 DIAGNOSIS — M79.661 BILATERAL CALF PAIN: ICD-10-CM

## 2023-01-17 DIAGNOSIS — R32 URINARY INCONTINENCE, UNSPECIFIED TYPE: ICD-10-CM

## 2023-01-17 DIAGNOSIS — G89.4 CHRONIC PAIN SYNDROME: ICD-10-CM

## 2023-01-17 DIAGNOSIS — M51.369 DDD (DEGENERATIVE DISC DISEASE), LUMBAR: ICD-10-CM

## 2023-01-17 PROCEDURE — 99214 OFFICE O/P EST MOD 30 MIN: CPT | Mod: 95 | Performed by: INTERNAL MEDICINE

## 2023-01-17 RX ORDER — CODEINE PHOSPHATE/GUAIFENESIN 10-100MG/5
5 LIQUID (ML) ORAL EVERY 4 HOURS PRN
Qty: 118 ML | Refills: 0 | Status: SHIPPED | OUTPATIENT
Start: 2023-01-17 | End: 2023-07-31

## 2023-01-17 RX ORDER — HYDROCODONE BITARTRATE AND ACETAMINOPHEN 5; 325 MG/1; MG/1
1-2 TABLET ORAL EVERY 6 HOURS PRN
Qty: 180 TABLET | Refills: 0 | Status: SHIPPED | OUTPATIENT
Start: 2023-01-19 | End: 2023-02-19

## 2023-01-17 ASSESSMENT — ENCOUNTER SYMPTOMS: COUGH: 1

## 2023-01-17 NOTE — PROGRESS NOTES
Svetlana is a 77 year old who is being evaluated via a billable video visit.      How would you like to obtain your AVS? Mail a copy  If the video visit is dropped, the invitation should be resent by: Text to cell phone: [phone]  Will anyone else be joining your video visit? No          Assessment & Plan     Cough, unspecified type  Etiology is overall unclear, it is possible the losartan is a factor, it also could be related to reflux and postnasal drainage.  For now recommend she hold her losartan for a week, if the cough is better than would change her medication to a different antihypertensive.  If the cough is not completely resolved or not improve, the next step would be to try a PPI for reflux.  For now we will refill the codeine cough syrup  - guaiFENesin-codeine (GUAIFENESIN AC) 100-10 MG/5ML syrup; Take 5 mLs by mouth every 4 hours as needed for congestion or cough Use sparingly    Urinary frequency  Her urinary symptoms have been very chronic and complex, recommend she schedule back with urology  - Adult Urology  Referral; Future    Nocturia  As above  - Adult Urology  Referral; Future    Urinary incontinence, unspecified type  As above    Bilateral calf pain  Advised this may be related to distended veins since it aches at night and not seeming to be related to stretching of the calf muscles.  It is probably not arterial since she is not having pain with walking.  It is probably not related to her back.  Recommend elevating the legs once or twice during the day and compression socks    Fibromyalgia  Stable chronic pain, refill medication  - HYDROcodone-acetaminophen (NORCO) 5-325 MG tablet; Take 1-2 tablets by mouth every 6 hours as needed for pain    DDD (degenerative disc disease), lumbar    - HYDROcodone-acetaminophen (NORCO) 5-325 MG tablet; Take 1-2 tablets by mouth every 6 hours as needed for pain    Chronic pain syndrome    - HYDROcodone-acetaminophen (NORCO) 5-325 MG tablet;  Take 1-2 tablets by mouth every 6 hours as needed for pain    Return in about 6 months (around 7/17/2023).    Dolores Blanton MD  [facility] DEVORA Mota is a 77 year old, presenting for the following health issues:    1. Cough: She has had issues with chronic cough, feels like it is maybe a little worse or stable but bothering her a lot.  Cough is mostly dry but sometimes a little mucus production.  She has tried a lot of over-the-counter things without any relief, the only thing that seems to help is Robitussin with codeine.  It does not seem that it is related to any shortness of breath, she is not having wheezing, she does not feel the inhaler or neb help with the cough very much.  She does have some acid reflux/heartburn type symptoms.  She does have chronic rhinorrhea and does have some chronic postnasal drainage.    2. Chronic pain follow up: She is stable with her medication use.  There is no significant change in her chronic back pain, no new leg symptoms.    3.  Chronic urinary frequency, urgency and nocturia: She had seen urology but recommended she decrease her fluids later in the day and put her on oxybutynin.  She does not feel oxybutynin helped at all which is caused dry mouth.  She is having some incontinence with urgency.  She has a lot of dry mouth even without the oxybutynin and is sipping on liquids later in the day still.    4.  She has been having aching in her lower legs at night.  This may be a little more on the right than the left, it is not every single night but fairly frequently.  She tends to just start feeling aching when she goes to bed, is not really feeling it much otherwise during the day.  She does not tend to have pain with walking.  When the aching is there it does not seem to help to stretch out her legs much.  They do have some minimal swelling.      Patient Active Problem List   Diagnosis     Mild persistent asthma without complication      Restless leg syndrome     Fibromyalgia     Advanced directives, counseling/discussion     Chronic midline low back pain without sciatica     Spinal stenosis     Controlled substance agreement signed- [id number]/14/20     Morbid obesity (H)     Anxiety     Narcotic dependence (H)     Benign essential hypertension     CARDIOVASCULAR SCREENING; LDL GOAL LESS THAN 130     Chronic, continuous use of opioids     Current Outpatient Medications   Medication Sig Dispense Refill     albuterol (ACCUNEB) 0.63 MG/3ML neb solution Take 3 mLs (0.63 mg) by nebulization every 6 hours as needed for shortness of breath / dyspnea or wheezing 90 mL 1     albuterol (PROAIR HFA/PROVENTIL HFA/VENTOLIN HFA) 108 (90 BASE) MCG/ACT Inhaler Inhale 2 puffs into the lungs every 6 hours as needed for shortness of breath / dyspnea 1 Inhaler 11     amLODIPine (NORVASC) 5 MG tablet TAKE 1 TABLET BY MOUTH  DAILY 90 tablet 3     BIOTIN PO Take by mouth daily       DULoxetine (CYMBALTA) 60 MG capsule TAKE ONE CAPSULE BY MOUTH TWICE A  capsule 3     fluticasone-salmeterol (AIRDUO RESPICLICK) 232-14 MCG/ACT inhaler Inhale 1 puff into the lungs 2 times daily 1 each 11     guaiFENesin-codeine (GUAIFENESIN AC) 100-10 MG/5ML syrup Take 5 mLs by mouth every 4 hours as needed for congestion or cough Use sparingly 118 mL 0     HYDROcodone-acetaminophen (NORCO) 5-325 MG tablet Take 1-2 tablets by mouth every 6 hours as needed for pain 180 tablet 0     hydrOXYzine (ATARAX) 10 MG tablet Take 1 tablet (10 mg) by mouth every 6 hours as needed for itching or anxiety (with pain, moderate pain) 20 tablet 0     losartan (COZAAR) 50 MG tablet TAKE 1 TABLET BY MOUTH  DAILY 90 tablet 3     medical cannabis (Patient's own supply) (The purpose of this order is to document that the patient reports taking medical cannabis.  This is not a prescription, and is not used to certify that the patient has a qualifying medical condition.) 0 Information only 0     miconazole  (MICATIN) 2 % external powder Apply topically daily 90 g 11     mirtazapine (REMERON) 15 MG tablet Take 1 tablet (15 mg) by mouth At Bedtime 30 tablet 0     montelukast (SINGULAIR) 10 MG tablet TAKE 1 TABLET(10 MG) BY MOUTH AT BEDTIME 90 tablet 3     naloxone (NARCAN) 4 MG/0.1ML nasal spray Spray 1 spray (4 mg) into one nostril alternating nostrils once as needed for opioid reversal every 2-3 minutes until assistance arrives 0.2 mL 0     neomycin-polymyxin-hydrocortisone (CORTISPORIN) 3.5-92183-0 otic solution Place 3 drops in ear(s) 4 times daily 10 mL 0     oxybutynin (DITROPAN) 5 MG tablet        Vitamin D3 (CHOLECALCIFEROL) 25 mcg (1000 units) tablet Take by mouth daily       acetaminophen (TYLENOL) 325 MG tablet Take 2 tablets (650 mg) by mouth every 4 hours as needed for other (mild pain) (Patient not taking: Reported on 1/17/2023) 100 tablet 0           Review of Systems   Respiratory: Positive for cough.             Objective           Vitals:  No vitals were obtained today due to virtual visit.    Physical Exam   GENERAL: Healthy, alert and no distress  EYES: Eyes grossly normal to inspection.  No discharge or erythema, or obvious scleral/conjunctival abnormalities.  RESP: No audible wheeze, cough, or visible cyanosis.  No visible retractions or increased work of breathing.    SKIN: Visible skin clear. No significant rash, abnormal pigmentation or lesions.  NEURO: Cranial nerves grossly intact.  Mentation and speech appropriate for age.  PSYCH: Mentation appears normal, affect normal/bright, judgement and insight intact, normal speech and appearance well-groomed.                Video-Visit Details    Type of service:  Video Visit   Video Start Time: 5:27  Video End Time:5:59    Originating Location (pt. Location): Home    Distant Location (provider location):  On-site  Platform used for Video Visit: Jose

## 2023-01-23 ENCOUNTER — TRANSFERRED RECORDS (OUTPATIENT)
Dept: HEALTH INFORMATION MANAGEMENT | Facility: CLINIC | Age: 78
End: 2023-01-23

## 2023-01-23 ENCOUNTER — TELEPHONE (OUTPATIENT)
Dept: UROLOGY | Facility: CLINIC | Age: 78
End: 2023-01-23

## 2023-01-23 NOTE — TELEPHONE ENCOUNTER
[facility] Call Center    Phone Message    May a detailed message be left on voicemail: yes     Reason for Call: Patient is requesting to meet with another provider in [place]. States that she didn't like how her visit went. Please review. Thank you    Action Taken: Message routed to:  [facility] Uro    Travel Screening: Not Applicable

## 2023-01-26 ENCOUNTER — VIRTUAL VISIT (OUTPATIENT)
Dept: UROLOGY | Facility: CLINIC | Age: 78
End: 2023-01-26
Payer: MEDICARE

## 2023-01-26 VITALS — WEIGHT: 230 LBS | BODY MASS INDEX: 39.27 KG/M2 | HEIGHT: 64 IN

## 2023-01-26 DIAGNOSIS — N39.44 NOCTURNAL ENURESIS: ICD-10-CM

## 2023-01-26 DIAGNOSIS — R35.1 NOCTURIA: Primary | ICD-10-CM

## 2023-01-26 DIAGNOSIS — N39.46 MIXED INCONTINENCE: ICD-10-CM

## 2023-01-26 DIAGNOSIS — R39.15 URINARY URGENCY: ICD-10-CM

## 2023-01-26 PROCEDURE — 99214 OFFICE O/P EST MOD 30 MIN: CPT | Mod: 95 | Performed by: PHYSICIAN ASSISTANT

## 2023-01-26 RX ORDER — BIOTIN 100 %
POWDER (GRAM) MISCELLANEOUS
COMMUNITY

## 2023-01-26 RX ORDER — FINASTERIDE 5 MG/1
TABLET, FILM COATED ORAL
COMMUNITY
Start: 2022-02-10 | End: 2023-07-31

## 2023-01-26 ASSESSMENT — PAIN SCALES - GENERAL: PAINLEVEL: MODERATE PAIN (5)

## 2023-01-26 NOTE — LETTER
1/26/2023       RE: Svetlana Serrano  [address] [place] [address]     Dear Colleague,    Thank you for referring your patient, Svetlana Serrano, to the [facility] at [facility]. Please see a copy of my visit note below.    Send link to cell phone    Pt states she can barely talk bc she coughed all night  Pt states she just fell asleep at 7am      Svetlana is a 77 year old who is being evaluated via a billable video visit.      How would you like to obtain your AVS? MyChart  If the video visit is dropped, the invitation should be resent by: Text to cell phone: [phone]  Will anyone else be joining your video visit? No        Video-Visit Details    Type of service:  Video Visit    Video Start Time: 11:10 AM    Video End Time:11:24 AM    Originating Location (pt. Location): Home    Distant Location (provider location): On-site    Platform used for Video Visit: [facility]    Urology Clinic    Name: Svetlana Serrano    MRN: [id number]   YOB: 1945  Accompanied at today's visit by:self              Assessment and Plan:   77 year old female with nocturia, nocturnal enuresis, SADIE. Nocturia most bothersome symptom.     - Discussed at length correlation between sleep apnea/sleep disturbances and nocturia. Patient not interested in a a sleep study.  - Offered increasing night time oxybutynin though patient declined. Continue on current 5mg nightly dose.  - Patient reports dry mouth likely related to polypharmacy, chronic opioid use and oxybutynin. Offered switching to Myrbetriq though patient declined.   - Recommend chewing sugar free gum or continuing to use biotin mouthwash.   - Avoid fluids before bed.  - Avoid bladder irritants. Advised cutting back on soda.   - Referral to PFPT placed.   - Could try daily metamucil for bowels.   - Plan to f/u in 6 months.   - If symptoms not improved, consider cysto and discuss third line  options.       No orders of the defined types were placed in this encounter.      After discussing the assessment and plan with patient, patient verbalized understanding and agreed to the above plan. All questions answered.     30 minutes were spent today on the date of the encounter in reviewing the EMR, direct patient care, coordination of care and documentation in addition to placing referral to PFPT.     Veronica Bajwa PA-C  January 26, 2023    Patient Care Team:  Dolores Blanton MD as PCP - General (Internal Medicine)  Dolores Blanton MD as Assigned PCP  Jessenia Espinoza PA-C as Physician Assistant (Urology)  Pastor Munoz PA-C as Assigned Musculoskeletal Provider  Jessenia Espinoza PA-C as Assigned Surgical Provider  Dolores Blanton MD as Assigned Pain Medication Provider            Chief Complaint:   Urinary incontinence           History of Present Illness:   January 26, 2023    HISTORY: Svetlana Serrano is a 77 year old female is here for follow-up. We have been following her for nocturia, urinary urgency and urinary incontinence. Last saw Jessenia Espinoza PA-C on 2/28/22. At last encounter, was on oxybutyhnin 5mg and reported dry mouth. Was advised to avoid fluids before bed. Patient was also interested in PTNS, but never started/completed PTNS.  Here today for follow-up. Voids 2-3x/day at night voiding about 7-8x/night. Reports SADIE; both equally bothersome. Going through 1 pad/day and 2-3 pads/night. Feels like she empties her bladder completely. Never been tested for sleep apnea. Has trouble sleeping due to chronic pain and states she has had insomnia since she was a child. Drinks caffeinated beverages throughout the day, everyday. Denies hx of kidney stones or gross hematuria. Denies hx of Lea. Denies vaginal prolapse symptoms. Hx of cholecystectomy about 10 years ago. Has diarrhea; thinks it's associated to her medications. Takes imodium as needed. Never seen GI for this. Last  colonoscopy was 3 years ago per patient and states she was told she no longer needs to have a colonoscopy. Has a cough. Has seen provider and given cough syrup. Advised patient to go to ER if develops fever/chills, SOB, trouble breathing, or other worsening symptoms. Hx of chronic back pain and asthma. Of note, has a controlled substance agreement.  Patient voices no other concerns at this time.          Past Medical History:     Past Medical History:   Diagnosis Date     Arthritis     right shoulder     Back pain      Fibromyalgia      HTN (hypertension)      Incontinence of urine in female      Mild intermittent asthma      Other chronic pain     from fibromyalgia     Restless leg      Tubular adenoma of colon 8/15            Past Surgical History:     Past Surgical History:   Procedure Laterality Date     ARTHROPLASTY HIP Right 2020    Procedure: Right total hip arthroplasty.  Posterior lateral piriformis sparing approach.;  Surgeon: Cirilo Man MD;  Location: [facility] OR     ARTHROPLASTY HIP Left 7/15/2020    Procedure: Left total hip arthroplasty.  Posterior lateral piriformis sparing approach;  Surgeon: Cirilo Man MD;  Location: [facility] OR     ARTHROPLASTY SHOULDER Left      C/SECTION, CLASSICAL      , Classical     CHOLECYSTECTOMY, LAPOROSCOPIC      Cholecystectomy, Laparoscopic     COLONOSCOPY N/A 10/8/2018    Procedure: COLONOSCOPY;  Colonoscopy with polypectomies;  Surgeon: Anton Stroud MD;  Location: [facility] OR     HYSTERECTOMY, JOSHUA       JOINT REPLACEMTN, KNEE RT/LT      bilateral            Social History:     Social History     Tobacco Use     Smoking status: Former     Types: Cigarettes     Quit date: 1969     Years since quittin.1     Smokeless tobacco: Never   Substance Use Topics     Alcohol use: Yes     Alcohol/week: 0.0 standard drinks     Comment: casual            Family History:     Family History   Problem Relation Age of Onset     Breast Cancer Mother       Family History Negative Mother      Family History Negative Father      Ovarian Cancer No family hx of               Allergies:     Allergies   Allergen Reactions     Clindamycin Shortness Of Breath     Erythromycin Anaphylaxis     PN: LW Reaction: ANAPHYLAXIS     Amoxicillin      Rash.     Cephalexin Hives     Doxycycline GI Disturbance and Hives     gi distress       Sulfa Drugs      Hives              Medications:     Current Outpatient Medications   Medication Sig     albuterol (ACCUNEB) 0.63 MG/3ML neb solution Take 3 mLs (0.63 mg) by nebulization every 6 hours as needed for shortness of breath / dyspnea or wheezing     albuterol (PROAIR HFA/PROVENTIL HFA/VENTOLIN HFA) 108 (90 BASE) MCG/ACT Inhaler Inhale 2 puffs into the lungs every 6 hours as needed for shortness of breath / dyspnea     amLODIPine (NORVASC) 5 MG tablet TAKE 1 TABLET BY MOUTH  DAILY     BIOTIN PO Take by mouth daily     biotin POWD      DULoxetine (CYMBALTA) 60 MG capsule TAKE ONE CAPSULE BY MOUTH TWICE A DAY     finasteride (PROSCAR) 5 MG tablet      fluticasone-salmeterol (AIRDUO RESPICLICK) 232-14 MCG/ACT inhaler Inhale 1 puff into the lungs 2 times daily     guaiFENesin-codeine (GUAIFENESIN AC) 100-10 MG/5ML syrup Take 5 mLs by mouth every 4 hours as needed for congestion or cough Use sparingly     HYDROcodone-acetaminophen (NORCO) 5-325 MG tablet Take 1-2 tablets by mouth every 6 hours as needed for pain     hydrOXYzine (ATARAX) 10 MG tablet Take 1 tablet (10 mg) by mouth every 6 hours as needed for itching or anxiety (with pain, moderate pain)     losartan (COZAAR) 50 MG tablet TAKE 1 TABLET BY MOUTH  DAILY     medical cannabis (Patient's own supply) (The purpose of this order is to document that the patient reports taking medical cannabis.  This is not a prescription, and is not used to certify that the patient has a qualifying medical condition.)     miconazole (MICATIN) 2 % external powder Apply topically daily     mirtazapine  "(REMERON) 15 MG tablet Take 1 tablet (15 mg) by mouth At Bedtime     montelukast (SINGULAIR) 10 MG tablet TAKE 1 TABLET(10 MG) BY MOUTH AT BEDTIME     naloxone (NARCAN) 4 MG/0.1ML nasal spray Spray 1 spray (4 mg) into one nostril alternating nostrils once as needed for opioid reversal every 2-3 minutes until assistance arrives     neomycin-polymyxin-hydrocortisone (CORTISPORIN) 3.5-16324-7 otic solution Place 3 drops in ear(s) 4 times daily     oxybutynin (DITROPAN) 5 MG tablet      Vitamin D3 (CHOLECALCIFEROL) 25 mcg (1000 units) tablet Take by mouth daily     No current facility-administered medications for this visit.             Review of Systems:    ROS: 14 point ROS neg other than the symptoms noted above in the HPI.          Physical Exam:   Height 1.626 m (5' 4\"), weight 104.3 kg (230 lb).  5' 4\", Body mass index is 39.48 kg/m ., 230 lbs 0 oz  Gen appearance: Age-appropriate appearing female in NAD.   HEENT:  EOMI, conjunctiva clear/white. Normal ROM of neck for age.   Psych:  alert , In no acute distress.  Neuro:  A&Ox3  Resp:  Normal respiratory effort; not in acute respiratory distress.          Data:    Labs:    Creatinine   Date Value Ref Range Status   01/31/2022 0.91 0.52 - 1.04 mg/dL Final   07/09/2020 0.88 0.52 - 1.04 mg/dL Final            "

## 2023-01-26 NOTE — PROGRESS NOTES
Send link to cell phone    Pt states she can barely talk bc she coughed all night  Pt states she just fell asleep at 7am      Svetlana is a 77 year old who is being evaluated via a billable video visit.      How would you like to obtain your AVS? Jaynehart  If the video visit is dropped, the invitation should be resent by: Text to cell phone: [phone]  Will anyone else be joining your video visit? No        Video-Visit Details    Type of service:  Video Visit    Video Start Time: 11:10 AM    Video End Time:11:24 AM    Originating Location (pt. Location): Home    Distant Location (provider location): On-site    Platform used for Video Visit: [facility]    Urology Clinic    Name: Svetlana Serrano    MRN: [id number]   YOB: 1945  Accompanied at today's visit by:self              Assessment and Plan:   77 year old female with nocturia, nocturnal enuresis, SADIE. Nocturia most bothersome symptom.     - Discussed at length correlation between sleep apnea/sleep disturbances and nocturia. Patient not interested in a a sleep study.  - Offered increasing night time oxybutynin though patient declined. Continue on current 5mg nightly dose.  - Patient reports dry mouth likely related to polypharmacy, chronic opioid use and oxybutynin. Offered switching to Myrbetriq though patient declined.   - Recommend chewing sugar free gum or continuing to use biotin mouthwash.   - Avoid fluids before bed.  - Avoid bladder irritants. Advised cutting back on soda.   - Referral to PFPT placed.   - Could try daily metamucil for bowels.   - Plan to f/u in 6 months.   - If symptoms not improved, consider cysto and discuss third line options.       No orders of the defined types were placed in this encounter.      After discussing the assessment and plan with patient, patient verbalized understanding and agreed to the above plan. All questions answered.     30 minutes were spent today on the date of the encounter in reviewing the EMR,  direct patient care, coordination of care and documentation in addition to placing referral to PFPT.     Veronica Bajwa PA-C  January 26, 2023    Patient Care Team:  Dolores Blanton MD as PCP - General (Internal Medicine)  Dolores Blanton MD as Assigned PCP  Jessenia Espinoza PA-C as Physician Assistant (Urology)  Pastor Munoz PA-C as Assigned Musculoskeletal Provider  Jessenia Espinoza PA-C as Assigned Surgical Provider  Dolores Blanton MD as Assigned Pain Medication Provider            Chief Complaint:   Urinary incontinence           History of Present Illness:   January 26, 2023    HISTORY: Svetlana Serrano is a 77 year old female is here for follow-up. We have been following her for nocturia, urinary urgency and urinary incontinence. Last saw Jessenia Espinoza PA-C on 2/28/22. At last encounter, was on oxybutyhnin 5mg and reported dry mouth. Was advised to avoid fluids before bed. Patient was also interested in PTNS, but never started/completed PTNS.  Here today for follow-up. Voids 2-3x/day at night voiding about 7-8x/night. Reports SADIE; both equally bothersome. Going through 1 pad/day and 2-3 pads/night. Feels like she empties her bladder completely. Never been tested for sleep apnea. Has trouble sleeping due to chronic pain and states she has had insomnia since she was a child. Drinks caffeinated beverages throughout the day, everyday. Denies hx of kidney stones or gross hematuria. Denies hx of Lea. Denies vaginal prolapse symptoms. Hx of cholecystectomy about 10 years ago. Has diarrhea; thinks it's associated to her medications. Takes imodium as needed. Never seen GI for this. Last colonoscopy was 3 years ago per patient and states she was told she no longer needs to have a colonoscopy. Has a cough. Has seen provider and given cough syrup. Advised patient to go to ER if develops fever/chills, SOB, trouble breathing, or other worsening symptoms. Hx of chronic back pain and asthma. Of note,  has a controlled substance agreement.  Patient voices no other concerns at this time.          Past Medical History:     Past Medical History:   Diagnosis Date     Arthritis     right shoulder     Back pain      Fibromyalgia      HTN (hypertension)      Incontinence of urine in female      Mild intermittent asthma      Other chronic pain     from fibromyalgia     Restless leg      Tubular adenoma of colon 8/15            Past Surgical History:     Past Surgical History:   Procedure Laterality Date     ARTHROPLASTY HIP Right 2020    Procedure: Right total hip arthroplasty.  Posterior lateral piriformis sparing approach.;  Surgeon: Cirilo Man MD;  Location: [facility] OR     ARTHROPLASTY HIP Left 7/15/2020    Procedure: Left total hip arthroplasty.  Posterior lateral piriformis sparing approach;  Surgeon: Cirilo Man MD;  Location: [facility] OR     ARTHROPLASTY SHOULDER Left      C/SECTION, CLASSICAL      , Classical     CHOLECYSTECTOMY, LAPOROSCOPIC      Cholecystectomy, Laparoscopic     COLONOSCOPY N/A 10/8/2018    Procedure: COLONOSCOPY;  Colonoscopy with polypectomies;  Surgeon: Anton Stroud MD;  Location: [facility] OR     HYSTERECTOMY, JOSHUA       JOINT REPLACEMTN, KNEE RT/LT      bilateral            Social History:     Social History     Tobacco Use     Smoking status: Former     Types: Cigarettes     Quit date: 1969     Years since quittin.1     Smokeless tobacco: Never   Substance Use Topics     Alcohol use: Yes     Alcohol/week: 0.0 standard drinks     Comment: casual            Family History:     Family History   Problem Relation Age of Onset     Breast Cancer Mother      Family History Negative Mother      Family History Negative Father      Ovarian Cancer No family hx of               Allergies:     Allergies   Allergen Reactions     Clindamycin Shortness Of Breath     Erythromycin Anaphylaxis     PN: LW Reaction: ANAPHYLAXIS     Amoxicillin      Rash.     Cephalexin Hives      Doxycycline GI Disturbance and Hives     gi distress       Sulfa Drugs      Hives              Medications:     Current Outpatient Medications   Medication Sig     albuterol (ACCUNEB) 0.63 MG/3ML neb solution Take 3 mLs (0.63 mg) by nebulization every 6 hours as needed for shortness of breath / dyspnea or wheezing     albuterol (PROAIR HFA/PROVENTIL HFA/VENTOLIN HFA) 108 (90 BASE) MCG/ACT Inhaler Inhale 2 puffs into the lungs every 6 hours as needed for shortness of breath / dyspnea     amLODIPine (NORVASC) 5 MG tablet TAKE 1 TABLET BY MOUTH  DAILY     BIOTIN PO Take by mouth daily     biotin POWD      DULoxetine (CYMBALTA) 60 MG capsule TAKE ONE CAPSULE BY MOUTH TWICE A DAY     finasteride (PROSCAR) 5 MG tablet      fluticasone-salmeterol (AIRDUO RESPICLICK) 232-14 MCG/ACT inhaler Inhale 1 puff into the lungs 2 times daily     guaiFENesin-codeine (GUAIFENESIN AC) 100-10 MG/5ML syrup Take 5 mLs by mouth every 4 hours as needed for congestion or cough Use sparingly     HYDROcodone-acetaminophen (NORCO) 5-325 MG tablet Take 1-2 tablets by mouth every 6 hours as needed for pain     hydrOXYzine (ATARAX) 10 MG tablet Take 1 tablet (10 mg) by mouth every 6 hours as needed for itching or anxiety (with pain, moderate pain)     losartan (COZAAR) 50 MG tablet TAKE 1 TABLET BY MOUTH  DAILY     medical cannabis (Patient's own supply) (The purpose of this order is to document that the patient reports taking medical cannabis.  This is not a prescription, and is not used to certify that the patient has a qualifying medical condition.)     miconazole (MICATIN) 2 % external powder Apply topically daily     mirtazapine (REMERON) 15 MG tablet Take 1 tablet (15 mg) by mouth At Bedtime     montelukast (SINGULAIR) 10 MG tablet TAKE 1 TABLET(10 MG) BY MOUTH AT BEDTIME     naloxone (NARCAN) 4 MG/0.1ML nasal spray Spray 1 spray (4 mg) into one nostril alternating nostrils once as needed for opioid reversal every 2-3 minutes until  "assistance arrives     neomycin-polymyxin-hydrocortisone (CORTISPORIN) 3.5-11417-2 otic solution Place 3 drops in ear(s) 4 times daily     oxybutynin (DITROPAN) 5 MG tablet      Vitamin D3 (CHOLECALCIFEROL) 25 mcg (1000 units) tablet Take by mouth daily     No current facility-administered medications for this visit.             Review of Systems:    ROS: 14 point ROS neg other than the symptoms noted above in the HPI.          Physical Exam:   Height 1.626 m (5' 4\"), weight 104.3 kg (230 lb).  5' 4\", Body mass index is 39.48 kg/m ., 230 lbs 0 oz  Gen appearance: Age-appropriate appearing female in NAD.   HEENT:  EOMI, conjunctiva clear/white. Normal ROM of neck for age.   Psych:  alert , In no acute distress.  Neuro:  A&Ox3  Resp:  Normal respiratory effort; not in acute respiratory distress.          Data:    Labs:    Creatinine   Date Value Ref Range Status   01/31/2022 0.91 0.52 - 1.04 mg/dL Final   07/09/2020 0.88 0.52 - 1.04 mg/dL Final              "

## 2023-01-26 NOTE — PATIENT INSTRUCTIONS
- Will refer you to pelvic floor physical therapy. They will call you to get this scheduled.   - Try to avoid fluids 3 hours before bed. Recommend sugar free gum or biotin mouthwash for dry mouth.  - Let me know if you change your  mind about increasing the oxybutynin.   - Recommend trying to void every 2-3 hours during the day to avoid incontinence.   - Constipation can contribute to your bladder symptoms. Recommend taking daily metamucil.   - Below is a list of things that can irritate the bladder and should be eliminated:    Caffeinated soft drinks.  Coffee.  Tea.  Chocolate.  Tomato-based foods.  Acidic juices and fruits. (includes cranberry juice)  Alcohol.  Nicotine  Carbonated drinks.  Aspartame/Nutrasweet.  _______________  Locations for Pelvic Floor Physical Therapy:    [facility]   [facility] - [facility] - [facility]   [facility] - [facility] - [facility]    _________________________________________________

## 2023-02-09 DIAGNOSIS — F41.9 ANXIETY: ICD-10-CM

## 2023-02-09 DIAGNOSIS — M79.7 FIBROMYALGIA: ICD-10-CM

## 2023-02-10 RX ORDER — DULOXETIN HYDROCHLORIDE 60 MG/1
CAPSULE, DELAYED RELEASE ORAL
Qty: 180 CAPSULE | Refills: 3 | Status: SHIPPED | OUTPATIENT
Start: 2023-02-10 | End: 2023-08-22

## 2023-02-10 NOTE — TELEPHONE ENCOUNTER
Routing refill request to provider for review/approval because:  No recent blood pressure readings

## 2023-02-19 ENCOUNTER — MYC REFILL (OUTPATIENT)
Dept: INTERNAL MEDICINE | Facility: CLINIC | Age: 78
End: 2023-02-19
Payer: MEDICARE

## 2023-02-19 DIAGNOSIS — G89.4 CHRONIC PAIN SYNDROME: ICD-10-CM

## 2023-02-19 DIAGNOSIS — M51.369 DDD (DEGENERATIVE DISC DISEASE), LUMBAR: ICD-10-CM

## 2023-02-19 DIAGNOSIS — M79.7 FIBROMYALGIA: ICD-10-CM

## 2023-02-21 NOTE — TELEPHONE ENCOUNTER
Norco refill request    Last refill on 1/19/23 for #180    Routing refill request to provider for review/approval because:  Drug not on the [facility] refill protocol

## 2023-02-22 RX ORDER — HYDROCODONE BITARTRATE AND ACETAMINOPHEN 5; 325 MG/1; MG/1
1-2 TABLET ORAL EVERY 6 HOURS PRN
Qty: 180 TABLET | Refills: 0 | Status: SHIPPED | OUTPATIENT
Start: 2023-02-22 | End: 2023-03-20

## 2023-03-20 ENCOUNTER — MYC REFILL (OUTPATIENT)
Dept: INTERNAL MEDICINE | Facility: CLINIC | Age: 78
End: 2023-03-20
Payer: MEDICARE

## 2023-03-20 DIAGNOSIS — M79.7 FIBROMYALGIA: ICD-10-CM

## 2023-03-20 DIAGNOSIS — M51.369 DDD (DEGENERATIVE DISC DISEASE), LUMBAR: ICD-10-CM

## 2023-03-20 DIAGNOSIS — G89.4 CHRONIC PAIN SYNDROME: ICD-10-CM

## 2023-03-22 RX ORDER — HYDROCODONE BITARTRATE AND ACETAMINOPHEN 5; 325 MG/1; MG/1
1-2 TABLET ORAL EVERY 6 HOURS PRN
Qty: 180 TABLET | Refills: 0 | Status: SHIPPED | OUTPATIENT
Start: 2023-03-24 | End: 2023-04-21

## 2023-03-24 ENCOUNTER — THERAPY VISIT (OUTPATIENT)
Dept: PHYSICAL THERAPY | Facility: CLINIC | Age: 78
End: 2023-03-24
Attending: PHYSICIAN ASSISTANT
Payer: MEDICARE

## 2023-03-24 DIAGNOSIS — N39.46 MIXED INCONTINENCE: ICD-10-CM

## 2023-03-24 DIAGNOSIS — R35.1 NOCTURIA: ICD-10-CM

## 2023-03-24 DIAGNOSIS — R39.15 URINARY URGENCY: ICD-10-CM

## 2023-03-24 PROCEDURE — 97535 SELF CARE MNGMENT TRAINING: CPT | Mod: GP | Performed by: PHYSICAL THERAPIST

## 2023-03-24 PROCEDURE — 97161 PT EVAL LOW COMPLEX 20 MIN: CPT | Mod: GP | Performed by: PHYSICAL THERAPIST

## 2023-03-24 NOTE — PROGRESS NOTES
Physical Therapy Initial Evaluation  Subjective:  SUBJECTIVE:    Patient reports onset of symptoms in 2020.   Symptoms include   Mixed incontinence and nocturia, can be getting up 5-8x per night, does endorse long history of poor sleeping. Pt reports she had her hips replaced around the beginning of March 2020 and that's when her symptoms started.   Since onset symptoms have been getting better, worse or staying the same? Getting worse      Urination:  Do you leak on the way to the bathroom or with a strong urge to void? Yes    Do you leak with cough,sneeze, jumping, running? Yes   Any other activities that cause leaking? Standing up  Do you have triggers that make you feel you can't wait to go to the bathroom? No   Type of pad and number used per day? 5 disposable underwear per day.   When you leak what is the amount? Small-large   How often do you typically void? Only voids a few times during the day     How long can you delay the need to urinate? 1 - 2 minutes.   How many times do you get up to urinate at night? Can get up 5 times in the first half hour of sleeping   Can you stop the flow of urine when on the toilet? No  Is the volume of urine passed usually: medium. (8sec rule=  250ml with average bladder storing  400-600ml)    Do you strain to pass urine? No  Do you have a slow or hesitant urinary stream? No  Do you have difficulty initiating the urine stream? No    How many bladder infections have you had in last 12 months? No      Fluid intake (one glass is 8oz or one cup) 4-5 water glasses/day  1 caffinated glasses/day (coke)   0 alcohol glasses/day.    Bowel habits:  Frequency of bowel movements? 3-4x per week   (sometimes has leaking of stool,  A few times per month)   Consistancy of stool? soft formed   Do you ignore the urge to defecate? No  Do you strain to pass stool? Yes, sometimes    Pelvic Pain:  When do you have pelvic pain? no  Are you sexually active? No    Birth History:   Given birth: Yes    Complications: some fast labors, 9lb baby   Vaginal deliveries:  4  C-sections:  1  Episiotomies: unsure  Have you ever been worried for your physical safety? No  History of abdominal or pelvic surgeries? Yes, hysterectomy, gallbladder removal, D&Cs    Regular exercise: none, walking in the house   Practiced the PF(kegel) exercises for 4 or more weeks? no      GOALS: stop leaking                The history is provided by the patient (daughter present for session).   Patient Health History  Svetlana Serrano being seen for incontinence.     Problem began: 2023 (MD order).      Pain is reported as 0/10 on pain scale.  General health as reported by patient is good.  Pertinent medical history includes: asthma, cancer, fibromyalgia, high blood pressure, incontinence, menopausal, osteoporosis and overweight.        Surgeries include:  Orthopedic surgery. Other surgery history details: B hip and knee replacements .    Current medications:  Pain medication and high blood pressure medication.    Current occupation is retired.   Primary job tasks include:  Computer work, driving, lifting/carrying, prolonged sitting and repetitive tasks.                                    Objective:  System                                 Pelvic Dysfunction Evaluation:    Bladder/Pelvic Problems:    Storage Problem:  Stress incontinence and nocturia                    External Assessment:  External assessment pelvic: declined any form of pelvic exam today, hoping to focus on educational stuff first                 Additional History:  Delivery History:  Episiotomies,  and vaginal delivery    Number of Live Births: 5  Caffeine Consumption:  1                     General     ROS    Assessment/Plan:    Patient is a 77 year old female with pelvic complaints.    Patient has the following significant findings with corresponding treatment plan.                Diagnosis 1:  Mixed incontinence  Decreased ROM/flexibility - manual therapy,  therapeutic exercise and home program  Decreased joint mobility - manual therapy, therapeutic exercise and home program  Impaired muscle performance - neuro re-education and home program  Decreased function - therapeutic activities and home program    Cumulative Therapy Evaluation is: Low complexity.    Previous and current functional limitations:  (See Goal Flow Sheet for this information)    Short term and Long term goals: (See Goal Flow Sheet for this information)     Communication ability:  Patient appears to be able to clearly communicate and understand verbal and written communication and follow directions correctly.  Treatment Explanation - The following has been discussed with the patient:   RX ordered/plan of care  Anticipated outcomes  Possible risks and side effects  This patient would benefit from PT intervention to resume normal activities.   Rehab potential is good.    Frequency:  2 X a month, once daily  Duration:  for 3 months  Discharge Plan:  Achieve all LTG.  Independent in home treatment program.  Reach maximal therapeutic benefit.    Please refer to the daily flowsheet for treatment today, total treatment time and time spent performing 1:1 timed codes.

## 2023-03-24 NOTE — PROGRESS NOTES
[facility]    OUTPATIENT Physical Therapy ORTHOPEDIC EVALUATION  PLAN OF TREATMENT FOR OUTPATIENT REHABILITATION  (COMPLETE FOR INITIAL CLAIMS ONLY)  Patient's Last Name, First Name, M.I.  YOB: 1945  Svetlana Serrano    Provider s Name:  [facility]   Medical Record No.  [id number]   Start of Care Date:  03/24/23   Onset Date:   01/26/23 (MD visit)   Treatment Diagnosis:  nocturia, SADIE Medical Diagnosis:     Urinary urgency  Mixed incontinence  Nocturia       Goals:     03/24/23 0700   Urinary Leakage   Previous Functional Level No problems   Pad/Pantiliner usage   Previous Functional Level None   Current Functional Level Number of pull ups used in a day/night   Performance Level 5   STG Target Performance Number of pull ups used in a day/night   Performance Level 2   Rationale continence throughout the day;continence throughout the night   Due Date 05/05/23   LTGTarget Performance Number of pull ups used in a day/night   Performance Level 1   Rationale continence throughout the day;continence throughout the night   Due Date 05/19/23   Voiding Patterns   Previous Functional Level No problems   Current Functional Level Number of times patient voids during night   Peformance level 5-8   STG Target Performance Reduce number of times patient voids at night   Performance Level 3-4   Rationale work toward normal voiding intervals to focus on ADLS, work, or school;to establish restorative sleep pattern   Due Date 05/05/23   LTG Target Performance Reduce number of times patient voids at night   Performance Level 1-2   Rationale to establish restorative sleep pattern;work toward normal voiding intervals to focus on ADLS, work, or school   Due Date 06/16/23         Therapy Frequency:  2x per month  Predicted Duration of Therapy Intervention:  6 visits    Radha Rojas PT                  I CERTIFY THE NEED FOR THESE SERVICES FURNISHED UNDER        THIS PLAN OF TREATMENT AND WHILE UNDER MY CARE     (Physician attestation of this document indicates review and certification of the therapy plan).                     Certification Date From:  03/24/23   Certification Date To:  06/16/23    Referring Provider:  Veronica Bajwa    Initial Assessment        See Epic Evaluation SOC Date: 03/24/23

## 2023-03-26 ENCOUNTER — HEALTH MAINTENANCE LETTER (OUTPATIENT)
Age: 78
End: 2023-03-26

## 2023-04-21 ENCOUNTER — MYC REFILL (OUTPATIENT)
Dept: INTERNAL MEDICINE | Facility: CLINIC | Age: 78
End: 2023-04-21
Payer: MEDICARE

## 2023-04-21 DIAGNOSIS — G89.4 CHRONIC PAIN SYNDROME: ICD-10-CM

## 2023-04-21 DIAGNOSIS — M51.369 DDD (DEGENERATIVE DISC DISEASE), LUMBAR: ICD-10-CM

## 2023-04-21 DIAGNOSIS — M79.7 FIBROMYALGIA: ICD-10-CM

## 2023-04-21 RX ORDER — HYDROCODONE BITARTRATE AND ACETAMINOPHEN 5; 325 MG/1; MG/1
1-2 TABLET ORAL EVERY 6 HOURS PRN
Qty: 180 TABLET | Refills: 0 | Status: SHIPPED | OUTPATIENT
Start: 2023-04-23 | End: 2023-05-23

## 2023-04-21 NOTE — TELEPHONE ENCOUNTER
Pending Prescriptions:                       Disp   Refills    HYDROcodone-acetaminophen (NORCO) 5-325 MG*180 ta*0        Sig: Take 1-2 tablets by mouth every 6 hours as needed for           pain    Routing refill request to provider for review/approval because:  Drug not on the [facility] refill protocol     Please advise, thanks.

## 2023-04-24 ENCOUNTER — TELEPHONE (OUTPATIENT)
Dept: INTERNAL MEDICINE | Facility: CLINIC | Age: 78
End: 2023-04-24
Payer: MEDICARE

## 2023-04-24 NOTE — TELEPHONE ENCOUNTER
Prior Authorization Retail Medication Request    Medication/Dose: HYDROcodone-acetaminophen (NORCO) 5-325 MG tablet  ICD code (if different than what is on RX):    Fibromyalgia [M79.7]       DDD (degenerative disc disease), lumbar [M51.36]       Chronic pain syndrome [G89.4]           Previously Tried and Failed:    Rationale:      Insurance Name:    Insurance ID:        Pharmacy Information (if different than what is on RX)  Name:    Phone:

## 2023-04-27 NOTE — TELEPHONE ENCOUNTER
Central Prior Authorization Team   Phone: [phone]    PA Initiation    Medication: HYDROcodone-acetaminophen (NORCO) 5-325 MG tablet  Insurance Company: [employer] ([facility]) - Phone [phone] Fax [phone]  Pharmacy Filling the Rx: [id number] - [place], [place] - [address] [place] [address] [address] AT [facility]  Filling Pharmacy Phone: [phone]  Filling Pharmacy Fax:    Start Date: 4/27/2023

## 2023-04-27 NOTE — TELEPHONE ENCOUNTER
Prior Authorization Approval    Authorization Effective Date: 4/27/2023  Authorization Expiration Date: 5/27/2023  Medication: HYDROcodone-acetaminophen (NORCO) 5-325 MG tablet  Approved Dose/Quantity:    Reference #:     Insurance Company: [employer] ([facility]) - Phone [phone] Fax [phone]  Expected CoPay:       CoPay Card Available:      Foundation Assistance Needed:    Which Pharmacy is filling the prescription (Not needed for infusion/clinic administered): [id number] - [place], [place] - [address] [place] [address] [address]  AT [place] [address]  Pharmacy Notified: Yes  Patient Notified: Yes  **Instructed pharmacy to notify patient when script is ready to /ship.**

## 2023-05-01 PROBLEM — R35.1 NOCTURIA: Status: RESOLVED | Noted: 2023-03-24 | Resolved: 2023-05-01

## 2023-05-23 DIAGNOSIS — M51.369 DDD (DEGENERATIVE DISC DISEASE), LUMBAR: ICD-10-CM

## 2023-05-23 DIAGNOSIS — M79.7 FIBROMYALGIA: ICD-10-CM

## 2023-05-23 DIAGNOSIS — G89.4 CHRONIC PAIN SYNDROME: ICD-10-CM

## 2023-05-23 RX ORDER — HYDROCODONE BITARTRATE AND ACETAMINOPHEN 5; 325 MG/1; MG/1
1-2 TABLET ORAL EVERY 6 HOURS PRN
Qty: 180 TABLET | Refills: 0 | Status: SHIPPED | OUTPATIENT
Start: 2023-05-23 | End: 2023-06-21

## 2023-05-23 NOTE — TELEPHONE ENCOUNTER
HYDROcodone-acetaminophen (NORCO) 5-325 MG tablet      Last Written Prescription Date:  04/23/23  Last Fill Quantity: 180,   # refills: 0  Last Office Visit: 01/17/23  Future Office visit:       Routing refill request to provider for review/approval because:  Drug not on the [facility], [facility] or [facility] refill protocol or controlled substance

## 2023-06-11 NOTE — TELEPHONE ENCOUNTER
Call to pt. Updated. Pt not happy. Apt scheduled but first available that works with pt's schedule not until 2/24.   0

## 2023-06-20 ENCOUNTER — TELEPHONE (OUTPATIENT)
Dept: INTERNAL MEDICINE | Facility: CLINIC | Age: 78
End: 2023-06-20
Payer: MEDICARE

## 2023-06-20 NOTE — TELEPHONE ENCOUNTER
Call to pt. She states she has both hips and both knees replaced and she always gets Penicillin before dental procedures. She takes 4 pills prior to appt.     She states she has allergy to amoxicillin but has never had reaction to penicillin.     Please advise.

## 2023-06-20 NOTE — TELEPHONE ENCOUNTER
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 Patient always needs penicillin before a dental appt and she thinks she needs it before they shave off the skin cancer. Patient uses [facility] [address] in [place]. Ok to call and lm [phone]

## 2023-06-21 ENCOUNTER — MYC REFILL (OUTPATIENT)
Dept: INTERNAL MEDICINE | Facility: CLINIC | Age: 78
End: 2023-06-21
Payer: MEDICARE

## 2023-06-21 DIAGNOSIS — M79.7 FIBROMYALGIA: ICD-10-CM

## 2023-06-21 DIAGNOSIS — G89.4 CHRONIC PAIN SYNDROME: ICD-10-CM

## 2023-06-21 DIAGNOSIS — M51.369 DDD (DEGENERATIVE DISC DISEASE), LUMBAR: ICD-10-CM

## 2023-06-21 NOTE — TELEPHONE ENCOUNTER
"She does not need antibiotics for this unless she has hx of heart valve replacement? Dental procedures are different. No abx needed for melanoma excision.    \"People who are at a high risk for endocarditis because of deformed or prosthetic heart valves take prophylactic antibiotics to minimize the risk of post-surgical infection. Prophylactic antibiotics are antibiotics given before surgical procedures to prevent bacterial infection in vulnerable tissues, in this case, abnormal heart tissue.\"  "

## 2023-06-22 RX ORDER — HYDROCODONE BITARTRATE AND ACETAMINOPHEN 5; 325 MG/1; MG/1
1-2 TABLET ORAL EVERY 6 HOURS PRN
Qty: 180 TABLET | Refills: 0 | Status: SHIPPED | OUTPATIENT
Start: 2023-06-22 | End: 2023-07-25

## 2023-06-27 ENCOUNTER — PATIENT OUTREACH (OUTPATIENT)
Dept: CARE COORDINATION | Facility: CLINIC | Age: 78
End: 2023-06-27
Payer: MEDICARE

## 2023-07-13 ENCOUNTER — TELEPHONE (OUTPATIENT)
Dept: INTERNAL MEDICINE | Facility: CLINIC | Age: 78
End: 2023-07-13

## 2023-07-14 ENCOUNTER — TELEPHONE (OUTPATIENT)
Dept: INTERNAL MEDICINE | Facility: CLINIC | Age: 78
End: 2023-07-14

## 2023-07-14 NOTE — TELEPHONE ENCOUNTER
What antibiotic where they going to prescribe?  How long did they want her to take the antibiotic for?  If she does not know then it would be best for her to call the dermatologist back and request a different antibiotic.

## 2023-07-14 NOTE — TELEPHONE ENCOUNTER
"Called and spoke to patient who says her dermatologist was going to prescribe her Cephalexin to take for undetermined amount of time.    Patient states she got into an argument with the dermatologist, because the dermatologist \"didn't believe I was allergic to cephalexin\". Patient states she will not call the dermatologist back because she doesn't want to talk to that doctor or the clinic again.     Patient states that in the past she has been prescribed a Z-pack given to her for sinus infections which patient tolerated well. Patient states that she is allergic to the dye in amoxicillin and has not tolerated liquid medication, but is wondering about taking an oral tablet of penicillin?    Patient states she would like a phone call with update and it is okay to leave detailed voicemail if she doesn't answer the phone.    Thank you,  Cal Cortes, Triage RN [facility]  2:46 PM 7/14/2023     "

## 2023-07-14 NOTE — TELEPHONE ENCOUNTER
I cannot tell from this whether the dermatologist wanted to treat her for prevention of infection after removing something or if they are treating some other condition.  Without that information, I cannot tell what antibiotic to use and for how long.     Penicillin is not appropriate for treating skin problems.  Azithromycin potentially could treat some skin conditions but I would not know how long to treat her for.    It is possible the dermatologist felt given her allergies that it would not really be necessary to treat her to prevent infection.  Usually they do not need to use antibiotics to remove skin lesions.    The options are she can call them back and explained that she could take up Zithromycin or have them send us the records so it is unlikely that they would be available until after the weekend.

## 2023-07-14 NOTE — TELEPHONE ENCOUNTER
"Patient states she had a \"piece of cancer\" removed from her left elbow that was approximately 5 mm in diameter. Patient states she believes the antibiotic was going to be prescribed to her to prevent infection, but she is not sure. I asked patient if she could look for paperwork given to her during her visit to see if it may tell more information, but patient was unable to locate information given to her from her appointment.     Patient says she was seen at [facility] in [place] and was seen by Yasmin Madrid and phone number to contact clinic is [phone]. I attempted to call clinic in order to obtain more information, but clinic was closed and there is no option to talk to after hours provider/nurse/staff member.    Thank you,  Cal Cortes, Triage RN [facility]  3:24 PM 7/14/2023             "

## 2023-07-14 NOTE — TELEPHONE ENCOUNTER
Patient is calling to ask if Dr Blanton can prescribe her an antibiotic. She said she saw dermatology yesterday and they removed something off her elbow and wanted to prescribe her an antibiotic but the dermatologist wouldn't listen to her when she said she was allergic to it. Patient said the dermatologist didn't treat her very well at all and would not prescribe her anything.

## 2023-07-19 ENCOUNTER — MYC REFILL (OUTPATIENT)
Dept: INTERNAL MEDICINE | Facility: CLINIC | Age: 78
End: 2023-07-19
Payer: MEDICARE

## 2023-07-19 DIAGNOSIS — G89.4 CHRONIC PAIN SYNDROME: ICD-10-CM

## 2023-07-19 DIAGNOSIS — M79.7 FIBROMYALGIA: ICD-10-CM

## 2023-07-19 DIAGNOSIS — M51.369 DDD (DEGENERATIVE DISC DISEASE), LUMBAR: ICD-10-CM

## 2023-07-20 RX ORDER — HYDROCODONE BITARTRATE AND ACETAMINOPHEN 5; 325 MG/1; MG/1
1-2 TABLET ORAL EVERY 6 HOURS PRN
Qty: 180 TABLET | Refills: 0 | Status: CANCELLED | OUTPATIENT
Start: 2023-07-22

## 2023-07-22 ENCOUNTER — MYC REFILL (OUTPATIENT)
Dept: INTERNAL MEDICINE | Facility: CLINIC | Age: 78
End: 2023-07-22
Payer: MEDICARE

## 2023-07-22 ENCOUNTER — MYC MEDICAL ADVICE (OUTPATIENT)
Dept: INTERNAL MEDICINE | Facility: CLINIC | Age: 78
End: 2023-07-22
Payer: MEDICARE

## 2023-07-22 DIAGNOSIS — M79.7 FIBROMYALGIA: ICD-10-CM

## 2023-07-22 DIAGNOSIS — G89.4 CHRONIC PAIN SYNDROME: ICD-10-CM

## 2023-07-22 DIAGNOSIS — M51.369 DDD (DEGENERATIVE DISC DISEASE), LUMBAR: ICD-10-CM

## 2023-07-24 DIAGNOSIS — M79.7 FIBROMYALGIA: ICD-10-CM

## 2023-07-24 DIAGNOSIS — G89.4 CHRONIC PAIN SYNDROME: ICD-10-CM

## 2023-07-24 DIAGNOSIS — M51.369 DDD (DEGENERATIVE DISC DISEASE), LUMBAR: ICD-10-CM

## 2023-07-24 NOTE — TELEPHONE ENCOUNTER
Sent pt 2 messages to schedule appointment.   Please have TC call pt if she doesn't schedule by tomorrow.     Routing refill request to provider for review/approval because:  Drug not on the [facility] refill protocol

## 2023-07-24 NOTE — TELEPHONE ENCOUNTER
Patient calls and states she is going thru withdrawal and dizzy.    Patient states she has to have medication.today.  Patient states she has dinner reservations at [facility] and has to have her medication before she goes.  Patient then states she has 1 pill left.    Please address and advise Patient once refilled.or denied.    Patient is wanting to know which Provider will refill this medication in Dr. Blanton's place? Patient advised Dr. Blanton's is not referring her Patient to another Provider.

## 2023-07-25 RX ORDER — HYDROCODONE BITARTRATE AND ACETAMINOPHEN 5; 325 MG/1; MG/1
1-2 TABLET ORAL EVERY 6 HOURS PRN
Qty: 180 TABLET | Refills: 0 | OUTPATIENT
Start: 2023-07-25

## 2023-07-25 RX ORDER — HYDROCODONE BITARTRATE AND ACETAMINOPHEN 5; 325 MG/1; MG/1
1-2 TABLET ORAL EVERY 6 HOURS PRN
Qty: 180 TABLET | Refills: 0 | Status: SHIPPED | OUTPATIENT
Start: 2023-07-25 | End: 2023-08-21

## 2023-07-25 NOTE — TELEPHONE ENCOUNTER
Next 5 appointments (look out 90 days)      Jul 31, 2023  3:00 PM  (Arrive by 2:40 PM)  Provider Visit with CAMELIA Nelson CNP  [facility] [address]  [address]  [facility] [address] [phone]          Medication was refilled today.

## 2023-07-28 ENCOUNTER — HOSPITAL ENCOUNTER (OUTPATIENT)
Dept: MAMMOGRAPHY | Facility: CLINIC | Age: 78
Discharge: HOME | End: 2023-07-28
Attending: INTERNAL MEDICINE | Admitting: INTERNAL MEDICINE
Payer: MEDICARE

## 2023-07-28 DIAGNOSIS — Z12.31 VISIT FOR SCREENING MAMMOGRAM: ICD-10-CM

## 2023-07-28 PROCEDURE — 77067 SCR MAMMO BI INCL CAD: CPT

## 2023-07-31 ENCOUNTER — OFFICE VISIT (OUTPATIENT)
Dept: INTERNAL MEDICINE | Facility: CLINIC | Age: 78
End: 2023-07-31
Payer: MEDICARE

## 2023-07-31 VITALS
HEART RATE: 88 BPM | OXYGEN SATURATION: 96 % | TEMPERATURE: 97.6 F | DIASTOLIC BLOOD PRESSURE: 89 MMHG | HEIGHT: 63 IN | RESPIRATION RATE: 20 BRPM | BODY MASS INDEX: 42.17 KG/M2 | WEIGHT: 238 LBS | SYSTOLIC BLOOD PRESSURE: 149 MMHG

## 2023-07-31 DIAGNOSIS — E66.01 MORBID OBESITY (H): ICD-10-CM

## 2023-07-31 DIAGNOSIS — Z13.0 SCREENING FOR IRON DEFICIENCY ANEMIA: ICD-10-CM

## 2023-07-31 DIAGNOSIS — J45.30 MILD PERSISTENT ASTHMA WITHOUT COMPLICATION: ICD-10-CM

## 2023-07-31 DIAGNOSIS — F41.9 ANXIETY: ICD-10-CM

## 2023-07-31 DIAGNOSIS — I10 BENIGN ESSENTIAL HYPERTENSION: Primary | ICD-10-CM

## 2023-07-31 DIAGNOSIS — J45.31 MILD PERSISTENT ASTHMA WITH ACUTE EXACERBATION: ICD-10-CM

## 2023-07-31 DIAGNOSIS — M79.7 FIBROMYALGIA: ICD-10-CM

## 2023-07-31 LAB
ANION GAP SERPL CALCULATED.3IONS-SCNC: 13 MMOL/L (ref 7–15)
BUN SERPL-MCNC: 11.6 MG/DL (ref 8–23)
CALCIUM SERPL-MCNC: 9.7 MG/DL (ref 8.8–10.2)
CHLORIDE SERPL-SCNC: 103 MMOL/L (ref 98–107)
CREAT SERPL-MCNC: 0.86 MG/DL (ref 0.51–0.95)
DEPRECATED HCO3 PLAS-SCNC: 26 MMOL/L (ref 22–29)
ERYTHROCYTE [DISTWIDTH] IN BLOOD BY AUTOMATED COUNT: 12.4 % (ref 10–15)
GFR SERPL CREATININE-BSD FRML MDRD: 69 ML/MIN/1.73M2
GLUCOSE SERPL-MCNC: 113 MG/DL (ref 70–99)
HCT VFR BLD AUTO: 45.7 % (ref 35–47)
HGB BLD-MCNC: 15.6 G/DL (ref 11.7–15.7)
MCH RBC QN AUTO: 32.7 PG (ref 26.5–33)
MCHC RBC AUTO-ENTMCNC: 34.1 G/DL (ref 31.5–36.5)
MCV RBC AUTO: 96 FL (ref 78–100)
PLATELET # BLD AUTO: 311 10E3/UL (ref 150–450)
POTASSIUM SERPL-SCNC: 3.6 MMOL/L (ref 3.4–5.3)
RBC # BLD AUTO: 4.77 10E6/UL (ref 3.8–5.2)
SODIUM SERPL-SCNC: 142 MMOL/L (ref 136–145)
WBC # BLD AUTO: 8.1 10E3/UL (ref 4–11)

## 2023-07-31 PROCEDURE — 82570 ASSAY OF URINE CREATININE: CPT

## 2023-07-31 PROCEDURE — 99214 OFFICE O/P EST MOD 30 MIN: CPT

## 2023-07-31 PROCEDURE — 85027 COMPLETE CBC AUTOMATED: CPT

## 2023-07-31 PROCEDURE — 80048 BASIC METABOLIC PNL TOTAL CA: CPT

## 2023-07-31 PROCEDURE — 82043 UR ALBUMIN QUANTITATIVE: CPT

## 2023-07-31 PROCEDURE — 36415 COLL VENOUS BLD VENIPUNCTURE: CPT

## 2023-07-31 RX ORDER — FLUTICASONE PROPIONATE AND SALMETEROL 232; 14 UG/1; UG/1
1 POWDER, METERED RESPIRATORY (INHALATION) 2 TIMES DAILY
Qty: 1 EACH | Refills: 11 | Status: SHIPPED | OUTPATIENT
Start: 2023-07-31 | End: 2023-11-21

## 2023-07-31 RX ORDER — LOSARTAN POTASSIUM 50 MG/1
50 TABLET ORAL DAILY
Qty: 90 TABLET | Refills: 1 | Status: SHIPPED | OUTPATIENT
Start: 2023-07-31 | End: 2023-11-21

## 2023-07-31 RX ORDER — MONTELUKAST SODIUM 10 MG/1
1 TABLET ORAL AT BEDTIME
Qty: 90 TABLET | Refills: 1 | Status: SHIPPED | OUTPATIENT
Start: 2023-07-31 | End: 2023-11-21

## 2023-07-31 ASSESSMENT — ANXIETY QUESTIONNAIRES
3. WORRYING TOO MUCH ABOUT DIFFERENT THINGS: NOT AT ALL
7. FEELING AFRAID AS IF SOMETHING AWFUL MIGHT HAPPEN: NOT AT ALL
GAD7 TOTAL SCORE: 0
4. TROUBLE RELAXING: NOT AT ALL
GAD7 TOTAL SCORE: 0
1. FEELING NERVOUS, ANXIOUS, OR ON EDGE: NOT AT ALL
7. FEELING AFRAID AS IF SOMETHING AWFUL MIGHT HAPPEN: NOT AT ALL
6. BECOMING EASILY ANNOYED OR IRRITABLE: NOT AT ALL
GAD7 TOTAL SCORE: 0
5. BEING SO RESTLESS THAT IT IS HARD TO SIT STILL: NOT AT ALL
8. IF YOU CHECKED OFF ANY PROBLEMS, HOW DIFFICULT HAVE THESE MADE IT FOR YOU TO DO YOUR WORK, TAKE CARE OF THINGS AT HOME, OR GET ALONG WITH OTHER PEOPLE?: NOT DIFFICULT AT ALL
2. NOT BEING ABLE TO STOP OR CONTROL WORRYING: NOT AT ALL
IF YOU CHECKED OFF ANY PROBLEMS ON THIS QUESTIONNAIRE, HOW DIFFICULT HAVE THESE PROBLEMS MADE IT FOR YOU TO DO YOUR WORK, TAKE CARE OF THINGS AT HOME, OR GET ALONG WITH OTHER PEOPLE: NOT DIFFICULT AT ALL

## 2023-07-31 ASSESSMENT — ASTHMA QUESTIONNAIRES
QUESTION_2 LAST FOUR WEEKS HOW OFTEN HAVE YOU HAD SHORTNESS OF BREATH: NOT AT ALL
QUESTION_5 LAST FOUR WEEKS HOW WOULD YOU RATE YOUR ASTHMA CONTROL: WELL CONTROLLED
QUESTION_1 LAST FOUR WEEKS HOW MUCH OF THE TIME DID YOUR ASTHMA KEEP YOU FROM GETTING AS MUCH DONE AT WORK, SCHOOL OR AT HOME: NONE OF THE TIME
QUESTION_4 LAST FOUR WEEKS HOW OFTEN HAVE YOU USED YOUR RESCUE INHALER OR NEBULIZER MEDICATION (SUCH AS ALBUTEROL): TWO OR THREE TIMES PER WEEK
QUESTION_3 LAST FOUR WEEKS HOW OFTEN DID YOUR ASTHMA SYMPTOMS (WHEEZING, COUGHING, SHORTNESS OF BREATH, CHEST TIGHTNESS OR PAIN) WAKE YOU UP AT NIGHT OR EARLIER THAN USUAL IN THE MORNING: NOT AT ALL
ACT_TOTALSCORE: 22
ACT_TOTALSCORE: 22

## 2023-07-31 ASSESSMENT — PATIENT HEALTH QUESTIONNAIRE - PHQ9
SUM OF ALL RESPONSES TO PHQ QUESTIONS 1-9: 2
SUM OF ALL RESPONSES TO PHQ QUESTIONS 1-9: 2
10. IF YOU CHECKED OFF ANY PROBLEMS, HOW DIFFICULT HAVE THESE PROBLEMS MADE IT FOR YOU TO DO YOUR WORK, TAKE CARE OF THINGS AT HOME, OR GET ALONG WITH OTHER PEOPLE: NOT DIFFICULT AT ALL

## 2023-07-31 NOTE — PROGRESS NOTES
"Medication    Assessment & Plan     (I10) Benign essential hypertension  (primary encounter diagnosis)  Comment: BP slightly elevated above goal today at 149/89. Currently on Losartan 50MG.  Plan: losartan (COZAAR) 50 MG tablet, Basic metabolic        panel, Albumin Random Urine Quantitative with         Creat Ratio            (F41.9) Anxiety  Comment: Stable. Continue with Cymbalta 60MG BID    (E66.01) Morbid obesity (H)  Comment: BMI of 42.16, we talked about increasing level of activity by including daily walks to help with weight loss and improving fatigue    (M79.7) Fibromyalgia  Comment: controlled with PRN Norco.    (J45.31) Mild persistent asthma with acute exacerbation  Comment: Coverage for inhaler is $130-- she states she has had PFTs done and these were normal. She has been given daily ICS but felt this did not improve symptoms at all. Continue with Singulair 10MG  Plan: montelukast (SINGULAIR) 10 MG tablet            (J45.30) Mild persistent asthma without complication  Comment: I will send refill for this but pt not interested due to cost.  Plan: fluticasone-salmeterol (AIRDUO RESPICLICK)         232-14 MCG/ACT inhaler            (Z13.0) Screening for iron deficiency anemia  Plan: CBC with platelets                   BMI:   Estimated body mass index is 42.16 kg/m  as calculated from the following:    Height as of this encounter: 1.6 m (5' 3\").    Weight as of this encounter: 108 kg (238 lb).   Weight management plan: Discussed healthy diet and exercise guidelines        Suzanna Puentes, APRN CNP  [facility] DEVORA Mota is a 78 year old, presenting for the following health issues:  Medication Compliance      History of Present Illness       Reason for visit:  Annual    She eats 2-3 servings of fruits and vegetables daily.She consumes 0 sweetened beverage(s) daily.She exercises with enough effort to increase her heart rate 9 or less minutes per day.  She exercises with " "enough effort to increase her heart rate 3 or less days per week.   She is taking medications regularly.             Objective    BP (!) 149/89   Pulse 88   Temp 97.6  F (36.4  C) (Oral)   Resp 20   Ht 1.6 m (5' 3\")   Wt 108 kg (238 lb)   SpO2 96%   BMI 42.16 kg/m    Body mass index is 42.16 kg/m .          Physical Exam  Constitutional:       General: She is not in acute distress.     Appearance: Normal appearance. She is not ill-appearing, toxic-appearing or diaphoretic.   HENT:      Head: Normocephalic and atraumatic.   Eyes:      Conjunctiva/sclera: Conjunctivae normal.   Cardiovascular:      Rate and Rhythm: Normal rate and regular rhythm.      Heart sounds: Normal heart sounds.   Pulmonary:      Effort: Pulmonary effort is normal.      Breath sounds: Normal breath sounds.   Skin:     General: Skin is warm and dry.   Neurological:      Mental Status: She is alert and oriented to person, place, and time.   Psychiatric:         Mood and Affect: Mood normal.         Behavior: Behavior normal.         Thought Content: Thought content normal.         Judgment: Judgment normal.                 "

## 2023-08-01 LAB
CREAT UR-MCNC: 237 MG/DL
MICROALBUMIN UR-MCNC: 41.9 MG/L
MICROALBUMIN/CREAT UR: 17.68 MG/G CR (ref 0–25)

## 2023-08-21 ENCOUNTER — MYC REFILL (OUTPATIENT)
Dept: INTERNAL MEDICINE | Facility: CLINIC | Age: 78
End: 2023-08-21
Payer: MEDICARE

## 2023-08-21 DIAGNOSIS — M79.7 FIBROMYALGIA: ICD-10-CM

## 2023-08-21 DIAGNOSIS — M51.369 DDD (DEGENERATIVE DISC DISEASE), LUMBAR: ICD-10-CM

## 2023-08-21 DIAGNOSIS — G89.4 CHRONIC PAIN SYNDROME: ICD-10-CM

## 2023-08-21 RX ORDER — HYDROCODONE BITARTRATE AND ACETAMINOPHEN 5; 325 MG/1; MG/1
1-2 TABLET ORAL EVERY 6 HOURS PRN
Qty: 180 TABLET | Refills: 0 | Status: SHIPPED | OUTPATIENT
Start: 2023-08-21 | End: 2023-09-16

## 2023-08-22 DIAGNOSIS — M79.7 FIBROMYALGIA: ICD-10-CM

## 2023-08-22 DIAGNOSIS — F41.9 ANXIETY: ICD-10-CM

## 2023-08-22 RX ORDER — DULOXETIN HYDROCHLORIDE 60 MG/1
60 CAPSULE, DELAYED RELEASE ORAL 2 TIMES DAILY
Qty: 180 CAPSULE | Refills: 1 | Status: SHIPPED | OUTPATIENT
Start: 2023-08-22 | End: 2023-11-21

## 2023-09-16 ENCOUNTER — MYC REFILL (OUTPATIENT)
Dept: INTERNAL MEDICINE | Facility: CLINIC | Age: 78
End: 2023-09-16
Payer: MEDICARE

## 2023-09-16 DIAGNOSIS — M51.369 DDD (DEGENERATIVE DISC DISEASE), LUMBAR: ICD-10-CM

## 2023-09-16 DIAGNOSIS — G89.4 CHRONIC PAIN SYNDROME: ICD-10-CM

## 2023-09-16 DIAGNOSIS — M79.7 FIBROMYALGIA: ICD-10-CM

## 2023-09-18 RX ORDER — HYDROCODONE BITARTRATE AND ACETAMINOPHEN 5; 325 MG/1; MG/1
1-2 TABLET ORAL EVERY 6 HOURS PRN
Qty: 180 TABLET | Refills: 0 | Status: SHIPPED | OUTPATIENT
Start: 2023-09-18 | End: 2023-10-16

## 2023-10-16 ENCOUNTER — MYC REFILL (OUTPATIENT)
Dept: INTERNAL MEDICINE | Facility: CLINIC | Age: 78
End: 2023-10-16
Payer: MEDICARE

## 2023-10-16 DIAGNOSIS — M79.7 FIBROMYALGIA: ICD-10-CM

## 2023-10-16 DIAGNOSIS — M51.369 DDD (DEGENERATIVE DISC DISEASE), LUMBAR: ICD-10-CM

## 2023-10-16 DIAGNOSIS — G89.4 CHRONIC PAIN SYNDROME: ICD-10-CM

## 2023-10-16 RX ORDER — HYDROCODONE BITARTRATE AND ACETAMINOPHEN 5; 325 MG/1; MG/1
1-2 TABLET ORAL EVERY 6 HOURS PRN
Qty: 180 TABLET | Refills: 0 | Status: SHIPPED | OUTPATIENT
Start: 2023-10-16 | End: 2023-11-10

## 2023-11-10 ENCOUNTER — VIRTUAL VISIT (OUTPATIENT)
Dept: INTERNAL MEDICINE | Facility: CLINIC | Age: 78
End: 2023-11-10
Payer: MEDICARE

## 2023-11-10 ENCOUNTER — MYC REFILL (OUTPATIENT)
Dept: INTERNAL MEDICINE | Facility: CLINIC | Age: 78
End: 2023-11-10

## 2023-11-10 DIAGNOSIS — G89.4 CHRONIC PAIN SYNDROME: ICD-10-CM

## 2023-11-10 DIAGNOSIS — J45.30 MILD PERSISTENT ASTHMA WITHOUT COMPLICATION: ICD-10-CM

## 2023-11-10 DIAGNOSIS — M79.7 FIBROMYALGIA: ICD-10-CM

## 2023-11-10 DIAGNOSIS — R05.9 COUGH, UNSPECIFIED TYPE: Primary | ICD-10-CM

## 2023-11-10 DIAGNOSIS — M51.369 DDD (DEGENERATIVE DISC DISEASE), LUMBAR: ICD-10-CM

## 2023-11-10 PROCEDURE — 99214 OFFICE O/P EST MOD 30 MIN: CPT | Mod: VID

## 2023-11-10 RX ORDER — HYDROCODONE BITARTRATE AND ACETAMINOPHEN 5; 325 MG/1; MG/1
1-2 TABLET ORAL EVERY 6 HOURS PRN
Qty: 180 TABLET | Refills: 0 | Status: SHIPPED | OUTPATIENT
Start: 2023-11-12 | End: 2023-12-06

## 2023-11-10 RX ORDER — PREDNISONE 20 MG/1
40 TABLET ORAL DAILY
Qty: 10 TABLET | Refills: 0 | Status: SHIPPED | OUTPATIENT
Start: 2023-11-10 | End: 2023-11-15

## 2023-11-10 RX ORDER — CODEINE PHOSPHATE AND GUAIFENESIN 10; 100 MG/5ML; MG/5ML
1-2 SOLUTION ORAL EVERY 6 HOURS PRN
Qty: 118 ML | Refills: 0 | Status: SHIPPED | OUTPATIENT
Start: 2023-11-10 | End: 2023-11-21

## 2023-11-10 ASSESSMENT — ENCOUNTER SYMPTOMS: COUGH: 1

## 2023-11-10 NOTE — PROGRESS NOTES
Svetlana is a 78 year old who is being evaluated via a billable video visit.      How would you like to obtain your AVS? MyChart  If the video visit is dropped, the invitation should be resent by: Text to cell phone: [phone]  Will anyone else be joining your video visit? No          Assessment & Plan     (R05.9) Cough, unspecified type  (primary encounter diagnosis)  Comment: Has been coughing for about 3 weeks along with rhinorrhea and rhinitis. She has taken Mucinex daily.   She tested for Covid once and it was negative.  She feels miserable. In the past this has happened once a year and she usually receives azithromycin. We discussed her symptoms are likely due to bronchitis and a probable asthma exacerbation as she has been using her albuterol more often.   It is very difficult to obtain a history from her over the phone as the connection is not great and I am not able to hear her very well. She states she needs to feel better because she has a meeting with Mrs. Farley on Tuesday.  I will send Rx for Robitussin AC and Prednisone. If symptoms worsen over the next few days, she needs to be seen and have CXR done.  Plan: guaiFENesin-codeine (ROBITUSSIN AC) 100-10         MG/5ML solution            (J45.30) Mild persistent asthma without complication  Comment: See details above.  Plan:     (G89.4) Chronic pain syndrome  Comment: Refilled pain medication today. Symptoms stable.               CAMELIA Rivas CNP  [facility]   Svetlana is a 78 year old, presenting for the following health issues:  Cough      11/10/2023     1:14 PM   Additional Questions   Roomed by dimitri Ramos     X 3 weeks , phlegmy  Tried OTC - no releif   No noted fevers   Has not tried any albuterol        Review of Systems   Respiratory:  Positive for cough.             Objective           Vitals:  No vitals were obtained today due to virtual visit.    Physical Exam   GENERAL: Healthy,  alert and no distress  EYES: Eyes grossly normal to inspection.  No discharge or erythema, or obvious scleral/conjunctival abnormalities.  RESP: No audible wheeze, cough, or visible cyanosis.  No visible retractions or increased work of breathing.    SKIN: Visible skin clear. No significant rash, abnormal pigmentation or lesions.  NEURO: Cranial nerves grossly intact.  Mentation and speech appropriate for age.  PSYCH: Mentation appears normal, affect normal/bright, judgement and insight intact, normal speech and appearance well-groomed.                Video-Visit Details    Type of service:  Video Visit   Video Start Time: 3:50 PM  Video End Time:3:59 PM    Originating Location (pt. Location): Home    Distant Location (provider location):  On-site  Platform used for Video Visit: Bhumi

## 2023-11-20 ASSESSMENT — ENCOUNTER SYMPTOMS
HEMATURIA: 0
DYSURIA: 0
HEADACHES: 0
HEARTBURN: 0
FREQUENCY: 0
FEVER: 0
ARTHRALGIAS: 0
DIZZINESS: 0
WEAKNESS: 0
BREAST MASS: 0
ABDOMINAL PAIN: 0
MYALGIAS: 0
HEMATOCHEZIA: 0
CONSTIPATION: 0
DIARRHEA: 1
SORE THROAT: 0
NERVOUS/ANXIOUS: 0
CHILLS: 0
JOINT SWELLING: 0
PALPITATIONS: 0
NAUSEA: 0
COUGH: 1
EYE PAIN: 0
SHORTNESS OF BREATH: 0
PARESTHESIAS: 0

## 2023-11-20 ASSESSMENT — ACTIVITIES OF DAILY LIVING (ADL): CURRENT_FUNCTION: NO ASSISTANCE NEEDED

## 2023-11-21 ENCOUNTER — OFFICE VISIT (OUTPATIENT)
Dept: INTERNAL MEDICINE | Facility: CLINIC | Age: 78
End: 2023-11-21
Payer: MEDICARE

## 2023-11-21 VITALS
BODY MASS INDEX: 41.29 KG/M2 | SYSTOLIC BLOOD PRESSURE: 143 MMHG | HEART RATE: 74 BPM | RESPIRATION RATE: 16 BRPM | OXYGEN SATURATION: 97 % | DIASTOLIC BLOOD PRESSURE: 82 MMHG | WEIGHT: 233 LBS | HEIGHT: 63 IN | TEMPERATURE: 97.6 F

## 2023-11-21 DIAGNOSIS — E66.01 MORBID OBESITY (H): ICD-10-CM

## 2023-11-21 DIAGNOSIS — Z12.83 ENCOUNTER FOR SCREENING FOR MALIGNANT NEOPLASM OF SKIN: ICD-10-CM

## 2023-11-21 DIAGNOSIS — Z00.00 ENCOUNTER FOR MEDICARE ANNUAL WELLNESS EXAM: Primary | ICD-10-CM

## 2023-11-21 DIAGNOSIS — Z78.0 ENCOUNTER FOR OSTEOPOROSIS SCREENING IN ASYMPTOMATIC POSTMENOPAUSAL PATIENT: ICD-10-CM

## 2023-11-21 DIAGNOSIS — M79.7 FIBROMYALGIA: ICD-10-CM

## 2023-11-21 DIAGNOSIS — J45.31 MILD PERSISTENT ASTHMA WITH ACUTE EXACERBATION: ICD-10-CM

## 2023-11-21 DIAGNOSIS — Z13.820 ENCOUNTER FOR OSTEOPOROSIS SCREENING IN ASYMPTOMATIC POSTMENOPAUSAL PATIENT: ICD-10-CM

## 2023-11-21 DIAGNOSIS — I10 BENIGN ESSENTIAL HYPERTENSION: ICD-10-CM

## 2023-11-21 DIAGNOSIS — F11.20 NARCOTIC DEPENDENCE (H): ICD-10-CM

## 2023-11-21 DIAGNOSIS — R73.09 ELEVATED GLUCOSE: ICD-10-CM

## 2023-11-21 DIAGNOSIS — J45.30 MILD PERSISTENT ASTHMA WITHOUT COMPLICATION: ICD-10-CM

## 2023-11-21 DIAGNOSIS — J45.20 MILD INTERMITTENT ASTHMA WITHOUT COMPLICATION: ICD-10-CM

## 2023-11-21 DIAGNOSIS — Z13.220 SCREENING FOR HYPERLIPIDEMIA: ICD-10-CM

## 2023-11-21 DIAGNOSIS — F41.9 ANXIETY: ICD-10-CM

## 2023-11-21 PROCEDURE — 99214 OFFICE O/P EST MOD 30 MIN: CPT | Mod: 25

## 2023-11-21 PROCEDURE — G0439 PPPS, SUBSEQ VISIT: HCPCS

## 2023-11-21 RX ORDER — MONTELUKAST SODIUM 10 MG/1
1 TABLET ORAL AT BEDTIME
Qty: 90 TABLET | Refills: 1 | Status: SHIPPED | OUTPATIENT
Start: 2023-11-21 | End: 2024-02-20

## 2023-11-21 RX ORDER — FLUTICASONE PROPIONATE AND SALMETEROL 232; 14 UG/1; UG/1
1 POWDER, METERED RESPIRATORY (INHALATION) 2 TIMES DAILY
Qty: 1 EACH | Refills: 5 | Status: SHIPPED | OUTPATIENT
Start: 2023-11-21 | End: 2024-02-20

## 2023-11-21 RX ORDER — ALBUTEROL SULFATE 90 UG/1
2 AEROSOL, METERED RESPIRATORY (INHALATION) EVERY 6 HOURS PRN
Qty: 8 G | Refills: 4 | Status: SHIPPED | OUTPATIENT
Start: 2023-11-21 | End: 2024-02-20

## 2023-11-21 RX ORDER — LOSARTAN POTASSIUM 50 MG/1
50 TABLET ORAL DAILY
Qty: 90 TABLET | Refills: 1 | Status: SHIPPED | OUTPATIENT
Start: 2023-11-21 | End: 2024-02-20

## 2023-11-21 RX ORDER — DULOXETIN HYDROCHLORIDE 60 MG/1
60 CAPSULE, DELAYED RELEASE ORAL 2 TIMES DAILY
Qty: 180 CAPSULE | Refills: 1 | Status: SHIPPED | OUTPATIENT
Start: 2023-11-21 | End: 2024-02-20

## 2023-11-21 RX ORDER — AMLODIPINE BESYLATE 5 MG/1
5 TABLET ORAL DAILY
Qty: 90 TABLET | Refills: 1 | Status: SHIPPED | OUTPATIENT
Start: 2023-11-21 | End: 2024-02-20

## 2023-11-21 ASSESSMENT — ENCOUNTER SYMPTOMS
FEVER: 0
PARESTHESIAS: 0
PALPITATIONS: 0
DIZZINESS: 0
BREAST MASS: 0
ABDOMINAL PAIN: 0
WEAKNESS: 0
HEMATOCHEZIA: 0
SORE THROAT: 0
NAUSEA: 0
JOINT SWELLING: 0
CONSTIPATION: 0
HEARTBURN: 0
EYE PAIN: 0
HEADACHES: 0
MYALGIAS: 0
NERVOUS/ANXIOUS: 0
SHORTNESS OF BREATH: 0
ARTHRALGIAS: 0
DYSURIA: 0
COUGH: 1
FREQUENCY: 0
CHILLS: 0
HEMATURIA: 0
DIARRHEA: 1

## 2023-11-21 ASSESSMENT — ACTIVITIES OF DAILY LIVING (ADL): CURRENT_FUNCTION: NO ASSISTANCE NEEDED

## 2023-11-21 NOTE — PROGRESS NOTES
"SUBJECTIVE:   Svetlana is a 78 year old, presenting for the following:  Wellness Visit        11/10/2023     1:14 PM   Additional Questions   Roomed by dimitri       Are you in the first 12 months of your Medicare coverage?  No    Healthy Habits:     In general, how would you rate your overall health?  Good    Frequency of exercise:  None    Do you usually eat at least 4 servings of fruit and vegetables a day, include whole grains    & fiber and avoid regularly eating high fat or \"junk\" foods?  Yes    Taking medications regularly:  Yes    Medication side effects:  None    Ability to successfully perform activities of daily living:  No assistance needed    Home Safety:  No safety concerns identified    Hearing Impairment:  No hearing concerns    In the past 6 months, have you been bothered by leaking of urine? Yes    In general, how would you rate your overall mental or emotional health?  Excellent    Additional concerns today:  No      Today's PHQ-2 Score:       11/20/2023     5:34 PM   PHQ-2 ( 1999 [employer])   Q1: Little interest or pleasure in doing things 0   Q2: Feeling down, depressed or hopeless 0   PHQ-2 Score 0   Q1: Little interest or pleasure in doing things Not at all   Q2: Feeling down, depressed or hopeless Not at all   PHQ-2 Score 0           Have you ever done Advance Care Planning? (For example, a Health Directive, POLST, or a discussion with a medical provider or your loved ones about your wishes): No, advance care planning information given to patient to review.  Patient declined advance care planning discussion at this time.       Fall risk  Fallen 2 or more times in the past year?: No  Any fall with injury in the past year?: No  click delete button to remove this line now  Cognitive Screening   1) Repeat 3 items (Leader, Season, Table)      2) Clock draw: NORMAL  3) 3 item recall:   Recalls 3 objects  Results: 3 items recalled: COGNITIVE IMPAIRMENT LESS LIKELY    Mini-CogTM Copyright S Kailee. " Licensed by the author for use in [facility]; reprinted with permission (lonny@[facility]). All rights reserved.      Do you have sleep apnea, excessive snoring or daytime drowsiness? : no    Reviewed and updated as needed this visit by clinical staff   Tobacco  Allergies  Meds   Med Hx  Surg Hx  Fam Hx  Soc Hx        Reviewed and updated as needed this visit by Provider                 Social History     Tobacco Use    Smoking status: Former     Types: Cigarettes     Quit date: 1969     Years since quittin.9    Smokeless tobacco: Never   Substance Use Topics    Alcohol use: Yes     Alcohol/week: 0.0 standard drinks of alcohol     Comment: casual             2023     5:33 PM   Alcohol Use   Prescreen: >3 drinks/day or >7 drinks/week? No     Do you have a current opioid prescription? Yes   How severe is your pain on a scale from 1-10? 7/10            No data to display              Low Risk (0-3)  Moderate Risk (4-7)  High Risk (>8)  Do you use any other controlled substances or medications that are not prescribed by a provider? None              Current providers sharing in care for this patient include:   Patient Care Team:  Suzanna Puentes, APRN CNP as PCP - General (Internal Medicine)  Dolores Blanton MD as Assigned PCP  Jessenia Espinoza PA-C as Physician Assistant (Urology)  Dolores Blanton MD as Assigned Pain Medication Provider    The following health maintenance items are reviewed in Epic and correct as of today:  Health Maintenance   Topic Date Due    HEPATITIS A IMMUNIZATION (1 of 2 - Risk 2-dose series) Never done    RSV VACCINE (Pregnancy & 60+) (1 - 1-dose 60+ series) Never done    ASTHMA ACTION PLAN  2020    CONTROLLED SUBSTANCE AGREEMENT FOR CHRONIC PAIN MANAGEMENT  2023    URINE DRUG SCREEN  2023    ANNUAL REVIEW OF HM ORDERS  2024    ASTHMA CONTROL TEST  2024    MAMMO SCREENING  2024    MICROALBUMIN  2024    GHADA ASSESSMENT   "07/31/2024    PHQ-9  07/31/2024    MEDICARE ANNUAL WELLNESS VISIT  11/21/2024    FALL RISK ASSESSMENT  11/21/2024    LIPID  01/31/2027    ADVANCE CARE PLANNING  11/21/2028    DEXA  06/10/2030    DTAP/TDAP/TD IMMUNIZATION (4 - Td or Tdap) 11/25/2032    HEPATITIS C SCREENING  Completed    PHQ-2 (once per calendar year)  Completed    INFLUENZA VACCINE  Completed    Pneumococcal Vaccine: 65+ Years  Completed    ZOSTER IMMUNIZATION  Completed    COVID-19 Vaccine  Completed    IPV IMMUNIZATION  Aged Out    HPV IMMUNIZATION  Aged Out    MENINGITIS IMMUNIZATION  Aged Out    RSV MONOCLONAL ANTIBODY  Aged Out    COLORECTAL CANCER SCREENING  Discontinued             Pertinent mammograms are reviewed under the imaging tab.    Review of Systems   Constitutional:  Negative for chills and fever.   HENT:  Positive for congestion. Negative for ear pain, hearing loss and sore throat.    Eyes:  Negative for pain and visual disturbance.   Respiratory:  Positive for cough. Negative for shortness of breath.    Cardiovascular:  Negative for chest pain, palpitations and peripheral edema.   Gastrointestinal:  Positive for diarrhea. Negative for abdominal pain, constipation, heartburn, hematochezia and nausea.   Breasts:  Negative for tenderness, breast mass and discharge.   Genitourinary:  Negative for dysuria, frequency, genital sores, hematuria, pelvic pain, urgency, vaginal bleeding and vaginal discharge.   Musculoskeletal:  Negative for arthralgias, joint swelling and myalgias.   Skin:  Negative for rash.   Neurological:  Negative for dizziness, weakness, headaches and paresthesias.   Psychiatric/Behavioral:  Negative for mood changes. The patient is not nervous/anxious.          OBJECTIVE:   BP (!) 143/82   Pulse 74   Temp 97.6  F (36.4  C) (Oral)   Resp 16   Ht 1.607 m (5' 3.25\")   Wt 105.7 kg (233 lb)   SpO2 97%   BMI 40.95 kg/m   Estimated body mass index is 40.95 kg/m  as calculated from the following:    Height as of this " "encounter: 1.607 m (5' 3.25\").    Weight as of this encounter: 105.7 kg (233 lb).      Physical Exam  Constitutional:       General: She is not in acute distress.     Appearance: Normal appearance. She is not ill-appearing, toxic-appearing or diaphoretic.   HENT:      Head: Normocephalic and atraumatic.   Eyes:      Conjunctiva/sclera: Conjunctivae normal.   Cardiovascular:      Rate and Rhythm: Normal rate and regular rhythm.      Heart sounds: Normal heart sounds.   Pulmonary:      Effort: Pulmonary effort is normal.      Breath sounds: Normal breath sounds.   Skin:     General: Skin is warm and dry.   Neurological:      Mental Status: She is alert and oriented to person, place, and time.   Psychiatric:         Mood and Affect: Mood normal.         Behavior: Behavior normal.         Thought Content: Thought content normal.         Judgment: Judgment normal.       Diagnostic Test Results:  Labs reviewed in Epic    ASSESSMENT / PLAN:   (Z00.00) Encounter for Medicare annual wellness exam  (primary encounter diagnosis)  Comment: pt presents for annual visit  Plan:     (R73.09) Elevated glucose  Comment:   Plan: Hemoglobin A1c, Basic metabolic panel  (Ca, Cl,        CO2, Creat, Gluc, K, Na, BUN)            (Z13.220) Screening for hyperlipidemia  Comment:   Plan: Lipid panel reflex to direct LDL Fasting            (E66.01) Morbid obesity (H)  Comment: BMI of 40.95- she states her  and her are looking into some dieting programs. They are interested in losing weight. She was able to lose 60lbs in the past when she started watching portion sizes and refraining from getting seconds at dinner  Plan:     (I10) Benign essential hypertension  Comment: BP adequately controlled with pt's age. Continue losartan 50MG and Amlodipine 5MG  Plan: losartan (COZAAR) 50 MG tablet, amLODIPine         (NORVASC) 5 MG tablet            (J45.31) Mild persistent asthma with acute exacerbation  Comment:   Plan: montelukast (SINGULAIR) 10 " MG tablet            (F41.9) Anxiety  Comment:   Plan: DULoxetine (CYMBALTA) 60 MG capsule            (M79.7) Fibromyalgia  Comment:   Plan: DULoxetine (CYMBALTA) 60 MG capsule            (F11.20) Narcotic dependence (H)  Comment: On Norco PRN for chronic pain  Plan:     (Z12.83) Encounter for screening for malignant neoplasm of skin  Comment:   Plan: Adult Dermatology  Referral            (J45.30) Mild persistent asthma without complication  Comment:   Plan: fluticasone-salmeterol (AIRDUO RESPICLICK)         232-14 MCG/ACT inhaler            (J45.20) Mild intermittent asthma without complication  Comment:   Plan: albuterol (PROAIR HFA/PROVENTIL HFA/VENTOLIN         HFA) 108 (90 Base) MCG/ACT inhaler            (Z13.820,  Z78.0) Encounter for osteoporosis screening in asymptomatic postmenopausal patient  Comment:   Plan: DX Hip/Pelvis/Spine            Patient has been advised of split billing requirements and indicates understanding: Yes      COUNSELING:  Reviewed preventive health counseling, as reflected in patient instructions       Regular exercise       Healthy diet/nutrition       Osteoporosis prevention/bone health        She reports that she quit smoking about 54 years ago. She has never used smokeless tobacco.      Appropriate preventive services were discussed with this patient, including applicable screening as appropriate for fall prevention, nutrition, physical activity, Tobacco-use cessation, weight loss and cognition.  Checklist reviewing preventive services available has been given to the patient.    Reviewed patients plan of care and provided an AVS. The Basic Care Plan (routine screening as documented in Health Maintenance) for Svetlana meets the Care Plan requirement. This Care Plan has been established and reviewed with the Patient.          CAMELIA Rivas [facility]    Identified Health Risks:  I have reviewed Opioid Use Disorder and Substance Use  Disorder risk factors and made any needed referrals. well

## 2023-11-30 ENCOUNTER — NURSE TRIAGE (OUTPATIENT)
Dept: INTERNAL MEDICINE | Facility: CLINIC | Age: 78
End: 2023-11-30

## 2023-11-30 ENCOUNTER — OFFICE VISIT (OUTPATIENT)
Dept: FAMILY MEDICINE | Facility: CLINIC | Age: 78
End: 2023-11-30
Payer: MEDICARE

## 2023-11-30 VITALS
OXYGEN SATURATION: 100 % | TEMPERATURE: 97.9 F | WEIGHT: 234.6 LBS | DIASTOLIC BLOOD PRESSURE: 81 MMHG | BODY MASS INDEX: 40.05 KG/M2 | RESPIRATION RATE: 14 BRPM | HEART RATE: 102 BPM | SYSTOLIC BLOOD PRESSURE: 135 MMHG | HEIGHT: 64 IN

## 2023-11-30 DIAGNOSIS — J20.9 ACUTE BRONCHITIS, UNSPECIFIED ORGANISM: Primary | ICD-10-CM

## 2023-11-30 DIAGNOSIS — Z13.220 SCREENING FOR HYPERLIPIDEMIA: ICD-10-CM

## 2023-11-30 DIAGNOSIS — R73.09 ELEVATED GLUCOSE: ICD-10-CM

## 2023-11-30 LAB
ANION GAP SERPL CALCULATED.3IONS-SCNC: 12 MMOL/L (ref 7–15)
BUN SERPL-MCNC: 16.1 MG/DL (ref 8–23)
CALCIUM SERPL-MCNC: 9.7 MG/DL (ref 8.8–10.2)
CHLORIDE SERPL-SCNC: 101 MMOL/L (ref 98–107)
CHOLEST SERPL-MCNC: 179 MG/DL
CREAT SERPL-MCNC: 1.07 MG/DL (ref 0.51–0.95)
DEPRECATED HCO3 PLAS-SCNC: 27 MMOL/L (ref 22–29)
EGFRCR SERPLBLD CKD-EPI 2021: 53 ML/MIN/1.73M2
GLUCOSE SERPL-MCNC: 108 MG/DL (ref 70–99)
HBA1C MFR BLD: 5.4 % (ref 0–5.6)
HDLC SERPL-MCNC: 68 MG/DL
LDLC SERPL CALC-MCNC: 87 MG/DL
NONHDLC SERPL-MCNC: 111 MG/DL
POTASSIUM SERPL-SCNC: 3.7 MMOL/L (ref 3.4–5.3)
SODIUM SERPL-SCNC: 140 MMOL/L (ref 135–145)
TRIGL SERPL-MCNC: 122 MG/DL

## 2023-11-30 PROCEDURE — 36415 COLL VENOUS BLD VENIPUNCTURE: CPT | Performed by: PHYSICIAN ASSISTANT

## 2023-11-30 PROCEDURE — 80048 BASIC METABOLIC PNL TOTAL CA: CPT | Performed by: PHYSICIAN ASSISTANT

## 2023-11-30 PROCEDURE — 99213 OFFICE O/P EST LOW 20 MIN: CPT | Performed by: PHYSICIAN ASSISTANT

## 2023-11-30 PROCEDURE — 80061 LIPID PANEL: CPT | Performed by: PHYSICIAN ASSISTANT

## 2023-11-30 PROCEDURE — 83036 HEMOGLOBIN GLYCOSYLATED A1C: CPT | Performed by: PHYSICIAN ASSISTANT

## 2023-11-30 RX ORDER — AZITHROMYCIN 250 MG/1
TABLET, FILM COATED ORAL
Qty: 6 TABLET | Refills: 0 | Status: SHIPPED | OUTPATIENT
Start: 2023-11-30 | End: 2023-12-05

## 2023-11-30 RX ORDER — PREDNISONE 20 MG/1
40 TABLET ORAL DAILY
Qty: 10 TABLET | Refills: 0 | Status: SHIPPED | OUTPATIENT
Start: 2023-11-30 | End: 2023-12-05

## 2023-11-30 RX ORDER — RESPIRATORY SYNCYTIAL VIRUS VACCINE 120MCG/0.5
0.5 KIT INTRAMUSCULAR ONCE
Qty: 1 EACH | Refills: 0 | Status: CANCELLED | OUTPATIENT
Start: 2023-11-30 | End: 2023-11-30

## 2023-11-30 ASSESSMENT — ENCOUNTER SYMPTOMS
FATIGUE: 1
CHILLS: 1
COUGH: 1

## 2023-11-30 NOTE — PROGRESS NOTES
Assessment & Plan     Acute bronchitis, unspecified organism  Azithromycin has helped her in the past and she has tolerated (has allergy to erythromycin)  - azithromycin (ZITHROMAX) 250 MG tablet; Take 2 tablets (500 mg) by mouth daily for 1 day, THEN 1 tablet (250 mg) daily for 4 days.  - predniSONE (DELTASONE) 20 MG tablet; Take 2 tablets (40 mg) by mouth daily for 5 days    Elevated glucose  Ordered by PCP  - Hemoglobin A1c  - Basic metabolic panel  (Ca, Cl, CO2, Creat, Gluc, K, Na, BUN)    Screening for hyperlipidemia  Ordered by PCP  - Lipid panel reflex to direct LDL Fasting    Review of external notes as documented elsewhere in note  Prescription drug management      Aniyah Winter PA-C  [facility]    Josh Mota is a 78 year old, presenting for the following health issues:  URI        11/30/2023     2:10 PM   Additional Questions   Roomed by Tom RINALDI       History of Present Illness       Reason for visit:  Feel all pooped out and my cough is back  Symptom onset:  3-7 days ago  Symptom intensity:  Moderate  Symptom progression:  Staying the same  What makes it worse:  Laying down  What makes it better:  Not much    She eats 2-3 servings of fruits and vegetables daily.She consumes 0 sweetened beverage(s) daily.She exercises with enough effort to increase her heart rate 10 to 19 minutes per day.  She exercises with enough effort to increase her heart rate 4 days per week.   She is taking medications regularly.  URI  This is a recurrent problem. The current episode started in the past 7 days. The problem has been gradually worsening. Associated symptoms include chills, coughing and fatigue. The symptoms are aggravated by coughing. She has tried nothing for the symptoms. The treatment provided no relief.        Acute Illness  Acute illness concerns: Cough/ Cold   Onset/Duration: 1 week  Symptoms:  Fever: No  Chills/Sweats: YES  Headache (location?): No  Sinus Pressure:  "No  Conjunctivitis:  No  Ear Pain: no  Rhinorrhea: YES  Congestion: No  Sore Throat: No  Cough: YES-non-productive, with shortness of breath, worsening over time  Wheeze: YES  Decreased Appetite: No  Nausea: No  Vomiting: No  Diarrhea: YES  Dysuria/Freq.: No  Dysuria or Hematuria: No  Fatigue/Achiness: YES- fatigue  Sick/Strep Exposure: YES- Pneumonia, and Covid   Therapies tried and outcome: Prednisone- mild help, cough syrup - mild help      Review of Systems   Constitutional:  Positive for chills and fatigue.   Respiratory:  Positive for cough.           Objective    /81 (BP Location: Left arm, Patient Position: Sitting, Cuff Size: Adult Large)   Pulse 102   Temp 97.9  F (36.6  C) (Oral)   Resp 14   Ht 1.626 m (5' 4\")   Wt 106.4 kg (234 lb 9.6 oz)   SpO2 100%   BMI 40.27 kg/m    Body mass index is 40.27 kg/m .  Physical Exam   GENERAL: No acute distress  HEENT: Normocephalic  CARDIAC: Regular rate and rhythm. No murmurs.  PULMONARY: Coarse breath sounds right lower and middle lobes. Otherwise normal breath sounds.  NEURO: Alert and non-focal                "

## 2023-11-30 NOTE — TELEPHONE ENCOUNTER
Situation    Patient stating her cough returned about one week ago. Felt better a little while after the 11/10 appointment.   Requesting an appointment.     Background   Virtual appointment - 11/10 for cough- given prednisone and cough medication.   Daughter has pneumonia and patient has been exposed to her often  Exposed to son that has Covid - 11/18- diagnosed covid next day     Assessments   Runny nose - clear drainage- denied nose being stuffed up.   Patient is Fatigued   Frequent Coughing sometimes cough is productive ( off white)   No fever, had cold sweats this morning.   No SOB at rest or walking.   No chest pain   No sore throat    Recommendations   Advised to take a covid test - call back if + and can schedule a virtual.   Advised can have labs as part of today's appointment, if Covid + reschedule   Scheduled for today - advised of location, address, arrival time and appointment time.     Reason for Disposition   Patient wants to be seen    Additional Information   Negative: SEVERE difficulty breathing (e.g., struggling for each breath, speaks in single words)   Negative: Very weak (can't stand)   Negative: Sounds like a life-threatening emergency to the triager   Negative: Difficulty breathing and not from stuffy nose (e.g., not relieved by cleaning out the nose)   Negative: Runny nose is caused by pollen or other allergies   Negative: Cough is main symptom   Negative: Sore throat is main symptom   Negative: Patient sounds very sick or weak to the triager   Negative: Fever > 103 F (39.4 C)   Negative: Fever > 101 F (38.3 C) and over 60 years of age   Negative: Fever > 100.0 F (37.8 C) and has diabetes mellitus or a weak immune system (e.g., HIV positive, cancer chemotherapy, organ transplant, splenectomy, chronic steroids)   Negative: Fever > 100.0 F (37.8 C) and bedridden (e.g., CVA, chronic illness, recovering from surgery)   Negative: Fever present > 3 days (72 hours)   Negative: Fever returns after  gone for over 24 hours and symptoms worse or not improved   Negative: Sinus pain (not just congestion) and fever   Negative: Earache   Negative: Sinus congestion (pressure, fullness) present > 10 days   Negative: Nasal discharge present > 10 days   Negative: Using nasal washes and pain medicine > 24 hours and sinus pain (lower forehead, cheekbone, or eye) persists    Protocols used: Common Cold-A-OH

## 2023-12-06 ENCOUNTER — MYC REFILL (OUTPATIENT)
Dept: INTERNAL MEDICINE | Facility: CLINIC | Age: 78
End: 2023-12-06
Payer: MEDICARE

## 2023-12-06 DIAGNOSIS — G89.4 CHRONIC PAIN SYNDROME: ICD-10-CM

## 2023-12-06 DIAGNOSIS — M51.369 DDD (DEGENERATIVE DISC DISEASE), LUMBAR: ICD-10-CM

## 2023-12-06 DIAGNOSIS — M79.7 FIBROMYALGIA: ICD-10-CM

## 2023-12-10 DIAGNOSIS — M79.7 FIBROMYALGIA: ICD-10-CM

## 2023-12-10 DIAGNOSIS — M51.369 DDD (DEGENERATIVE DISC DISEASE), LUMBAR: ICD-10-CM

## 2023-12-10 DIAGNOSIS — G89.4 CHRONIC PAIN SYNDROME: ICD-10-CM

## 2023-12-11 RX ORDER — HYDROCODONE BITARTRATE AND ACETAMINOPHEN 5; 325 MG/1; MG/1
1-2 TABLET ORAL EVERY 6 HOURS PRN
Qty: 180 TABLET | Refills: 0 | Status: SHIPPED | OUTPATIENT
Start: 2023-12-11 | End: 2024-01-05

## 2023-12-11 RX ORDER — HYDROCODONE BITARTRATE AND ACETAMINOPHEN 5; 325 MG/1; MG/1
1-2 TABLET ORAL EVERY 6 HOURS PRN
Qty: 180 TABLET | Refills: 0 | OUTPATIENT
Start: 2023-12-11

## 2024-01-05 ENCOUNTER — MYC REFILL (OUTPATIENT)
Dept: INTERNAL MEDICINE | Facility: CLINIC | Age: 79
End: 2024-01-05
Payer: COMMERCIAL

## 2024-01-05 DIAGNOSIS — G89.4 CHRONIC PAIN SYNDROME: ICD-10-CM

## 2024-01-05 DIAGNOSIS — M79.7 FIBROMYALGIA: ICD-10-CM

## 2024-01-05 DIAGNOSIS — M51.369 DDD (DEGENERATIVE DISC DISEASE), LUMBAR: ICD-10-CM

## 2024-01-08 ENCOUNTER — TELEPHONE (OUTPATIENT)
Dept: INTERNAL MEDICINE | Facility: CLINIC | Age: 79
End: 2024-01-08
Payer: COMMERCIAL

## 2024-01-08 DIAGNOSIS — M51.369 DDD (DEGENERATIVE DISC DISEASE), LUMBAR: ICD-10-CM

## 2024-01-08 DIAGNOSIS — M79.7 FIBROMYALGIA: ICD-10-CM

## 2024-01-08 DIAGNOSIS — G89.4 CHRONIC PAIN SYNDROME: ICD-10-CM

## 2024-01-08 RX ORDER — HYDROCODONE BITARTRATE AND ACETAMINOPHEN 5; 325 MG/1; MG/1
1-2 TABLET ORAL EVERY 6 HOURS PRN
Qty: 180 TABLET | Refills: 0 | Status: SHIPPED | OUTPATIENT
Start: 2024-01-08 | End: 2024-01-10

## 2024-01-08 NOTE — TELEPHONE ENCOUNTER
Pt calling for the 2nd time for refill request. Please call pt and let her know why it hasn't been put in yet.

## 2024-01-08 NOTE — TELEPHONE ENCOUNTER
"[facility] calling in regards to patient's hydrocodone rx. Patient has new insurance as of 1/1/2024 and her ins is considering this a \"first fill\" so will only allow 7 days worth of medication to be dispensed at this time.     Pharmacy will fill 7 days worth of hydrocodone per ins but will need a new rx after 7 days - either a full month prescription or the remaining 124 tablets from the original rx on 1/8/2024.  "

## 2024-01-08 NOTE — TELEPHONE ENCOUNTER
They can fill remaining tablets in 7 days.  I can send new rx- do I need to send one today that says only for 7 days worth? I am not sure I am clear on what I need to send in. When I try to put a new rx in today for start date of 1/15, it says that it will discontinue my previous Rx?

## 2024-01-08 NOTE — TELEPHONE ENCOUNTER
Sent message back with Suzanna's response. Pt sent the request Friday night AFTER clinic was closed for the weekend.

## 2024-01-09 RX ORDER — HYDROCODONE BITARTRATE AND ACETAMINOPHEN 5; 325 MG/1; MG/1
1-2 TABLET ORAL EVERY 6 HOURS PRN
Qty: 124 TABLET | Refills: 0 | OUTPATIENT
Start: 2024-01-15

## 2024-01-09 NOTE — TELEPHONE ENCOUNTER
This encounter was closed by the refill nurse and never addressed.   OK to send in new Rx now. Pt read her mychart refill and that it was faxed in yesterday afternoon.

## 2024-01-09 NOTE — TELEPHONE ENCOUNTER
I do not think I can fill the second prescription until she picks the first one up- I tried to fill this yesterday and it said it would discontinue the initial prescription I placed?    Per the PDMP, she has not yet picked up medication?

## 2024-01-10 RX ORDER — HYDROCODONE BITARTRATE AND ACETAMINOPHEN 5; 325 MG/1; MG/1
1-2 TABLET ORAL EVERY 6 HOURS PRN
Qty: 124 TABLET | Refills: 0 | Status: SHIPPED | OUTPATIENT
Start: 2024-01-12 | End: 2024-02-04

## 2024-01-10 NOTE — TELEPHONE ENCOUNTER
Svetlana sent a message back that she picked up the medication.     [facility] (5:55 PM)     Dr Puentes or Denice,  I picked up the prescription, so now you can send in another one.  New Insurance

## 2024-01-30 ENCOUNTER — NURSE TRIAGE (OUTPATIENT)
Dept: INTERNAL MEDICINE | Facility: CLINIC | Age: 79
End: 2024-01-30
Payer: COMMERCIAL

## 2024-01-30 NOTE — TELEPHONE ENCOUNTER
"Call received from patient complaining of cough and \"junk in my chest\". Symptoms started [place] evening. States she has a chronic cough due to asthma but it has been worse for the past week. Cough keeps patient awake at night. Cough is productive of large amounts of clear to cloudy sputum. States she can hear her chest \"rattling\". Denies fevers, difficulty breathing and shortness of breath. Patient with congested sounding cough while on phone. Advised appointment. Scheduled.     Reason for Disposition   Cough has been present for > 3 weeks    Additional Information   Negative: Bluish (or gray) lips or face   Negative: SEVERE difficulty breathing (e.g., struggling for each breath, speaks in single words)   Negative: Rapid onset of cough and has hives   Negative: Coughing started suddenly after medicine, an allergic food or bee sting   Negative: Difficulty breathing after exposure to flames, smoke, or fumes   Negative: Sounds like a life-threatening emergency to the triager   Negative: Previous asthma attacks and this feels like asthma attack   Negative: Dry cough (non-productive; no sputum or minimal clear sputum) and within 14 days of COVID-19 Exposure   Negative: MODERATE difficulty breathing (e.g., speaks in phrases, SOB even at rest, pulse 100-120) and still present when not coughing   Negative: Chest pain present when not coughing   Negative: Passed out (i.e., fainted, collapsed and was not responding)   Negative: Patient sounds very sick or weak to the triager   Negative: MILD difficulty breathing (e.g., minimal/no SOB at rest, SOB with walking, pulse <100) and still present when not coughing   Negative: Coughed up > 1 tablespoon (15 ml) blood (Exception: Blood-tinged sputum.)   Negative: Fever > 103 F (39.4 C)   Negative: Fever > 101 F (38.3 C) and over 60 years of age   Negative: Fever > 100.0 F (37.8 C) and has diabetes mellitus or a weak immune system (e.g., HIV positive, cancer chemotherapy, organ " transplant, splenectomy, chronic steroids)   Negative: Fever > 100.0 F (37.8 C) and bedridden (e.g., CVA, chronic illness, recovering from surgery)   Negative: Increasing ankle swelling   Negative: Wheezing is present   Negative: SEVERE coughing spells (e.g., whooping sound after coughing, vomiting after coughing)   Negative: Coughing up pepper-colored (reddish-brown) or blood-tinged sputum   Negative: Fever present > 3 days (72 hours)   Negative: Fever returns after gone for over 24 hours and symptoms worse or not improved   Negative: Using nasal washes and pain medicine > 24 hours and sinus pain persists   Negative: Known COPD or other severe lung disease (i.e., bronchiectasis, cystic fibrosis, lung surgery) and worsening symptoms (i.e., increased sputum purulence or amount, increased breathing difficulty)   Negative: Continuous (nonstop) coughing interferes with work or school and no improvement using cough treatment per Care Advice   Negative: Patient wants to be seen    Protocols used: Cough-A-OH

## 2024-01-31 ENCOUNTER — OFFICE VISIT (OUTPATIENT)
Dept: INTERNAL MEDICINE | Facility: CLINIC | Age: 79
End: 2024-01-31
Payer: COMMERCIAL

## 2024-01-31 VITALS
HEIGHT: 63 IN | HEART RATE: 125 BPM | SYSTOLIC BLOOD PRESSURE: 142 MMHG | TEMPERATURE: 98.1 F | WEIGHT: 234 LBS | OXYGEN SATURATION: 95 % | DIASTOLIC BLOOD PRESSURE: 80 MMHG | BODY MASS INDEX: 41.46 KG/M2 | RESPIRATION RATE: 20 BRPM

## 2024-01-31 DIAGNOSIS — J01.10 ACUTE NON-RECURRENT FRONTAL SINUSITIS: ICD-10-CM

## 2024-01-31 DIAGNOSIS — R05.1 ACUTE COUGH: Primary | ICD-10-CM

## 2024-01-31 PROCEDURE — 99213 OFFICE O/P EST LOW 20 MIN: CPT

## 2024-01-31 RX ORDER — AZITHROMYCIN 250 MG/1
TABLET, FILM COATED ORAL
Qty: 6 TABLET | Refills: 0 | Status: SHIPPED | OUTPATIENT
Start: 2024-01-31 | End: 2024-02-05

## 2024-01-31 RX ORDER — LEVOFLOXACIN 750 MG/1
750 TABLET, FILM COATED ORAL DAILY
Qty: 7 TABLET | Refills: 0 | Status: CANCELLED | OUTPATIENT
Start: 2024-01-31 | End: 2024-02-07

## 2024-01-31 RX ORDER — PREDNISONE 20 MG/1
40 TABLET ORAL DAILY
Qty: 10 TABLET | Refills: 0 | Status: SHIPPED | OUTPATIENT
Start: 2024-01-31 | End: 2024-02-05

## 2024-01-31 ASSESSMENT — ASTHMA QUESTIONNAIRES
ACT_TOTALSCORE: 20
QUESTION_2 LAST FOUR WEEKS HOW OFTEN HAVE YOU HAD SHORTNESS OF BREATH: ONCE OR TWICE A WEEK
ACT_TOTALSCORE: 20
QUESTION_3 LAST FOUR WEEKS HOW OFTEN DID YOUR ASTHMA SYMPTOMS (WHEEZING, COUGHING, SHORTNESS OF BREATH, CHEST TIGHTNESS OR PAIN) WAKE YOU UP AT NIGHT OR EARLIER THAN USUAL IN THE MORNING: NOT AT ALL
QUESTION_1 LAST FOUR WEEKS HOW MUCH OF THE TIME DID YOUR ASTHMA KEEP YOU FROM GETTING AS MUCH DONE AT WORK, SCHOOL OR AT HOME: NONE OF THE TIME
QUESTION_4 LAST FOUR WEEKS HOW OFTEN HAVE YOU USED YOUR RESCUE INHALER OR NEBULIZER MEDICATION (SUCH AS ALBUTEROL): ONE OR TWO TIMES PER DAY
QUESTION_5 LAST FOUR WEEKS HOW WOULD YOU RATE YOUR ASTHMA CONTROL: WELL CONTROLLED

## 2024-01-31 ASSESSMENT — ENCOUNTER SYMPTOMS: COUGH: 1

## 2024-01-31 NOTE — PROGRESS NOTES
"  Assessment & Plan     (R05.1) Acute cough  (primary encounter diagnosis)  Comment: Clinical picture suggest patient has an acute patient presents to the clinic with a chief complaint of acute cough.  Clinical picture suggest she has secondary infection due to viral illness.  At this time we will start the patient on a prednisone pack of 40 mg once daily for 5 days as well as a antibiotic.  Plan: predniSONE (DELTASONE) 20 MG tablet, CANCELED:         Respiratory Panel PCR      (J01.10) Acute non-recurrent frontal sinusitis  Comment: Patient presents to the clinic with a chief complaint of nasal congestion and drainage.  Clinical picture suggest acute sinusitis therefore we will treat patient with azithromycin.  Patient has an allergy to erythromycin however she tolerates azithromycin without any side effects.  Patient has a long list of other antibiotic allergies.  Plan: azithromycin (ZITHROMAX) 250 MG tablet        Medication sent in      20 minutes spent by me on the date of the encounter doing chart review, patient visit, and documentation       BMI  Estimated body mass index is 41.45 kg/m  as calculated from the following:    Height as of this encounter: 1.6 m (5' 3\").    Weight as of this encounter: 106.1 kg (234 lb).   Weight management plan: Patient was referred to their PCP to discuss a diet and exercise plan.          Josh Mota is a 78 year old, presenting for the following health issues: Patient presents to the clinic for chief complaint for ongoing cough for over a month.  Patient states she was seen around [place] time when she came down with a cough and chest congestion.  At that time she was given azithromycin and prednisone.  Patient states that she had great relief from that but then went on an airplane and had it come back.  Patient states that her previous provider at [facility] would always give her 2 azithromycin packs and 2 courses of prednisone to help with her cough and " "congestion in the wintertime.  She denies any fevers at this time.  Her blood pressure and pulse are elevated during the visit but patient states that that is not unusual for her.  Cough        1/31/2024    12:43 PM   Additional Questions   Roomed by Estelita YOON CMA   Accompanied by Elif, daughter     Cough    History of Present Illness       Reason for visit:  Get rid of cough  Symptom onset:  More than a month  Symptoms include:  Cough  Symptom intensity:  Severe  Symptom progression:  Staying the same  Had these symptoms before:  Yes  Has tried/received treatment for these symptoms:  Yes  Previous treatment was successful:  Yes  Prior treatment description:  Z pack and something else  What makes it worse:  Going to bed  What makes it better:  Sitting up    She eats 2-3 servings of fruits and vegetables daily.She consumes 2 sweetened beverage(s) daily.She exercises with enough effort to increase her heart rate 10 to 19 minutes per day.  She exercises with enough effort to increase her heart rate 3 or less days per week.   She is taking medications regularly.                     Review of Systems  Constitutional, HEENT, cardiovascular, pulmonary, gi and gu systems are negative, except as otherwise noted.      Objective    BP (!) 145/80 (BP Location: Right arm, Patient Position: Sitting, Cuff Size: Adult Large)   Pulse (!) 125   Temp 98.1  F (36.7  C) (Tympanic)   Resp 20   Ht 1.6 m (5' 3\")   Wt 106.1 kg (234 lb)   LMP  (LMP Unknown)   SpO2 95%   Breastfeeding No   BMI 41.45 kg/m    Body mass index is 41.45 kg/m .  Physical Exam   GENERAL: alert and no distress  NECK: no adenopathy, no asymmetry, masses, or scars  RESP: lungs clear to auscultation - no rales, rhonchi or wheezes  CV: regular rate and rhythm, normal S1 S2, no S3 or S4, no murmur, click or rub, no peripheral edema  MS: no gross musculoskeletal defects noted, no edema            Signed Electronically by: CAMELIA Henley CNP    "

## 2024-02-04 ENCOUNTER — MYC MEDICAL ADVICE (OUTPATIENT)
Dept: INTERNAL MEDICINE | Facility: CLINIC | Age: 79
End: 2024-02-04
Payer: COMMERCIAL

## 2024-02-04 ENCOUNTER — MYC REFILL (OUTPATIENT)
Dept: INTERNAL MEDICINE | Facility: CLINIC | Age: 79
End: 2024-02-04
Payer: COMMERCIAL

## 2024-02-04 DIAGNOSIS — M79.7 FIBROMYALGIA: ICD-10-CM

## 2024-02-04 DIAGNOSIS — G89.4 CHRONIC PAIN SYNDROME: ICD-10-CM

## 2024-02-04 DIAGNOSIS — M51.369 DDD (DEGENERATIVE DISC DISEASE), LUMBAR: ICD-10-CM

## 2024-02-06 RX ORDER — HYDROCODONE BITARTRATE AND ACETAMINOPHEN 5; 325 MG/1; MG/1
1-2 TABLET ORAL EVERY 6 HOURS PRN
Qty: 180 TABLET | Refills: 0 | Status: SHIPPED | OUTPATIENT
Start: 2024-02-06 | End: 2024-02-26

## 2024-02-06 RX ORDER — HYDROCODONE BITARTRATE AND ACETAMINOPHEN 5; 325 MG/1; MG/1
1-2 TABLET ORAL EVERY 6 HOURS PRN
Qty: 180 TABLET | Refills: 0 | Status: SHIPPED | OUTPATIENT
Start: 2024-02-06 | End: 2024-02-06

## 2024-02-06 NOTE — TELEPHONE ENCOUNTER
NAC is N-Acytel cysteine. It is a supplement to help support the lungs. This can be purchased on [employer]. If you are unable to find it, that is okay, you can just focus on the others.

## 2024-02-06 NOTE — TELEPHONE ENCOUNTER
Coughs can linger for quite awhile. I suggest boosting your immune system with vitamin and supplements. Antibiotics don't get rid of a cough, rather, they treat the infection.   Vitamin C 2,000 mg daily   Vitamin D 10,000 international unit(s) daily  Zinc 25 mg daily with food.   NAC to support lungs and immunity.

## 2024-02-09 ENCOUNTER — MYC MEDICAL ADVICE (OUTPATIENT)
Dept: INTERNAL MEDICINE | Facility: CLINIC | Age: 79
End: 2024-02-09
Payer: COMMERCIAL

## 2024-02-20 DIAGNOSIS — J45.31 MILD PERSISTENT ASTHMA WITH ACUTE EXACERBATION: ICD-10-CM

## 2024-02-20 DIAGNOSIS — F41.9 ANXIETY: ICD-10-CM

## 2024-02-20 DIAGNOSIS — J45.30 MILD PERSISTENT ASTHMA WITHOUT COMPLICATION: ICD-10-CM

## 2024-02-20 DIAGNOSIS — I10 BENIGN ESSENTIAL HYPERTENSION: ICD-10-CM

## 2024-02-20 DIAGNOSIS — M79.7 FIBROMYALGIA: ICD-10-CM

## 2024-02-20 DIAGNOSIS — J45.20 MILD INTERMITTENT ASTHMA WITHOUT COMPLICATION: ICD-10-CM

## 2024-02-20 RX ORDER — AMLODIPINE BESYLATE 5 MG/1
5 TABLET ORAL DAILY
Qty: 90 TABLET | Refills: 1 | Status: SHIPPED | OUTPATIENT
Start: 2024-02-20 | End: 2024-07-12

## 2024-02-20 RX ORDER — DULOXETIN HYDROCHLORIDE 60 MG/1
60 CAPSULE, DELAYED RELEASE ORAL 2 TIMES DAILY
Qty: 180 CAPSULE | Refills: 1 | Status: SHIPPED | OUTPATIENT
Start: 2024-02-20 | End: 2024-07-12

## 2024-02-20 RX ORDER — FLUTICASONE PROPIONATE AND SALMETEROL 232; 14 UG/1; UG/1
1 POWDER, METERED RESPIRATORY (INHALATION) 2 TIMES DAILY
Qty: 1 EACH | Refills: 5 | Status: SHIPPED | OUTPATIENT
Start: 2024-02-20 | End: 2024-02-23

## 2024-02-20 RX ORDER — ALBUTEROL SULFATE 90 UG/1
2 AEROSOL, METERED RESPIRATORY (INHALATION) EVERY 6 HOURS PRN
Qty: 8 G | Refills: 4 | Status: SHIPPED | OUTPATIENT
Start: 2024-02-20

## 2024-02-20 RX ORDER — MONTELUKAST SODIUM 10 MG/1
1 TABLET ORAL AT BEDTIME
Qty: 90 TABLET | Refills: 1 | Status: SHIPPED | OUTPATIENT
Start: 2024-02-20 | End: 2024-07-12

## 2024-02-20 RX ORDER — LOSARTAN POTASSIUM 50 MG/1
50 TABLET ORAL DAILY
Qty: 90 TABLET | Refills: 1 | Status: SHIPPED | OUTPATIENT
Start: 2024-02-20 | End: 2024-07-12

## 2024-02-20 NOTE — TELEPHONE ENCOUNTER
Patient called, requesting to send new prescriptions, except for Hydrocodone, to new pharmacy [facility] (pended).

## 2024-02-22 ENCOUNTER — MYC MEDICAL ADVICE (OUTPATIENT)
Dept: INTERNAL MEDICINE | Facility: CLINIC | Age: 79
End: 2024-02-22
Payer: COMMERCIAL

## 2024-02-22 ENCOUNTER — TELEPHONE (OUTPATIENT)
Dept: INTERNAL MEDICINE | Facility: CLINIC | Age: 79
End: 2024-02-22
Payer: COMMERCIAL

## 2024-02-22 DIAGNOSIS — J45.31 MILD PERSISTENT ASTHMA WITH ACUTE EXACERBATION: Primary | ICD-10-CM

## 2024-02-22 NOTE — TELEPHONE ENCOUNTER
Prior Authorization Retail Medication Request    Medication/Dose: fluticasone-salmeterol (AIRDUO RESPICLICK) 232-14 MCG/ACT inhaler  Diagnosis and ICD code (if different than what is on RX):    New/renewal/insurance change PA/secondary ins. PA:  Previously Tried and Failed:    Rationale:      Insurance   Primary:   Insurance ID:    Secondary (if applicable):  Insurance ID:      Pharmacy Information (if different than what is on RX)  Name:    Phone:    Fax:

## 2024-02-23 NOTE — TELEPHONE ENCOUNTER
Per patient's most recent HERCAMOSHOPhart message below, Fluticks is the Fluticasone/Salmeterol inhaler.  This medication removed from her medication list per this RN.

## 2024-02-26 ENCOUNTER — TELEPHONE (OUTPATIENT)
Dept: INTERNAL MEDICINE | Facility: CLINIC | Age: 79
End: 2024-02-26
Payer: COMMERCIAL

## 2024-02-26 DIAGNOSIS — M79.7 FIBROMYALGIA: ICD-10-CM

## 2024-02-26 DIAGNOSIS — G89.4 CHRONIC PAIN SYNDROME: ICD-10-CM

## 2024-02-26 DIAGNOSIS — M51.369 DDD (DEGENERATIVE DISC DISEASE), LUMBAR: ICD-10-CM

## 2024-02-26 NOTE — TELEPHONE ENCOUNTER
"Hydrocodone is for pain, not cough. Patient has prior authorization initiated for fluticasone-salmeterol (AIRDUO RESPICLICK) 232-14 MCG/ACT inhaler that patient has used in the past for cough.    Pended prescription for Hydrocodone. Patient uses [facility], but due to system being down this writer put \"written prescription\" for hard copy to be printed out that patient could .     Routing to provider covering \"S\" Patient's primary care provider out of office this week.      Please advise, thanks.    Thank you,  Cal Cortes, Triage RN [facility]  3:41 PM 2/26/2024       "

## 2024-02-26 NOTE — TELEPHONE ENCOUNTER
Medication Question or Refill        What medication are you calling about (include dose and sig)?: Hydrocodone 5mg    Preferred Pharmacy:     [facility]   [place]      Controlled Substance Agreement on file:   CSA -- Patient Level:     [Media Unavailable] Controlled Substance Agreement - Opioid - Scan on 2/1/2022  3:40 PM: OPIOD       Who prescribed the medication?: Deloris    Do you need a refill? Yes    When did you use the medication last? 02/26/24    Patient offered an appointment? No    Do you have any questions or concerns?  Yes: Cough not resolving.      Could we send this information to you in [employer] or would you prefer to receive a phone call?:   Patient would prefer a phone call   Okay to leave a detailed message?: Yes at Home number on file [phone] (home)

## 2024-02-29 ENCOUNTER — TELEPHONE (OUTPATIENT)
Dept: INTERNAL MEDICINE | Facility: CLINIC | Age: 79
End: 2024-02-29
Payer: COMMERCIAL

## 2024-03-01 DIAGNOSIS — J01.11 ACUTE RECURRENT FRONTAL SINUSITIS: Primary | ICD-10-CM

## 2024-03-01 RX ORDER — LEVOFLOXACIN 500 MG/1
500 TABLET, FILM COATED ORAL DAILY
Qty: 7 TABLET | Refills: 0 | Status: SHIPPED | OUTPATIENT
Start: 2024-03-01 | End: 2024-03-08

## 2024-03-01 NOTE — PROGRESS NOTES
Please call patient- I will send in a new antibiotic in for the patient. I don't like repeating an antibiotic that we just did as it didn't clear it up to begin with. I am going to send in levofloxacin. (She does not have an allergy to this medication). I am going to hold on the steroid for now as it is contraindicated with this new antibiotic.  If she is not better after this course, she will need to come back in to be evaluated. Thanks

## 2024-03-01 NOTE — TELEPHONE ENCOUNTER
Patient is calling to ask if she can get another rx for Z deana and prednisone. She had got this previously from [facility].  See the 1/30/24 encounter. She is still having the same symptoms. Please call the patient as she cannot get on her computer.  
Please call patient- I will start a new antibiotic as repeating one that didn't work is often times not helpful and not advised. It is called Levoquin. She does not have an allergy to this medication. At this time I am going to hold on a steroid as it is contraindicated with this antibiotic. If she does not improve after this antibiotic, she should return to the clinic for further evaluation. Thanks   
Line Functional

## 2024-03-04 ENCOUNTER — MYC MEDICAL ADVICE (OUTPATIENT)
Dept: INTERNAL MEDICINE | Facility: CLINIC | Age: 79
End: 2024-03-04
Payer: COMMERCIAL

## 2024-03-04 RX ORDER — HYDROCODONE BITARTRATE AND ACETAMINOPHEN 5; 325 MG/1; MG/1
1-2 TABLET ORAL EVERY 6 HOURS PRN
Qty: 180 TABLET | Refills: 0 | Status: SHIPPED | OUTPATIENT
Start: 2024-03-07 | End: 2024-03-30

## 2024-03-04 NOTE — TELEPHONE ENCOUNTER
"See her message. Last Rx was on 2/6 (29 days in Feb). Earliest should be out would be 3/6.   States she \"lost\" some pills.     Please advise.       "

## 2024-03-04 NOTE — TELEPHONE ENCOUNTER
She can have it filled on April 4th for this one time only. When she sends her refill request she needs to put reminder on note that she is traveling

## 2024-03-04 NOTE — TELEPHONE ENCOUNTER
Systems are working now. Should be going through.   E-Prescribing Status: Receipt confirmed by pharmacy (3/4/2024  9:19 AM CST)     Pt was called on Friday, and was given a new antibiotic from [place].     Reviewed the inhaler PA and resent to the PA department, since has been 2 weeks and no updates.

## 2024-03-04 NOTE — TELEPHONE ENCOUNTER
She has refill in place that she can  pickup on 3/7. Her refills have always been given to her every 30 days, no sooner.  Unfortunately I cannot send for an early rx due to lost medication as it is a controlled substance and is very tightly regulated

## 2024-03-04 NOTE — TELEPHONE ENCOUNTER
She will be eligible to pick this up on 3/7.   I sent prescription to [facility]- if their system is still down, please let me know. But she is not due to pick this up until 3/7 as that is technically 30 days from last date she picked up rx

## 2024-03-04 NOTE — TELEPHONE ENCOUNTER
See her message for April refill. Could she get it filled in [place]? Otherwise would have to fill early.

## 2024-03-06 ENCOUNTER — MYC REFILL (OUTPATIENT)
Dept: INTERNAL MEDICINE | Facility: CLINIC | Age: 79
End: 2024-03-06
Payer: COMMERCIAL

## 2024-03-06 DIAGNOSIS — R32 URINARY INCONTINENCE, UNSPECIFIED TYPE: Primary | ICD-10-CM

## 2024-03-06 NOTE — TELEPHONE ENCOUNTER
PA Initiation    Medication: FLUTICASONE-SALMETEROL 232-14 MCG/ACT IN AEPB  Insurance Company: [facility] Non-Specialty PA's - Phone [phone] Fax [phone]  Pharmacy Filling the Rx: [facility] - [place], [place] - [address] [place] [address]  Filling Pharmacy Phone: [phone]  Filling Pharmacy Fax: [phone]  Start Date: 3/6/2024

## 2024-03-07 RX ORDER — BUDESONIDE AND FORMOTEROL FUMARATE DIHYDRATE 160; 4.5 UG/1; UG/1
2 AEROSOL RESPIRATORY (INHALATION) 2 TIMES DAILY
Qty: 6 G | Refills: 0 | Status: SHIPPED | OUTPATIENT
Start: 2024-03-07 | End: 2024-05-31

## 2024-03-07 RX ORDER — OXYBUTYNIN CHLORIDE 5 MG/1
5 TABLET ORAL AT BEDTIME
Qty: 90 TABLET | Refills: 3 | Status: SHIPPED | OUTPATIENT
Start: 2024-03-07

## 2024-03-07 NOTE — TELEPHONE ENCOUNTER
PRIOR AUTHORIZATION DENIED    Medication: FLUTICASONE-SALMETEROL 232-14 MCG/ACT IN AEPB    Insurance Company: [facility] Non-Specialty PA's - Phone [phone] Fax [phone]    Denial Date: 3/6/2024    Denial Reason(s): Patient needs to try and fail budesonide-formoterol and Breyna.     Appeal Information:

## 2024-03-18 NOTE — IP AVS SNAPSHOT
MRN:[id number]                      After Visit Summary   10/8/2018    Svetlana Serrano    MRN: [id number]           Thank you!     Thank you for choosing [facility] for your care. Our goal is always to provide you with excellent care. Hearing back from our patients is one way we can continue to improve our services. Please take a few minutes to complete the written survey that you may receive in the mail after you visit. If you would like to speak to someone directly about your visit please contact Patient Relations at [phone]. Thank you!          Patient Information     Date Of Birth          1945        About your hospital stay     You were admitted on:  October 8, 2018 You last received care in the:  [facility] PreOP/PostOP    You were discharged on:  October 8, 2018       Who to Call     For medical emergencies, please call 911.  For non-urgent questions about your medical care, please call your primary care provider or clinic, [phone]  For questions related to your surgery, please call your surgery clinic        Attending Provider     Provider Specialty    Anton Stroud MD Gastroenterology       Primary Care Provider Office Phone # Fax #    Dolores Blanton [phone]      Further instructions from your care team       GENERAL ANESTHESIA OR SEDATION ADULT DISCHARGE INSTRUCTIONS   SPECIAL PRECAUTIONS FOR 24 HOURS AFTER SURGERY    IT IS NOT UNUSUAL TO FEEL LIGHT-HEADED OR FAINT, UP TO 24 HOURS AFTER SURGERY OR WHILE TAKING PAIN MEDICATION.  IF YOU HAVE THESE SYMPTOMS; SIT FOR A FEW MINUTES BEFORE STANDING AND HAVE SOMEONE ASSIST YOU WHEN YOU GET UP TO WALK OR USE THE BATHROOM.    YOU SHOULD REST AND RELAX FOR THE NEXT 24 HOURS AND YOU MUST MAKE ARRANGEMENTS TO HAVE SOMEONE STAY WITH YOU FOR AT LEAST 24 HOURS AFTER YOUR DISCHARGE.  AVOID HAZARDOUS AND STRENUOUS ACTIVITIES.  DO NOT MAKE IMPORTANT DECISIONS FOR 24 HOURS.    DO NOT DRIVE ANY  Spoke w/ Ewelina from radiology.     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     Routing to AD for review    VEHICLE OR OPERATE MECHANICAL EQUIPMENT FOR 24 HOURS FOLLOWING THE END OF YOUR SURGERY.  EVEN THOUGH YOU MAY FEEL NORMAL, YOUR REACTIONS MAY BE AFFECTED BY THE MEDICATION YOU HAVE RECEIVED.    DO NOT DRINK ALCOHOLIC BEVERAGES FOR 24 HOURS FOLLOWING YOUR SURGERY.    DRINK CLEAR LIQUIDS (APPLE JUICE, GINGER ALE, 7-UP, BROTH, ETC.).  PROGRESS TO YOUR REGULAR DIET AS YOU FEEL ABLE.    YOU MAY HAVE A DRY MOUTH, A SORE THROAT, MUSCLES ACHES OR TROUBLE SLEEPING.  THESE SHOULD GO AWAY AFTER 24 HOURS.    CALL YOUR DOCTOR FOR ANY OF THE FOLLOWING:  SIGNS OF INFECTION (FEVER, GROWING TENDERNESS AT THE SURGERY SITE, A LARGE AMOUNT OF DRAINAGE OR BLEEDING, SEVERE PAIN, FOUL-SMELLING DRAINAGE, REDNESS OR SWELLING.    IT HAS BEEN OVER 8 TO 10 HOURS SINCE SURGERY AND YOU ARE STILL NOT ABLE TO URINATE (PASS WATER).     COLONOSCOPY DISCHARGE INSTRUCTIONS    You may not drive, use heavy equipment or consume alcohol for 24 hours because the drugs you were given may cause dizziness, drowsiness, forgetfulness and slower reaction time.    You may resume your regular diet and medications.    If you had a biopsy or polypectomy done, do not take aspirin, aleve (naproxen) or ibuprofen products for the next 10 days.  Tylenol (acetaminophen) is safe to take.    What to watch for:    Problems rarely occur after the exam.  It is important for you to be aware of the early signs of a possible complication.  Call your doctor immediately if you notice any of the followin.  Unusual or persistent abdominal pain (pain that does not move around like a gas pain).    2.  Passing bright red blood from your rectum.    3.  Black or bloody stools.    4.  Temperature above 100.6 degrees F (37.5 degrees C)    If you fell this has become a medical emergency please call 911.    DR. ILIANA BOWEN M.D.   CLINIC PHONE NUMBER:  [phone] extension [phone]    Pending Results     No orders found from 10/6/2018 to 10/9/2018.            Admission Information      "Date & Time Provider Department Dept. Phone    10/8/2018 Anton Stroud MD [facility] PreOP/PostOP [phone]      Your Vitals Were     Blood Pressure Temperature Respirations Weight Pulse Oximetry BMI (Body Mass Index)    122/80 97.6  F (36.4  C) (Temporal) 18 118.4 kg (261 lb) 94% 45.51 kg/m2      MyChar[x+1] Information     InMyRoom lets you send messages to your doctor, view your test results, renew your prescriptions, schedule appointments and more. To sign up, go to [email or web address] . Click on \"Log in\" on the left side of the screen, which will take you to the Welcome page. Then click on \"Sign up Now\" on the right side of the page.     You will be asked to enter the access code listed below, as well as some personal information. Please follow the directions to create your username and password.     Your access code is: [email or web address]  Expires: 2019 11:47 AM     Your access code will  in 90 days. If you need help or a new code, please call your [facility] clinic or [phone].        Care EveryWhere ID     This is your Care EveryWhere ID. This could be used by other organizations to access your [facility] medical records  [id number]        Equal Access to Services     ANNIKA BERG : Hadii aad ku hadasho Somuniraali, waaxda luqadaha, qaybta kaalmada sugey, bj quiroga. So [facility] [phone].    ATENCIÓN: Si habla español, tiene a will disposición servicios gratuitos de asistencia lingüística. Llame al [phone].    We comply with applicable federal civil rights laws and [place] laws. We do not discriminate on the basis of race, color, national origin, age, disability, sex, sexual orientation, or gender identity.               Review of your medicines      UNREVIEWED medicines. Ask your doctor about these medicines        Dose / Directions    albuterol 108 (90 Base) MCG/ACT inhaler   Commonly known as:  PROAIR HFA/PROVENTIL HFA/VENTOLIN HFA   Used for:  Mild " intermittent asthma without complication        Dose:  2 puff   Inhale 2 puffs into the lungs every 6 hours as needed for shortness of breath / dyspnea   Quantity:  1 Inhaler   Refills:  11       amLODIPine 5 MG tablet   Commonly known as:  NORVASC   Used for:  Benign essential hypertension        TAKE 1 TABLET BY MOUTH  DAILY   Quantity:  90 tablet   Refills:  0       diclofenac 1 % Gel topical gel   Commonly known as:  VOLTAREN   Used for:  Fibromyalgia        Apply to affected area tid as directed.   Quantity:  300 g   Refills:  3       DULoxetine 60 MG EC capsule   Commonly known as:  CYMBALTA   Used for:  Fibromyalgia, Major depressive disorder, recurrent episode, mild (H), Anxiety        TAKE 1 CAPSULE BY MOUTH TWO TIMES DAILY   Quantity:  180 capsule   Refills:  1       HYDROcodone-acetaminophen 5-325 MG per tablet   Commonly known as:  NORCO   Used for:  Fibromyalgia, DDD (degenerative disc disease), lumbar        Dose:  1-2 tablet   Take 1-2 tablets by mouth every 6 hours as needed   Quantity:  180 tablet   Refills:  0       LORazepam 0.5 MG tablet   Commonly known as:  ATIVAN   Used for:  Acute reaction to stress        TAKE 1-2 TABLETS BY MOUTH PRIOR TO FLYING. DO NOT DRIVE WITHIN 6 HOURS OF TAKING   Quantity:  10 tablet   Refills:  0       losartan 50 MG tablet   Commonly known as:  COZAAR   Used for:  Benign essential hypertension        TAKE 1 TABLET BY MOUTH  DAILY   Quantity:  90 tablet   Refills:  0       OVER-THE-COUNTER        Ideal collegen   Refills:  0       OVER-THE-COUNTER        womens ultra zulema   Refills:  0       OVER-THE-COUNTER        Hemp oil   Refills:  0       Vitamin D-3 5000 units Tabs        Refills:  0                Protect others around you: Learn how to safely use, store and throw away your medicines at www.disposemymeds.org.             Medication List: This is a list of all your medications and when to take them. Check marks below indicate your daily home schedule. Keep this  list as a reference.      Medications           Morning Afternoon Evening Bedtime As Needed    albuterol 108 (90 Base) MCG/ACT inhaler   Commonly known as:  PROAIR HFA/PROVENTIL HFA/VENTOLIN HFA   Inhale 2 puffs into the lungs every 6 hours as needed for shortness of breath / dyspnea                                amLODIPine 5 MG tablet   Commonly known as:  NORVASC   TAKE 1 TABLET BY MOUTH  DAILY                                diclofenac 1 % Gel topical gel   Commonly known as:  VOLTAREN   Apply to affected area tid as directed.                                DULoxetine 60 MG EC capsule   Commonly known as:  CYMBALTA   TAKE 1 CAPSULE BY MOUTH TWO TIMES DAILY                                HYDROcodone-acetaminophen 5-325 MG per tablet   Commonly known as:  NORCO   Take 1-2 tablets by mouth every 6 hours as needed                                LORazepam 0.5 MG tablet   Commonly known as:  ATIVAN   TAKE 1-2 TABLETS BY MOUTH PRIOR TO FLYING. DO NOT DRIVE WITHIN 6 HOURS OF TAKING                                losartan 50 MG tablet   Commonly known as:  COZAAR   TAKE 1 TABLET BY MOUTH  DAILY                                OVER-THE-COUNTER   Ideal collegen                                OVER-THE-COUNTER   womens ultra zulema                                OVER-THE-COUNTER   Hemp oil                                Vitamin D-3 5000 units Tabs

## 2024-03-30 ENCOUNTER — MYC MEDICAL ADVICE (OUTPATIENT)
Dept: INTERNAL MEDICINE | Facility: CLINIC | Age: 79
End: 2024-03-30
Payer: COMMERCIAL

## 2024-03-30 ENCOUNTER — MYC REFILL (OUTPATIENT)
Dept: INTERNAL MEDICINE | Facility: CLINIC | Age: 79
End: 2024-03-30
Payer: COMMERCIAL

## 2024-03-30 DIAGNOSIS — M51.369 DDD (DEGENERATIVE DISC DISEASE), LUMBAR: ICD-10-CM

## 2024-03-30 DIAGNOSIS — G89.4 CHRONIC PAIN SYNDROME: ICD-10-CM

## 2024-03-30 DIAGNOSIS — M79.7 FIBROMYALGIA: ICD-10-CM

## 2024-04-01 RX ORDER — HYDROCODONE BITARTRATE AND ACETAMINOPHEN 5; 325 MG/1; MG/1
1-2 TABLET ORAL EVERY 6 HOURS PRN
Qty: 180 TABLET | Refills: 0 | Status: SHIPPED | OUTPATIENT
Start: 2024-04-04 | End: 2024-05-02

## 2024-05-02 ENCOUNTER — MYC REFILL (OUTPATIENT)
Dept: INTERNAL MEDICINE | Facility: CLINIC | Age: 79
End: 2024-05-02
Payer: COMMERCIAL

## 2024-05-02 DIAGNOSIS — M79.7 FIBROMYALGIA: ICD-10-CM

## 2024-05-02 DIAGNOSIS — M51.369 DDD (DEGENERATIVE DISC DISEASE), LUMBAR: ICD-10-CM

## 2024-05-02 DIAGNOSIS — G89.4 CHRONIC PAIN SYNDROME: ICD-10-CM

## 2024-05-03 RX ORDER — HYDROCODONE BITARTRATE AND ACETAMINOPHEN 5; 325 MG/1; MG/1
1-2 TABLET ORAL EVERY 6 HOURS PRN
Qty: 180 TABLET | Refills: 0 | Status: SHIPPED | OUTPATIENT
Start: 2024-05-03 | End: 2024-05-31

## 2024-05-24 ENCOUNTER — MYC MEDICAL ADVICE (OUTPATIENT)
Dept: INTERNAL MEDICINE | Facility: CLINIC | Age: 79
End: 2024-05-24
Payer: COMMERCIAL

## 2024-05-31 ENCOUNTER — MYC REFILL (OUTPATIENT)
Dept: INTERNAL MEDICINE | Facility: CLINIC | Age: 79
End: 2024-05-31
Payer: COMMERCIAL

## 2024-05-31 DIAGNOSIS — J45.31 MILD PERSISTENT ASTHMA WITH ACUTE EXACERBATION: ICD-10-CM

## 2024-05-31 DIAGNOSIS — M79.7 FIBROMYALGIA: ICD-10-CM

## 2024-05-31 DIAGNOSIS — G89.4 CHRONIC PAIN SYNDROME: ICD-10-CM

## 2024-05-31 DIAGNOSIS — M51.369 DDD (DEGENERATIVE DISC DISEASE), LUMBAR: ICD-10-CM

## 2024-06-03 RX ORDER — BUDESONIDE AND FORMOTEROL FUMARATE DIHYDRATE 160; 4.5 UG/1; UG/1
2 AEROSOL RESPIRATORY (INHALATION) 2 TIMES DAILY
Qty: 6 G | Refills: 0 | Status: SHIPPED | OUTPATIENT
Start: 2024-06-03 | End: 2024-07-12

## 2024-06-03 RX ORDER — HYDROCODONE BITARTRATE AND ACETAMINOPHEN 5; 325 MG/1; MG/1
1-2 TABLET ORAL EVERY 6 HOURS PRN
Qty: 180 TABLET | Refills: 0 | Status: SHIPPED | OUTPATIENT
Start: 2024-06-03 | End: 2024-06-30

## 2024-06-30 ENCOUNTER — MYC REFILL (OUTPATIENT)
Dept: INTERNAL MEDICINE | Facility: CLINIC | Age: 79
End: 2024-06-30
Payer: COMMERCIAL

## 2024-06-30 DIAGNOSIS — M51.369 DDD (DEGENERATIVE DISC DISEASE), LUMBAR: ICD-10-CM

## 2024-06-30 DIAGNOSIS — G89.4 CHRONIC PAIN SYNDROME: ICD-10-CM

## 2024-06-30 DIAGNOSIS — M79.7 FIBROMYALGIA: ICD-10-CM

## 2024-07-01 RX ORDER — HYDROCODONE BITARTRATE AND ACETAMINOPHEN 5; 325 MG/1; MG/1
1-2 TABLET ORAL EVERY 6 HOURS PRN
Qty: 180 TABLET | Refills: 0 | Status: SHIPPED | OUTPATIENT
Start: 2024-07-01 | End: 2024-07-12

## 2024-07-01 NOTE — TELEPHONE ENCOUNTER
She needs to be seen every 6 months with controlled medications - her last visit was in November.  I will send for last refill this month

## 2024-07-12 ENCOUNTER — VIRTUAL VISIT (OUTPATIENT)
Dept: INTERNAL MEDICINE | Facility: CLINIC | Age: 79
End: 2024-07-12
Payer: COMMERCIAL

## 2024-07-12 DIAGNOSIS — F41.9 ANXIETY: ICD-10-CM

## 2024-07-12 DIAGNOSIS — M79.7 FIBROMYALGIA: Primary | ICD-10-CM

## 2024-07-12 DIAGNOSIS — G89.4 CHRONIC PAIN SYNDROME: ICD-10-CM

## 2024-07-12 DIAGNOSIS — J45.31 MILD PERSISTENT ASTHMA WITH ACUTE EXACERBATION: ICD-10-CM

## 2024-07-12 DIAGNOSIS — F51.01 PRIMARY INSOMNIA: ICD-10-CM

## 2024-07-12 DIAGNOSIS — M51.369 DDD (DEGENERATIVE DISC DISEASE), LUMBAR: ICD-10-CM

## 2024-07-12 DIAGNOSIS — I10 BENIGN ESSENTIAL HYPERTENSION: ICD-10-CM

## 2024-07-12 PROBLEM — Z96.1 PSEUDOPHAKIA, BOTH EYES: Status: ACTIVE | Noted: 2018-08-08

## 2024-07-12 PROBLEM — H40.053 BILATERAL OCULAR HYPERTENSION: Status: ACTIVE | Noted: 2017-06-21

## 2024-07-12 PROCEDURE — 99213 OFFICE O/P EST LOW 20 MIN: CPT | Mod: 95

## 2024-07-12 PROCEDURE — G2211 COMPLEX E/M VISIT ADD ON: HCPCS | Mod: 95

## 2024-07-12 RX ORDER — LOSARTAN POTASSIUM 50 MG/1
50 TABLET ORAL DAILY
Qty: 90 TABLET | Refills: 3 | Status: SHIPPED | OUTPATIENT
Start: 2024-07-12

## 2024-07-12 RX ORDER — HYDROCODONE BITARTRATE AND ACETAMINOPHEN 5; 325 MG/1; MG/1
1-2 TABLET ORAL EVERY 6 HOURS PRN
Qty: 180 TABLET | Refills: 0 | Status: SHIPPED | OUTPATIENT
Start: 2024-07-29 | End: 2024-07-26

## 2024-07-12 RX ORDER — DULOXETIN HYDROCHLORIDE 60 MG/1
60 CAPSULE, DELAYED RELEASE ORAL 2 TIMES DAILY
Qty: 180 CAPSULE | Refills: 3 | Status: SHIPPED | OUTPATIENT
Start: 2024-07-12

## 2024-07-12 RX ORDER — AMLODIPINE BESYLATE 5 MG/1
5 TABLET ORAL DAILY
Qty: 90 TABLET | Refills: 3 | Status: SHIPPED | OUTPATIENT
Start: 2024-07-12

## 2024-07-12 RX ORDER — BUDESONIDE AND FORMOTEROL FUMARATE DIHYDRATE 160; 4.5 UG/1; UG/1
2 AEROSOL RESPIRATORY (INHALATION) 2 TIMES DAILY
Qty: 6 G | Refills: 3 | Status: SHIPPED | OUTPATIENT
Start: 2024-07-12

## 2024-07-12 RX ORDER — MONTELUKAST SODIUM 10 MG/1
1 TABLET ORAL AT BEDTIME
Qty: 90 TABLET | Refills: 3 | Status: SHIPPED | OUTPATIENT
Start: 2024-07-12

## 2024-07-12 NOTE — PROGRESS NOTES
Svetlana is a 79 year old who is being evaluated via a billable video visit.    How would you like to obtain your AVS? Mail a copy  If the video visit is dropped, the invitation should be resent by: Text to cell phone: [phone]  Will anyone else be joining your video visit? No      Assessment & Plan     (M79.7) Fibromyalgia  (primary encounter diagnosis)  Comment:   Plan: DULoxetine (CYMBALTA) 60 MG capsule,         HYDROcodone-acetaminophen (NORCO) 5-325 MG         tablet            (J45.31) Mild persistent asthma with acute exacerbation  Comment:   Plan: montelukast (SINGULAIR) 10 MG tablet,         budesonide-formoterol (SYMBICORT) 160-4.5         MCG/ACT Inhaler            (I10) Benign essential hypertension  Comment:   Plan: losartan (COZAAR) 50 MG tablet, amLODIPine         (NORVASC) 5 MG tablet            (F41.9) Anxiety  Comment:   Plan: DULoxetine (CYMBALTA) 60 MG capsule            (M51.36) DDD (degenerative disc disease), lumbar  Comment:   Plan: HYDROcodone-acetaminophen (NORCO) 5-325 MG         tablet            (G89.4) Chronic pain syndrome  Comment:   Plan: HYDROcodone-acetaminophen (NORCO) 5-325 MG         tablet            (F51.01) Primary insomnia  Comment: She endorses difficulty with sleeping. Per chart review it looks like she has had chronic insomnia for many years.  She has tried Trazodone without difficulty.  She has never had a sleep study completed.  I have advised her that she should have this done as she may very well have underlying untreated sleep apnea.  Plan: Adult Sleep Eval & Management          Referral     The longitudinal plan of care for the diagnosis(es)/condition(s) as documented were addressed during this visit. Due to the added complexity in care, I will continue to support Svetlana in the subsequent management and with ongoing continuity of care.           Subjective   Svetlana is a 79 year old, presenting for the following health issues:  Recheck Medication       7/12/2024     9:34 AM   Additional Questions   Roomed by Yolanda Ochoa     Video Start Time: 12:14 PM    HPI     Hypertension Follow-up    Do you check your blood pressure regularly outside of the clinic? No   Are you following a low salt diet? No  Are your blood pressures ever more than 140 on the top number (systolic) OR more   than 90 on the bottom number (diastolic), for example 140/90? Yes  How many servings of fruits and vegetables do you eat daily?  0-1  On average, how many sweetened beverages do you drink each day (Examples: soda, juice, sweet tea, etc.  Do NOT count diet or artificially sweetened beverages)?   0  How many days per week do you exercise enough to make your heart beat faster? 3 or less  How many minutes a day do you exercise enough to make your heart beat faster? 9 or less  How many days per week do you miss taking your medication? 0            Objective    Vitals - Patient Reported  Pain Score: No Pain (0)        Physical Exam   GENERAL: alert and no distress  EYES: Eyes grossly normal to inspection.  No discharge or erythema, or obvious scleral/conjunctival abnormalities.  RESP: No audible wheeze, cough, or visible cyanosis.    SKIN: Visible skin clear. No significant rash, abnormal pigmentation or lesions.  NEURO: Cranial nerves grossly intact.  Mentation and speech appropriate for age.  PSYCH: Appropriate affect, tone, and pace of words        Video-Visit Details    Type of service:  Video Visit   Video End Time:12:32 PM  Originating Location (pt. Location): Home    Distant Location (provider location):  On-site  Platform used for Video Visit: Bhumi  Signed Electronically by: CAMELIA Rivas CNP

## 2024-07-25 ENCOUNTER — MYC MEDICAL ADVICE (OUTPATIENT)
Dept: INTERNAL MEDICINE | Facility: CLINIC | Age: 79
End: 2024-07-25
Payer: COMMERCIAL

## 2024-07-25 DIAGNOSIS — M51.369 DDD (DEGENERATIVE DISC DISEASE), LUMBAR: ICD-10-CM

## 2024-07-25 DIAGNOSIS — G89.4 CHRONIC PAIN SYNDROME: ICD-10-CM

## 2024-07-25 DIAGNOSIS — M79.7 FIBROMYALGIA: ICD-10-CM

## 2024-07-29 RX ORDER — HYDROCODONE BITARTRATE AND ACETAMINOPHEN 5; 325 MG/1; MG/1
1-2 TABLET ORAL EVERY 6 HOURS PRN
Qty: 180 TABLET | Refills: 0 | Status: SHIPPED | OUTPATIENT
Start: 2024-07-29 | End: 2024-08-26

## 2024-07-29 NOTE — TELEPHONE ENCOUNTER
Sent Med Access message to patient.    Thank you,  Cal Cortes, Triage RN [place]  9:42 AM 7/29/2024

## 2024-08-20 ENCOUNTER — MYC MEDICAL ADVICE (OUTPATIENT)
Dept: INTERNAL MEDICINE | Facility: CLINIC | Age: 79
End: 2024-08-20
Payer: COMMERCIAL

## 2024-08-22 NOTE — TELEPHONE ENCOUNTER
Left message for patient to call back  Patient has not read her message about the evisit.  Can she come in to see Raysa?

## 2024-08-26 ENCOUNTER — VIRTUAL VISIT (OUTPATIENT)
Dept: INTERNAL MEDICINE | Facility: CLINIC | Age: 79
End: 2024-08-26
Payer: COMMERCIAL

## 2024-08-26 DIAGNOSIS — M51.369 DDD (DEGENERATIVE DISC DISEASE), LUMBAR: ICD-10-CM

## 2024-08-26 DIAGNOSIS — M79.7 FIBROMYALGIA: ICD-10-CM

## 2024-08-26 DIAGNOSIS — G89.4 CHRONIC PAIN SYNDROME: ICD-10-CM

## 2024-08-26 DIAGNOSIS — R35.0 URINARY FREQUENCY: Primary | ICD-10-CM

## 2024-08-26 PROCEDURE — 99214 OFFICE O/P EST MOD 30 MIN: CPT

## 2024-08-26 PROCEDURE — G2211 COMPLEX E/M VISIT ADD ON: HCPCS

## 2024-08-26 RX ORDER — HYDROCODONE BITARTRATE AND ACETAMINOPHEN 5; 325 MG/1; MG/1
1-2 TABLET ORAL EVERY 6 HOURS PRN
Qty: 180 TABLET | Refills: 0 | Status: SHIPPED | OUTPATIENT
Start: 2024-08-26 | End: 2024-09-12

## 2024-08-26 ASSESSMENT — ASTHMA QUESTIONNAIRES
QUESTION_3 LAST FOUR WEEKS HOW OFTEN DID YOUR ASTHMA SYMPTOMS (WHEEZING, COUGHING, SHORTNESS OF BREATH, CHEST TIGHTNESS OR PAIN) WAKE YOU UP AT NIGHT OR EARLIER THAN USUAL IN THE MORNING: NOT AT ALL
QUESTION_1 LAST FOUR WEEKS HOW MUCH OF THE TIME DID YOUR ASTHMA KEEP YOU FROM GETTING AS MUCH DONE AT WORK, SCHOOL OR AT HOME: NONE OF THE TIME
ACT_TOTALSCORE: 22
QUESTION_4 LAST FOUR WEEKS HOW OFTEN HAVE YOU USED YOUR RESCUE INHALER OR NEBULIZER MEDICATION (SUCH AS ALBUTEROL): ONE OR TWO TIMES PER DAY
QUESTION_2 LAST FOUR WEEKS HOW OFTEN HAVE YOU HAD SHORTNESS OF BREATH: NOT AT ALL
ACT_TOTALSCORE: 22
QUESTION_5 LAST FOUR WEEKS HOW WOULD YOU RATE YOUR ASTHMA CONTROL: COMPLETELY CONTROLLED

## 2024-08-26 ASSESSMENT — ANXIETY QUESTIONNAIRES
5. BEING SO RESTLESS THAT IT IS HARD TO SIT STILL: NOT AT ALL
4. TROUBLE RELAXING: NOT AT ALL
3. WORRYING TOO MUCH ABOUT DIFFERENT THINGS: NOT AT ALL
1. FEELING NERVOUS, ANXIOUS, OR ON EDGE: NOT AT ALL
2. NOT BEING ABLE TO STOP OR CONTROL WORRYING: NOT AT ALL
GAD7 TOTAL SCORE: 0
IF YOU CHECKED OFF ANY PROBLEMS ON THIS QUESTIONNAIRE, HOW DIFFICULT HAVE THESE PROBLEMS MADE IT FOR YOU TO DO YOUR WORK, TAKE CARE OF THINGS AT HOME, OR GET ALONG WITH OTHER PEOPLE: NOT DIFFICULT AT ALL
GAD7 TOTAL SCORE: 0
6. BECOMING EASILY ANNOYED OR IRRITABLE: NOT AT ALL
7. FEELING AFRAID AS IF SOMETHING AWFUL MIGHT HAPPEN: NOT AT ALL

## 2024-08-26 ASSESSMENT — PATIENT HEALTH QUESTIONNAIRE - PHQ9: SUM OF ALL RESPONSES TO PHQ QUESTIONS 1-9: 3

## 2024-08-26 NOTE — PROGRESS NOTES
"Svetlana is a 79 year old who is being evaluated via a billable video visit.    How would you like to obtain your AVS? MyChart  If the video visit is dropped, the invitation should be resent by: Text to cell phone: [phone]  Will anyone else be joining your video visit? No      Assessment & Plan     (R35.0) Urinary frequency  (primary encounter diagnosis)  Comment:   She endorses shivering while urinating and increased urinary frequency over the past 3-4 days.  No hematuria, dysuria, fevers, chills, nausea, vomiting, or flank pain.   Plan: Basic metabolic panel  (Ca, Cl, CO2, Creat,         Gluc, K, Na, BUN), CBC with platelets, UA         Macroscopic with reflex to Microscopic and         Culture - Lab Collect            (M79.7) Fibromyalgia  Comment: I reiterated the fact that going forward her pain medication must last 30 days  Plan: HYDROcodone-acetaminophen (NORCO) 5-325 MG         tablet            (M51.36) DDD (degenerative disc disease), lumbar  Comment:   Plan: HYDROcodone-acetaminophen (NORCO) 5-325 MG         tablet            (G89.4) Chronic pain syndrome  Comment:   Plan: HYDROcodone-acetaminophen (NORCO) 5-325 MG         tablet            The longitudinal plan of care for the diagnosis(es)/condition(s) as documented were addressed during this visit. Due to the added complexity in care, I will continue to support Svetlana in the subsequent management and with ongoing continuity of care.      BMI  Estimated body mass index is 41.45 kg/m  as calculated from the following:    Height as of 1/31/24: 1.6 m (5' 3\").    Weight as of 1/31/24: 106.1 kg (234 lb).   Weight management plan: Discussed healthy diet and exercise guidelines          Subjective   Svetlana is a 79 year old, presenting for the following health issues:  No chief complaint on file.        8/26/2024    11:05 AM   Additional Questions   Roomed by   Dianna   Patient states shivers when \"  sitting down to pee \"   Also is out of Norco and is in " pain     Video Start Time: 12:06 PM    HPI       Objective           Vitals:  No vitals were obtained today due to virtual visit.    Physical Exam   GENERAL: alert and no distress  EYES: Eyes grossly normal to inspection.  No discharge or erythema, or obvious scleral/conjunctival abnormalities.  RESP: No audible wheeze, cough, or visible cyanosis.    SKIN: Visible skin clear. No significant rash, abnormal pigmentation or lesions.  NEURO: Cranial nerves grossly intact.  Mentation and speech appropriate for age.  PSYCH: Appropriate affect, tone, and pace of words          Video-Visit Details    Type of service:  Video Visit   Video End Time:12:16 PM  Originating Location (pt. Location): Home    Distant Location (provider location):  On-site  Platform used for Video Visit: Bhumi  Signed Electronically by: CAMELIA Rivas CNP

## 2024-08-29 ENCOUNTER — LAB (OUTPATIENT)
Dept: LAB | Facility: CLINIC | Age: 79
End: 2024-08-29
Payer: COMMERCIAL

## 2024-08-29 DIAGNOSIS — R35.0 URINARY FREQUENCY: ICD-10-CM

## 2024-08-29 LAB
ALBUMIN UR-MCNC: NEGATIVE MG/DL
ANION GAP SERPL CALCULATED.3IONS-SCNC: 10 MMOL/L (ref 7–15)
APPEARANCE UR: ABNORMAL
BACTERIA #/AREA URNS HPF: ABNORMAL /HPF
BILIRUB UR QL STRIP: ABNORMAL
BUN SERPL-MCNC: 18.1 MG/DL (ref 8–23)
CALCIUM SERPL-MCNC: 9.7 MG/DL (ref 8.8–10.4)
CHLORIDE SERPL-SCNC: 104 MMOL/L (ref 98–107)
COLOR UR AUTO: YELLOW
CREAT SERPL-MCNC: 0.95 MG/DL (ref 0.51–0.95)
EGFRCR SERPLBLD CKD-EPI 2021: 61 ML/MIN/1.73M2
ERYTHROCYTE [DISTWIDTH] IN BLOOD BY AUTOMATED COUNT: 12.8 % (ref 10–15)
GLUCOSE SERPL-MCNC: 115 MG/DL (ref 70–99)
GLUCOSE UR STRIP-MCNC: NEGATIVE MG/DL
HCO3 SERPL-SCNC: 26 MMOL/L (ref 22–29)
HCT VFR BLD AUTO: 45.4 % (ref 35–47)
HGB BLD-MCNC: 15.3 G/DL (ref 11.7–15.7)
HGB UR QL STRIP: ABNORMAL
KETONES UR STRIP-MCNC: NEGATIVE MG/DL
LEUKOCYTE ESTERASE UR QL STRIP: ABNORMAL
MCH RBC QN AUTO: 33.5 PG (ref 26.5–33)
MCHC RBC AUTO-ENTMCNC: 33.7 G/DL (ref 31.5–36.5)
MCV RBC AUTO: 99 FL (ref 78–100)
NITRATE UR QL: NEGATIVE
PH UR STRIP: 5.5 [PH] (ref 5–7)
PLATELET # BLD AUTO: 287 10E3/UL (ref 150–450)
POTASSIUM SERPL-SCNC: 4.3 MMOL/L (ref 3.4–5.3)
RBC # BLD AUTO: 4.57 10E6/UL (ref 3.8–5.2)
RBC #/AREA URNS AUTO: ABNORMAL /HPF
SODIUM SERPL-SCNC: 140 MMOL/L (ref 135–145)
SP GR UR STRIP: >=1.03 (ref 1–1.03)
SQUAMOUS #/AREA URNS AUTO: ABNORMAL /LPF
UROBILINOGEN UR STRIP-ACNC: 1 E.U./DL
WBC # BLD AUTO: 7.9 10E3/UL (ref 4–11)
WBC #/AREA URNS AUTO: >100 /HPF
WBC CLUMPS #/AREA URNS HPF: PRESENT /HPF

## 2024-08-29 PROCEDURE — 87186 SC STD MICRODIL/AGAR DIL: CPT

## 2024-08-29 PROCEDURE — 81001 URINALYSIS AUTO W/SCOPE: CPT

## 2024-08-29 PROCEDURE — 85027 COMPLETE CBC AUTOMATED: CPT

## 2024-08-29 PROCEDURE — 87086 URINE CULTURE/COLONY COUNT: CPT

## 2024-08-29 PROCEDURE — 80048 BASIC METABOLIC PNL TOTAL CA: CPT

## 2024-08-29 PROCEDURE — 36415 COLL VENOUS BLD VENIPUNCTURE: CPT

## 2024-08-30 ENCOUNTER — TELEPHONE (OUTPATIENT)
Dept: INTERNAL MEDICINE | Facility: CLINIC | Age: 79
End: 2024-08-30
Payer: COMMERCIAL

## 2024-08-30 LAB — BACTERIA UR CULT: ABNORMAL

## 2024-08-30 RX ORDER — NITROFURANTOIN 25; 75 MG/1; MG/1
100 CAPSULE ORAL 2 TIMES DAILY
Qty: 10 CAPSULE | Refills: 0 | Status: SHIPPED | OUTPATIENT
Start: 2024-08-30 | End: 2024-09-04

## 2024-08-30 NOTE — TELEPHONE ENCOUNTER
Reason for Call:  Appointment Request    Patient requesting this type of appt: Chronic Diease Management/Medication/Follow-Up    Requested provider: Suzanna Puentes    Reason patient unable to be scheduled: Not within requested timeframe    When does patient want to be seen/preferred time: 3-7 days    Comments: Based on their lab results and per pcp recommendation, pt would like to be seen sooner the week of 09/03/2024 virtually. Please call back to advise     Could we send this information to you in [place] or would you prefer to receive a phone call?:   Patient would prefer a phone call   Okay to leave a detailed message?: Yes at Cell number on file:    Telephone Information:   Mobile [phone]       Call taken on 8/30/2024 at 12:32 PM by Valeri Hansen

## 2024-09-05 ENCOUNTER — ANCILLARY PROCEDURE (OUTPATIENT)
Dept: BONE DENSITY | Facility: CLINIC | Age: 79
End: 2024-09-05
Payer: COMMERCIAL

## 2024-09-05 ENCOUNTER — HOSPITAL ENCOUNTER (OUTPATIENT)
Dept: MAMMOGRAPHY | Facility: CLINIC | Age: 79
Discharge: HOME | End: 2024-09-05
Payer: COMMERCIAL

## 2024-09-05 DIAGNOSIS — Z13.820 ENCOUNTER FOR OSTEOPOROSIS SCREENING IN ASYMPTOMATIC POSTMENOPAUSAL PATIENT: ICD-10-CM

## 2024-09-05 DIAGNOSIS — Z12.31 SCREENING MAMMOGRAM, ENCOUNTER FOR: ICD-10-CM

## 2024-09-05 DIAGNOSIS — Z78.0 ENCOUNTER FOR OSTEOPOROSIS SCREENING IN ASYMPTOMATIC POSTMENOPAUSAL PATIENT: ICD-10-CM

## 2024-09-05 PROCEDURE — 77080 DXA BONE DENSITY AXIAL: CPT | Mod: TC | Performed by: PHYSICIAN ASSISTANT

## 2024-09-05 PROCEDURE — 77063 BREAST TOMOSYNTHESIS BI: CPT

## 2024-09-05 PROCEDURE — 77081 DXA BONE DENSITY APPENDICULR: CPT | Mod: TC | Performed by: PHYSICIAN ASSISTANT

## 2024-09-06 ENCOUNTER — VIRTUAL VISIT (OUTPATIENT)
Dept: INTERNAL MEDICINE | Facility: CLINIC | Age: 79
End: 2024-09-06
Payer: COMMERCIAL

## 2024-09-06 DIAGNOSIS — F11.90 CHRONIC, CONTINUOUS USE OF OPIOIDS: ICD-10-CM

## 2024-09-06 DIAGNOSIS — G89.4 CHRONIC PAIN SYNDROME: ICD-10-CM

## 2024-09-06 DIAGNOSIS — G47.9 SLEEP DISTURBANCE: Primary | ICD-10-CM

## 2024-09-06 PROCEDURE — G2211 COMPLEX E/M VISIT ADD ON: HCPCS | Mod: 95

## 2024-09-06 PROCEDURE — 99213 OFFICE O/P EST LOW 20 MIN: CPT | Mod: 95

## 2024-09-06 NOTE — PROGRESS NOTES
Svetlana is a 79 year old who is being evaluated via a billable video visit.    How would you like to obtain your AVS? MyChart  If the video visit is dropped, the invitation should be resent by: Text to cell phone: [phone]  Will anyone else be joining your video visit? No      Assessment & Plan     (G47.9) Sleep disturbance  (primary encounter diagnosis)  (F11.90) Chronic, continuous use of opioids  (G89.4) Chronic pain syndrome  Comment:   She feels her  symptoms have improved since finishing the Macrobid yesterday. Her urine culture showed >100,000 CFU/mL E.coli   She no longer shivers when she goes to the bathroom.    We talked about her chronic pain for which she uses Norco.   We also discussed importance of her getting the sleep study done in order to assess for potential undiagnosed BRISEYDA. She is using an over the counter sleep aid which she thinks is helping but still feels fatigued all of the time.  She is stressed with her  and that her son living at home does not help out at all.  She is going on vacation to [place] later this month.       The longitudinal plan of care for the diagnosis(es)/condition(s) as documented were addressed during this visit. Due to the added complexity in care, I will continue to support Svetlana in the subsequent management and with ongoing continuity of care.      Subjective   Svetlana is a 79 year old, presenting for the following health issues:  Follow Up        9/6/2024     2:47 PM   Additional Questions   Roomed by Dianna       Video Start Time: 4:06 PM    History of Present Illness       Reason for visit:  RECHECK   She is taking medications regularly.             Objective           Vitals:  No vitals were obtained today due to virtual visit.    Physical Exam   GENERAL: alert   RESP: No audible wheeze, cough  NEURO:Mentation and speech appropriate for age.  PSYCH: Appropriate affect, tone, and pace of words          Video-Visit Details    Type of service:  Video  Visit   Video End Time:4:24 PM  Originating Location (pt. Location): Home    Distant Location (provider location):  On-site  Platform used for Video Visit: Bhumi  Signed Electronically by: CAMELIA Rivas CNP

## 2024-09-12 ENCOUNTER — MYC MEDICAL ADVICE (OUTPATIENT)
Dept: INTERNAL MEDICINE | Facility: CLINIC | Age: 79
End: 2024-09-12
Payer: COMMERCIAL

## 2024-09-12 DIAGNOSIS — F43.0 ACUTE REACTION TO STRESS: ICD-10-CM

## 2024-09-13 RX ORDER — LORAZEPAM 0.5 MG/1
TABLET ORAL
Qty: 10 TABLET | Refills: 0 | Status: SHIPPED | OUTPATIENT
Start: 2024-09-13

## 2024-09-13 NOTE — TELEPHONE ENCOUNTER
Regarding her norco- yes she will have enough to last her until she gets back on the 28th of sept as she picked up 30 days worth of norco on 8/26. I sent for an additional 5 days worth which would mean technically she would have enough norco until September 30th.   She can  the next refill when she returns

## 2024-09-16 NOTE — TELEPHONE ENCOUNTER
Patient's pharmacy calls, wanting to know if early refill was allowed for patient's Norco prescription. Writer reviewed notes, indicating that NP Deloris had ordered an additional 5 days of [place] for patient, as she was leaving on a trip. This was passed to pharmacist. She will  process order.

## 2024-09-29 ENCOUNTER — MYC REFILL (OUTPATIENT)
Dept: INTERNAL MEDICINE | Facility: CLINIC | Age: 79
End: 2024-09-29
Payer: COMMERCIAL

## 2024-09-29 ENCOUNTER — MYC MEDICAL ADVICE (OUTPATIENT)
Dept: INTERNAL MEDICINE | Facility: CLINIC | Age: 79
End: 2024-09-29
Payer: COMMERCIAL

## 2024-09-29 DIAGNOSIS — M79.7 FIBROMYALGIA: ICD-10-CM

## 2024-09-29 DIAGNOSIS — G89.4 CHRONIC PAIN SYNDROME: ICD-10-CM

## 2024-09-29 DIAGNOSIS — M51.369 DEGENERATION OF INTERVERTEBRAL DISC OF LUMBAR REGION, UNSPECIFIED WHETHER PAIN PRESENT: ICD-10-CM

## 2024-09-29 DIAGNOSIS — M51.369 DDD (DEGENERATIVE DISC DISEASE), LUMBAR: ICD-10-CM

## 2024-09-30 RX ORDER — HYDROCODONE BITARTRATE AND ACETAMINOPHEN 5; 325 MG/1; MG/1
1-2 TABLET ORAL EVERY 6 HOURS PRN
Qty: 180 TABLET | Refills: 0 | Status: SHIPPED | OUTPATIENT
Start: 2024-09-30 | End: 2024-10-02

## 2024-09-30 RX ORDER — HYDROCODONE BITARTRATE AND ACETAMINOPHEN 5; 325 MG/1; MG/1
1-2 TABLET ORAL EVERY 6 HOURS PRN
Qty: 180 TABLET | Refills: 0 | Status: CANCELLED | OUTPATIENT
Start: 2024-09-30

## 2024-09-30 NOTE — TELEPHONE ENCOUNTER
To what pharmacy? I believe her norco usually goes to [facility] in [place] which is where I sent it

## 2024-09-30 NOTE — TELEPHONE ENCOUNTER
Primary care provider ordered Norco - HYDROcodone-acetaminophen (NORCO) 5-325 MG tablet and sent to [facility] pharmacy in [place], [place].     Sent eGames message to patient.    Thank you,  Cal Cortes, Triage RN [facility]  12:50 PM 9/30/2024

## 2024-09-30 NOTE — TELEPHONE ENCOUNTER
[facility] is pended.    Call to patient. States [facility] doesn't have a full supply of the prescription.

## 2024-10-01 RX ORDER — HYDROCODONE BITARTRATE AND ACETAMINOPHEN 5; 325 MG/1; MG/1
1-2 TABLET ORAL EVERY 6 HOURS PRN
Qty: 180 TABLET | Refills: 0 | Status: CANCELLED | OUTPATIENT
Start: 2024-10-01

## 2024-10-02 RX ORDER — HYDROCODONE BITARTRATE AND ACETAMINOPHEN 5; 325 MG/1; MG/1
1-2 TABLET ORAL EVERY 6 HOURS PRN
Qty: 180 TABLET | Refills: 0 | Status: SHIPPED | OUTPATIENT
Start: 2024-10-02

## 2024-10-17 ENCOUNTER — NURSE TRIAGE (OUTPATIENT)
Dept: INTERNAL MEDICINE | Facility: CLINIC | Age: 79
End: 2024-10-17
Payer: COMMERCIAL

## 2024-10-17 NOTE — TELEPHONE ENCOUNTER
RN COVID TREATMENT VISIT  10/17/24      The patient has been triaged and does not require a higher level of care.    Svetlana Serrano  79 year old  Current weight? 230 lbs    Has the patient been seen by a primary care provider at an [facility] or [facility] within the past two years? Yes.   Have you been in close proximity to/do you have a known exposure to a person with a confirmed case of influenza? No.     General treatment eligibility:  Date of positive COVID test (PCR or at home)?  10/16/24    Are you or have you been hospitalized for this COVID-19 infection? No.   Have you received monoclonal antibodies or antiviral treatment for COVID-19 since this positive test? No.   Do you have any of the following conditions that place you at risk of being very sick from COVID-19?   - Age 50 years or older  Yes, patient has at least one high risk condition as noted above.     Current COVID symptoms:   - cough  - fatigue  - muscle or body aches  Yes. Patient has at least one symptom as selected.     How many days since symptoms started? 5 days or less. Established patient, 12 years or older weighing at least 88.2 lbs, who has symptoms that started in the past 5 days, has not been hospitalized nor received treatment already, and is at risk for being very sick from COVID-19.     Treatment eligibility by RN:  Are you currently pregnant or nursing? No  Do you have a clinically significant hypersensitivity to nirmatrelvir or ritonavir, or toxic epidermal necrolysis (TEN) or Camacho-Arash Syndrome? No  Do you have a history of hepatitis, any hepatic impairment on the Problem List (such as Child-Loving Class C, cirrhosis, fatty liver disease, alcoholic liver disease), or was the last liver lab (hepatic panel, ALT, AST, ALK Phos, bilirubin) elevated in the past 6 months? No  Do you have any history of severe renal impairment (eGFR < 30mL/min)? No    Is patient eligible to continue? Yes, patient meets  all eligibility requirements for the RN COVID treatment (as denoted by all no responses above).     Current Outpatient Medications   Medication Sig Dispense Refill     albuterol (ACCUNEB) 0.63 MG/3ML neb solution Take 3 mLs (0.63 mg) by nebulization every 6 hours as needed for shortness of breath / dyspnea or wheezing 90 mL 1     albuterol (PROAIR HFA/PROVENTIL HFA/VENTOLIN HFA) 108 (90 Base) MCG/ACT inhaler Inhale 2 puffs into the lungs every 6 hours as needed for shortness of breath 8 g 4     amLODIPine (NORVASC) 5 MG tablet Take 1 tablet (5 mg) by mouth daily 90 tablet 3     BIOTIN PO Take by mouth daily       biotin POWD        budesonide-formoterol (SYMBICORT) 160-4.5 MCG/ACT Inhaler Inhale 2 puffs into the lungs 2 times daily 6 g 3     DULoxetine (CYMBALTA) 60 MG capsule Take 1 capsule (60 mg) by mouth 2 times daily 180 capsule 3     HYDROcodone-acetaminophen (NORCO) 5-325 MG tablet Take 1-2 tablets by mouth every 6 hours as needed for pain. 180 tablet 0     LORazepam (ATIVAN) 0.5 MG tablet TAKE 1 po q8 hours prn anxiety 10 tablet 0     losartan (COZAAR) 50 MG tablet Take 1 tablet (50 mg) by mouth daily 90 tablet 3     medical cannabis (Patient's own supply) (The purpose of this order is to document that the patient reports taking medical cannabis.  This is not a prescription, and is not used to certify that the patient has a qualifying medical condition.) 0 Information only 0     miconazole (MICATIN) 2 % external powder Apply topically daily 90 g 11     montelukast (SINGULAIR) 10 MG tablet Take 1 tablet (10 mg) by mouth at bedtime 90 tablet 3     naloxone (NARCAN) 4 MG/0.1ML nasal spray Spray 1 spray (4 mg) into one nostril alternating nostrils once as needed for opioid reversal every 2-3 minutes until assistance arrives 0.2 mL 0     neomycin-polymyxin-hydrocortisone (CORTISPORIN) 3.5-09167-3 otic solution Place 3 drops in ear(s) 4 times daily 10 mL 0     oxyBUTYnin (DITROPAN) 5 MG tablet Take 1 tablet (5 mg)  by mouth at bedtime 90 tablet 3     Vitamin D3 (CHOLECALCIFEROL) 25 mcg (1000 units) tablet Take by mouth daily         Medications from List 1 of the standing order (on medications that exclude the use of Paxlovid) that patient is taking: NONE. Is patient taking [place]'s Wort? No  Is patient taking [place]'s Wort or any meds from List 1? No.   Medications from List 2 of the standing order (on meds that provider needs to adjust) that patient is taking: amlodipine (Norvasc), explained a provider visit is necessary to discuss medication adjustments while taking Paxlovid. Is patient on any of the meds from List 2? Yes. Patient will be scheduled or transferred to a  at the end of this call.   Eduard Fall RN         Reason for Disposition    COVID-19 diagnosed by positive lab test (e.g., PCR, rapid self-test kit) and NO symptoms (e.g., cough, fever, others)    Additional Information    Negative: SEVERE difficulty breathing (e.g., struggling for each breath, speaks in single words)    Negative: Difficult to awaken or acting confused (e.g., disoriented, slurred speech)    Negative: Bluish (or gray) lips or face now    Negative: Shock suspected (e.g., cold/pale/clammy skin, too weak to stand, low BP, rapid pulse)    Negative: Sounds like a life-threatening emergency to the triager    Negative: Diagnosed or suspected COVID-19 and symptoms lasting 3 or more weeks    Negative: COVID-19 exposure and no symptoms    Negative: COVID-19 vaccine reaction suspected (e.g., fever, headache, muscle aches) occurring 1 to 3 days after getting vaccine    Negative: COVID-19 vaccine, questions about    Negative: Lives with someone known to have influenza (flu test positive) and flu-like symptoms (e.g., cough, runny nose, sore throat, SOB; with or without fever)    Negative: Possible COVID-19 symptoms and triager concerned about severity of symptoms or other causes    Negative: COVID-19 and breastfeeding, questions about     Negative: Chest pain or pressure  (Exception: MILD central chest pain, present only when coughing.)    Negative: Drinking very little and dehydration suspected (e.g., no urine > 12 hours, very dry mouth, very lightheaded)    Negative: Patient sounds very sick or weak to the triager    Negative: SEVERE or constant chest pain or pressure  (Exception: Mild central chest pain, present only when coughing.)    Negative: MODERATE difficulty breathing (e.g., speaks in phrases, SOB even at rest, pulse 100-120)    Negative: Headache and stiff neck (can't touch chin to chest)    Negative: Oxygen level (e.g., pulse oximetry) 90% or lower    Negative: MILD difficulty breathing (e.g., minimal/no SOB at rest, SOB with walking, pulse <100)    Negative: Fever > 103 F (39.4 C)    Negative: Fever > 101 F (38.3 C) and over 60 years of age    Negative: Fever > 100.0 F (37.8 C) and bedridden (e.g., CVA, chronic illness, recovering from surgery)    Negative: HIGH RISK patient (e.g., weak immune system, age > 64 years, obesity with BMI of 30 or higher, pregnant, chronic lung disease or other chronic medical condition) and COVID symptoms (e.g., cough, fever)  (Exceptions: Already seen by doctor or NP/PA and no new or worsening symptoms.)    Negative: HIGH RISK patient and influenza is widespread in the community and ONE OR MORE respiratory symptoms: cough, sore throat, runny or stuffy nose    Negative: HIGH RISK patient and influenza exposure within the last 7 days and ONE OR MORE respiratory symptoms: cough, sore throat, runny or stuffy nose    Negative: Oxygen level (e.g., pulse oximetry) 91 to 94%    Negative: COVID-19 infection suspected by caller or triager and mild symptoms (cough, fever, or others) and negative COVID-19 rapid test    Negative: Fever present > 3 days (72 hours)    Negative: Fever returns after gone for over 24 hours and symptoms worse or not improved    Negative: Continuous (nonstop) coughing interferes with work or  "school and no improvement using cough treatment per Care Advice    Negative: Cough present > 3 weeks    Answer Assessment - Initial Assessment Questions  1. COVID-19 DIAGNOSIS: \"How do you know that you have COVID?\" (e.g., positive lab test or self-test, diagnosed by doctor or NP/PA, symptoms after exposure).      Home test  2. COVID-19 EXPOSURE: \"Was there any known exposure to COVID before the symptoms began?\" [facility] Definition of close contact: within 6 feet (2 meters) for a total of 15 minutes or more over a 24-hour period.       Son visited [place]  3. ONSET: \"When did the COVID-19 symptoms start?\"       yesterday  4. WORST SYMPTOM: \"What is your worst symptom?\" (e.g., cough, fever, shortness of breath, muscle aches)      cough  5. COUGH: \"Do you have a cough?\" If Yes, ask: \"How bad is the cough?\"        Chronic cough, but it is getting worse.  6. FEVER: \"Do you have a fever?\" If Yes, ask: \"What is your temperature, how was it measured, and when did it start?\"      no  7. RESPIRATORY STATUS: \"Describe your breathing?\" (e.g., normal; shortness of breath, wheezing, unable to speak)       no  8. BETTER-SAME-WORSE: \"Are you getting better, staying the same or getting worse compared to yesterday?\"  If getting worse, ask, \"In what way?\"      yes  9. OTHER SYMPTOMS: \"Do you have any other symptoms?\"  (e.g., chills, fatigue, headache, loss of smell or taste, muscle pain, sore throat)      Sore all over, cough, fatigue  10. HIGH RISK DISEASE: \"Do you have any chronic medical problems?\" (e.g., asthma, heart or lung disease, weak immune system, obesity, etc.)        asthma  11. VACCINE: \"Have you had the COVID-19 vaccine?\" If Yes, ask: \"Which one, how many shots, when did you get it?\"        Yes  12. PREGNANCY: \"Is there any chance you are pregnant?\" \"When was your last menstrual period?\"        no  13. O2 SATURATION MONITOR:  \"Do you use an oxygen saturation monitor (pulse oximeter) at home?\" If Yes, ask \"What is your " "reading (oxygen level) today?\" \"What is your usual oxygen saturation reading?\" (e.g., 95%)        no    Protocols used: Coronavirus (COVID-19) Diagnosed or [id number]    "

## 2024-10-18 ENCOUNTER — VIRTUAL VISIT (OUTPATIENT)
Dept: URGENT CARE | Facility: CLINIC | Age: 79
End: 2024-10-18
Payer: COMMERCIAL

## 2024-10-18 DIAGNOSIS — R05.1 ACUTE COUGH: Primary | ICD-10-CM

## 2024-10-18 DIAGNOSIS — U07.1 COVID: ICD-10-CM

## 2024-10-18 PROCEDURE — 99441 PR PHYSICIAN TELEPHONE EVALUATION 5-10 MIN: CPT | Mod: 93 | Performed by: EMERGENCY MEDICINE

## 2024-10-18 RX ORDER — BENZONATATE 200 MG/1
200 CAPSULE ORAL 3 TIMES DAILY PRN
Qty: 30 CAPSULE | Refills: 0 | Status: SHIPPED | OUTPATIENT
Start: 2024-10-18

## 2024-10-18 NOTE — PROGRESS NOTES
"The patient has been notified of following:     \"This telephone visit will be conducted via a call between you and your physician/provider. We have found that certain health care needs can be provided without the need for a physical exam.  This service lets us provide the care you need with a short phone conversation.  If a prescription is necessary we can send it directly to your pharmacy.  If lab work is needed we can place an order for that and you can then stop by our lab to have the test done at a later time.    Telephone visits are billed at different rates depending on your insurance coverage. During this emergency period, for some insurers they may be billed the same as an in-person visit.  Please reach out to your insurance provider with any questions.    If during the course of the call the physician/provider feels a telephone visit is not appropriate, you will not be charged for this service.\"    Patient has given verbal consent for Telephone visit?  Yes    What phone number would you like to be contacted at?  [phone]    How would you like to obtain your AVS? MyChart    Subjective   CC: Svetlana Serrano  is a 79 year old female who presents via phone visit today for the following health issues:   Chief Complaint   Patient presents with    Infection          COVID-19 Symptom Review  How many days ago did these symptoms start? 2    Are any of the following symptoms significant for you?  New or worsening difficulty breathing? No  Worsening cough? Yes, I am coughing up mucus.  Fever or chills? No  Headache: No  Sore throat: No  Chest pain: No  Diarrhea: No  Body aches? No    What treatments has patient tried? Guaifenesin (mucinex)   Does patient live in a nursing home, group home, or shelter? No  Does patient have a way to get food/medications during quarantined? Yes, I have a friend or family member who can help me. and Yes         Phone appointment:  Patient location: At home  Provider location: [facility] " [facility] virtual provider (remote).              Reviewed and updated as needed this visit by Provider                   Review of Systems         Objective    Gen: Patient is alert, oriented  Gen: No acute distress on phone appointment although does sound somewhat hoarse.  She is nondyspneic sounding speaking in full sentences.              Assessment/Plan:  1. Acute cough  Patient is a 79-year-old female day 2 of COVID infection.  Symptoms are typical and relatively mild and clinically not short of breath.  She has comorbidities of age at 79, elevated BMI at 41, fibromyalgia, asthma, hypertension.  She consents to taking Paxlovid.  GFR is 61.  She is instructed to reduce her Norco to twice a day instead of 3 times a day and she currently is not taking Ativan.  No other drug drug interaction concerns.  - benzonatate (TESSALON) 200 MG capsule; Take 1 capsule (200 mg) by mouth 3 times daily as needed.  Dispense: 30 capsule; Refill: 0    2. COVID  See above        - nirmatrelvir and ritonavir (PAXLOVID) 300 mg/100 mg therapy pack; Take 3 tablets by mouth 2 times daily for 5 days. (Take 2 Nirmatrelvir tablets and 1 Ritonavir tablet twice daily for 5 days)  Dispense: 30 tablet; Refill: 0    Phone call duration:  10 minutes    Olman Banks MD

## 2024-10-21 ENCOUNTER — VIRTUAL VISIT (OUTPATIENT)
Dept: INTERNAL MEDICINE | Facility: CLINIC | Age: 79
End: 2024-10-21
Payer: COMMERCIAL

## 2024-10-21 DIAGNOSIS — R05.1 ACUTE COUGH: Primary | ICD-10-CM

## 2024-10-21 DIAGNOSIS — U07.1 INFECTION DUE TO 2019 NOVEL CORONAVIRUS: ICD-10-CM

## 2024-10-21 PROCEDURE — G2211 COMPLEX E/M VISIT ADD ON: HCPCS | Mod: 95

## 2024-10-21 PROCEDURE — 99213 OFFICE O/P EST LOW 20 MIN: CPT | Mod: 95

## 2024-10-21 RX ORDER — CODEINE PHOSPHATE AND GUAIFENESIN 10; 100 MG/5ML; MG/5ML
1-2 SOLUTION ORAL EVERY 4 HOURS PRN
Qty: 118 ML | Refills: 0 | Status: SHIPPED | OUTPATIENT
Start: 2024-10-21

## 2024-10-21 NOTE — PROGRESS NOTES
Svetlana is a 79 year old who is being evaluated via a billable video visit.    How would you like to obtain your AVS? MyChart  If the video visit is dropped, the invitation should be resent by: Text to cell phone: [phone]  Will anyone else be joining your video visit? No      Assessment & Plan     (R05.1) Acute cough  (primary encounter diagnosis)  (U07.1) Infection due to 2019 novel coronavirus  Comment:     She was started on Paxlovid on 10/18 for positive Covid by  provider.  She was given tessalon for the cough but she has not picked this up as she says this never worked for her in the past. She would like prescription for robitussin with codeine. I will send rx for this.   She has history of asthma but denies any wheezing or shortness of breath today.   I think it is okay to hold off on treatment with prednisone for asthma exacerbation. She will finish the paxlovid as prescribed.   She is to follow up with me in clinic if symptoms do not resolve or improve. If she develops severe shortness of breath or wheezing, she should be seen in ED.    The longitudinal plan of care for the diagnosis(es)/condition(s) as documented were addressed during this visit. Due to the added complexity in care, I will continue to support Svetlana in the subsequent management and with ongoing continuity of care.        Subjective   Svetlana is a 79 year old, presenting for the following health issues:  Covid Concern (Positive covid , cough , tessalon pearls not helping .)      10/21/2024    10:51 AM   Additional Questions   Roomed by [facility] [place]     Video Start Time: 11:29 AM    HPI             Objective           Vitals:  No vitals were obtained today due to virtual visit.    Physical Exam   GENERAL: alert and no distress  EYES: Eyes grossly normal to inspection.  No discharge or erythema, or obvious scleral/conjunctival abnormalities.  RESP: No audible wheeze, cough, or visible cyanosis.    SKIN: Visible skin clear. No significant  rash, abnormal pigmentation or lesions.  NEURO: Cranial nerves grossly intact.  Mentation and speech appropriate for age.  PSYCH: Appropriate affect, tone, and pace of words          Video-Visit Details    Type of service:  Video Visit   Video End Time:11:42 AM  Originating Location (pt. Location): Home    Distant Location (provider location):  Off-site  Platform used for Video Visit: Bhumi  Signed Electronically by: CAMELIA Rivas CNP

## 2024-10-21 NOTE — PROGRESS NOTES
"Svetlana is a 79 year old who is being evaluated via a billable video visit.    How would you like to obtain your AVS? MyChart  If the video visit is dropped, the invitation should be resent by: Text to cell phone: [phone]  Will anyone else be joining your video visit? No  {If patient encounters technical issues they should call [phone] :761445}    {PROVIDER CHARTING PREFERENCE:227527}     Subjective   Svetlana is a 79 year old, presenting for the following health issues:        9/6/2024     2:47 PM   Additional Questions   Roomed by Dianna       Video Start Time: {video visit start/end time for provider to select:723712}    HPI     {SUPERLIST (Optional):808416}  {additonal problems for provider to add (Optional):986200}    {ROS Picklists (Optional):355312}      Objective           Vitals:  No vitals were obtained today due to virtual visit.    Physical Exam   {video visit exam brief selected:338864}    {Diagnostic Test Results (Optional):851094}      Video-Visit Details    Type of service:  Video Visit   Video End Time:{video visit start/end time for provider to select:425842}  Originating Location (pt. Location): {video visit patient location:165172::\"Home\"}  {PROVIDER LOCATION On-site should be selected for visits conducted from your clinic location or adjoining [facility] hospital, academic office, or other nearby [facility] building. Off-site should be selected for all other provider locations, including home:733031}  Distant Location (provider location):  {virtual location provider:869394}  Platform used for Video Visit: {Virtual Visit Platforms:710460::\"MartMania\"}  Signed Electronically by: CAMELIA Rivas CNP  {Email feedback regarding this note to [email or web address]   :670388}  "

## 2024-10-27 ENCOUNTER — MYC REFILL (OUTPATIENT)
Dept: INTERNAL MEDICINE | Facility: CLINIC | Age: 79
End: 2024-10-27
Payer: COMMERCIAL

## 2024-10-27 DIAGNOSIS — G89.4 CHRONIC PAIN SYNDROME: ICD-10-CM

## 2024-10-27 DIAGNOSIS — M51.369 DEGENERATION OF INTERVERTEBRAL DISC OF LUMBAR REGION, UNSPECIFIED WHETHER PAIN PRESENT: ICD-10-CM

## 2024-10-27 DIAGNOSIS — M79.7 FIBROMYALGIA: ICD-10-CM

## 2024-10-28 RX ORDER — HYDROCODONE BITARTRATE AND ACETAMINOPHEN 5; 325 MG/1; MG/1
1-2 TABLET ORAL EVERY 6 HOURS PRN
Qty: 180 TABLET | Refills: 0 | Status: SHIPPED | OUTPATIENT
Start: 2024-10-30

## 2024-11-18 ENCOUNTER — MYC MEDICAL ADVICE (OUTPATIENT)
Dept: INTERNAL MEDICINE | Facility: CLINIC | Age: 79
End: 2024-11-18
Payer: COMMERCIAL

## 2024-11-25 ENCOUNTER — MYC REFILL (OUTPATIENT)
Dept: INTERNAL MEDICINE | Facility: CLINIC | Age: 79
End: 2024-11-25
Payer: COMMERCIAL

## 2024-11-25 DIAGNOSIS — M79.7 FIBROMYALGIA: ICD-10-CM

## 2024-11-25 DIAGNOSIS — M51.369 DEGENERATION OF INTERVERTEBRAL DISC OF LUMBAR REGION, UNSPECIFIED WHETHER PAIN PRESENT: ICD-10-CM

## 2024-11-25 DIAGNOSIS — G89.4 CHRONIC PAIN SYNDROME: ICD-10-CM

## 2024-11-25 RX ORDER — HYDROCODONE BITARTRATE AND ACETAMINOPHEN 5; 325 MG/1; MG/1
1-2 TABLET ORAL EVERY 6 HOURS PRN
Qty: 180 TABLET | Refills: 0 | Status: SHIPPED | OUTPATIENT
Start: 2024-11-27

## 2024-12-10 ENCOUNTER — TELEPHONE (OUTPATIENT)
Dept: INTERNAL MEDICINE | Facility: CLINIC | Age: 79
End: 2024-12-10
Payer: COMMERCIAL

## 2024-12-16 DIAGNOSIS — J45.30 MILD PERSISTENT ASTHMA WITHOUT COMPLICATION: ICD-10-CM

## 2024-12-16 RX ORDER — ALBUTEROL SULFATE 0.63 MG/3ML
1 SOLUTION RESPIRATORY (INHALATION) EVERY 6 HOURS PRN
Qty: 90 ML | Refills: 3 | Status: SHIPPED | OUTPATIENT
Start: 2024-12-16

## 2024-12-16 NOTE — TELEPHONE ENCOUNTER
Reason for Call:  Appointment Request and also wants nebulizer solution    Patient requesting this type of appt:  Hospital/ED Follow-Up     Requested provider: Suzanna Puentes    Reason patient unable to be scheduled: Not within requested timeframe    When does patient want to be seen/preferred time: 1-2 days    Comments: Patient was seen in   [facility] 12/8 for asthma .She did decline Dec 11th at 8:00 AM due to it being to early.  Wants an afternoon apt     Could we send this information to you in [place] or would you prefer to receive a phone call?:   Patient would prefer a phone call   Okay to leave a detailed message?: Yes at Home number on file [phone] (home)    Call taken on 12/10/2024 at 3:25 PM by Tricia Hernández  
Has 12- appointment.   
Patient was called and a voice message was left. Patient was informed to ask for her care team when calling back.      Upon call back pls assist patient in making an appointment   
36.3

## 2024-12-16 NOTE — TELEPHONE ENCOUNTER
Patient is requesting albuterol neb refill.  Last rx was from 2022.  Patient states she got hundreds of doses from a relative so has not needed a new rx.      Send to [facility]. YESICA Goldberg R.N.

## 2024-12-17 ENCOUNTER — PATIENT OUTREACH (OUTPATIENT)
Dept: INTERNAL MEDICINE | Facility: CLINIC | Age: 79
End: 2024-12-17
Payer: COMMERCIAL

## 2024-12-17 NOTE — TELEPHONE ENCOUNTER
Patient Quality Outreach    Patient is due for the following:   Hypertension -  Hypertension follow-up visit    Action(s) Taken:   Patient has upcoming appointment, these items will be addressed at that time.    Type of outreach:    Chart review performed, no outreach needed.    Questions for provider review:    None           Mallory Max MA

## 2024-12-21 ENCOUNTER — MYC REFILL (OUTPATIENT)
Dept: INTERNAL MEDICINE | Facility: CLINIC | Age: 79
End: 2024-12-21
Payer: COMMERCIAL

## 2024-12-21 ENCOUNTER — HEALTH MAINTENANCE LETTER (OUTPATIENT)
Age: 79
End: 2024-12-21

## 2024-12-21 ENCOUNTER — MYC MEDICAL ADVICE (OUTPATIENT)
Dept: INTERNAL MEDICINE | Facility: CLINIC | Age: 79
End: 2024-12-21
Payer: COMMERCIAL

## 2024-12-21 DIAGNOSIS — G89.4 CHRONIC PAIN SYNDROME: ICD-10-CM

## 2024-12-21 DIAGNOSIS — M51.369 DEGENERATION OF INTERVERTEBRAL DISC OF LUMBAR REGION, UNSPECIFIED WHETHER PAIN PRESENT: ICD-10-CM

## 2024-12-21 DIAGNOSIS — M79.7 FIBROMYALGIA: ICD-10-CM

## 2024-12-23 RX ORDER — HYDROCODONE BITARTRATE AND ACETAMINOPHEN 5; 325 MG/1; MG/1
1-2 TABLET ORAL EVERY 6 HOURS PRN
Qty: 180 TABLET | Refills: 0 | Status: SHIPPED | OUTPATIENT
Start: 2024-12-24

## 2024-12-23 NOTE — TELEPHONE ENCOUNTER
Last rx was filled 11/27. Earliest refill at 28 days would be 12/25. Let patient know she can pickup rx tomorrow (12/24) since 12/25 is a holiday. Please call patient and explain to her that even though she is picking this up 1 day early, her next refill will not be able to be picked up on 1/22 at the earliest so she needs to make sure rx lasts the same amount of time it normally does

## 2025-01-10 ENCOUNTER — TELEPHONE (OUTPATIENT)
Dept: INTERNAL MEDICINE | Facility: CLINIC | Age: 80
End: 2025-01-10
Payer: COMMERCIAL

## 2025-01-10 DIAGNOSIS — G89.4 CHRONIC PAIN SYNDROME: ICD-10-CM

## 2025-01-10 DIAGNOSIS — M79.7 FIBROMYALGIA: Primary | ICD-10-CM

## 2025-01-10 NOTE — TELEPHONE ENCOUNTER
Order/Referral Request    Who is requesting: PT    Orders being requested: referral     Reason service is needed/diagnosis: to get back on medical marijuana for pain mgmt    When are orders needed by: ASAP    Has this been discussed with Provider: No    Does patient have a preference on a Group/Provider/Facility? [facility] Rebecca Barnack    Does patient have an appointment scheduled?: Yes: 1/23 with     Where to send orders: Fax [phone]    Could we send this information to you in [facility] or would you prefer to receive a phone call?:   Patient would prefer a phone call   Okay to leave a detailed message?: Yes at Cell number on file:    Telephone Information:   Mobile [phone]

## 2025-01-14 NOTE — TELEPHONE ENCOUNTER
Printed referral and faxed to [phone]     Thank you,  Cal, Triage ALEX Charles    2:20 PM 1/14/2025

## 2025-01-14 NOTE — TELEPHONE ENCOUNTER
Pended pain referral for Nayda Steinberg, however it states she is located at [facility] [address] [facility] [place] [address].     Call to patient to confirm provider she is seeing also practices at [facility] and referral will need to be faxed to them once signed.    Please fax referral to - [phone]     Thank you,  Cal, Triage RN [place] Marissa    1:38 PM 1/14/2025

## 2025-01-18 ENCOUNTER — MYC REFILL (OUTPATIENT)
Dept: INTERNAL MEDICINE | Facility: CLINIC | Age: 80
End: 2025-01-18
Payer: COMMERCIAL

## 2025-01-18 DIAGNOSIS — I10 BENIGN ESSENTIAL HYPERTENSION: ICD-10-CM

## 2025-01-19 ENCOUNTER — MYC REFILL (OUTPATIENT)
Dept: INTERNAL MEDICINE | Facility: CLINIC | Age: 80
End: 2025-01-19
Payer: COMMERCIAL

## 2025-01-19 DIAGNOSIS — M79.7 FIBROMYALGIA: ICD-10-CM

## 2025-01-19 DIAGNOSIS — M51.369 DEGENERATION OF INTERVERTEBRAL DISC OF LUMBAR REGION, UNSPECIFIED WHETHER PAIN PRESENT: ICD-10-CM

## 2025-01-19 DIAGNOSIS — G89.4 CHRONIC PAIN SYNDROME: ICD-10-CM

## 2025-01-20 DIAGNOSIS — Z13.220 SCREENING FOR HYPERLIPIDEMIA: ICD-10-CM

## 2025-01-20 DIAGNOSIS — I10 BENIGN ESSENTIAL HYPERTENSION: Primary | ICD-10-CM

## 2025-01-20 RX ORDER — LOSARTAN POTASSIUM 50 MG/1
50 TABLET ORAL DAILY
Qty: 90 TABLET | Refills: 3 | OUTPATIENT
Start: 2025-01-20

## 2025-01-21 RX ORDER — HYDROCODONE BITARTRATE AND ACETAMINOPHEN 5; 325 MG/1; MG/1
1-2 TABLET ORAL EVERY 6 HOURS PRN
Qty: 180 TABLET | Refills: 0 | OUTPATIENT
Start: 2025-01-23

## 2025-01-21 NOTE — TELEPHONE ENCOUNTER
This can be filled at upcoming appt on 1/23 as it is not able to be picked up until 1/23 at the earliest

## 2025-01-23 ENCOUNTER — TELEPHONE (OUTPATIENT)
Dept: INTERNAL MEDICINE | Facility: CLINIC | Age: 80
End: 2025-01-23

## 2025-01-23 ENCOUNTER — OFFICE VISIT (OUTPATIENT)
Dept: INTERNAL MEDICINE | Facility: CLINIC | Age: 80
End: 2025-01-23
Payer: COMMERCIAL

## 2025-01-23 VITALS
TEMPERATURE: 96.7 F | RESPIRATION RATE: 14 BRPM | WEIGHT: 233.6 LBS | HEART RATE: 99 BPM | HEIGHT: 63 IN | OXYGEN SATURATION: 96 % | SYSTOLIC BLOOD PRESSURE: 136 MMHG | DIASTOLIC BLOOD PRESSURE: 82 MMHG | BODY MASS INDEX: 41.39 KG/M2

## 2025-01-23 DIAGNOSIS — J45.30 MILD PERSISTENT ASTHMA WITHOUT COMPLICATION: Primary | ICD-10-CM

## 2025-01-23 DIAGNOSIS — Z13.220 SCREENING FOR HYPERLIPIDEMIA: ICD-10-CM

## 2025-01-23 DIAGNOSIS — J45.20 MILD INTERMITTENT ASTHMA WITHOUT COMPLICATION: ICD-10-CM

## 2025-01-23 DIAGNOSIS — I10 BENIGN ESSENTIAL HYPERTENSION: ICD-10-CM

## 2025-01-23 DIAGNOSIS — M51.369 DEGENERATION OF INTERVERTEBRAL DISC OF LUMBAR REGION, UNSPECIFIED WHETHER PAIN PRESENT: ICD-10-CM

## 2025-01-23 DIAGNOSIS — R32 URINARY INCONTINENCE, UNSPECIFIED TYPE: ICD-10-CM

## 2025-01-23 DIAGNOSIS — M79.7 FIBROMYALGIA: ICD-10-CM

## 2025-01-23 DIAGNOSIS — G89.4 CHRONIC PAIN SYNDROME: ICD-10-CM

## 2025-01-23 LAB
AMPHETAMINES UR QL SCN: ABNORMAL
ANION GAP SERPL CALCULATED.3IONS-SCNC: 12 MMOL/L (ref 7–15)
BARBITURATES UR QL SCN: ABNORMAL
BENZODIAZ UR QL SCN: ABNORMAL
BUN SERPL-MCNC: 14.1 MG/DL (ref 8–23)
BZE UR QL SCN: ABNORMAL
CALCIUM SERPL-MCNC: 10 MG/DL (ref 8.8–10.4)
CANNABINOIDS UR QL SCN: ABNORMAL
CHLORIDE SERPL-SCNC: 102 MMOL/L (ref 98–107)
CHOLEST SERPL-MCNC: 173 MG/DL
CREAT SERPL-MCNC: 0.77 MG/DL (ref 0.51–0.95)
CREAT UR-MCNC: 323 MG/DL
EGFRCR SERPLBLD CKD-EPI 2021: 79 ML/MIN/1.73M2
FASTING STATUS PATIENT QL REPORTED: YES
FASTING STATUS PATIENT QL REPORTED: YES
FENTANYL UR QL: ABNORMAL
GLUCOSE SERPL-MCNC: 102 MG/DL (ref 70–99)
HCO3 SERPL-SCNC: 27 MMOL/L (ref 22–29)
HDLC SERPL-MCNC: 67 MG/DL
LDLC SERPL CALC-MCNC: 89 MG/DL
MICROALBUMIN UR-MCNC: 51.1 MG/L
MICROALBUMIN/CREAT UR: 15.82 MG/G CR (ref 0–25)
NONHDLC SERPL-MCNC: 106 MG/DL
OPIATES UR QL SCN: ABNORMAL
PCP QUAL URINE (ROCHE): ABNORMAL
POTASSIUM SERPL-SCNC: 4 MMOL/L (ref 3.4–5.3)
SODIUM SERPL-SCNC: 141 MMOL/L (ref 135–145)
TRIGL SERPL-MCNC: 87 MG/DL

## 2025-01-23 RX ORDER — HYDROCODONE BITARTRATE AND ACETAMINOPHEN 5; 325 MG/1; MG/1
1-2 TABLET ORAL EVERY 6 HOURS PRN
Qty: 180 TABLET | Refills: 0 | Status: SHIPPED | OUTPATIENT
Start: 2025-01-23

## 2025-01-23 RX ORDER — OXYBUTYNIN CHLORIDE 5 MG/1
5 TABLET ORAL AT BEDTIME
Qty: 90 TABLET | Refills: 1 | Status: SHIPPED | OUTPATIENT
Start: 2025-01-23

## 2025-01-23 RX ORDER — PREDNISONE 20 MG/1
40 TABLET ORAL DAILY
Qty: 10 TABLET | Refills: 0 | Status: SHIPPED | OUTPATIENT
Start: 2025-01-23 | End: 2025-01-28

## 2025-01-23 RX ORDER — ALBUTEROL SULFATE 90 UG/1
2 INHALANT RESPIRATORY (INHALATION) EVERY 6 HOURS PRN
Qty: 8 G | Refills: 4 | Status: SHIPPED | OUTPATIENT
Start: 2025-01-23

## 2025-01-23 NOTE — NURSING NOTE
"/82   Pulse 99   Temp (!) 96.7  F (35.9  C) (Tympanic)   Resp 14   Ht 1.6 m (5' 3\")   Wt 106 kg (233 lb 9.6 oz)   LMP  (LMP Unknown)   SpO2 96%   BMI 41.38 kg/m      "

## 2025-01-23 NOTE — PROGRESS NOTES
Assessment & Plan     (J45.30) Mild persistent asthma without complication  (primary encounter diagnosis)  Comment: The patient is following up for a chronic cough that has worsened this year, with a sensation of something in her chest and occasional productivity. She denies hemoptysis, shortness of breath, or wheezing. She uses Symbicort twice daily, sometimes more frequently, and albuterol 1-2 times per week on average. She also uses a nebulizer once weekly and takes Singulair. She previously [facility] and has no history of tobacco use. She was treated for an asthma exacerbation in December 2024. Chest x-ray on 12/8/24 was negative. Her PFTs were last updated in 2020, and updating them is recommended, along with re-establishing care with a pulmonologist. She experienced significant improvement in her cough after a recent steroid burst and has requested an additional prescription, for which Prednisone 40mg for 5 days was prescribed. Her breathing is at baseline, and her albuterol prescription was renewed today.  Plan: General PFT Lab (Please always keep checked),         Adult Pulmonary Medicine  Referral            (J45.20) Mild intermittent asthma without complication  Comment:   Plan: albuterol (PROAIR HFA/PROVENTIL HFA/VENTOLIN         HFA) 108 (90 Base) MCG/ACT inhaler, predniSONE         (DELTASONE) 20 MG tablet            (G89.4) Chronic pain syndrome  Comment:   Plan: Urine Drug Screen, HYDROcodone-acetaminophen         (NORCO) 5-325 MG tablet            (M79.7) Fibromyalgia  Comment:   Plan: HYDROcodone-acetaminophen (NORCO) 5-325 MG         tablet            (M51.369) Degeneration of intervertebral disc of lumbar region, unspecified whether pain present  Comment:   Plan: HYDROcodone-acetaminophen (NORCO) 5-325 MG         tablet            (R32) Urinary incontinence, unspecified type  Comment:   Plan: oxyBUTYnin (DITROPAN) 5 MG tablet            Follow  "up: see me in 6 months    The longitudinal plan of care for the diagnosis(es)/condition(s) as documented were addressed during this visit. Due to the added complexity in care, I will continue to support Svetlana in the subsequent management and with ongoing continuity of care.            Subjective   Svetlana is a 79 year old, presenting for the following health issues:  Consult (Fatigue, heaches)      1/23/2025    11:12 AM   Additional Questions   Roomed by daljit   Accompanied by          1/23/2025    11:12 AM   Patient Reported Additional Medications   Patient reports taking the following new medications none     HPI                 Objective    /82   Pulse 99   Temp (!) 96.7  F (35.9  C) (Tympanic)   Resp 14   Ht 1.6 m (5' 3\")   Wt 106 kg (233 lb 9.6 oz)   LMP  (LMP Unknown)   SpO2 96%   BMI 41.38 kg/m    Body mass index is 41.38 kg/m .    Physical Exam  Constitutional:       General: She is not in acute distress.     Appearance: Normal appearance. She is not ill-appearing, toxic-appearing or diaphoretic.   HENT:      Head: Normocephalic and atraumatic.   Eyes:      Conjunctiva/sclera: Conjunctivae normal.   Cardiovascular:      Rate and Rhythm: Normal rate and regular rhythm.      Heart sounds: Normal heart sounds.   Pulmonary:      Effort: Pulmonary effort is normal.      Breath sounds: Normal breath sounds.   Skin:     General: Skin is warm and dry.   Neurological:      Mental Status: She is alert and oriented to person, place, and time.   Psychiatric:         Mood and Affect: Mood normal.         Behavior: Behavior normal.         Thought Content: Thought content normal.         Judgment: Judgment normal.             Signed Electronically by: CAMELIA Rivas CNP    "

## 2025-01-23 NOTE — TELEPHONE ENCOUNTER
Called patient and she stated that she did leave a specimen.      Noted that the labs are all currently in process.

## 2025-01-23 NOTE — TELEPHONE ENCOUNTER
It looks like patient was not able to leave a urine sample today for the urine drug screen. I believe it is within the pain contract that a patient has to provide a sample when asked. She should return to lab tomorrow and no later to provide a urine sample.

## 2025-01-24 ENCOUNTER — TELEPHONE (OUTPATIENT)
Dept: INTERNAL MEDICINE | Facility: CLINIC | Age: 80
End: 2025-01-24
Payer: COMMERCIAL

## 2025-01-24 DIAGNOSIS — J45.20 MILD INTERMITTENT ASTHMA WITHOUT COMPLICATION: ICD-10-CM

## 2025-01-24 RX ORDER — PREDNISONE 20 MG/1
40 TABLET ORAL DAILY
Qty: 10 TABLET | Refills: 0 | Status: SHIPPED | OUTPATIENT
Start: 2025-01-24

## 2025-01-24 NOTE — TELEPHONE ENCOUNTER
Pt calling to [facility]. Reports [facility] typically only dispenses meds that are 30 day supplies with refills.     Please advise rx transfer to be able to .

## 2025-01-29 ENCOUNTER — NURSE TRIAGE (OUTPATIENT)
Dept: INTERNAL MEDICINE | Facility: CLINIC | Age: 80
End: 2025-01-29
Payer: COMMERCIAL

## 2025-01-29 NOTE — TELEPHONE ENCOUNTER
"Nurse Triage SBAR    Is this a 2nd Level Triage? YES, LICENSED PRACTITIONER REVIEW IS REQUIRED    Situation: Patient reports cough x a year.    Background: Hx of asthma, respiratory airway disease. Patient taking montelukast, albuterol neb and inhaler. Patient was seen by primary care provider on 01/23/2025.    Assessment: Patient states she spoke with a  who was taking a medication called Zilphercast that has \"worked wonders\". Writer relayed unfamiliar with this medication. Patient would like to ask provider about this and possibly get this prescribed. Patient notes chest tightness with coughing.   Patient notes she has a dry cough and wet cough, it depends. Mucus is clear.     Patient is using her neb once a day and albuterol inhaler usually more than once a day.     Patient denies wheezing, shortness of breath.     Protocol Recommended Disposition:   SEE IN OFFICE OR VIDEO VISIT TODAY:     Recommendation: Patient recently seen on 01/23/2025  by primary care provider. Will route to primary care provider/covering providers to advise about medication patient is asking about - Zilphercast (may be spelt wrong).     Routed to provider    Does the patient meet one of the following criteria for ADS visit consideration? 16+ years old, with an [facility] PCP     TIP  Providers, please consider if this condition is appropriate for management at one of our Acute and Diagnostic Services sites.     If patient is a good candidate, please use dotphrase <dot>triageresponse and select Refer to ADS to document.    Reason for Disposition   SEVERE coughing spells (e.g., whooping sound after coughing, vomiting after coughing)    Additional Information   Negative: Bluish (or gray) lips or face   Negative: SEVERE difficulty breathing (e.g., struggling for each breath, speaks in single words)   Negative: Rapid onset of cough and has hives   Negative: Coughing started suddenly after medicine, an allergic food or bee sting   " "Negative: Difficulty breathing after exposure to flames, smoke, or fumes   Negative: Sounds like a life-threatening emergency to the triager   Negative: Previous asthma attacks and this feels like asthma attack   Negative: Dry cough (non-productive; no sputum or minimal clear sputum) and within 14 days of COVID-19 Exposure   Negative: MODERATE difficulty breathing (e.g., speaks in phrases, SOB even at rest, pulse 100-120) and still present when not coughing   Negative: Chest pain present when not coughing   Negative: Passed out (e.g., fainted, lost consciousness, blacked out and was not responding)   Negative: Patient sounds very sick or weak to the triager   Negative: MILD difficulty breathing (e.g., minimal/no SOB at rest, SOB with walking, pulse <100) and still present when not coughing   Negative: Coughed up > 1 tablespoon (15 ml) blood (Exception: Blood-tinged sputum.)   Negative: Fever > 103 F (39.4 C)   Negative: Fever > 101 F (38.3 C) and over 60 years of age   Negative: Fever > 100 F (37.8 C) and has diabetes mellitus or a weak immune system (e.g., HIV positive, cancer chemotherapy, organ transplant, splenectomy, chronic steroids)   Negative: Fever > 100 F (37.8 C) and bedridden (e.g., CVA, chronic illness, recovering from surgery)   Negative: Increasing ankle swelling   Negative: Wheezing is present    Answer Assessment - Initial Assessment Questions  1. ONSET: \"When did the cough begin?\"       A year.    2. SEVERITY: \"How bad is the cough today?\"       Moderate    3. SPUTUM: \"Describe the color of your sputum\" (e.g., none, dry cough; clear, white, yellow, green)      Clear    4. HEMOPTYSIS: \"Are you coughing up any blood?\" If Yes, ask: \"How much?\" (e.g., flecks, streaks, tablespoons, etc.)      NO.     5. DIFFICULTY BREATHING: \"Are you having difficulty breathing?\" If Yes, ask: \"How bad is it?\" (e.g., mild, moderate, severe)       Normal.     6. FEVER: \"Do you have a fever?\" If Yes, ask: \"What is your " "temperature, how was it measured, and when did it start?\"      No    7. CARDIAC HISTORY: \"Do you have any history of heart disease?\" (e.g., heart attack, congestive heart failure)       HTN    8. LUNG HISTORY: \"Do you have any history of lung disease?\"  (e.g., pulmonary embolus, asthma, emphysema)      Hx of asthma, respiratory airway disease.     9. PE RISK FACTORS: \"Do you have a history of blood clots?\" (or: recent major surgery, recent prolonged travel, bedridden)      No.     10. OTHER SYMPTOMS: \"Do you have any other symptoms?\" (e.g., runny nose, wheezing, chest pain)        Chest tighness with cough.     11. PREGNANCY: \"Is there any chance you are pregnant?\" \"When was your last menstrual period?\"        N/A    12. TRAVEL: \"Have you traveled out of the country in the last month?\" (e.g., travel history, exposures)        No    Protocols used: Cough-A-OH    "

## 2025-01-30 NOTE — TELEPHONE ENCOUNTER
This is not urgent and I would like PCP to make the final call. The patient may need to see specialist to start that medication. Please let patient know PCP will be back February 10th. Thanks

## 2025-02-08 NOTE — TELEPHONE ENCOUNTER
I referred her to pulmonary specialist at her recent appt. Would recommend she discuss this with pulmonary provider.

## 2025-02-18 ENCOUNTER — APPOINTMENT (OUTPATIENT)
Age: 80
Setting detail: DERMATOLOGY
End: 2025-02-18

## 2025-02-18 DIAGNOSIS — L82.1 OTHER SEBORRHEIC KERATOSIS: ICD-10-CM

## 2025-02-18 DIAGNOSIS — D22 MELANOCYTIC NEVI: ICD-10-CM

## 2025-02-18 DIAGNOSIS — Z85.828 PERSONAL HISTORY OF OTHER MALIGNANT NEOPLASM OF SKIN: ICD-10-CM

## 2025-02-18 DIAGNOSIS — D18.0 HEMANGIOMA: ICD-10-CM

## 2025-02-18 DIAGNOSIS — Z71.89 OTHER SPECIFIED COUNSELING: ICD-10-CM

## 2025-02-18 DIAGNOSIS — L30.4 ERYTHEMA INTERTRIGO: ICD-10-CM | Status: STABLE

## 2025-02-18 PROBLEM — D23.71 OTHER BENIGN NEOPLASM OF SKIN OF RIGHT LOWER LIMB, INCLUDING HIP: Status: ACTIVE | Noted: 2025-02-18

## 2025-02-18 PROBLEM — D22.5 MELANOCYTIC NEVI OF TRUNK: Status: ACTIVE | Noted: 2025-02-18

## 2025-02-18 PROBLEM — D18.01 HEMANGIOMA OF SKIN AND SUBCUTANEOUS TISSUE: Status: ACTIVE | Noted: 2025-02-18

## 2025-02-18 PROCEDURE — ? OBSERVATION

## 2025-02-18 PROCEDURE — 99213 OFFICE O/P EST LOW 20 MIN: CPT

## 2025-02-18 PROCEDURE — ? PRESCRIPTION MEDICATION MANAGEMENT

## 2025-02-18 PROCEDURE — ? PRESCRIPTION

## 2025-02-18 PROCEDURE — ? COUNSELING

## 2025-02-18 PROCEDURE — ? SUNSCREEN RECOMMENDATIONS

## 2025-02-18 RX ORDER — KETOCONAZOLE 20 MG/G
CREAM TOPICAL
Qty: 30 | Refills: 3 | Status: ERX | COMMUNITY
Start: 2025-02-18

## 2025-02-18 RX ADMIN — KETOCONAZOLE: 20 CREAM TOPICAL at 00:00

## 2025-02-18 ASSESSMENT — LOCATION DETAILED DESCRIPTION DERM
LOCATION DETAILED: INFERIOR THORACIC SPINE
LOCATION DETAILED: LEFT INFRAMAMMARY CREASE (INNER QUADRANT)
LOCATION DETAILED: LEFT INFERIOR MEDIAL FOREHEAD
LOCATION DETAILED: SUPERIOR THORACIC SPINE
LOCATION DETAILED: RIGHT SUPERIOR MEDIAL MIDBACK
LOCATION DETAILED: MIDDLE STERNUM
LOCATION DETAILED: RIGHT INFRAMAMMARY CREASE (INNER QUADRANT)
LOCATION DETAILED: LEFT DISTAL POSTERIOR UPPER ARM
LOCATION DETAILED: EPIGASTRIC SKIN

## 2025-02-18 ASSESSMENT — LOCATION ZONE DERM
LOCATION ZONE: TRUNK
LOCATION ZONE: ARM
LOCATION ZONE: FACE

## 2025-02-18 ASSESSMENT — LOCATION SIMPLE DESCRIPTION DERM
LOCATION SIMPLE: LEFT BREAST
LOCATION SIMPLE: LEFT FOREHEAD
LOCATION SIMPLE: ABDOMEN
LOCATION SIMPLE: RIGHT LOWER BACK
LOCATION SIMPLE: CHEST
LOCATION SIMPLE: LEFT POSTERIOR UPPER ARM
LOCATION SIMPLE: RIGHT BREAST
LOCATION SIMPLE: UPPER BACK

## 2025-02-18 NOTE — PROCEDURE: PRESCRIPTION MEDICATION MANAGEMENT
Continue Regimen: Ketoconazole cream twice daily as needed
Detail Level: Zone
Render In Strict Bullet Format?: No

## 2025-02-18 NOTE — HPI: FULL BODY SKIN EXAMINATION
What Type Of Note Output Would You Prefer (Optional)?: Bullet Format
What Is The Reason For Today's Visit?: Full Body Skin Examination
What Is The Reason For Today's Visit? (Being Monitored For X): concerning skin lesions on a periodic basis
Additional History: Patient reports history of skin cancer removed in 2023 on the left elbow. No records available.

## 2025-02-24 ENCOUNTER — TELEPHONE (OUTPATIENT)
Dept: INTERNAL MEDICINE | Facility: CLINIC | Age: 80
End: 2025-02-24
Payer: COMMERCIAL

## 2025-02-24 ENCOUNTER — MYC REFILL (OUTPATIENT)
Dept: INTERNAL MEDICINE | Facility: CLINIC | Age: 80
End: 2025-02-24
Payer: COMMERCIAL

## 2025-02-24 DIAGNOSIS — M79.7 FIBROMYALGIA: ICD-10-CM

## 2025-02-24 DIAGNOSIS — M51.369 DEGENERATION OF INTERVERTEBRAL DISC OF LUMBAR REGION, UNSPECIFIED WHETHER PAIN PRESENT: ICD-10-CM

## 2025-02-24 DIAGNOSIS — G89.4 CHRONIC PAIN SYNDROME: ICD-10-CM

## 2025-02-24 DIAGNOSIS — G89.4 CHRONIC PAIN SYNDROME: Primary | ICD-10-CM

## 2025-02-24 NOTE — TELEPHONE ENCOUNTER
Is there a specific pain location in [place] patient and  are requesting to be seen?     Call to patient who states the location is [facility]. Referral needs to be faxed to: fax# [phone] Attn: Jackie     Pended referral (unsure of diagnosis to associate). Routing to primary care provider to advise.    Thank you,  Cal, Triage RN [place] Charles    4:30 PM 2/24/2025

## 2025-02-24 NOTE — TELEPHONE ENCOUNTER
Order/Referral Request    Who is requesting: patient     Orders being requested: referral to [facility]    Reason service is needed/diagnosis: Medical Marijuana card     When are orders needed by: soon    Has this been discussed with Provider: patient said it has    Does patient have a preference on a Group/Provider/Facility? [facility] fax# [phone] Attn: Jackie    Does patient have an appointment scheduled?: unknown to writer     Where to send orders: Fax see above     Could we send this information to you in [facility] or would you prefer to receive a phone call?:   No preference   Okay to leave a detailed message?: Yes at Cell number on file:    Telephone Information:   Mobile [phone]

## 2025-02-25 RX ORDER — HYDROCODONE BITARTRATE AND ACETAMINOPHEN 5; 325 MG/1; MG/1
1-2 TABLET ORAL EVERY 6 HOURS PRN
Qty: 180 TABLET | Refills: 0 | Status: SHIPPED | OUTPATIENT
Start: 2025-02-25

## 2025-02-25 NOTE — TELEPHONE ENCOUNTER
This was already faxed to this number on 1/14/25 but was not sent to the attention of anyone. Resent attn: Jackie

## 2025-02-25 NOTE — TELEPHONE ENCOUNTER
I believe I already sent this- look under the referrals tab. I sent this about a month ago to a pain provider that was requested by the pain

## 2025-03-23 ENCOUNTER — MYC REFILL (OUTPATIENT)
Dept: INTERNAL MEDICINE | Facility: CLINIC | Age: 80
End: 2025-03-23
Payer: COMMERCIAL

## 2025-03-23 DIAGNOSIS — G89.4 CHRONIC PAIN SYNDROME: ICD-10-CM

## 2025-03-23 DIAGNOSIS — M51.369 DEGENERATION OF INTERVERTEBRAL DISC OF LUMBAR REGION, UNSPECIFIED WHETHER PAIN PRESENT: ICD-10-CM

## 2025-03-23 DIAGNOSIS — M79.7 FIBROMYALGIA: ICD-10-CM

## 2025-03-24 RX ORDER — HYDROCODONE BITARTRATE AND ACETAMINOPHEN 5; 325 MG/1; MG/1
1-2 TABLET ORAL EVERY 6 HOURS PRN
Qty: 180 TABLET | Refills: 0 | Status: SHIPPED | OUTPATIENT
Start: 2025-03-24

## 2025-04-07 DIAGNOSIS — J45.30 MILD PERSISTENT ASTHMA WITHOUT COMPLICATION: ICD-10-CM

## 2025-04-07 RX ORDER — ALBUTEROL SULFATE 0.63 MG/3ML
1 SOLUTION RESPIRATORY (INHALATION) EVERY 6 HOURS PRN
Qty: 90 ML | Refills: 3 | Status: SHIPPED | OUTPATIENT
Start: 2025-04-07

## 2025-04-07 NOTE — TELEPHONE ENCOUNTER
Call received from patient requesting a refill today if possible.   
Patient calling to follow up on this request.She has been using the neb a few times/day. Patient is completely out of medication as she did not realize she was out of solution. She is hoping for this refill today.  Alivia Mednez,     
Suzanna is gone for the day.  Writer huddled with Dr. Arredondo - willing to fill the medication.   
The ECG shows an atrial fibrillation. 
Will need this sent to a local pharmacy.  Had refills sent to [facility] in December.     Medication and pharmacy pended    
None

## 2025-04-08 NOTE — PROGRESS NOTES
April 9, 2025        COMPREHENSIVE PAIN CLINIC INITIAL EVALUATION  I had the pleasure of meeting Ms. Svetlana Serrano on 4/9/2025 in the [facility] in consult for Suzanna DENISE CNP PCP with regards to her pain.  The patient is a 79 year old female with past medical history of fibromyalgia, RLS, spinal stenosis, OA multiple joints, asthma, morbid obesity, who presents for evaluation of chronic pain.      ___________________________________________________________________  History of of chronic pain on initial exam 4/9/2025                               Subjective:  She presents with her .    Patient endorses chronic pain due to fibromyalgia that was dx over 20 years ago.  She endorses pain inh b/l legs and arms to a lesser extent    The patient describes the pain as constant,dull and aching.  Patient denies numbness and tingling anywhere.  Patient denies weakness anywhere.  She reports that the pain is made worse by weather changes, walking.  Her pain is improved with rest and hydrocodone.  She reports chronic fatigue.  She is a poor sleeper.   She rates her currenty pain score at 2/10, but it can be as low as 2/10 or as severe as 9/10.  Physical therapy was completed years ago at [facility].     Patient endorses anxiety and depression.  Patient does not follow with a mental health care provider.  Patient does not exercise on a regular basis.    Patient is interested in becoming certified for medical cannabis.  She was previously certifed but it has lapsed.  History of dependency or addiction: no  History of severe heart disease: no  History of severe liver disease: no  Family history of schizophrenia: no    Progress Notes Reviewed:  1/23/2025 Suzanna DENISE CNP, PCP  12/08/2024 ED - asthma exacerbation  10/21/2024 Suzanna DENISE CNP, PCP    She denies any new problems with falls or balance, any new numbness or weakness of the arms or legs, any new bowel or bladder incontinence, any night  sweats or unexplained fevers, or any sudden or unexpected weight loss. She has scooter.    Svetlana Serrano has been seen at a pain clinic in the past.        Current Treatments:  Norco 5/325mg 2 tabs TID - 180 tabs for 23 days last dispensed 3/24/2025  Cannabis  Cymbalta 60mg daily  Lorazepam 0.5mg once daily prn  Narcan prn    Anticoagulation:  none  Implantable devices:  none      Past Medical History:  Medical history reviewed.  Past Medical History:   Diagnosis Date    Arthritis     right shoulder    Back pain     Fibromyalgia     HTN (hypertension)     Incontinence of urine in female     Mild intermittent asthma     Other chronic pain     from fibromyalgia    Restless leg     Tubular adenoma of colon 8/15      Patient Active Problem List   Diagnosis    Mild persistent asthma without complication    Restless leg syndrome    Fibromyalgia    Chronic midline low back pain without sciatica    Spinal stenosis    Controlled substance agreement signed- [id number]/14/20    Morbid obesity (H)    Anxiety    Narcotic dependence (H)    Benign essential hypertension    CARDIOVASCULAR SCREENING; LDL GOAL LESS THAN 130    Chronic, continuous use of opioids    Allergic rhinitis    Bilateral ocular hypertension    Pain in joint, shoulder region    Pseudophakia, both eyes    Asthma    RAD (reactive airway disease)    Sleep disturbance         Past Surgical History:  Pertinent surgical history reviewed.  Past Surgical History:   Procedure Laterality Date    ARTHROPLASTY HIP Right 2020    Procedure: Right total hip arthroplasty.  Posterior lateral piriformis sparing approach.;  Surgeon: Cirilo Man MD;  Location: [facility] OR    ARTHROPLASTY HIP Left 7/15/2020    Procedure: Left total hip arthroplasty.  Posterior lateral piriformis sparing approach;  Surgeon: Cirilo Man MD;  Location: [facility] OR    ARTHROPLASTY SHOULDER Left     C/SECTION, CLASSICAL      , Classical    CHOLECYSTECTOMY, LAPOROSCOPIC       Cholecystectomy, Laparoscopic    COLONOSCOPY N/A 10/8/2018    Procedure: COLONOSCOPY;  Colonoscopy with polypectomies;  Surgeon: Anton Stroud MD;  Location: [facility] OR    HYSTERECTOMY, JOSHUA      JOINT REPLACEMTN, KNEE RT/LT      bilateral          Medications: Pertinent medications reviewed.  Current Outpatient Medications   Medication Sig Dispense Refill    albuterol (ACCUNEB) 0.63 MG/3ML neb solution Take 3 mLs (0.63 mg) by nebulization every 6 hours as needed for shortness of breath or wheezing. 90 mL 3    albuterol (PROAIR HFA/PROVENTIL HFA/VENTOLIN HFA) 108 (90 Base) MCG/ACT inhaler Inhale 2 puffs into the lungs every 6 hours as needed for shortness of breath. 8 g 4    amLODIPine (NORVASC) 5 MG tablet Take 1 tablet (5 mg) by mouth daily 90 tablet 3    BIOTIN PO Take by mouth daily      biotin POWD       budesonide-formoterol (SYMBICORT) 160-4.5 MCG/ACT Inhaler Inhale 2 puffs into the lungs 2 times daily 6 g 3    DULoxetine (CYMBALTA) 60 MG capsule Take 1 capsule (60 mg) by mouth 2 times daily 180 capsule 3    HYDROcodone-acetaminophen (NORCO) 5-325 MG tablet Take 1-2 tablets by mouth every 6 hours as needed for pain. 180 tablet 0    ipratropium - albuterol 0.5 mg/2.5 mg/3 mL (DUONEB) 0.5-2.5 (3) MG/3ML neb solution Inhale 3 mLs into the lungs.      LORazepam (ATIVAN) 0.5 MG tablet TAKE 1 po q8 hours prn anxiety 10 tablet 0    losartan (COZAAR) 50 MG tablet Take 1 tablet (50 mg) by mouth daily 90 tablet 3    medical cannabis (Patient's own supply) (The purpose of this order is to document that the patient reports taking medical cannabis.  This is not a prescription, and is not used to certify that the patient has a qualifying medical condition.) 0 Information only 0    montelukast (SINGULAIR) 10 MG tablet Take 1 tablet (10 mg) by mouth at bedtime 90 tablet 3    naloxone (NARCAN) 4 MG/0.1ML nasal spray Spray 1 spray (4 mg) into one nostril alternating nostrils once as needed for opioid reversal every 2-3  minutes until assistance arrives 0.2 mL 0    oxyBUTYnin (DITROPAN) 5 MG tablet Take 1 tablet (5 mg) by mouth at bedtime. 90 tablet 1    predniSONE (DELTASONE) 20 MG tablet Take 2 tablets (40 mg) by mouth daily. 10 tablet 0    Vitamin D3 (CHOLECALCIFEROL) 25 mcg (1000 units) tablet Take by mouth daily         [place] Prescription Monitoring Program reviewed 4/9/2025.  No concern for abuse or misuse of controlled medications based on this report.  3/24/2025 Norco 5/325mg 180 tabs for 23 days  2/25/2025 Norco 5/325mg 180 tabs for 23 days  1/23/2025 Norco 5/325mg 180 tabs for 23 days      Allergies: Pertinent allergies reviewed.     Allergies   Allergen Reactions    Clindamycin Shortness Of Breath    Erythromycin Anaphylaxis     PN: LW Reaction: ANAPHYLAXIS    Amoxicillin      Rash.    Cephalexin Hives    Doxycycline GI Disturbance and Hives     gi distress      Sulfa Antibiotics      Hives         Family History:   family history includes Breast Cancer in her mother; Family History Negative in her father and mother.    Social History:   She is  and lives in a house in [place], [place].  She has 2 children.  She is retired. She is independent in ADL's.  No history of chemical dependency.  She  reports that she quit smoking about 56 years ago. Her smoking use included cigarettes. She has been exposed to tobacco smoke. She has never used smokeless tobacco. She reports current alcohol use. She reports that she does not use drugs.  Social History     Social History Narrative    Not on file       Review of Systems:      (Positive responses bolded)  GENERAL: fever/chills, fatigue, general unwell feeling, weight gain/loss  HEAD/EYES:  headache, dizziness, or vision changes  EARS/NOSE/THROAT: nosebleeds, hearing loss, sinus infection, earache, tinnitus  IMMUNE:  allergies, cancer, immune deficiency, or infections  SKIN:  itching, rash, hives  HEME/Lymphatic: anemia, easy bruising, easy bleeding  RESPIRATORY: cough, wheezing, or  shortness of breath  CARDIOVASCULAR/Circulation: extremity edema, syncope, hypertension, tachycardia, or angina  GASTROINTESTINAL: abdominal pain, nausea/emesis, diarrhea, constipation, hematochezia, or melena  ENDOCRINE:  diabetes, steroid use, thyroid disease or osteoporosis  MUSCULOSKELETAL: myalgias, joint pain, stiffness, neck pain, back pain, arthritis, or gout  GENITOURINARY: frequency, urgency, dysuria, difficulty voiding, hematuria or incontinence  NEUROLOGIC: weakness, numbness, paresthesias, seizure, tremor, stroke or memory loss  PSYCHIATRIC: depression, anxiety, stress, suicidal thoughts/attempts or mood swings      Physical Exam:  BP (!) 163/104   Pulse 80   LMP  (LMP Unknown)   SpO2 96%     Constitutional: She is oriented to person, place, and time.  She is obese She is not in acute distress.   HENT:     Head: Normocephalic and atraumatic.     Eyes: Pupils are equal, round, and reactive to light. EOM are normal. No scleral icterus.   Pulmonary/Chest:  NWOB. No respiratory distress.   Neurological: She is alert and oriented to person, place, and time. Coordination grossly normal.    Skin: Skin is warm and dry. She is not diaphoretic.   Psychiatric: She has a normal mood and affect. Her behavior is normal. Judgment and thought content normal.  Patient answers questions appropriately.  MSK: Gait is normal.       Imaging:  EXAM: DX AXIAL AND PERIPHERAL  LOCATION: [facility]  DATE: 9/5/2024     INDICATION: Postmenopausal, bone fracture during adulthood.  DEMOGRAPHICS: Age- 79 years. Gender- Female. Menopausal status- Postmenopausal.  COMPARISON: 6/10/2015  TECHNIQUE: Dual-energy x-ray absorptiometry (DXA) performed with routine technique. Forearm DXA performed since the hip and/or spine could not be measured or interpreted.      FINDINGS:     DXA RESULTS  -Lumbar Spine: L1-L2: BMD: 1.132 g/cm2. T-score: -0.4. Z-score: 0.3. Degenerative change may artifactually increase  BMD.  -LEFT FOREARM Radius 33%: BMD: 0.892 g/cm2. T-score: 0.2. Z-score: 2.7.     WHO T-SCORE CRITERIA  -Normal: T score at or above -1 SD  -Osteopenia: T score between -1 and -2.5 SD  -Osteoporosis: T score at or below -2.5 SD     The World Health Organization (WHO) criteria is applicable to perimenopausal females, postmenopausal females, and men aged 50 years or older.     INTERVAL CHANGE  -There has been a 7.6% increase in lumbar spine BMD, which may be due to arthritic/sclerotic changes.      FRACTURE RISK  -The FRAX risk calculator is not applicable due to normal BMD in the spine/hip regions.                                                                      IMPRESSION: NORMAL. Bone mineral density measurements are within normal limits using T score.      EMG:  na    Diagnosis:  (M79.7) Fibromyalgia  (primary encounter diagnosis)  Comment:   Plan:     (E66.01) Morbid obesity (H)  Comment:   Plan:     (G89.29) Chronic intractable pain  Comment:   Plan:     (F11.90) Chronic, continuous use of opioids  Comment:   Plan:           Plan on initial consult on 4/8/2025:    Diagnostics:   Reviewed DEXA scan .       Medications:  Certified for medical cannabis today.  Email: [email or web address]   Phone #: [phone]      Follow up:   Return to clinic annually for medical cannabis.        CAMELIA Jimenez, TOMASA  [facility] [place]        BILLING TIME DOCUMENTATION:   The total TIME spent on this patient on the date of the encounter/appointment was 44 minutes.

## 2025-04-09 ENCOUNTER — OFFICE VISIT (OUTPATIENT)
Dept: PALLIATIVE MEDICINE | Facility: CLINIC | Age: 80
End: 2025-04-09
Payer: COMMERCIAL

## 2025-04-09 VITALS — SYSTOLIC BLOOD PRESSURE: 163 MMHG | HEART RATE: 80 BPM | DIASTOLIC BLOOD PRESSURE: 104 MMHG | OXYGEN SATURATION: 96 %

## 2025-04-09 DIAGNOSIS — G89.29 CHRONIC INTRACTABLE PAIN: ICD-10-CM

## 2025-04-09 DIAGNOSIS — F11.90 CHRONIC, CONTINUOUS USE OF OPIOIDS: ICD-10-CM

## 2025-04-09 DIAGNOSIS — M79.7 FIBROMYALGIA: Primary | ICD-10-CM

## 2025-04-09 DIAGNOSIS — G89.4 CHRONIC PAIN SYNDROME: ICD-10-CM

## 2025-04-09 DIAGNOSIS — E66.01 MORBID OBESITY (H): ICD-10-CM

## 2025-04-09 ASSESSMENT — PAIN SCALES - PAIN ENJOYMENT GENERAL ACTIVITY SCALE (PEG)
INTERFERED_ENJOYMENT_LIFE: 8
AVG_PAIN_PASTWEEK: 8
INTERFERED_GENERAL_ACTIVITY: 8
INTERFERED_GENERAL_ACTIVITY: 8
PEG_TOTALSCORE: 8
AVG_PAIN_PASTWEEK: 8
PEG_TOTALSCORE: 8
INTERFERED_ENJOYMENT_LIFE: 8

## 2025-04-09 ASSESSMENT — PAIN SCALES - GENERAL: PAINLEVEL_OUTOF10: MILD PAIN (3)

## 2025-04-09 NOTE — NURSING NOTE
Pre-procedure Intake  If YES to any questions or NO to having a   Please complete laminated checklist and leave on the computer keyboard for Provider, verbally inform provider if able.    For SCS Trial, RFA's or any sedation procedure:  Have you been fasting? NA  If yes, for how long?     Are you taking any any blood thinners such as Coumadin, Warfarin, Jantoven, Pradaxa Xarelto, Eliquis, Edoxaban, Enoxaparin, Lovenox, Heparin, Arixtra, Fondaparinux, or Fragmin? OR Antiplatelet medication such as Plavix, Brilinta, or Effient?      If yes, when did you take your last dose?     Do you take aspirin?  No  If cervical procedure, have you held aspirin for 6 days?   NA    Is the Pt taking any GLP-1 Antagonist (hold needed for sedation patients only)  (semaglutide (Ozempic, Wegovy), dulaglutide (Trulicity), exenatide ER (Bydureon), tirzepatide (Mounjaro), Liraglutide (Saxenda, Victoza), semaglutide (Rybelsus)     NA  If yes, when did you take your last dose?     Do you have any allergies to contrast dye, iodine, steroid and/or numbing medications?  NO    Are you currently taking antibiotics or have an active infection?  NO    Have you had a fever/elevated temperature within the past week? NO    Are you currently taking oral steroids? NO    Do you have a ? Yes    Are you pregnant or breastfeeding?  Not Applicable    Have you received any vaccinations in the last week? NO    Vitals: B/P 142/79    Notify provider and RNs if systolic BP >170, diastolic BP >100, P >100 or O2 sats < 90%      Grace Baeza MA  [facility] [place]

## 2025-04-09 NOTE — PATIENT INSTRUCTIONS
Plan on initial consult on 4/8/2025:    Diagnostics:   Reviewed DEXA scan .       Medications:  Certified for medical cannabis today.  Email: [email or web address]   Phone #: [phone]      Follow up:   Return to clinic annually for medical cannabis.        CAMELIA Jimenez, TOMASA  [facility] [place]          Clinic Number:  [phone]   Call with any questions about your care and for scheduling assistance.   Calls are returned Monday through Friday between 8 AM and 4:30 PM. We usually get back to you within 2 business days depending on the issue/request.    If we are prescribing your medications:  For opioid medication refills, call the clinic or send a [employer] message 7 days in advance.  Please include:  Name of requested medication  Name of the pharmacy.  For non-opioid medications, call your pharmacy directly to request a refill. Please allow 3-4 days to be processed.   Per [place] State Law:  All controlled substance prescriptions must be filled within 30 days of being written.    For those controlled substances allowing refills, pickup must occur within 30 days of last fill.      We believe regular attendance is key to your success in our program!    Any time you are unable to keep your appointment we ask that you call us at least 24 hours in advance to cancel.This will allow us to offer the appointment time to another patient.   Multiple missed appointments may lead to dismissal from the clinic.

## 2025-04-21 ENCOUNTER — MYC REFILL (OUTPATIENT)
Dept: INTERNAL MEDICINE | Facility: CLINIC | Age: 80
End: 2025-04-21
Payer: COMMERCIAL

## 2025-04-21 DIAGNOSIS — G89.4 CHRONIC PAIN SYNDROME: ICD-10-CM

## 2025-04-21 DIAGNOSIS — M79.7 FIBROMYALGIA: ICD-10-CM

## 2025-04-21 DIAGNOSIS — M51.369 DEGENERATION OF INTERVERTEBRAL DISC OF LUMBAR REGION, UNSPECIFIED WHETHER PAIN PRESENT: ICD-10-CM

## 2025-04-21 RX ORDER — HYDROCODONE BITARTRATE AND ACETAMINOPHEN 5; 325 MG/1; MG/1
1-2 TABLET ORAL EVERY 6 HOURS PRN
Qty: 180 TABLET | Refills: 0 | Status: SHIPPED | OUTPATIENT
Start: 2025-04-21

## 2025-04-21 NOTE — TELEPHONE ENCOUNTER
Is norco supposed to go to [facility]? That is where it has been being filled. Please ensure correct pharmacy is chosen

## 2025-04-21 NOTE — TELEPHONE ENCOUNTER
Call to patient to verify pharmacy. Patient would like prescription to go to [facility]. Got rid of medical cannabis that was also pending too since Suzanna doesn't prescribe this.     Thank you,  Cal, Triage RN [place]    1:49 PM 4/21/2025

## 2025-04-28 ENCOUNTER — TELEPHONE (OUTPATIENT)
Dept: INTERNAL MEDICINE | Facility: CLINIC | Age: 80
End: 2025-04-28
Payer: COMMERCIAL

## 2025-05-19 ENCOUNTER — MYC REFILL (OUTPATIENT)
Dept: INTERNAL MEDICINE | Facility: CLINIC | Age: 80
End: 2025-05-19
Payer: COMMERCIAL

## 2025-05-19 DIAGNOSIS — G89.4 CHRONIC PAIN SYNDROME: ICD-10-CM

## 2025-05-19 DIAGNOSIS — M51.369 DEGENERATION OF INTERVERTEBRAL DISC OF LUMBAR REGION, UNSPECIFIED WHETHER PAIN PRESENT: ICD-10-CM

## 2025-05-19 DIAGNOSIS — M79.7 FIBROMYALGIA: ICD-10-CM

## 2025-05-20 ENCOUNTER — PATIENT OUTREACH (OUTPATIENT)
Dept: CARE COORDINATION | Facility: CLINIC | Age: 80
End: 2025-05-20
Payer: COMMERCIAL

## 2025-05-20 RX ORDER — HYDROCODONE BITARTRATE AND ACETAMINOPHEN 5; 325 MG/1; MG/1
1-2 TABLET ORAL EVERY 6 HOURS PRN
Qty: 180 TABLET | Refills: 0 | Status: SHIPPED | OUTPATIENT
Start: 2025-05-20

## 2025-05-29 DIAGNOSIS — J45.30 MILD PERSISTENT ASTHMA WITHOUT COMPLICATION: ICD-10-CM

## 2025-05-29 RX ORDER — ALBUTEROL SULFATE 0.63 MG/3ML
1 SOLUTION RESPIRATORY (INHALATION) EVERY 6 HOURS PRN
Qty: 90 ML | Refills: 3 | Status: SHIPPED | OUTPATIENT
Start: 2025-05-29

## 2025-05-29 RX ORDER — ALBUTEROL SULFATE 0.63 MG/3ML
SOLUTION RESPIRATORY (INHALATION)
Qty: 75 ML | OUTPATIENT
Start: 2025-05-29

## 2025-05-29 NOTE — TELEPHONE ENCOUNTER
Per , pt is taking roughly 2 per day. Needs more refills as only has 2 left.    Renae BRAXTONN, RN, PHN

## 2025-06-12 ENCOUNTER — PATIENT OUTREACH (OUTPATIENT)
Dept: INTERNAL MEDICINE | Facility: CLINIC | Age: 80
End: 2025-06-12
Payer: COMMERCIAL

## 2025-06-12 NOTE — TELEPHONE ENCOUNTER
Patient Quality Outreach    Patient is due for the following:   Asthma  -  ACT needed and AAP  Hypertension -  BP check and Hypertension follow-up visit  Physical Annual Wellness Visit      Topic Date Due    Hepatitis A Vaccine (1 of 2 - Risk 2-dose series) Never done    COVID-19 Vaccine (8 - Moderna risk 2024-25 season) 05/12/2025     Chronic Opioid Use -  Treatment Agreement (CSA)    Action(s) Taken:   Schedule a Annual Wellness Visit    Type of outreach:    Sent [employer] message.    Questions for provider review:    None         Saima Bond, LPN  Chart routed to self.

## 2025-06-15 DIAGNOSIS — M79.7 FIBROMYALGIA: ICD-10-CM

## 2025-06-15 DIAGNOSIS — F41.9 ANXIETY: ICD-10-CM

## 2025-06-15 DIAGNOSIS — J45.31 MILD PERSISTENT ASTHMA WITH ACUTE EXACERBATION: ICD-10-CM

## 2025-06-16 RX ORDER — MONTELUKAST SODIUM 10 MG/1
1 TABLET ORAL AT BEDTIME
Qty: 90 TABLET | Refills: 1 | Status: SHIPPED | OUTPATIENT
Start: 2025-06-16

## 2025-06-16 RX ORDER — DULOXETIN HYDROCHLORIDE 60 MG/1
60 CAPSULE, DELAYED RELEASE ORAL 2 TIMES DAILY
Qty: 180 CAPSULE | Refills: 1 | Status: SHIPPED | OUTPATIENT
Start: 2025-06-16

## 2025-06-21 ENCOUNTER — MYC REFILL (OUTPATIENT)
Dept: INTERNAL MEDICINE | Facility: CLINIC | Age: 80
End: 2025-06-21
Payer: COMMERCIAL

## 2025-06-21 DIAGNOSIS — M79.7 FIBROMYALGIA: ICD-10-CM

## 2025-06-21 DIAGNOSIS — M51.369 DEGENERATION OF INTERVERTEBRAL DISC OF LUMBAR REGION, UNSPECIFIED WHETHER PAIN PRESENT: ICD-10-CM

## 2025-06-21 DIAGNOSIS — G89.4 CHRONIC PAIN SYNDROME: ICD-10-CM

## 2025-06-24 RX ORDER — HYDROCODONE BITARTRATE AND ACETAMINOPHEN 5; 325 MG/1; MG/1
1-2 TABLET ORAL EVERY 6 HOURS PRN
Qty: 180 TABLET | Refills: 0 | Status: SHIPPED | OUTPATIENT
Start: 2025-06-24

## 2025-07-22 ENCOUNTER — MYC REFILL (OUTPATIENT)
Dept: INTERNAL MEDICINE | Facility: CLINIC | Age: 80
End: 2025-07-22
Payer: COMMERCIAL

## 2025-07-22 DIAGNOSIS — M51.369 DEGENERATION OF INTERVERTEBRAL DISC OF LUMBAR REGION, UNSPECIFIED WHETHER PAIN PRESENT: ICD-10-CM

## 2025-07-22 DIAGNOSIS — M79.7 FIBROMYALGIA: ICD-10-CM

## 2025-07-22 DIAGNOSIS — G89.4 CHRONIC PAIN SYNDROME: ICD-10-CM

## 2025-07-22 RX ORDER — HYDROCODONE BITARTRATE AND ACETAMINOPHEN 5; 325 MG/1; MG/1
1-2 TABLET ORAL EVERY 6 HOURS PRN
Qty: 180 TABLET | Refills: 0 | Status: SHIPPED | OUTPATIENT
Start: 2025-07-22

## 2025-08-06 ENCOUNTER — PATIENT OUTREACH (OUTPATIENT)
Dept: CARE COORDINATION | Facility: CLINIC | Age: 80
End: 2025-08-06
Payer: COMMERCIAL

## 2025-08-17 ENCOUNTER — MYC REFILL (OUTPATIENT)
Dept: INTERNAL MEDICINE | Facility: CLINIC | Age: 80
End: 2025-08-17
Payer: COMMERCIAL

## 2025-08-17 DIAGNOSIS — G89.4 CHRONIC PAIN SYNDROME: ICD-10-CM

## 2025-08-17 DIAGNOSIS — M51.369 DEGENERATION OF INTERVERTEBRAL DISC OF LUMBAR REGION, UNSPECIFIED WHETHER PAIN PRESENT: ICD-10-CM

## 2025-08-17 DIAGNOSIS — M79.7 FIBROMYALGIA: ICD-10-CM

## 2025-08-18 RX ORDER — HYDROCODONE BITARTRATE AND ACETAMINOPHEN 5; 325 MG/1; MG/1
1-2 TABLET ORAL EVERY 6 HOURS PRN
Qty: 180 TABLET | Refills: 0 | Status: SHIPPED | OUTPATIENT
Start: 2025-08-19

## 2025-09-03 ENCOUNTER — PATIENT OUTREACH (OUTPATIENT)
Dept: CARE COORDINATION | Facility: CLINIC | Age: 80
End: 2025-09-03
Payer: COMMERCIAL

## (undated) DEVICE — SU VICRYL+ 1 MO-4 18" DYED VCP702D

## (undated) DEVICE — NDL BLUNT 18GA 1" W/O FILTER 305181

## (undated) DEVICE — GLOVE PROTEXIS BLUE W/NEU-THERA 8.0  2D73EB80

## (undated) DEVICE — ENDO FORCEP BX CAPTURA JUMBO SPIKE 2.8MMX230CM G53042

## (undated) DEVICE — LINEN HALF SHEET 5512

## (undated) DEVICE — SU STRATAFIX 2-0 MH 36" SXPD2B412

## (undated) DEVICE — HOOD FLYTE W/PEELAWAY 408-800-100

## (undated) DEVICE — DRAPE SPLIT SHEET 77X108 REINFORCED 29436

## (undated) DEVICE — SOL NACL 0.9% 20ML VIAL

## (undated) DEVICE — GOWN XLG DISP 9545

## (undated) DEVICE — GLOVE PROTEXIS BLUE W/NEU-THERA 7.5  2D73EB75

## (undated) DEVICE — GLOVE PROTEXIS POWDER FREE 8.0 ORTHOPEDIC 2D73ET80

## (undated) DEVICE — DRSG GAUZE 4X4" TRAY

## (undated) DEVICE — SET HANDPIECE INTERPULSE W/COAXIAL FAN SPRAY TIP 0210118000

## (undated) DEVICE — DRSG TEGADERM 2 3/8X2 3/4" 1624W

## (undated) DEVICE — DRSG TEGADERM 4X10" 1627

## (undated) DEVICE — LINEN DRAPE 54X72" 5467

## (undated) DEVICE — KIT PROCEDURE W/CLEAN-A-SCOPE LINERS V2 200800

## (undated) DEVICE — SOL WATER IRRIG 1000ML BOTTLE 2F7114

## (undated) DEVICE — BAG CLEAR TRASH 1.3M 39X33" P4040C

## (undated) DEVICE — GLOVE PROTEXIS POWDER FREE 7.5 ORTHOPEDIC 2D73ET75

## (undated) DEVICE — DEVICE RETRIEVER HEWSON 71111579

## (undated) DEVICE — SUCTION CANISTER MEDIVAC LINER 3000ML W/LID 65651-530

## (undated) DEVICE — LINEN FULL SHEET 5511

## (undated) DEVICE — DRAPE STERI TOWEL LG 1010

## (undated) DEVICE — LINEN ORTHO ACL PACK 5447

## (undated) DEVICE — PACK TOTAL HIP RIDGES LATEX PO15HIFSG

## (undated) DEVICE — BLADE SAW SAGITTAL STRK 25X90X1.27MM HD SYS 6 6125-127-090

## (undated) DEVICE — SUCTION MANIFOLD NEPTUNE 2 SYS 4 PORT 0702-020-000

## (undated) DEVICE — DRAPE CONVERTORS U-DRAPE 60X72" 8476

## (undated) DEVICE — SOL NACL 0.9% IRRIG 3000ML BAG 2B7477

## (undated) DEVICE — SU VICRYL 2-0 CT-1 27" UND J259H

## (undated) DEVICE — TUBING SUCTION 12"X1/4" N612

## (undated) DEVICE — SOL WATER IRRIG 500ML BOTTLE 2F7113

## (undated) DEVICE — DRAPE SPLIT EENT 76X124" 3X28" 9447

## (undated) DEVICE — IMM PILLOW ABDUCT HIP MED M60-025-M

## (undated) DEVICE — PAD CHUX UNDERPAD 23X24" 7136

## (undated) DEVICE — PREP CHLORAPREP 26ML TINTED ORANGE  260815

## (undated) DEVICE — SU ETHIBOND 5 V-37 4X30" MB66G

## (undated) RX ORDER — PROPOFOL 10 MG/ML
INJECTION, EMULSION INTRAVENOUS
Status: DISPENSED
Start: 2020-02-26

## (undated) RX ORDER — GLYCOPYRROLATE 0.2 MG/ML
INJECTION INTRAMUSCULAR; INTRAVENOUS
Status: DISPENSED
Start: 2020-07-15

## (undated) RX ORDER — PROPOFOL 10 MG/ML
INJECTION, EMULSION INTRAVENOUS
Status: DISPENSED
Start: 2020-07-15

## (undated) RX ORDER — VANCOMYCIN HYDROCHLORIDE 1 G/20ML
INJECTION, POWDER, LYOPHILIZED, FOR SOLUTION INTRAVENOUS
Status: DISPENSED
Start: 2020-02-26

## (undated) RX ORDER — KETAMINE HCL IN 0.9 % NACL 50 MG/5 ML
SYRINGE (ML) INTRAVENOUS
Status: DISPENSED
Start: 2018-10-08

## (undated) RX ORDER — DEXAMETHASONE SODIUM PHOSPHATE 4 MG/ML
INJECTION, SOLUTION INTRA-ARTICULAR; INTRALESIONAL; INTRAMUSCULAR; INTRAVENOUS; SOFT TISSUE
Status: DISPENSED
Start: 2020-07-15

## (undated) RX ORDER — HYDRALAZINE HYDROCHLORIDE 20 MG/ML
INJECTION INTRAMUSCULAR; INTRAVENOUS
Status: DISPENSED
Start: 2020-02-26

## (undated) RX ORDER — TRANEXAMIC ACID 650 MG/1
TABLET ORAL
Status: DISPENSED
Start: 2020-07-15

## (undated) RX ORDER — ONDANSETRON 2 MG/ML
INJECTION INTRAMUSCULAR; INTRAVENOUS
Status: DISPENSED
Start: 2018-10-08

## (undated) RX ORDER — FENTANYL CITRATE 50 UG/ML
INJECTION, SOLUTION INTRAMUSCULAR; INTRAVENOUS
Status: DISPENSED
Start: 2018-10-08

## (undated) RX ORDER — FENTANYL CITRATE 50 UG/ML
INJECTION, SOLUTION INTRAMUSCULAR; INTRAVENOUS
Status: DISPENSED
Start: 2020-07-15

## (undated) RX ORDER — LABETALOL 20 MG/4 ML (5 MG/ML) INTRAVENOUS SYRINGE
Status: DISPENSED
Start: 2020-02-26

## (undated) RX ORDER — PHENYLEPHRINE HCL IN 0.9% NACL 1 MG/10 ML
SYRINGE (ML) INTRAVENOUS
Status: DISPENSED
Start: 2020-07-15

## (undated) RX ORDER — ACETAMINOPHEN 500 MG
TABLET ORAL
Status: DISPENSED
Start: 2020-02-26

## (undated) RX ORDER — ACETAMINOPHEN 325 MG/1
TABLET ORAL
Status: DISPENSED
Start: 2020-07-15

## (undated) RX ORDER — FENTANYL CITRATE 50 UG/ML
INJECTION, SOLUTION INTRAMUSCULAR; INTRAVENOUS
Status: DISPENSED
Start: 2020-02-26

## (undated) RX ORDER — CEFAZOLIN SODIUM 2 G/100ML
INJECTION, SOLUTION INTRAVENOUS
Status: DISPENSED
Start: 2020-02-26

## (undated) RX ORDER — ONDANSETRON 2 MG/ML
INJECTION INTRAMUSCULAR; INTRAVENOUS
Status: DISPENSED
Start: 2020-07-15

## (undated) RX ORDER — HYDROMORPHONE HYDROCHLORIDE 1 MG/ML
INJECTION, SOLUTION INTRAMUSCULAR; INTRAVENOUS; SUBCUTANEOUS
Status: DISPENSED
Start: 2020-02-26

## (undated) RX ORDER — KETAMINE HCL IN 0.9 % NACL 50 MG/5 ML
SYRINGE (ML) INTRAVENOUS
Status: DISPENSED
Start: 2020-02-26

## (undated) RX ORDER — NEOSTIGMINE METHYLSULFATE 1 MG/ML
VIAL (ML) INJECTION
Status: DISPENSED
Start: 2020-07-15

## (undated) RX ORDER — EPHEDRINE SULFATE 50 MG/ML
INJECTION, SOLUTION INTRAMUSCULAR; INTRAVENOUS; SUBCUTANEOUS
Status: DISPENSED
Start: 2020-07-15

## (undated) RX ORDER — LABETALOL 20 MG/4 ML (5 MG/ML) INTRAVENOUS SYRINGE
Status: DISPENSED
Start: 2020-07-15

## (undated) RX ORDER — VANCOMYCIN HYDROCHLORIDE 1 G/20ML
INJECTION, POWDER, LYOPHILIZED, FOR SOLUTION INTRAVENOUS
Status: DISPENSED
Start: 2020-07-15